# Patient Record
Sex: FEMALE | Race: WHITE | NOT HISPANIC OR LATINO | Employment: UNEMPLOYED | URBAN - METROPOLITAN AREA
[De-identification: names, ages, dates, MRNs, and addresses within clinical notes are randomized per-mention and may not be internally consistent; named-entity substitution may affect disease eponyms.]

---

## 2020-02-10 LAB — ALCOHOL URINE: <10 MG/DL

## 2020-02-14 LAB
BUPRENORPHINE GLUCURONIDE URINE: <5 NG/ML
BUPRENORPHINE URINE: <2 NG/ML
N-DESMETHYLTAPENTADOL, URINE: <25 NG/ML
NALOXONE URINE: <100 NG/ML
NORBUPRENORPHINE GLUCURONIDE URINE: <5 NG/ML
NORBUPRENORPHINE, URINE: <2 NG/ML
TAPENTADOL, URINE: <25 NG/ML

## 2020-02-19 LAB
6-ACETYLMORPHINE, UR: <25 NG/ML
7-AMINOCLONAZEPAM, URINE: <25 NG/ML
ALPHA-HYDROXYALPRAZOLAM, URINE: <25 NG/ML
ALPHA-HYDROXYMIDAZOLAM, URINE: <25 NG/ML
ALPRAZOLAM URINE QUANT: <25 NG/ML
AMPHETAMINE SCREEN, URINE: ABNORMAL
BARBITURATE SCREEN, URINE: ABNORMAL
CANNABINOID SCREEN URINE: ABNORMAL
CHLORDIAZEPOXIDE, URINE: <25 NG/ML
CLONAZEPAM, URINE CONFIRM: <25 NG/ML
COCAINE METABOLITE SCREEN URINE: ABNORMAL
CODEINE URINE: <50 NG/ML
CREAT SERPL-MCNC: 254.3 MG/DL
DIAZEPAM URINE QUANT: <25 NG/ML
EDDP, URINE: <25 NG/ML
FENTANYL, UR CONF: <2.5 NG/ML
HYDROCODONE, URINE: 1070 NG/ML
HYDROMORPHONE, URINE: 280 NG/ML
LORAZEPAM URINE QUANT: <25 NG/ML
Lab: ABNORMAL
METHADONE, URINE: <25 NG/ML
MIDAZOLAM URINE QUANT: <25 NG/ML
MORPHINE URINE: <50 NG/ML
NORDIAZEPAM URINE QUANT: <25 NG/ML
NORFENTANYL, UR CONF: <2.5 NG/ML
NORHYDROCODONE, URINE: 876 NG/ML
NOROXYCODONE, URINE: <25 NG/ML
O-DESMETHYLTRAMADOL,UR: <50 NG/ML
OXAZEPAM URINE QUANT: <25 NG/ML
OXYCODONE URINE: <25 NG/ML
OXYMORPHONE, URINE: <25 NG/ML
PHENCYCLIDINE SCREEN URINE: ABNORMAL
TEMAZEPAM URINE QUANT: <25 NG/ML
TRAMADOL, UR GC/MS CONF: <50 NG/ML
ZOLPIDEM METABOLITE (ZCA), URINE: <25 NG/ML
ZOLPIDEM, URINE: <25 NG/ML

## 2023-02-21 LAB
ESTIMATED AVERAGE GLUCOSE FOR HBA1C: 85 MG/DL
HEMOGLOBIN A1C/HEMOGLOBIN TOTAL IN BLOOD: 4.6 %

## 2023-02-22 LAB
CALCIDIOL (25 OH VITAMIN D3) (NG/ML) IN SER/PLAS: 24 NG/ML
CORTISOL (UG/DL) IN SERUM - AM: 5.8 UG/DL (ref 5–20)
CORTISOL UR FREE PER VOLUME: NORMAL
CORTISOL, URINE FREE - PER 24H: NORMAL
CORTISOL, URINE INTERPRETATION: NORMAL
CORTISOL/CREATININE RATIO: NORMAL
CREATININE, URINE - PER VOLUME: NORMAL
HOURS COLLECTED (ARUP): NORMAL
THYROTROPIN (MIU/L) IN SER/PLAS BY DETECTION LIMIT <= 0.05 MIU/L: 0.82 MIU/L (ref 0.44–3.98)
THYROXINE (T4) FREE (NG/DL) IN SER/PLAS: 0.97 NG/DL (ref 0.78–1.48)
TOTAL VOLUME (ARUP): NORMAL

## 2023-03-07 LAB — CHROMOGRANIN A, LC/MS/MS: 192 NG/ML

## 2023-03-10 LAB
ALANINE AMINOTRANSFERASE (SGPT) (U/L) IN SER/PLAS: 19 U/L (ref 7–45)
ALBUMIN (G/DL) IN SER/PLAS: 4.3 G/DL (ref 3.4–5)
ALKALINE PHOSPHATASE (U/L) IN SER/PLAS: 44 U/L (ref 33–110)
ANION GAP IN SER/PLAS: 13 MMOL/L (ref 10–20)
ASPARTATE AMINOTRANSFERASE (SGOT) (U/L) IN SER/PLAS: 18 U/L (ref 9–39)
BILIRUBIN TOTAL (MG/DL) IN SER/PLAS: 0.5 MG/DL (ref 0–1.2)
C REACTIVE PROTEIN (MG/L) IN SER/PLAS: 0.17 MG/DL
CALCIUM (MG/DL) IN SER/PLAS: 9 MG/DL (ref 8.6–10.3)
CARBON DIOXIDE, TOTAL (MMOL/L) IN SER/PLAS: 25 MMOL/L (ref 21–32)
CHLORIDE (MMOL/L) IN SER/PLAS: 101 MMOL/L (ref 98–107)
CHOLESTEROL (MG/DL) IN SER/PLAS: 214 MG/DL (ref 0–199)
CHOLESTEROL IN HDL (MG/DL) IN SER/PLAS: 41.3 MG/DL
CHOLESTEROL/HDL RATIO: 5.2
CREATININE (MG/DL) IN SER/PLAS: 0.6 MG/DL (ref 0.5–1.05)
ERYTHROCYTE DISTRIBUTION WIDTH (RATIO) BY AUTOMATED COUNT: 14.4 % (ref 11.5–14.5)
ERYTHROCYTE MEAN CORPUSCULAR HEMOGLOBIN CONCENTRATION (G/DL) BY AUTOMATED: 33.4 G/DL (ref 32–36)
ERYTHROCYTE MEAN CORPUSCULAR VOLUME (FL) BY AUTOMATED COUNT: 94 FL (ref 80–100)
ERYTHROCYTES (10*6/UL) IN BLOOD BY AUTOMATED COUNT: 3.9 X10E12/L (ref 4–5.2)
GFR FEMALE: >90 ML/MIN/1.73M2
GLUCOSE (MG/DL) IN SER/PLAS: 80 MG/DL (ref 74–99)
HEMATOCRIT (%) IN BLOOD BY AUTOMATED COUNT: 36.8 % (ref 36–46)
HEMOGLOBIN (G/DL) IN BLOOD: 12.3 G/DL (ref 12–16)
LDL: 121 MG/DL (ref 0–99)
LEUKOCYTES (10*3/UL) IN BLOOD BY AUTOMATED COUNT: 5.4 X10E9/L (ref 4.4–11.3)
NON HDL CHOLESTEROL: 173 MG/DL
PLATELETS (10*3/UL) IN BLOOD AUTOMATED COUNT: 287 X10E9/L (ref 150–450)
POTASSIUM (MMOL/L) IN SER/PLAS: 4.1 MMOL/L (ref 3.5–5.3)
PROTEIN TOTAL: 6.9 G/DL (ref 6.4–8.2)
SEDIMENTATION RATE, ERYTHROCYTE: 6 MM/H (ref 0–20)
SODIUM (MMOL/L) IN SER/PLAS: 135 MMOL/L (ref 136–145)
TRIGLYCERIDE (MG/DL) IN SER/PLAS: 261 MG/DL (ref 0–149)
URATE (MG/DL) IN SER/PLAS: 3.3 MG/DL (ref 2.3–6.7)
UREA NITROGEN (MG/DL) IN SER/PLAS: 12 MG/DL (ref 6–23)
VLDL: 52 MG/DL (ref 0–40)

## 2023-04-12 ENCOUNTER — TELEMEDICINE (OUTPATIENT)
Dept: PRIMARY CARE | Facility: CLINIC | Age: 43
End: 2023-04-12
Payer: COMMERCIAL

## 2023-04-12 DIAGNOSIS — F41.9 ANXIETY: ICD-10-CM

## 2023-04-12 DIAGNOSIS — F33.9 EPISODE OF RECURRENT MAJOR DEPRESSIVE DISORDER, UNSPECIFIED DEPRESSION EPISODE SEVERITY (CMS-HCC): Primary | ICD-10-CM

## 2023-04-12 PROBLEM — J30.9 ALLERGIC RHINITIS: Status: ACTIVE | Noted: 2023-04-12

## 2023-04-12 PROBLEM — R10.32 CONTINUOUS LLQ ABDOMINAL PAIN: Status: ACTIVE | Noted: 2023-04-12

## 2023-04-12 PROBLEM — R09.89 LABILE HYPERTENSION: Status: ACTIVE | Noted: 2023-04-12

## 2023-04-12 PROBLEM — K52.9 BOWEL DISEASE, INFLAMMATORY: Status: ACTIVE | Noted: 2023-04-12

## 2023-04-12 PROBLEM — G90.A POTS (POSTURAL ORTHOSTATIC TACHYCARDIA SYNDROME): Status: ACTIVE | Noted: 2023-04-12

## 2023-04-12 PROBLEM — G24.1: Status: ACTIVE | Noted: 2023-04-12

## 2023-04-12 PROBLEM — R73.03 PREDIABETES: Status: ACTIVE | Noted: 2023-04-12

## 2023-04-12 PROBLEM — D84.9: Status: ACTIVE | Noted: 2019-08-13

## 2023-04-12 PROBLEM — E03.9 HYPOTHYROIDISM (ACQUIRED): Status: ACTIVE | Noted: 2023-04-12

## 2023-04-12 PROBLEM — M10.9 GOUTY ARTHROPATHY: Status: ACTIVE | Noted: 2023-04-12

## 2023-04-12 PROBLEM — A69.20 LYME DISEASE: Status: ACTIVE | Noted: 2023-04-12

## 2023-04-12 PROBLEM — L40.50 PSORIATIC ARTHROPATHY (MULTI): Status: ACTIVE | Noted: 2023-04-12

## 2023-04-12 PROBLEM — G47.10 HYPERSOMNIA: Status: ACTIVE | Noted: 2023-04-12

## 2023-04-12 PROBLEM — J45.909: Status: ACTIVE | Noted: 2023-04-12

## 2023-04-12 PROBLEM — R23.2 HOT FLASHES: Status: ACTIVE | Noted: 2023-04-12

## 2023-04-12 PROBLEM — E53.8 VITAMIN B12 DEFICIENCY: Status: ACTIVE | Noted: 2023-04-12

## 2023-04-12 PROBLEM — R79.89 LOW VITAMIN D LEVEL: Status: ACTIVE | Noted: 2023-04-12

## 2023-04-12 PROBLEM — D89.89 MANNOSE-BINDING LECTIN DEFICIENCY (MULTI): Status: ACTIVE | Noted: 2023-04-12

## 2023-04-12 PROBLEM — F32.A DEPRESSION: Status: ACTIVE | Noted: 2023-04-12

## 2023-04-12 PROBLEM — B02.29 POST HERPETIC NEURALGIA: Status: ACTIVE | Noted: 2023-04-12

## 2023-04-12 PROBLEM — N39.46 MIXED INCONTINENCE URGE AND STRESS: Status: ACTIVE | Noted: 2023-04-12

## 2023-04-12 PROBLEM — F19.939: Status: ACTIVE | Noted: 2019-08-13

## 2023-04-12 PROBLEM — K21.9 GERD (GASTROESOPHAGEAL REFLUX DISEASE): Status: ACTIVE | Noted: 2023-04-12

## 2023-04-12 PROBLEM — M81.0 OSTEOPOROSIS: Status: ACTIVE | Noted: 2023-04-12

## 2023-04-12 PROBLEM — M25.50 ARTHRALGIA OF MULTIPLE SITES: Status: ACTIVE | Noted: 2023-04-12

## 2023-04-12 PROBLEM — G43.909 MIGRAINE WITHOUT STATUS MIGRAINOSUS, NOT INTRACTABLE: Status: ACTIVE | Noted: 2023-04-12

## 2023-04-12 PROBLEM — Z98.84 BARIATRIC SURGERY STATUS: Status: ACTIVE | Noted: 2023-04-12

## 2023-04-12 PROBLEM — R49.0 HOARSENESS: Status: ACTIVE | Noted: 2023-04-12

## 2023-04-12 PROBLEM — K52.9 CHRONIC DIARRHEA: Status: ACTIVE | Noted: 2023-04-12

## 2023-04-12 PROBLEM — G47.33 OBSTRUCTIVE SLEEP APNEA, ADULT: Status: ACTIVE | Noted: 2023-04-12

## 2023-04-12 PROBLEM — M19.90 INFLAMMATORY ARTHROPATHY: Status: ACTIVE | Noted: 2023-04-12

## 2023-04-12 PROBLEM — E78.1 HYPERTRIGLYCERIDEMIA: Status: ACTIVE | Noted: 2023-04-12

## 2023-04-12 PROBLEM — F11.90 METHADONE USE: Status: ACTIVE | Noted: 2023-04-12

## 2023-04-12 PROBLEM — E78.49 ESSENTIAL FAMILIAL HYPERLIPIDEMIA: Status: ACTIVE | Noted: 2023-04-12

## 2023-04-12 PROBLEM — E66.01 MORBID OBESITY (MULTI): Status: ACTIVE | Noted: 2023-04-12

## 2023-04-12 PROBLEM — R63.2 POLYPHAGIA: Status: ACTIVE | Noted: 2023-04-12

## 2023-04-12 PROCEDURE — 99442 PR PHYS/QHP TELEPHONE EVALUATION 11-20 MIN: CPT | Performed by: NURSE PRACTITIONER

## 2023-04-12 RX ORDER — ALBUTEROL SULFATE 90 UG/1
AEROSOL, METERED RESPIRATORY (INHALATION)
COMMUNITY
Start: 2015-04-13

## 2023-04-12 RX ORDER — ICOSAPENT ETHYL 1 G/1
2 CAPSULE ORAL
COMMUNITY
Start: 2018-01-26 | End: 2024-01-04 | Stop reason: ALTCHOICE

## 2023-04-12 RX ORDER — ORPHENADRINE CITRATE 100 MG/1
1 TABLET, EXTENDED RELEASE ORAL 2 TIMES DAILY PRN
COMMUNITY
Start: 2019-07-22 | End: 2023-10-05 | Stop reason: SDUPTHER

## 2023-04-12 RX ORDER — PREGABALIN 75 MG/1
75 CAPSULE ORAL 3 TIMES DAILY
COMMUNITY
Start: 2020-05-26 | End: 2023-10-05 | Stop reason: SDUPTHER

## 2023-04-12 RX ORDER — ALLOPURINOL 300 MG/1
300 TABLET ORAL DAILY
COMMUNITY
Start: 2020-04-02 | End: 2024-01-26 | Stop reason: SDUPTHER

## 2023-04-12 RX ORDER — ERGOCALCIFEROL 1.25 MG/1
50000 CAPSULE ORAL
COMMUNITY
Start: 2019-06-19

## 2023-04-12 RX ORDER — EPLERENONE 25 MG/1
1 TABLET, FILM COATED ORAL DAILY
COMMUNITY
Start: 2021-04-12

## 2023-04-12 RX ORDER — EVOLOCUMAB 140 MG/ML
140 INJECTION, SOLUTION SUBCUTANEOUS
COMMUNITY
Start: 2020-01-28 | End: 2024-03-06 | Stop reason: SDUPTHER

## 2023-04-12 RX ORDER — COLCHICINE 0.6 MG/1
1 TABLET ORAL EVERY OTHER DAY
COMMUNITY
Start: 2020-04-02 | End: 2023-11-14 | Stop reason: SDUPTHER

## 2023-04-12 RX ORDER — BUSPIRONE HYDROCHLORIDE 15 MG/1
1 TABLET ORAL 2 TIMES DAILY
COMMUNITY
Start: 2022-11-22 | End: 2023-09-19 | Stop reason: SDUPTHER

## 2023-04-12 RX ORDER — AMLODIPINE BESYLATE 2.5 MG/1
2.5 TABLET ORAL DAILY
COMMUNITY
Start: 2021-06-16

## 2023-04-12 RX ORDER — VENLAFAXINE HYDROCHLORIDE 75 MG/1
75 CAPSULE, EXTENDED RELEASE ORAL DAILY
COMMUNITY
Start: 2019-06-21 | End: 2023-10-05 | Stop reason: ALTCHOICE

## 2023-04-12 RX ORDER — ONDANSETRON 4 MG/1
4 TABLET, ORALLY DISINTEGRATING ORAL EVERY 8 HOURS PRN
COMMUNITY
Start: 2018-02-19 | End: 2023-07-27 | Stop reason: SDUPTHER

## 2023-04-12 RX ORDER — ALLOPURINOL 100 MG/1
100 TABLET ORAL DAILY
COMMUNITY
Start: 2020-11-03 | End: 2024-01-26 | Stop reason: SDUPTHER

## 2023-04-12 RX ORDER — CLONIDINE HYDROCHLORIDE 0.1 MG/1
0.1 TABLET ORAL DAILY
COMMUNITY
Start: 2020-07-02

## 2023-04-12 RX ORDER — LEFLUNOMIDE 10 MG/1
10 TABLET ORAL DAILY
COMMUNITY
Start: 2021-07-06 | End: 2023-11-14 | Stop reason: SDUPTHER

## 2023-04-12 RX ORDER — MONTELUKAST SODIUM 10 MG/1
10 TABLET ORAL NIGHTLY
COMMUNITY
Start: 2023-01-17

## 2023-04-12 RX ORDER — TAPENTADOL HYDROCHLORIDE 50 MG/1
1 TABLET, FILM COATED ORAL EVERY 6 HOURS
COMMUNITY
Start: 2021-10-04 | End: 2023-10-05 | Stop reason: SDUPTHER

## 2023-04-12 RX ORDER — CARVEDILOL 25 MG/1
1 TABLET ORAL
COMMUNITY
Start: 2020-04-02

## 2023-04-12 RX ORDER — MELOXICAM 15 MG/1
1 TABLET ORAL DAILY
COMMUNITY
End: 2023-10-05 | Stop reason: SDUPTHER

## 2023-04-12 RX ORDER — VENLAFAXINE HYDROCHLORIDE 150 MG/1
150 TABLET, EXTENDED RELEASE ORAL DAILY
COMMUNITY
End: 2023-10-05 | Stop reason: ALTCHOICE

## 2023-04-12 RX ORDER — BACLOFEN 10 MG/1
TABLET ORAL
COMMUNITY
Start: 2014-05-06 | End: 2023-10-05 | Stop reason: ALTCHOICE

## 2023-04-12 RX ORDER — OLMESARTAN MEDOXOMIL 40 MG/1
40 TABLET ORAL DAILY
COMMUNITY
Start: 2019-05-14

## 2023-04-12 RX ORDER — CARBIDOPA, LEVODOPA AND ENTACAPONE 50; 200; 200 MG/1; MG/1; MG/1
1 TABLET, FILM COATED ORAL EVERY 4 HOURS
COMMUNITY
Start: 2014-02-25 | End: 2023-06-13 | Stop reason: SDUPTHER

## 2023-04-12 RX ORDER — MULTIVIT-MIN/IRON/FOLIC ACID/K 18-600-40
1 CAPSULE ORAL DAILY
COMMUNITY
Start: 2022-02-10

## 2023-04-12 RX ORDER — BENZTROPINE MESYLATE 2 MG/1
2 TABLET ORAL DAILY
COMMUNITY
Start: 2015-06-19

## 2023-04-12 RX ORDER — ALENDRONATE SODIUM 70 MG/1
1 TABLET ORAL WEEKLY
COMMUNITY
Start: 2019-11-04 | End: 2024-04-25 | Stop reason: WASHOUT

## 2023-04-12 RX ORDER — METOPROLOL SUCCINATE 25 MG/1
25 TABLET, EXTENDED RELEASE ORAL DAILY
COMMUNITY
Start: 2022-12-27 | End: 2023-10-05 | Stop reason: ALTCHOICE

## 2023-04-12 RX ORDER — BENZOYL PEROXIDE 100 MG/ML
LIQUID TOPICAL
COMMUNITY
Start: 2020-06-16

## 2023-04-12 RX ORDER — TIOTROPIUM BROMIDE INHALATION SPRAY 1.56 UG/1
SPRAY, METERED RESPIRATORY (INHALATION)
COMMUNITY
Start: 2019-07-23

## 2023-04-12 RX ORDER — SECUKINUMAB 150 MG/ML
INJECTION SUBCUTANEOUS
COMMUNITY
Start: 2022-07-29 | End: 2023-10-19 | Stop reason: SDUPTHER

## 2023-04-12 RX ORDER — FLUTICASONE FUROATE AND VILANTEROL 100; 25 UG/1; UG/1
1 POWDER RESPIRATORY (INHALATION) DAILY
COMMUNITY
Start: 2018-11-27 | End: 2024-04-25 | Stop reason: WASHOUT

## 2023-04-12 RX ORDER — SUMATRIPTAN SUCCINATE 25 MG/1
TABLET ORAL
COMMUNITY
Start: 2020-06-08 | End: 2023-09-05 | Stop reason: SDUPTHER

## 2023-04-12 RX ORDER — PIOGLITAZONEHYDROCHLORIDE 15 MG/1
15 TABLET ORAL DAILY
COMMUNITY
Start: 2021-04-13

## 2023-04-12 RX ORDER — BUPROPION HYDROCHLORIDE 450 MG/1
450 TABLET, FILM COATED, EXTENDED RELEASE ORAL DAILY
COMMUNITY
Start: 2021-12-19 | End: 2023-06-13 | Stop reason: SDUPTHER

## 2023-04-12 RX ORDER — AMOXICILLIN 500 MG/1
500 CAPSULE ORAL EVERY 12 HOURS SCHEDULED
COMMUNITY
Start: 2023-02-17

## 2023-04-12 NOTE — PROGRESS NOTES
Subjective   Patient ID: Nichelle Izaguirre is a 42 y.o. female who presents for medication fuv (0 concerns).    HPI     She states that her mood is up and down. She has been feeling very emotional, crying easily, and then can feel happy the next moment. Relates a lot of her stress/anxiety to dealing with the stressors of moving her mother-in-law who has dementia into a nursing home. States she feels that the weight of the whole world is on her shoulders and feels very overwhelmed. She has been having trouble sleeping, even with taking 2 benadryl at night. She denies SI or HI. She is compliant with her medications but mentions she is taking buspirone just once per day (did not realize it was supposed to be BID). She tried to get scheduled with the access clinic but they were unable to locate the referral.       Review of Systems  ROS negative except as noted above in HPI.       Objective   There were no vitals taken for this visit.    Physical Exam  A full physical exam was unable to be completed due to the limitations of the telehealth visit, but those observed are noted below.     Constitutional: Alert and in no acute distress  Pulmonary: No respiratory distress  Neurologic: Normal cortical function  Psychiatric: Normal judgment and insight. Normal mood and affect      Assessment/Plan   Anxiety and depression  - Uncontrolled  - Start taking buspirone 15 mg BID as prescribed  - Continue bupropion 450 mg every day and venlafaxine 225 mg every day  - New referral placed for access clinic  - Encouraged counseling, patient states she does not have time    RTC after access clinic appt    Reviewed and approved by STEPHON CAMPOS on 4/12/23 at 3:25 PM.

## 2023-06-13 DIAGNOSIS — F32.A DEPRESSION, UNSPECIFIED DEPRESSION TYPE: Primary | ICD-10-CM

## 2023-06-13 DIAGNOSIS — G20.C PARKINSONISM, UNSPECIFIED PARKINSONISM TYPE (MULTI): ICD-10-CM

## 2023-06-13 RX ORDER — CARBIDOPA, LEVODOPA AND ENTACAPONE 50; 200; 200 MG/1; MG/1; MG/1
1 TABLET, FILM COATED ORAL EVERY 4 HOURS
Qty: 540 TABLET | Refills: 1 | Status: SHIPPED | OUTPATIENT
Start: 2023-06-13 | End: 2024-04-26 | Stop reason: SDUPTHER

## 2023-06-13 RX ORDER — BUPROPION HYDROCHLORIDE 450 MG/1
450 TABLET, FILM COATED, EXTENDED RELEASE ORAL DAILY
Qty: 90 TABLET | Refills: 1 | Status: SHIPPED | OUTPATIENT
Start: 2023-06-13 | End: 2023-10-11

## 2023-06-16 ENCOUNTER — LAB (OUTPATIENT)
Dept: LAB | Facility: LAB | Age: 43
End: 2023-06-16
Payer: COMMERCIAL

## 2023-06-16 ENCOUNTER — OFFICE VISIT (OUTPATIENT)
Dept: PRIMARY CARE | Facility: CLINIC | Age: 43
End: 2023-06-16
Payer: COMMERCIAL

## 2023-06-16 VITALS
SYSTOLIC BLOOD PRESSURE: 119 MMHG | HEART RATE: 75 BPM | HEIGHT: 67 IN | DIASTOLIC BLOOD PRESSURE: 86 MMHG | BODY MASS INDEX: 31.11 KG/M2 | OXYGEN SATURATION: 99 % | WEIGHT: 198.2 LBS

## 2023-06-16 DIAGNOSIS — R10.9 ACUTE RIGHT FLANK PAIN: Primary | ICD-10-CM

## 2023-06-16 DIAGNOSIS — R10.31 RLQ ABDOMINAL PAIN: ICD-10-CM

## 2023-06-16 DIAGNOSIS — R82.90 ABNORMAL URINALYSIS: ICD-10-CM

## 2023-06-16 DIAGNOSIS — R10.9 ACUTE RIGHT FLANK PAIN: ICD-10-CM

## 2023-06-16 LAB
POC APPEARANCE, URINE: CLEAR
POC BILIRUBIN, URINE: ABNORMAL
POC BLOOD, URINE: ABNORMAL
POC COLOR, URINE: ABNORMAL
POC GLUCOSE, URINE: NEGATIVE MG/DL
POC KETONES, URINE: NEGATIVE MG/DL
POC LEUKOCYTES, URINE: ABNORMAL
POC NITRITE,URINE: NEGATIVE
POC PH, URINE: 6 PH
POC PROTEIN, URINE: ABNORMAL MG/DL
POC SPECIFIC GRAVITY, URINE: >=1.03
POC UROBILINOGEN, URINE: 0.2 EU/DL

## 2023-06-16 PROCEDURE — 36415 COLL VENOUS BLD VENIPUNCTURE: CPT

## 2023-06-16 PROCEDURE — 3074F SYST BP LT 130 MM HG: CPT | Performed by: NURSE PRACTITIONER

## 2023-06-16 PROCEDURE — 99214 OFFICE O/P EST MOD 30 MIN: CPT | Performed by: NURSE PRACTITIONER

## 2023-06-16 PROCEDURE — 81003 URINALYSIS AUTO W/O SCOPE: CPT | Performed by: NURSE PRACTITIONER

## 2023-06-16 PROCEDURE — 1036F TOBACCO NON-USER: CPT | Performed by: NURSE PRACTITIONER

## 2023-06-16 PROCEDURE — 3079F DIAST BP 80-89 MM HG: CPT | Performed by: NURSE PRACTITIONER

## 2023-06-16 PROCEDURE — 80069 RENAL FUNCTION PANEL: CPT

## 2023-06-16 PROCEDURE — 87086 URINE CULTURE/COLONY COUNT: CPT

## 2023-06-16 RX ORDER — NEBULIZER AND COMPRESSOR
EACH MISCELLANEOUS
COMMUNITY
Start: 2017-05-05

## 2023-06-16 RX ORDER — ALPRAZOLAM 0.5 MG/1
1 TABLET ORAL 3 TIMES DAILY PRN
COMMUNITY
Start: 2023-05-02 | End: 2024-03-25 | Stop reason: ALTCHOICE

## 2023-06-16 NOTE — PROGRESS NOTES
"Subjective   Patient ID: Nichelle Izaguirre is a 43 y.o. female who presents for Abdominal Pain (Around 6/1/23 pt has had pain on R side of stomach. Pt has tried OTC meds and heating pad with no relief. ).    HPI     Reports 2 week history of right sided lower quadrant pain. The pain is constant but comes and goes in intensity. Describes the pain as sharp. States when it gets intense, it has dropped her to her knees.     Denies any  fevers, chills, body aches, nausea, vomiting, diarrhea, constipation, melena, abnormal vaginal bleeding or discharge, or dysuria. Reports urinary urgency and frequency but states this is normal for her. Has been taking ibuprofen and tylenol without relief.     States she had this last month but it lasted only 4-5 days.     Review of Systems  ROS negative except as noted above in HPI.       Objective   /86   Pulse 75   Ht 1.702 m (5' 7\")   Wt 89.9 kg (198 lb 3.2 oz)   SpO2 99%   BMI 31.04 kg/m²     Physical Exam  General: Alert and oriented, in no acute distress. Appears stated age, well-nourished, and well hydrated  HEENT:  - Head: Normocephalic and atraumatic   - Eyes: EOMI, PERRLA  - ENT: Hearing grossly intact  Heart: RRR. No murmurs, clicks, or rubs  Lungs: Unlabored breathing. CTAB with no crackles, wheezes, or rhonchi  Abdomen: Normal BS in all 4 quadrants. TTP of RLQ. No rebound tenderness. Soft, non-distended, with no masses. R CVA tenderness  Extremities: Warm and well perfused. No edema. Normal peripheral pulses  Musculoskeletal: Normal gait and station  Neurological: Alert and oriented. No gross neurological deficits  Psychological: Appropriate mood and affect  Skin: No rash, abnormal lesions, cyanosis, or erythema      Assessment/Plan   RLQ pain/R flank pain  - IO UA mod blood, trace leuks  - Send urine for culture  - Obtain CT abd/pelvis w/wo contrast to rule out appendicitis, kidney stones  - Tylenol prn pain  - Increase PO fluid intake    Sandra Lilly, " NAYELI-CNP  [facility]

## 2023-06-17 LAB
ALBUMIN (G/DL) IN SER/PLAS: 4.3 G/DL (ref 3.4–5)
ANION GAP IN SER/PLAS: 13 MMOL/L (ref 10–20)
CALCIUM (MG/DL) IN SER/PLAS: 9.6 MG/DL (ref 8.6–10.6)
CARBON DIOXIDE, TOTAL (MMOL/L) IN SER/PLAS: 27 MMOL/L (ref 21–32)
CHLORIDE (MMOL/L) IN SER/PLAS: 103 MMOL/L (ref 98–107)
CREATININE (MG/DL) IN SER/PLAS: 0.77 MG/DL (ref 0.5–1.05)
GFR FEMALE: >90 ML/MIN/1.73M2
GLUCOSE (MG/DL) IN SER/PLAS: 99 MG/DL (ref 74–99)
PHOSPHATE (MG/DL) IN SER/PLAS: 3.6 MG/DL (ref 2.5–4.9)
POTASSIUM (MMOL/L) IN SER/PLAS: 3.9 MMOL/L (ref 3.5–5.3)
SODIUM (MMOL/L) IN SER/PLAS: 139 MMOL/L (ref 136–145)
UREA NITROGEN (MG/DL) IN SER/PLAS: 15 MG/DL (ref 6–23)

## 2023-06-18 LAB — URINE CULTURE: NORMAL

## 2023-06-22 DIAGNOSIS — R31.9 HEMATURIA, UNSPECIFIED TYPE: Primary | ICD-10-CM

## 2023-06-22 NOTE — PROGRESS NOTES
Called patient re: CT abd/pelvis. Nothing to really explain her pain. With the hematuria found, recommend that she see a nephrology. Patient agreeable.

## 2023-07-05 DIAGNOSIS — K21.9 GASTROESOPHAGEAL REFLUX DISEASE WITHOUT ESOPHAGITIS: Primary | ICD-10-CM

## 2023-07-05 RX ORDER — OMEPRAZOLE 40 MG/1
40 CAPSULE, DELAYED RELEASE ORAL
Qty: 90 CAPSULE | Refills: 3 | Status: SHIPPED | OUTPATIENT
Start: 2023-07-05 | End: 2024-06-29

## 2023-07-05 RX ORDER — OMEPRAZOLE 40 MG/1
40 CAPSULE, DELAYED RELEASE ORAL
COMMUNITY
Start: 2023-01-23 | End: 2023-07-05 | Stop reason: SDUPTHER

## 2023-07-14 DIAGNOSIS — Z12.31 ENCOUNTER FOR SCREENING MAMMOGRAM FOR MALIGNANT NEOPLASM OF BREAST: ICD-10-CM

## 2023-07-27 DIAGNOSIS — R11.2 NAUSEA AND VOMITING, UNSPECIFIED VOMITING TYPE: Primary | ICD-10-CM

## 2023-07-27 RX ORDER — ONDANSETRON 4 MG/1
4 TABLET, ORALLY DISINTEGRATING ORAL EVERY 8 HOURS PRN
Qty: 30 TABLET | Refills: 0 | Status: SHIPPED | OUTPATIENT
Start: 2023-07-27 | End: 2023-09-05 | Stop reason: SDUPTHER

## 2023-08-14 LAB
ALANINE AMINOTRANSFERASE (SGPT) (U/L) IN SER/PLAS: 10 U/L (ref 7–45)
ALBUMIN (G/DL) IN SER/PLAS: 4.1 G/DL (ref 3.4–5)
ALKALINE PHOSPHATASE (U/L) IN SER/PLAS: 54 U/L (ref 33–110)
ASPARTATE AMINOTRANSFERASE (SGOT) (U/L) IN SER/PLAS: 13 U/L (ref 9–39)
BILIRUBIN DIRECT (MG/DL) IN SER/PLAS: 0 MG/DL (ref 0–0.3)
BILIRUBIN TOTAL (MG/DL) IN SER/PLAS: 0.3 MG/DL (ref 0–1.2)
C REACTIVE PROTEIN (MG/L) IN SER/PLAS: 0.12 MG/DL
ERYTHROCYTE DISTRIBUTION WIDTH (RATIO) BY AUTOMATED COUNT: 13.2 % (ref 11.5–14.5)
ERYTHROCYTE MEAN CORPUSCULAR HEMOGLOBIN CONCENTRATION (G/DL) BY AUTOMATED: 32.7 G/DL (ref 32–36)
ERYTHROCYTE MEAN CORPUSCULAR VOLUME (FL) BY AUTOMATED COUNT: 96 FL (ref 80–100)
ERYTHROCYTES (10*6/UL) IN BLOOD BY AUTOMATED COUNT: 3.82 X10E12/L (ref 4–5.2)
HEMATOCRIT (%) IN BLOOD BY AUTOMATED COUNT: 36.7 % (ref 36–46)
HEMOGLOBIN (G/DL) IN BLOOD: 12 G/DL (ref 12–16)
LEUKOCYTES (10*3/UL) IN BLOOD BY AUTOMATED COUNT: 4.5 X10E9/L (ref 4.4–11.3)
NRBC (PER 100 WBCS) BY AUTOMATED COUNT: 0 /100 WBC (ref 0–0)
PLATELETS (10*3/UL) IN BLOOD AUTOMATED COUNT: 301 X10E9/L (ref 150–450)
PROTEIN TOTAL: 6.9 G/DL (ref 6.4–8.2)
SEDIMENTATION RATE, ERYTHROCYTE: 6 MM/H (ref 0–20)
URATE (MG/DL) IN SER/PLAS: 5.6 MG/DL (ref 2.3–6.7)

## 2023-08-24 LAB
BASOPHILS (10*3/UL) IN BLOOD BY AUTOMATED COUNT: 0.02 X10E9/L (ref 0–0.1)
BASOPHILS/100 LEUKOCYTES IN BLOOD BY AUTOMATED COUNT: 0.3 % (ref 0–2)
EOSINOPHILS (10*3/UL) IN BLOOD BY AUTOMATED COUNT: 0 X10E9/L (ref 0–0.7)
EOSINOPHILS/100 LEUKOCYTES IN BLOOD BY AUTOMATED COUNT: 0 % (ref 0–6)
ERYTHROCYTE DISTRIBUTION WIDTH (RATIO) BY AUTOMATED COUNT: 13.2 % (ref 11.5–14.5)
ERYTHROCYTE MEAN CORPUSCULAR HEMOGLOBIN CONCENTRATION (G/DL) BY AUTOMATED: 31.5 G/DL (ref 32–36)
ERYTHROCYTE MEAN CORPUSCULAR VOLUME (FL) BY AUTOMATED COUNT: 98 FL (ref 80–100)
ERYTHROCYTES (10*6/UL) IN BLOOD BY AUTOMATED COUNT: 3.83 X10E12/L (ref 4–5.2)
HEMATOCRIT (%) IN BLOOD BY AUTOMATED COUNT: 37.5 % (ref 36–46)
HEMOGLOBIN (G/DL) IN BLOOD: 11.8 G/DL (ref 12–16)
IGA (MG/DL) IN SER/PLAS: 154 MG/DL (ref 70–400)
IGG (MG/DL) IN SER/PLAS: 1190 MG/DL (ref 700–1600)
IGM (MG/DL) IN SER/PLAS: 47 MG/DL (ref 40–230)
IMMATURE GRANULOCYTES/100 LEUKOCYTES IN BLOOD BY AUTOMATED COUNT: 0 % (ref 0–0.9)
LEUKOCYTES (10*3/UL) IN BLOOD BY AUTOMATED COUNT: 5.9 X10E9/L (ref 4.4–11.3)
LYMPHOCYTES (10*3/UL) IN BLOOD BY AUTOMATED COUNT: 2.28 X10E9/L (ref 1.2–4.8)
LYMPHOCYTES/100 LEUKOCYTES IN BLOOD BY AUTOMATED COUNT: 38.7 % (ref 13–44)
MONOCYTES (10*3/UL) IN BLOOD BY AUTOMATED COUNT: 0.53 X10E9/L (ref 0.1–1)
MONOCYTES/100 LEUKOCYTES IN BLOOD BY AUTOMATED COUNT: 9 % (ref 2–10)
NEUTROPHILS (10*3/UL) IN BLOOD BY AUTOMATED COUNT: 3.06 X10E9/L (ref 1.2–7.7)
NEUTROPHILS/100 LEUKOCYTES IN BLOOD BY AUTOMATED COUNT: 52 % (ref 40–80)
NRBC (PER 100 WBCS) BY AUTOMATED COUNT: 0 /100 WBC (ref 0–0)
PLATELETS (10*3/UL) IN BLOOD AUTOMATED COUNT: 325 X10E9/L (ref 150–450)

## 2023-08-28 PROBLEM — G62.9 PERIPHERAL NEUROPATHY: Status: ACTIVE | Noted: 2023-08-28

## 2023-08-28 PROBLEM — N28.9 RENAL LESION: Status: ACTIVE | Noted: 2023-08-28

## 2023-08-28 PROBLEM — R42 POSTURAL DIZZINESS: Status: ACTIVE | Noted: 2023-08-28

## 2023-08-28 PROBLEM — M53.3 SACROILIAC JOINT DYSFUNCTION OF RIGHT SIDE: Status: ACTIVE | Noted: 2023-08-28

## 2023-08-28 PROBLEM — N81.89 PELVIC FLOOR WEAKNESS: Status: ACTIVE | Noted: 2023-08-28

## 2023-08-28 PROBLEM — R55 SYNCOPE: Status: ACTIVE | Noted: 2023-08-28

## 2023-08-28 PROBLEM — N83.299 OTHER OVARIAN CYST, UNSPECIFIED SIDE: Status: ACTIVE | Noted: 2023-08-28

## 2023-08-28 PROBLEM — G47.00 INSOMNIA, ORGANIC: Status: ACTIVE | Noted: 2023-08-28

## 2023-08-28 PROBLEM — R35.0 URINARY FREQUENCY: Status: ACTIVE | Noted: 2023-08-28

## 2023-08-28 PROBLEM — J32.0 MAXILLARY SINUSITIS: Status: ACTIVE | Noted: 2023-08-28

## 2023-08-28 PROBLEM — Z04.9 CONDITION NOT FOUND: Status: ACTIVE | Noted: 2023-08-28

## 2023-08-28 PROBLEM — M54.50 LOWER BACK PAIN: Status: ACTIVE | Noted: 2023-08-28

## 2023-08-28 PROBLEM — M62.830 SPASM OF THORACIC BACK MUSCLE: Status: ACTIVE | Noted: 2023-08-28

## 2023-08-28 PROBLEM — M79.18 MYOFASCIAL PAIN: Status: ACTIVE | Noted: 2023-08-28

## 2023-08-28 PROBLEM — R06.09 DYSPNEA ON EFFORT: Status: ACTIVE | Noted: 2023-08-28

## 2023-08-28 PROBLEM — R73.9 HYPERGLYCEMIA: Status: ACTIVE | Noted: 2023-08-28

## 2023-08-28 PROBLEM — N39.0 RECURRENT URINARY TRACT INFECTION: Status: ACTIVE | Noted: 2023-08-28

## 2023-08-28 PROBLEM — L71.0 PERIORAL DERMATITIS: Status: ACTIVE | Noted: 2020-07-28

## 2023-08-28 PROBLEM — R06.83 SNORING: Status: ACTIVE | Noted: 2023-08-28

## 2023-08-28 PROBLEM — R29.6 FREQUENT FALLS: Status: ACTIVE | Noted: 2023-08-28

## 2023-08-28 PROBLEM — R00.2 INTERMITTENT PALPITATIONS: Status: ACTIVE | Noted: 2023-08-28

## 2023-08-28 PROBLEM — T16.1XXA FOREIGN BODY IN EAR, RIGHT, INITIAL ENCOUNTER: Status: ACTIVE | Noted: 2023-08-28

## 2023-08-28 PROBLEM — E79.0 HYPERURICEMIA: Status: ACTIVE | Noted: 2023-08-28

## 2023-08-28 PROBLEM — L71.9 ROSACEA, UNSPECIFIED: Status: ACTIVE | Noted: 2020-07-28

## 2023-08-28 PROBLEM — L40.0 PSORIASIS VULGARIS: Status: ACTIVE | Noted: 2020-07-28

## 2023-08-28 PROBLEM — I95.1 ORTHOSTATIC INTOLERANCE: Status: ACTIVE | Noted: 2023-08-28

## 2023-08-28 PROBLEM — R60.0 BILATERAL LEG EDEMA: Status: ACTIVE | Noted: 2023-08-28

## 2023-08-28 PROBLEM — M54.16 RIGHT LUMBAR RADICULOPATHY: Status: ACTIVE | Noted: 2023-08-28

## 2023-08-28 PROBLEM — R33.9 INCOMPLETE BLADDER EMPTYING: Status: ACTIVE | Noted: 2023-08-28

## 2023-08-28 PROBLEM — N81.4 CYSTOCELE WITH UTERINE DESCENSUS: Status: ACTIVE | Noted: 2023-08-28

## 2023-08-28 PROBLEM — M77.8 ELBOW TENDINITIS: Status: ACTIVE | Noted: 2023-08-28

## 2023-08-28 RX ORDER — DULOXETIN HYDROCHLORIDE 60 MG/1
1 CAPSULE, DELAYED RELEASE ORAL DAILY
COMMUNITY
Start: 2023-05-02 | End: 2024-04-08

## 2023-08-28 RX ORDER — LANOLIN ALCOHOL/MO/W.PET/CERES
CREAM (GRAM) TOPICAL
COMMUNITY
End: 2024-05-10 | Stop reason: ALTCHOICE

## 2023-08-28 RX ORDER — APREMILAST 10-20-30MG
1 KIT ORAL
COMMUNITY
Start: 2020-07-01 | End: 2023-10-05 | Stop reason: ALTCHOICE

## 2023-08-28 RX ORDER — IPRATROPIUM BROMIDE AND ALBUTEROL SULFATE 2.5; .5 MG/3ML; MG/3ML
3 SOLUTION RESPIRATORY (INHALATION) EVERY 4 HOURS PRN
COMMUNITY

## 2023-08-28 RX ORDER — IVERMECTIN 3 MG/1
TABLET ORAL
COMMUNITY
Start: 2020-03-23 | End: 2023-10-05 | Stop reason: ALTCHOICE

## 2023-08-28 RX ORDER — METRONIDAZOLE 7.5 MG/G
CREAM TOPICAL
COMMUNITY
Start: 2019-09-24

## 2023-08-28 RX ORDER — VORTIOXETINE 5 MG/1
TABLET, FILM COATED ORAL
COMMUNITY
Start: 2022-10-27 | End: 2023-10-05 | Stop reason: ALTCHOICE

## 2023-08-28 RX ORDER — FUROSEMIDE 20 MG/1
TABLET ORAL
COMMUNITY
End: 2024-01-04 | Stop reason: ALTCHOICE

## 2023-08-28 RX ORDER — QUETIAPINE FUMARATE 25 MG/1
TABLET, FILM COATED ORAL
COMMUNITY
Start: 2023-08-25 | End: 2023-11-14 | Stop reason: WASHOUT

## 2023-08-28 RX ORDER — APREMILAST 30 MG/1
1 TABLET, FILM COATED ORAL
COMMUNITY
Start: 2020-07-01 | End: 2023-10-05 | Stop reason: ALTCHOICE

## 2023-08-28 RX ORDER — SPIRONOLACTONE 25 MG/1
25 TABLET ORAL
COMMUNITY
Start: 2019-06-26

## 2023-08-28 RX ORDER — LEVALBUTEROL INHALATION SOLUTION 1.25 MG/3ML
1 SOLUTION RESPIRATORY (INHALATION)
COMMUNITY

## 2023-08-28 RX ORDER — TIRZEPATIDE 15 MG/.5ML
15 INJECTION, SOLUTION SUBCUTANEOUS
COMMUNITY
Start: 2023-05-04 | End: 2023-10-05 | Stop reason: ALTCHOICE

## 2023-08-28 RX ORDER — PEN NEEDLE, DIABETIC 31 GX5/16"
NEEDLE, DISPOSABLE MISCELLANEOUS EVERY 4 HOURS PRN
COMMUNITY
Start: 2017-05-05

## 2023-08-28 RX ORDER — CLINDAMYCIN PHOSPHATE 10 UG/ML
LOTION TOPICAL
COMMUNITY
Start: 2020-02-17

## 2023-08-28 RX ORDER — DOXYCYCLINE 100 MG/1
1 CAPSULE ORAL
COMMUNITY
Start: 2019-09-24

## 2023-08-28 RX ORDER — CLOBETASOL PROPIONATE 0.46 MG/ML
SOLUTION TOPICAL 2 TIMES DAILY
COMMUNITY

## 2023-08-28 RX ORDER — ISOTRETINOIN 40 MG/1
1 CAPSULE ORAL
COMMUNITY
Start: 2019-12-19 | End: 2023-10-05 | Stop reason: ALTCHOICE

## 2023-08-28 RX ORDER — TEZEPELUMAB-EKKO 210 MG/1.9ML
210 INJECTION, SOLUTION SUBCUTANEOUS
COMMUNITY

## 2023-08-28 RX ORDER — METOPROLOL SUCCINATE 50 MG/1
1 TABLET, EXTENDED RELEASE ORAL DAILY
COMMUNITY
Start: 2023-05-10

## 2023-08-28 RX ORDER — KETOCONAZOLE 20 MG/G
CREAM TOPICAL
COMMUNITY
Start: 2020-03-23

## 2023-08-28 RX ORDER — DICLOFENAC SODIUM 10 MG/G
4 GEL TOPICAL 2 TIMES DAILY PRN
COMMUNITY

## 2023-08-29 LAB
PNEUMO SEROTYPE INTERPRETATION: NORMAL
SEROTYPE 1, VIRC: 0.89 UG/ML
SEROTYPE 12F, VIRC: 1.13 UG/ML
SEROTYPE 14, VIRC: 14.76 UG/ML
SEROTYPE 18C(56), VIRC: 4.98 UG/ML
SEROTYPE 19F, VIRC: 23.57 UG/ML
SEROTYPE 23F, VIRC: 1.05 UG/ML
SEROTYPE 3, VIRC: 2.04 UG/ML
SEROTYPE 4, VIRC: 2.98 UG/ML
SEROTYPE 5, VIRC: 4.3 UG/ML
SEROTYPE 6B(26), VIRC: 1.25 UG/ML
SEROTYPE 7F(51), VIRC: 0.56 UG/ML
SEROTYPE 8, VIRC: >19.77 UG/ML
SEROTYPE 9N, VIRC: 0.77 UG/ML
SEROTYPE 9V(68), VIRC: 2.13 UG/ML

## 2023-09-05 DIAGNOSIS — G43.809 OTHER MIGRAINE WITHOUT STATUS MIGRAINOSUS, NOT INTRACTABLE: Primary | ICD-10-CM

## 2023-09-05 DIAGNOSIS — R11.2 NAUSEA AND VOMITING, UNSPECIFIED VOMITING TYPE: ICD-10-CM

## 2023-09-05 DIAGNOSIS — J30.9 ALLERGIC RHINITIS, UNSPECIFIED SEASONALITY, UNSPECIFIED TRIGGER: ICD-10-CM

## 2023-09-05 RX ORDER — ONDANSETRON 4 MG/1
4 TABLET, ORALLY DISINTEGRATING ORAL EVERY 8 HOURS PRN
Qty: 30 TABLET | Refills: 0 | Status: SHIPPED | OUTPATIENT
Start: 2023-09-05 | End: 2023-09-19 | Stop reason: SDUPTHER

## 2023-09-05 RX ORDER — CETIRIZINE HYDROCHLORIDE, PSEUDOEPHEDRINE HYDROCHLORIDE 5; 120 MG/1; MG/1
1 TABLET, FILM COATED, EXTENDED RELEASE ORAL 2 TIMES DAILY
Qty: 180 TABLET | Refills: 0 | Status: SHIPPED | OUTPATIENT
Start: 2023-09-05 | End: 2023-12-04

## 2023-09-05 RX ORDER — SUMATRIPTAN SUCCINATE 25 MG/1
25 TABLET ORAL ONCE AS NEEDED
Qty: 9 TABLET | Refills: 2 | Status: SHIPPED | OUTPATIENT
Start: 2023-09-05 | End: 2023-12-28 | Stop reason: SDUPTHER

## 2023-09-18 LAB
ALBUMIN (G/DL) IN SER/PLAS: 3.9 G/DL (ref 3.4–5)
ALBUMIN (MG/L) IN URINE: <7 MG/L
ALBUMIN/CREATININE (UG/MG) IN URINE: NORMAL UG/MG CRT (ref 0–30)
ANION GAP IN SER/PLAS: 11 MMOL/L (ref 10–20)
APPEARANCE, URINE: CLEAR
BILIRUBIN, URINE: NEGATIVE
BLOOD, URINE: NEGATIVE
CALCIUM (MG/DL) IN SER/PLAS: 9.4 MG/DL (ref 8.6–10.3)
CARBON DIOXIDE, TOTAL (MMOL/L) IN SER/PLAS: 28 MMOL/L (ref 21–32)
CHLORIDE (MMOL/L) IN SER/PLAS: 102 MMOL/L (ref 98–107)
COLOR, URINE: YELLOW
CREATININE (MG/DL) IN SER/PLAS: 0.59 MG/DL (ref 0.5–1.05)
CREATININE (MG/DL) IN URINE: 34.9 MG/DL (ref 20–320)
CREATININE (MG/DL) IN URINE: 34.9 MG/DL (ref 20–320)
ERYTHROCYTE DISTRIBUTION WIDTH (RATIO) BY AUTOMATED COUNT: 13.6 % (ref 11.5–14.5)
ERYTHROCYTE MEAN CORPUSCULAR HEMOGLOBIN CONCENTRATION (G/DL) BY AUTOMATED: 32.9 G/DL (ref 32–36)
ERYTHROCYTE MEAN CORPUSCULAR VOLUME (FL) BY AUTOMATED COUNT: 94 FL (ref 80–100)
ERYTHROCYTES (10*6/UL) IN BLOOD BY AUTOMATED COUNT: 3.84 X10E12/L (ref 4–5.2)
GFR FEMALE: >90 ML/MIN/1.73M2
GLUCOSE (MG/DL) IN SER/PLAS: 106 MG/DL (ref 74–99)
GLUCOSE, URINE: NEGATIVE MG/DL
HEMATOCRIT (%) IN BLOOD BY AUTOMATED COUNT: 36.2 % (ref 36–46)
HEMOGLOBIN (G/DL) IN BLOOD: 11.9 G/DL (ref 12–16)
HYALINE CASTS, URINE: ABNORMAL /LPF
KETONES, URINE: NEGATIVE MG/DL
LEUKOCYTE ESTERASE, URINE: ABNORMAL
LEUKOCYTES (10*3/UL) IN BLOOD BY AUTOMATED COUNT: 4.6 X10E9/L (ref 4.4–11.3)
NITRITE, URINE: NEGATIVE
PH, URINE: 6 (ref 5–8)
PHOSPHATE (MG/DL) IN SER/PLAS: 3.7 MG/DL (ref 2.5–4.9)
PLATELETS (10*3/UL) IN BLOOD AUTOMATED COUNT: 334 X10E9/L (ref 150–450)
POTASSIUM (MMOL/L) IN SER/PLAS: 4.2 MMOL/L (ref 3.5–5.3)
PROTEIN (MG/DL) IN URINE: <4 MG/DL (ref 5–24)
PROTEIN, URINE: NEGATIVE MG/DL
PROTEIN/CREATININE (MG/MG) IN URINE: ABNORMAL MG/MG CREAT (ref 0–0.17)
RBC, URINE: 1 /HPF (ref 0–5)
SODIUM (MMOL/L) IN SER/PLAS: 137 MMOL/L (ref 136–145)
SPECIFIC GRAVITY, URINE: 1.01 (ref 1–1.03)
SQUAMOUS EPITHELIAL CELLS, URINE: 1 /HPF
UREA NITROGEN (MG/DL) IN SER/PLAS: 11 MG/DL (ref 6–23)
UROBILINOGEN, URINE: <2 MG/DL (ref 0–1.9)
WBC, URINE: 3 /HPF (ref 0–5)

## 2023-09-19 DIAGNOSIS — R11.2 NAUSEA AND VOMITING, UNSPECIFIED VOMITING TYPE: ICD-10-CM

## 2023-09-19 DIAGNOSIS — F41.9 ANXIETY DISORDER, UNSPECIFIED TYPE: Primary | ICD-10-CM

## 2023-09-19 LAB
IMMUNOGLOBULIN LIGHT CHAINS KAPPA/LAMBDA (MASS RATIO) IN SERUM: 1.39 (ref 0.26–1.65)
IMMUNOGLOBULIN LIGHT CHAINS.KAPPA (MG/DL) IN SERUM: 2.1 MG/DL (ref 0.33–1.94)
IMMUNOGLOBULIN LIGHT CHAINS.LAMBDA (MG/DL) IN SERUM: 1.51 MG/DL (ref 0.57–2.63)

## 2023-09-19 RX ORDER — BUSPIRONE HYDROCHLORIDE 15 MG/1
15 TABLET ORAL 2 TIMES DAILY
Qty: 90 TABLET | Refills: 1 | Status: SHIPPED | OUTPATIENT
Start: 2023-09-19 | End: 2024-01-04 | Stop reason: ALTCHOICE

## 2023-09-19 RX ORDER — ONDANSETRON 4 MG/1
4 TABLET, ORALLY DISINTEGRATING ORAL EVERY 8 HOURS PRN
Qty: 90 TABLET | Refills: 0 | Status: SHIPPED | OUTPATIENT
Start: 2023-09-19 | End: 2023-10-19

## 2023-09-21 LAB
ALBUMIN ELP: 4.3 G/DL (ref 3.4–5)
ALPHA 1: 0.3 G/DL (ref 0.2–0.6)
ALPHA 2: 0.6 G/DL (ref 0.4–1.1)
BETA: 0.8 G/DL (ref 0.5–1.2)
GAMMA GLOBULIN: 1 G/DL (ref 0.5–1.4)
PATH REVIEW - SERUM IMMUNOFIXATION: NORMAL
PATH REVIEW-SERUM PROTEIN ELECTROPHORESIS: NORMAL
PROTEIN ELECTROPHORESIS INTERPRETATION: NORMAL
PROTEIN TOTAL: 7 G/DL (ref 6.4–8.2)
SERUM IMMUNOFIXATION INTERPRETATION: NORMAL

## 2023-10-05 ENCOUNTER — OFFICE VISIT (OUTPATIENT)
Dept: PAIN MEDICINE | Facility: HOSPITAL | Age: 43
End: 2023-10-05
Payer: MEDICAID

## 2023-10-05 VITALS
HEART RATE: 103 BPM | SYSTOLIC BLOOD PRESSURE: 166 MMHG | DIASTOLIC BLOOD PRESSURE: 95 MMHG | WEIGHT: 221.6 LBS | RESPIRATION RATE: 16 BRPM | HEIGHT: 66 IN | BODY MASS INDEX: 35.62 KG/M2

## 2023-10-05 DIAGNOSIS — M77.8 ELBOW TENDINITIS: ICD-10-CM

## 2023-10-05 DIAGNOSIS — M54.16 RIGHT LUMBAR RADICULOPATHY: ICD-10-CM

## 2023-10-05 DIAGNOSIS — M25.50 ARTHRALGIA OF MULTIPLE SITES: ICD-10-CM

## 2023-10-05 DIAGNOSIS — F11.90 CHRONIC, CONTINUOUS USE OF OPIOIDS: ICD-10-CM

## 2023-10-05 DIAGNOSIS — M62.830 SPASM OF THORACIC BACK MUSCLE: ICD-10-CM

## 2023-10-05 DIAGNOSIS — M51.16 LUMBAR DISC HERNIATION WITH RADICULOPATHY: Primary | ICD-10-CM

## 2023-10-05 PROCEDURE — 3077F SYST BP >= 140 MM HG: CPT | Performed by: PHYSICAL MEDICINE & REHABILITATION

## 2023-10-05 PROCEDURE — 99204 OFFICE O/P NEW MOD 45 MIN: CPT | Performed by: PHYSICAL MEDICINE & REHABILITATION

## 2023-10-05 PROCEDURE — 3080F DIAST BP >= 90 MM HG: CPT | Performed by: PHYSICAL MEDICINE & REHABILITATION

## 2023-10-05 PROCEDURE — 1036F TOBACCO NON-USER: CPT | Performed by: PHYSICAL MEDICINE & REHABILITATION

## 2023-10-05 PROCEDURE — 99214 OFFICE O/P EST MOD 30 MIN: CPT | Performed by: PHYSICAL MEDICINE & REHABILITATION

## 2023-10-05 RX ORDER — MELOXICAM 15 MG/1
15 TABLET ORAL DAILY
Qty: 30 TABLET | Refills: 3 | Status: SHIPPED | OUTPATIENT
Start: 2023-10-05

## 2023-10-05 RX ORDER — ORPHENADRINE CITRATE 100 MG/1
100 TABLET, EXTENDED RELEASE ORAL 2 TIMES DAILY PRN
Qty: 60 TABLET | Refills: 3 | Status: SHIPPED | OUTPATIENT
Start: 2023-10-05 | End: 2024-01-12 | Stop reason: SDUPTHER

## 2023-10-05 RX ORDER — PREGABALIN 75 MG/1
75 CAPSULE ORAL 3 TIMES DAILY
Qty: 90 CAPSULE | Refills: 0 | Status: SHIPPED | OUTPATIENT
Start: 2023-10-05 | End: 2024-05-14 | Stop reason: SDUPTHER

## 2023-10-05 RX ORDER — TAPENTADOL HYDROCHLORIDE 50 MG/1
50 TABLET, FILM COATED ORAL 3 TIMES DAILY
Qty: 90 TABLET | Refills: 0 | Status: SHIPPED | OUTPATIENT
Start: 2023-10-10 | End: 2023-11-29 | Stop reason: SDUPTHER

## 2023-10-05 ASSESSMENT — PATIENT HEALTH QUESTIONNAIRE - PHQ9
4. FEELING TIRED OR HAVING LITTLE ENERGY: NOT AT ALL
6. FEELING BAD ABOUT YOURSELF - OR THAT YOU ARE A FAILURE OR HAVE LET YOURSELF OR YOUR FAMILY DOWN: NOT AT ALL
1. LITTLE INTEREST OR PLEASURE IN DOING THINGS: NOT AT ALL
SUM OF ALL RESPONSES TO PHQ QUESTIONS 1-9: 0
9. THOUGHTS THAT YOU WOULD BE BETTER OFF DEAD, OR OF HURTING YOURSELF: NOT AT ALL
SUM OF ALL RESPONSES TO PHQ9 QUESTIONS 1 AND 2: 0
7. TROUBLE CONCENTRATING ON THINGS, SUCH AS READING THE NEWSPAPER OR WATCHING TELEVISION: NOT AT ALL
2. FEELING DOWN, DEPRESSED OR HOPELESS: NOT AT ALL
10. IF YOU CHECKED OFF ANY PROBLEMS, HOW DIFFICULT HAVE THESE PROBLEMS MADE IT FOR YOU TO DO YOUR WORK, TAKE CARE OF THINGS AT HOME, OR GET ALONG WITH OTHER PEOPLE: NOT DIFFICULT AT ALL
8. MOVING OR SPEAKING SO SLOWLY THAT OTHER PEOPLE COULD HAVE NOTICED. OR THE OPPOSITE, BEING SO FIGETY OR RESTLESS THAT YOU HAVE BEEN MOVING AROUND A LOT MORE THAN USUAL: NOT AT ALL
5. POOR APPETITE OR OVEREATING: NOT AT ALL
3. TROUBLE FALLING OR STAYING ASLEEP OR SLEEPING TOO MUCH: NOT AT ALL

## 2023-10-05 ASSESSMENT — ENCOUNTER SYMPTOMS
OCCASIONAL FEELINGS OF UNSTEADINESS: 0
DEPRESSION: 0
LOSS OF SENSATION IN FEET: 0

## 2023-10-05 ASSESSMENT — COLUMBIA-SUICIDE SEVERITY RATING SCALE - C-SSRS
2. HAVE YOU ACTUALLY HAD ANY THOUGHTS OF KILLING YOURSELF?: NO
6. HAVE YOU EVER DONE ANYTHING, STARTED TO DO ANYTHING, OR PREPARED TO DO ANYTHING TO END YOUR LIFE?: NO
1. IN THE PAST MONTH, HAVE YOU WISHED YOU WERE DEAD OR WISHED YOU COULD GO TO SLEEP AND NOT WAKE UP?: NO

## 2023-10-05 NOTE — PROGRESS NOTES
Subjective   Patient ID: Nichelle Izaguirre is a 43 y.o. female who presents for Back Pain and Elbow Pain.  HPI  Follow Up Visit    Location of Pain: pt is here for interval follow up and medication count. Pt states bilateral elbow pain due to psoriatic arthritis. Pt also having mid back pain along spine that radiates down to above the buttock.          Pain Score: 5/10    Treatment: Nucynta 50mg QID  Last dose: 10/5/23 1 dose and QID  Medication Count: 22 pills left in rx bottle  Fill date:10/3/23    Efficacy: 75% relief and then depends from there    Side Effects: denies    Narcan:pt forgot today    Other pain medication/neuromodulator: Mobic/Norflex/Lyrica-needs refills    Therapeutic Goals: to be able to do more throughout     Therapeutic Assessment: It does help more in the morning    Injections and/or Procedures:denies    Other:denies      43-year-old female with a past medical history of anxiety, psoriatic arthritis, gout, hypertension, GUICHO, irritable bowel disease, asthma, Parkinson's, kidney stones, osteoporosis who is being seen today as a follow-up of her bilateral elbow pain and back pain that radiates to her buttock    MRI of the lumbar spine done in 2021 showed loss of lumbar lordosis, normal disc height and hydration, small central disc bulge at L5/S1 and L4/5. no significant foraminal stenosis.  Moderate lateral recess stenosis at L4/5.    She takes Xanax, Wellbutrin, BuSpar, Cymbalta, Lyrica, baclofen, tapentadol    OARRS:  No data recorded  I have personally reviewed the OARRS report for Nichelle Izaguirre. I have considered the risks of abuse, dependence, addiction and diversion    Is the patient prescribed a combination of a benzodiazepine and opioid?  Yes, I feel it is clincially indicated to continue the medication and have discussed with the patient risks/benefits/alternatives.  She only fairly infrequently uses benzodiazepine    Last Urine Drug Screen / ordered today: Yes  Recent Results (from  the past 8760 hour(s))   Amphetamine Confirm, Urine    Collection Time: 06/26/23 12:23 PM   Result Value Ref Range    Amphetamines,Urine <50 ng/mL    MDA, Urine <200 ng/mL    MDEA, Urine <200 ng/mL    MDMA, Urine <200 ng/mL    Methamphetamine Quant, Ur <200 ng/mL    Phentermine,Urine <200 ng/mL   Tapentadol Confirmation, Urine    Collection Time: 06/26/23 12:23 PM   Result Value Ref Range    Tapentadol >1000 (A) Cutoff <25 ng/mL    N-Desmethyltapentadol >1000 (A) Cutoff <25 ng/mL   OPIATE/OPIOID/BENZO PRESCRIPTION COMPLIANCE    Collection Time: 06/26/23 12:23 PM   Result Value Ref Range    DRUG SCREEN COMMENT URINE SEE BELOW     Creatine, Urine 106.5 mg/dL    Amphetamine Screen, Urine PRESUMPTIVE POSITIVE (A) NEGATIVE    Barbiturate Screen, Urine PRESUMPTIVE NEGATIVE NEGATIVE    Cannabinoid Screen, Urine PRESUMPTIVE NEGATIVE NEGATIVE    Cocaine Screen, Urine PRESUMPTIVE NEGATIVE NEGATIVE    PCP Screen, Urine PRESUMPTIVE NEGATIVE NEGATIVE    7-Aminoclonazepam <25 Cutoff <25 ng/mL    Alpha-Hydroxyalprazolam <25 Cutoff <25 ng/mL    Alpha-Hydroxymidazolam <25 Cutoff <25 ng/mL    Alprazolam <25 Cutoff <25 ng/mL    Chlordiazepoxide <25 Cutoff <25 ng/mL    Clonazepam <25 Cutoff <25 ng/mL    Diazepam <25 Cutoff <25 ng/mL    Lorazepam <25 Cutoff <25 ng/mL    Midazolam <25 Cutoff <25 ng/mL    Nordiazepam <25 Cutoff <25 ng/mL    Oxazepam <25 Cutoff <25 ng/mL    Temazepam <25 Cutoff <25 ng/mL    Zolpidem <25 Cutoff <25 ng/mL    Zolpidem Metabolite (ZCA) <25 Cutoff <25 ng/mL    6-Acetylmorphine <25 Cutoff <25 ng/mL    Codeine <50 Cutoff <50 ng/mL    Hydrocodone <25 Cutoff <25 ng/mL    Hydromorphone <25 Cutoff <25 ng/mL    Morphine Urine <50 Cutoff <50 ng/mL    Norhydrocodone <25 Cutoff <25 ng/mL    Noroxycodone <25 Cutoff <25 ng/mL    Oxycodone <25 Cutoff <25 ng/mL    Oxymorphone <25 Cutoff <25 ng/mL    Tramadol <50 Cutoff <50 ng/mL    O-Desmethyltramadol <50 Cutoff <50 ng/mL    Fentanyl <2.5 Cutoff<2.5 ng/mL    Norfentanyl <2.5  Cutoff<2.5 ng/mL    METHADONE CONFIRMATION,URINE <25 Cutoff <25 ng/mL    EDDP <25 Cutoff <25 ng/mL   Buprenorphine Confirm,Urine    Collection Time: 06/26/23 12:23 PM   Result Value Ref Range    BUPRENORPHINE GLUC, URINE <5 ng/mL    BUPRENORPHINE ,URINE <2 ng/mL    NALOXONE, URINE <100 ng/mL    NORBUPRENORPHINE GLUC,URINE <5 ng/mL    NORBUPRENORPHINE, URINE <2 ng/mL     Results are as expected.     Controlled Substance Agreement: 6/26/23  Reviewed Controlled Substance Agreement including but not limited to the benefits, risks, and alternatives to treatment with a Controlled Substance medication(s).    Monitoring and compliance: 6/26/23    ORT: 6/26/23    PDUQ: 6/26/23    Office Agreement: 6/26/23      Review of Systems       Current Outpatient Medications:     alendronate (Fosamax) 70 mg tablet, Take 1 tablet (70 mg) by mouth once a week., Disp: , Rfl:     allopurinol (Zyloprim) 100 mg tablet, Take 1 tablet (100 mg) by mouth once daily., Disp: , Rfl:     allopurinol (Zyloprim) 300 mg tablet, Take 1 tablet (300 mg) by mouth once daily., Disp: , Rfl:     ALPRAZolam (Xanax) 0.5 mg tablet, Take 1 tablet (0.5 mg) by mouth 3 times a day as needed for anxiety., Disp: , Rfl:     amLODIPine (Norvasc) 2.5 mg tablet, Take 1 tablet (2.5 mg) by mouth once daily., Disp: , Rfl:     amoxicillin (Amoxil) 500 mg capsule, Take 1 capsule (500 mg) by mouth every 12 hours., Disp: , Rfl:     ascorbic acid, vitamin C, 500 mg capsule, Take 1 capsule by mouth once daily., Disp: , Rfl:     benzoyl peroxide (Benzac AC) 10 % external wash, Apply topically once daily., Disp: , Rfl:     benztropine (Cogentin) 2 mg tablet, Take 1 tablet (2 mg) by mouth once daily., Disp: , Rfl:     buPROPion XL (Forfivo XL) 450 mg 24 hr tablet, Take 1 tablet (450 mg) by mouth once daily., Disp: 90 tablet, Rfl: 1    busPIRone (Buspar) 15 mg tablet, Take 1 tablet (15 mg) by mouth 2 times a day., Disp: 90 tablet, Rfl: 1    carbidopa-levodopa-entacapone (Stalevo)  -200 mg tablet, Take 1 tablet by mouth every 4 hours., Disp: 540 tablet, Rfl: 1    carvedilol (Coreg) 25 mg tablet, Take 1 tablet (25 mg) by mouth 2 times a day with meals., Disp: , Rfl:     cetirizine-pseudoephedrine (ZyrTEC-D) 5-120 mg 12 hr tablet, Take 1 tablet by mouth 2 times a day., Disp: 180 tablet, Rfl: 0    clindamycin (Cleocin T) 1 % lotion, 1 Application, Disp: , Rfl:     clobetasol (Temovate) 0.05 % external solution, Apply topically 2 times a day., Disp: , Rfl:     cloNIDine (Catapres) 0.1 mg tablet, Take 1 tablet (0.1 mg) by mouth once daily., Disp: , Rfl:     colchicine 0.6 mg tablet, Take 1 tablet (0.6 mg) by mouth every other day., Disp: , Rfl:     Cosentyx Pen 150 mg/mL self-injector pen, Inject under the skin., Disp: , Rfl:     cyanocobalamin (Vitamin B-12) 1,000 mcg tablet, Take by mouth every 30 (thirty) days., Disp: , Rfl:     diclofenac sodium (Voltaren) 1 % gel gel, Apply 1 Application topically 2 times a day as needed., Disp: , Rfl:     doxycycline (Vibramycin) 100 mg capsule, 1 capsule (100 mg)., Disp: , Rfl:     DULoxetine (Cymbalta) 60 mg DR capsule, Take 1 capsule (60 mg) by mouth once daily., Disp: , Rfl:     eplerenone (Inspra) 25 mg tablet, Take 1 tablet (25 mg) by mouth once daily., Disp: , Rfl:     ergocalciferol (Vitamin D-2) 1.25 MG (94025 UT) capsule, Take 1 capsule (50,000 Units) by mouth 1 (one) time per week., Disp: , Rfl:     FENOFIBRATE MICRONIZED ORAL, 48 mg once daily., Disp: , Rfl:     fluticasone furoate-vilanteroL (Breo Ellipta) 100-25 mcg/dose inhaler, Inhale 1 puff once daily., Disp: , Rfl:     furosemide (Lasix) 20 mg tablet, Take by mouth., Disp: , Rfl:     icosapent ethyL (Vascepa) 1 gram capsule, Take 2 capsules (2 g) by mouth 2 times a day with meals., Disp: , Rfl:     ipratropium-albuteroL (Duo-Neb) 0.5-2.5 mg/3 mL nebulizer solution, Take 3 mL by nebulization every 4 hours if needed for wheezing., Disp: , Rfl:     ketoconazole (NIZOral) 2 % cream, 1  Application, Disp: , Rfl:     leflunomide (Arava) 10 mg tablet, Take 1 tablet (10 mg) by mouth once daily., Disp: , Rfl:     levalbuterol (Xopenex) 1.25 mg/3 mL nebulizer solution, Take 1 ampule by nebulization 3 times a day., Disp: , Rfl:     meloxicam (Mobic) 15 mg tablet, Take 1 tablet (15 mg) by mouth once daily., Disp: , Rfl:     metoprolol succinate XL (Toprol-XL) 50 mg 24 hr tablet, Take 1 tablet (50 mg) by mouth once daily., Disp: , Rfl:     metroNIDAZOLE (Metrocream) 0.75 % cream, 1 Application, Disp: , Rfl:     montelukast (Singulair) 10 mg tablet, Take 1 tablet (10 mg) by mouth once daily at bedtime., Disp: , Rfl:     nebulizer and compressor device, use q4-6 hours with albuterol PRN cough/wheeze, Disp: , Rfl:     nebulizers (Appscio Disposable Nebulizer) misc, every 4 hours if needed., Disp: , Rfl:     Nucynta 50 mg tablet, Take 1 tablet (50 mg) by mouth every 6 hours., Disp: , Rfl:     olmesartan (BENIcar) 40 mg tablet, Take 1 tablet (40 mg) by mouth once daily., Disp: , Rfl:     omeprazole (PriLOSEC) 40 mg DR capsule, Take 1 capsule (40 mg) by mouth once daily in the morning. Take before meals., Disp: 90 capsule, Rfl: 3    ondansetron ODT (Zofran-ODT) 4 mg disintegrating tablet, Take 1 tablet (4 mg) by mouth every 8 hours if needed for nausea or vomiting., Disp: 90 tablet, Rfl: 0    orphenadrine (Norflex) 100 mg 12 hr tablet, Take 1 tablet (100 mg) by mouth 2 times a day as needed., Disp: , Rfl:     pioglitazone (Actos) 15 mg tablet, Take 1 tablet (15 mg) by mouth once daily., Disp: , Rfl:     pregabalin (Lyrica) 75 mg capsule, Take 1 capsule (75 mg) by mouth 3 times a day., Disp: , Rfl:     ProAir HFA 90 mcg/actuation inhaler, , Disp: , Rfl:     QUEtiapine (SEROquel) 25 mg tablet, Take by mouth. TAKE 1-3 TABLETS AT BEDTIME, Disp: , Rfl:     Repatha SureClick 140 mg/mL injection, Inject 1 mL (140 mg) under the skin every 14 (fourteen) days., Disp: , Rfl:     Spiriva Respimat 1.25 mcg/actuation  inhaler, Inhale once daily., Disp: , Rfl:     spironolactone (Aldactone) 25 mg tablet, 1 tablet (25 mg)., Disp: , Rfl:     SUMAtriptan (Imitrex) 25 mg tablet, Take 1 tablet (25 mg) by mouth 1 time if needed for migraine. May repeat in 2 hours if no improvement. Max 200 mg/24 hr, Disp: 9 tablet, Rfl: 2    tezepelumab-ekko (Tezspire) SubQ Pen Injector, Inject 210 mg under the skin., Disp: , Rfl:      Past Medical History:   Diagnosis Date    Acute upper respiratory infection, unspecified 07/26/2016    Viral URI with cough    Acute upper respiratory infection, unspecified 11/15/2017    Viral URI with cough    Allergy status to unspecified drugs, medicaments and biological substances     History of allergy    Chronic maxillary sinusitis 08/27/2018    Maxillary sinusitis, unspecified chronicity    Dystonia, unspecified 04/08/2014    Dystonia    Encounter for other screening for malignant neoplasm of breast 01/22/2018    Breast screening    Encounter for screening for respiratory tuberculosis 11/11/2013    Screening examination for pulmonary tuberculosis    Essential (primary) hypertension 12/27/2017    Benign essential hypertension    Hypersomnia, unspecified 04/13/2015    Sleeps too much    Idiopathic sleep related nonobstructive alveolar hypoventilation     Nocturnal hypoxemia    Idiopathic sleep related nonobstructive alveolar hypoventilation 02/09/2018    Nocturnal hypoxia    Impaired fasting glucose 01/22/2018    Impaired fasting glucose    Insect bite (nonvenomous) of lower back and pelvis, initial encounter 03/29/2018    Tick bite of back    Left lower quadrant pain 01/07/2019    Left lower quadrant pain    Local infection of the skin and subcutaneous tissue, unspecified 07/28/2017    Skin infection    Low back pain, unspecified 05/23/2019    Acute low back pain    Other conditions influencing health status 02/27/2017    Sprain of right thumb, unspecified site of finger, initial encounter    Other malaise  04/16/2018    Malaise and fatigue    Other microscopic hematuria 03/30/2018    Microscopic hematuria    Other specified cough 09/07/2018    Productive cough    Other specified health status 01/07/2019    Acute medical illness    Other symptoms and signs involving the musculoskeletal system 07/12/2018    Weakness of right lower extremity    Other symptoms and signs involving the musculoskeletal system 03/21/2018    Polyarticular joint involvement    Other symptoms and signs involving the musculoskeletal system 07/12/2018    RUE weakness    Pain in right lower leg 01/27/2016    Right calf pain    Pain in unspecified hip 01/20/2016    Hip pain    Pelvic and perineal pain 04/17/2018    Pelvic pain    Personal history of diseases of the skin and subcutaneous tissue 08/07/2018    History of dermatitis    Personal history of other (healed) physical injury and trauma 08/07/2018    History of insect bite    Personal history of other diseases of the circulatory system     History of hypertension    Personal history of other diseases of the female genital tract 11/24/2015    History of menorrhagia    Personal history of other diseases of the musculoskeletal system and connective tissue 03/14/2018    History of tendinitis    Personal history of other diseases of the nervous system and sense organs 04/08/2014    History of Parkinson's disease    Personal history of other diseases of the respiratory system 01/07/2019    History of acute bronchitis    Personal history of other diseases of the respiratory system 10/05/2018    History of paranasal sinus pain    Personal history of other specified conditions 04/13/2015    History of snoring    Personal history of other specified conditions 09/11/2017    History of headache    Personal history of other specified conditions 09/07/2018    History of persistent cough    Personal history of other specified conditions 04/08/2014    History of memory loss    Personal history of other  specified conditions 03/26/2018    History of left flank pain    Personal history of other specified conditions     History of heartburn    Personal history of urinary calculi 03/26/2018    Personal history of urinary calculi    Personal history of urinary calculi 03/26/2018    History of renal calculi    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/2017    Plantar fasciitis, left    Primary insomnia 04/13/2015    Primary insomnia    Rash and other nonspecific skin eruption 04/16/2018    Rash    Unspecified abdominal pain 01/07/2019    Left sided abdominal pain    Urinary tract infection, site not specified 10/19/2018    UTI (urinary tract infection), bacterial    Urinary tract infection, site not specified 10/18/2018    Recurrent UTI    Vitamin D deficiency, unspecified 04/22/2016    Vitamin D deficiency    Zoster with other complications 11/25/2015    Herpes zoster dermatitis        Past Surgical History:   Procedure Laterality Date    CT ANGIO CORONARY ART WITH HEARTFLOW IF SCORE >30%  3/18/2020    CT HEART CORONARY ANGIOGRAM 3/18/2020 [facility]    HAND TENDON SURGERY  11/11/2013    Hand Incision Tendon Sheath Of A Finger    HYSTERECTOMY  03/04/2016    Hysterectomy    LITHOTRIPSY  11/11/2013    Renal Lithotripsy    TONSILLECTOMY  12/19/2013    Tonsillectomy With Adenoidectomy        Family History   Problem Relation Name Age of Onset    Asthma Mother      Hypertension Mother      Irregular heart beat Mother      Allergies Father      Heart disease Father      Hypertension Father      Sinusitis Father      Allergies Sister      Asthma Daughter      Allergies Son      Heart disease Maternal Grandmother      Breast cancer Paternal Grandmother      Heart disease Paternal Grandfather      Lung cancer Paternal Grandfather      Dementia Paternal Great-Grandfather          Allergies   Allergen Reactions    Clindamycin Anaphylaxis    Dupilumab Anaphylaxis    Hydrochlorothiazide Anaphylaxis, Itching and Swelling     Sulfamethoxazole-Trimethoprim Anaphylaxis    Cefazolin Hives, Other and Swelling     STATES TONGUE SPLITS    Atorvastatin Hives    Cephalexin Other    Clarithromycin Swelling     hives, tongue swelling    Nitrofurantoin Monohyd/M-Cryst Hives    Sulfa (Sulfonamide Antibiotics) Hives    Sulfacetamide Hives        Objective     Vitals:    10/05/23 1029   BP: (!) 166/95   Pulse: 103   Resp: 16        Physical Exam    GENERAL EXAM  Vital Signs: Vital signs to include heart rate, respiration rate, blood pressure, and temperature were reviewed.  General Appearance:  Awake, alert, healthy appearing, well developed, No acute distress.  Head: Normocephalic without evidence of head injury.  Neck: The appearance of the neck was normal without swelling with a midline trachea.  Eyes: The eyelids and eyebrows exhibited no abnormalities.  Pupils were not pin-point.  Sclera was without icterus.  Lungs: Respiration rhythm and depth was normal.  Respiratory movements were normal without labored breathing.  Cardiovascular: No peripheral edema was present.    Neurological: Patient was oriented to time, place, and person.  Speech was normal.  Balance, gait, and stance were unremarkable.    Psychiatric: Appearance was normal with appropriate dress.  Mood was euthymic and affect was normal.  Skin: Affected regions were without ecchymosis or skin lesions.    Physical exam as above except:    Tenderness over thoracic and lumbar paraspinous muscles.  Straight leg raise positive on the right at 45 degrees, negative on left.  ABHAY test negative bilaterally.  No pain with hip internal/external rotation bilaterally.      Assessment/Plan     43-year-old female with multiple medical problems with primary complaint today of lumbar neuritis pain.  Previous MRI of her lumbar spine which I personally reviewed showed disc bulging at L5-S1.    We will refer her to physical therapy and then schedule her for an interlaminar lumbar epidural steroid  injection L5/S1.  She is on a significant OME with her tapentadol at 4 times per day especially given the fact that she is on benzos for anxiety; we will reduce this to 3 times daily.  Potentially, in the future, we will transition her to buprenorphine patches given the lower risk of respiratory depression as well as given her young age less risk of tolerance and hyperalgesia with buprenorphine compared to tapentadol.    We will get her rheumatologist's approval for epidural steroid injections given her history of immunodeficiency.  According to the patient, she has been getting steroid injections in her elbows without issue, we will double check with rheumatology just to make sure.    We plan to continue her meloxicam, Cymbalta, Lyrica.     Physical therapy referral and x-rays of her lumbar spine.    She will follow-up with our nurse practitioner in 2 months    Diagnoses and all orders for this visit:  Lumbar disc herniation with radiculopathy  Chronic, continuous use of opioids  Spasm of thoracic back muscle

## 2023-10-11 ENCOUNTER — ANCILLARY PROCEDURE (OUTPATIENT)
Dept: RADIOLOGY | Facility: CLINIC | Age: 43
End: 2023-10-11
Payer: MEDICAID

## 2023-10-11 ENCOUNTER — OFFICE VISIT (OUTPATIENT)
Dept: BEHAVIORAL HEALTH | Facility: CLINIC | Age: 43
End: 2023-10-11
Payer: MEDICAID

## 2023-10-11 VITALS
HEIGHT: 66 IN | BODY MASS INDEX: 36.16 KG/M2 | HEART RATE: 89 BPM | RESPIRATION RATE: 20 BRPM | TEMPERATURE: 97.4 F | DIASTOLIC BLOOD PRESSURE: 81 MMHG | WEIGHT: 225 LBS | SYSTOLIC BLOOD PRESSURE: 131 MMHG

## 2023-10-11 DIAGNOSIS — N95.1 HOT FLASHES DUE TO MENOPAUSE: ICD-10-CM

## 2023-10-11 DIAGNOSIS — F33.41 RECURRENT MAJOR DEPRESSIVE DISORDER, IN PARTIAL REMISSION (CMS-HCC): ICD-10-CM

## 2023-10-11 DIAGNOSIS — M51.16 LUMBAR DISC HERNIATION WITH RADICULOPATHY: ICD-10-CM

## 2023-10-11 PROCEDURE — 3079F DIAST BP 80-89 MM HG: CPT | Performed by: PSYCHIATRY & NEUROLOGY

## 2023-10-11 PROCEDURE — 72120 X-RAY BEND ONLY L-S SPINE: CPT

## 2023-10-11 PROCEDURE — 3075F SYST BP GE 130 - 139MM HG: CPT | Performed by: PSYCHIATRY & NEUROLOGY

## 2023-10-11 PROCEDURE — 72114 X-RAY EXAM L-S SPINE BENDING: CPT | Performed by: RADIOLOGY

## 2023-10-11 PROCEDURE — 99213 OFFICE O/P EST LOW 20 MIN: CPT | Performed by: PSYCHIATRY & NEUROLOGY

## 2023-10-11 PROCEDURE — 1036F TOBACCO NON-USER: CPT | Performed by: PSYCHIATRY & NEUROLOGY

## 2023-10-11 RX ORDER — CLONIDINE HYDROCHLORIDE 0.2 MG/1
0.2 TABLET ORAL NIGHTLY
Qty: 90 TABLET | Refills: 3 | Status: SHIPPED | OUTPATIENT
Start: 2023-10-11 | End: 2024-10-10

## 2023-10-11 RX ORDER — BUPROPION HYDROCHLORIDE 300 MG/1
300 TABLET ORAL EVERY MORNING
Qty: 30 TABLET | Refills: 11 | Status: SHIPPED | OUTPATIENT
Start: 2023-10-11 | End: 2024-04-25 | Stop reason: SDUPTHER

## 2023-10-11 ASSESSMENT — PAIN SCALES - GENERAL: PAINLEVEL: 4

## 2023-10-12 ASSESSMENT — ENCOUNTER SYMPTOMS
SLEEP DISTURBANCE: 1
NERVOUS/ANXIOUS: 1

## 2023-10-12 NOTE — PROGRESS NOTES
Subjective   Patient ID: Nichelle Izaguirre is a 43 y.o. female who presents for medication management follow-up visit.  HPI patient seen interval psych history reviewed.  The patient reports that Seroquel makes her too drowsy the next morning and makes her more irritable.  The insomnia is very bad and has been for many years she does have hot flashes at night.  She also has a sense of inner trembling which might be Parkinson's related but could also be related to anxiety with son not talking to them, as well as her Parkinson's.    Review of Systems   Psychiatric/Behavioral:  Positive for sleep disturbance. The patient is nervous/anxious.        Objective   Physical Exam  Psychiatric:         Attention and Perception: Attention and perception normal.         Mood and Affect: Mood is anxious. Affect is blunt.         Speech: Speech normal.         Behavior: Behavior normal. Behavior is cooperative.         Thought Content: Thought content normal.         Cognition and Memory: Cognition and memory normal.         Judgment: Judgment normal.         Assessment/Plan   Problem List Items Addressed This Visit             ICD-10-CM    Depression F32.A     For severe chronic insomnia we will add clonidine 0.2 mg at bedtime which might also help with hot flashes.  For the inner trembling that she seems to have we will cut Wellbutrin XL dose down to 300 mg.  May continue to take clonidine 0.1 mg for high blood pressure readings during the day.  Follow-up in 4 weeks         Relevant Medications    buPROPion XL (Wellbutrin XL) 300 mg 24 hr tablet     Other Visit Diagnoses         Codes    Hot flashes due to menopause     N95.1    Relevant Medications    cloNIDine (Catapres) 0.2 mg tablet

## 2023-10-12 NOTE — ASSESSMENT & PLAN NOTE
For severe chronic insomnia we will add clonidine 0.2 mg at bedtime which might also help with hot flashes.  For the inner trembling that she seems to have we will cut Wellbutrin XL dose down to 300 mg.  May continue to take clonidine 0.1 mg for high blood pressure readings during the day.  Follow-up in 4 weeks

## 2023-10-19 DIAGNOSIS — L40.50 PSORIASIS WITH ARTHROPATHY (MULTI): Primary | ICD-10-CM

## 2023-10-19 RX ORDER — SECUKINUMAB 150 MG/ML
300 INJECTION SUBCUTANEOUS ONCE
Qty: 2 ML | Refills: 0 | Status: SHIPPED | OUTPATIENT
Start: 2023-10-19 | End: 2023-11-27 | Stop reason: ALTCHOICE

## 2023-10-23 ENCOUNTER — SPECIALTY PHARMACY (OUTPATIENT)
Dept: PHARMACY | Facility: CLINIC | Age: 43
End: 2023-10-23

## 2023-10-24 ENCOUNTER — PHARMACY VISIT (OUTPATIENT)
Dept: PHARMACY | Facility: CLINIC | Age: 43
End: 2023-10-24
Payer: MEDICAID

## 2023-10-24 PROCEDURE — RXMED WILLOW AMBULATORY MEDICATION CHARGE

## 2023-10-30 ENCOUNTER — APPOINTMENT (OUTPATIENT)
Dept: NEUROLOGY | Facility: CLINIC | Age: 43
End: 2023-10-30
Payer: MEDICAID

## 2023-11-01 ENCOUNTER — OFFICE VISIT (OUTPATIENT)
Dept: DERMATOLOGY | Facility: CLINIC | Age: 43
End: 2023-11-01
Payer: MEDICAID

## 2023-11-01 DIAGNOSIS — L40.9 PSORIASIS: Primary | ICD-10-CM

## 2023-11-01 PROCEDURE — 1036F TOBACCO NON-USER: CPT | Performed by: STUDENT IN AN ORGANIZED HEALTH CARE EDUCATION/TRAINING PROGRAM

## 2023-11-01 PROCEDURE — 3075F SYST BP GE 130 - 139MM HG: CPT | Performed by: STUDENT IN AN ORGANIZED HEALTH CARE EDUCATION/TRAINING PROGRAM

## 2023-11-01 PROCEDURE — 3079F DIAST BP 80-89 MM HG: CPT | Performed by: STUDENT IN AN ORGANIZED HEALTH CARE EDUCATION/TRAINING PROGRAM

## 2023-11-01 PROCEDURE — 99213 OFFICE O/P EST LOW 20 MIN: CPT | Performed by: STUDENT IN AN ORGANIZED HEALTH CARE EDUCATION/TRAINING PROGRAM

## 2023-11-01 RX ORDER — CLOBETASOL PROPIONATE 0.5 MG/ML
LOTION TOPICAL
Qty: 118 ML | Refills: 3 | Status: SHIPPED | OUTPATIENT
Start: 2023-11-01

## 2023-11-01 NOTE — PROGRESS NOTES
Subjective     Nichelle Izaguirre is a 43 y.o. female who presents for the following: Psoriasis (Hands and scalp).     Started coxentyx one year ago for psori arthritis with great response but unfortunatelyinsurance has issues with it and so she sometimes doesn't get it covered  Its sad because its working so well for her  Now she has been off of it for 3 month and skin is starting to break out    Review of Systems:  No other skin or systemic complaints other than what is documented elsewhere in the note.    The following portions of the chart were reviewed this encounter and updated as appropriate:          Skin Cancer History  No skin cancer on file.      Specialty Problems          Dermatology Problems    Perioral dermatitis    Psoriasis vulgaris    Rosacea, unspecified        Objective   Well appearing patient in no apparent distress; mood and affect are within normal limits.    A focused skin examination was performed. All findings within normal limits unless otherwise noted below.    Assessment/Plan   1. Psoriasis  Left Hand - Anterior, Right Hand - Anterior, Scalp  reddishpatches      I completely agree withCosentyx  I hope insurance will cover it  Otherwise, can try skyrizi or Taltz or other based on rheum input    For my end, I am also adding clobetsaol lotion to use every few days for flaring on scalp or hands  Not for face or body folds, just for scalp and hands

## 2023-11-06 ENCOUNTER — TELEPHONE (OUTPATIENT)
Dept: BEHAVIORAL HEALTH | Facility: CLINIC | Age: 43
End: 2023-11-06
Payer: MEDICAID

## 2023-11-06 DIAGNOSIS — F33.41 RECURRENT MAJOR DEPRESSIVE DISORDER, IN PARTIAL REMISSION (CMS-HCC): ICD-10-CM

## 2023-11-06 NOTE — PROGRESS NOTES
Patient request to increase the Welbutrin back to 450 mg. States she has no motivation and her depression has increased. Please send to [facility] - [place], [place] - [phone] [phone]-   No SI.

## 2023-11-07 RX ORDER — BUPROPION HYDROCHLORIDE 150 MG/1
150 TABLET ORAL DAILY
Qty: 30 TABLET | Refills: 11 | Status: SHIPPED | OUTPATIENT
Start: 2023-11-07 | End: 2024-04-25 | Stop reason: SDUPTHER

## 2023-11-14 ENCOUNTER — LAB (OUTPATIENT)
Dept: LAB | Facility: LAB | Age: 43
End: 2023-11-14
Payer: MEDICAID

## 2023-11-14 ENCOUNTER — OFFICE VISIT (OUTPATIENT)
Dept: RHEUMATOLOGY | Facility: CLINIC | Age: 43
End: 2023-11-14
Payer: MEDICAID

## 2023-11-14 VITALS
HEART RATE: 85 BPM | WEIGHT: 225 LBS | SYSTOLIC BLOOD PRESSURE: 118 MMHG | DIASTOLIC BLOOD PRESSURE: 77 MMHG | BODY MASS INDEX: 36.16 KG/M2 | HEIGHT: 66 IN

## 2023-11-14 DIAGNOSIS — L40.50 PSORIATIC ARTHROPATHY (MULTI): Primary | ICD-10-CM

## 2023-11-14 DIAGNOSIS — L40.50 PSORIATIC ARTHROPATHY (MULTI): ICD-10-CM

## 2023-11-14 DIAGNOSIS — M10.9 GOUTY ARTHROPATHY: ICD-10-CM

## 2023-11-14 LAB
BASOPHILS # BLD AUTO: 0.02 X10*3/UL (ref 0–0.1)
BASOPHILS NFR BLD AUTO: 0.4 %
EOSINOPHIL # BLD AUTO: 0 X10*3/UL (ref 0–0.7)
EOSINOPHIL NFR BLD AUTO: 0 %
ERYTHROCYTE [DISTWIDTH] IN BLOOD BY AUTOMATED COUNT: 13.6 % (ref 11.5–14.5)
ERYTHROCYTE [SEDIMENTATION RATE] IN BLOOD BY WESTERGREN METHOD: 5 MM/H (ref 0–20)
HCT VFR BLD AUTO: 34.9 % (ref 36–46)
HGB BLD-MCNC: 11.2 G/DL (ref 12–16)
IMM GRANULOCYTES # BLD AUTO: 0.01 X10*3/UL (ref 0–0.7)
IMM GRANULOCYTES NFR BLD AUTO: 0.2 % (ref 0–0.9)
LYMPHOCYTES # BLD AUTO: 2.48 X10*3/UL (ref 1.2–4.8)
LYMPHOCYTES NFR BLD AUTO: 46 %
MCH RBC QN AUTO: 30.5 PG (ref 26–34)
MCHC RBC AUTO-ENTMCNC: 32.1 G/DL (ref 32–36)
MCV RBC AUTO: 95 FL (ref 80–100)
MONOCYTES # BLD AUTO: 0.49 X10*3/UL (ref 0.1–1)
MONOCYTES NFR BLD AUTO: 9.1 %
NEUTROPHILS # BLD AUTO: 2.39 X10*3/UL (ref 1.2–7.7)
NEUTROPHILS NFR BLD AUTO: 44.3 %
NRBC BLD-RTO: 0 /100 WBCS (ref 0–0)
PLATELET # BLD AUTO: 296 X10*3/UL (ref 150–450)
RBC # BLD AUTO: 3.67 X10*6/UL (ref 4–5.2)
WBC # BLD AUTO: 5.4 X10*3/UL (ref 4.4–11.3)

## 2023-11-14 PROCEDURE — 99214 OFFICE O/P EST MOD 30 MIN: CPT | Performed by: INTERNAL MEDICINE

## 2023-11-14 PROCEDURE — 85652 RBC SED RATE AUTOMATED: CPT

## 2023-11-14 PROCEDURE — 36415 COLL VENOUS BLD VENIPUNCTURE: CPT

## 2023-11-14 PROCEDURE — 80053 COMPREHEN METABOLIC PANEL: CPT

## 2023-11-14 PROCEDURE — 86140 C-REACTIVE PROTEIN: CPT

## 2023-11-14 PROCEDURE — 3078F DIAST BP <80 MM HG: CPT | Performed by: INTERNAL MEDICINE

## 2023-11-14 PROCEDURE — 3074F SYST BP LT 130 MM HG: CPT | Performed by: INTERNAL MEDICINE

## 2023-11-14 PROCEDURE — 85025 COMPLETE CBC W/AUTO DIFF WBC: CPT

## 2023-11-14 PROCEDURE — 1036F TOBACCO NON-USER: CPT | Performed by: INTERNAL MEDICINE

## 2023-11-14 RX ORDER — COLCHICINE 0.6 MG/1
0.6 TABLET ORAL EVERY OTHER DAY
Qty: 45 TABLET | Refills: 0 | Status: SHIPPED | OUTPATIENT
Start: 2023-11-14 | End: 2023-12-20 | Stop reason: SDUPTHER

## 2023-11-14 RX ORDER — SECUKINUMAB 150 MG/ML
300 INJECTION SUBCUTANEOUS
COMMUNITY
Start: 2023-10-24 | End: 2023-11-15 | Stop reason: SDUPTHER

## 2023-11-14 RX ORDER — LEFLUNOMIDE 10 MG/1
10 TABLET ORAL DAILY
Qty: 90 TABLET | Refills: 0 | Status: SHIPPED | OUTPATIENT
Start: 2023-11-14 | End: 2024-02-12

## 2023-11-14 NOTE — PROGRESS NOTES
Follow-up Rheumatology Patient Visit    Chief Complaint:  Nichelle Izaguirre is a 43 y.o. female presenting today for Follow-up.    History of Presenting Problem:   42 y/o female with Hx of psoriatic arthritis, gout, asthma, Parkinsonism/Dystonia, Immunodeficiency disorder (MBL) on prophylaxis antibiotics with amoxicillin presents for follow-up  She is tolerating leflunomide currently taking 10 mg daily now  She is taking allopurinol 400 mg daily  Today She report she has finally got Cosentyx 1 week ago   she has been off Cosentyx x 6 months, just got medication filled recent, She had only taken for 2 months prior to the 6 month gap due to insurance issues.  Previous meds ;  MTX  Otezla      Problem List:   Patient Active Problem List   Diagnosis    Allergic rhinitis    Anxiety disorder    Arthralgia of multiple sites    Bariatric surgery status    Benign essential hypertension    Bowel disease, inflammatory    Immunodeficiency syndrome (CMS/HCC)    Chronic diarrhea    Continuous LLQ abdominal pain    Depression    Essential familial hyperlipidemia    GERD (gastroesophageal reflux disease)    Gouty arthropathy    Hoarseness    Hot flashes    Hyperlipidemia    Hypertriglyceridemia    Hypothyroidism (acquired)    Inflammatory arthropathy    Rheumatoid arthritis (CMS/HCC)    Hypersomnia    Labile hypertension    Low vitamin D level    Lyme disease    Mannose-binding lectin deficiency    Methadone use    Migraine without status migrainosus, not intractable    Morbid obesity (CMS/HCC)    Mixed incontinence urge and stress    Osteoporosis    Obstructive sleep apnea, adult    DRD (DOPA-responsive dystonia)    Post herpetic neuralgia    Polyphagia    POTS (postural orthostatic tachycardia syndrome)    Prediabetes    Psoriatic arthropathy (CMS/HCC)    Steroid withdrawal syndrome with complication (CMS/HCC)    Steroid-dependent asthma    Vitamin B12 deficiency    Vitamin D deficiency    Gait disturbance    Bilateral leg edema     Cystocele with uterine descensus    Dyspnea on effort    Elbow tendinitis    Foreign body in ear, right, initial encounter    Frequent falls    Hyperglycemia    Hyperuricemia    Incomplete bladder emptying    Insomnia, organic    Intermittent palpitations    Lower back pain    Maxillary sinusitis    Myofascial pain    Orthostatic intolerance    Other ovarian cyst, unspecified side    Pelvic floor weakness    Perioral dermatitis    Peripheral neuropathy    Postural dizziness    Psoriasis vulgaris    Recurrent urinary tract infection    Renal lesion    Right lumbar radiculopathy    Rosacea, unspecified    Sacroiliac joint dysfunction of right side    Snoring    Spasm of thoracic back muscle    Syncope    Urinary frequency    Condition not found       Past Medical History:   Past Medical History:   Diagnosis Date    Acute upper respiratory infection, unspecified 07/26/2016    Viral URI with cough    Acute upper respiratory infection, unspecified 11/15/2017    Viral URI with cough    Allergy status to unspecified drugs, medicaments and biological substances     History of allergy    Chronic maxillary sinusitis 08/27/2018    Maxillary sinusitis, unspecified chronicity    Dystonia, unspecified 04/08/2014    Dystonia    Encounter for other screening for malignant neoplasm of breast 01/22/2018    Breast screening    Encounter for screening for respiratory tuberculosis 11/11/2013    Screening examination for pulmonary tuberculosis    Essential (primary) hypertension 12/27/2017    Benign essential hypertension    Hypersomnia, unspecified 04/13/2015    Sleeps too much    Idiopathic sleep related nonobstructive alveolar hypoventilation     Nocturnal hypoxemia    Idiopathic sleep related nonobstructive alveolar hypoventilation 02/09/2018    Nocturnal hypoxia    Impaired fasting glucose 01/22/2018    Impaired fasting glucose    Insect bite (nonvenomous) of lower back and pelvis, initial encounter 03/29/2018    Tick bite of  back    Left lower quadrant pain 01/07/2019    Left lower quadrant pain    Local infection of the skin and subcutaneous tissue, unspecified 07/28/2017    Skin infection    Low back pain, unspecified 05/23/2019    Acute low back pain    Other conditions influencing health status 02/27/2017    Sprain of right thumb, unspecified site of finger, initial encounter    Other malaise 04/16/2018    Malaise and fatigue    Other microscopic hematuria 03/30/2018    Microscopic hematuria    Other specified cough 09/07/2018    Productive cough    Other specified health status 01/07/2019    Acute medical illness    Other symptoms and signs involving the musculoskeletal system 07/12/2018    Weakness of right lower extremity    Other symptoms and signs involving the musculoskeletal system 03/21/2018    Polyarticular joint involvement    Other symptoms and signs involving the musculoskeletal system 07/12/2018    RUE weakness    Pain in right lower leg 01/27/2016    Right calf pain    Pain in unspecified hip 01/20/2016    Hip pain    Pelvic and perineal pain 04/17/2018    Pelvic pain    Personal history of diseases of the skin and subcutaneous tissue 08/07/2018    History of dermatitis    Personal history of other (healed) physical injury and trauma 08/07/2018    History of insect bite    Personal history of other diseases of the circulatory system     History of hypertension    Personal history of other diseases of the female genital tract 11/24/2015    History of menorrhagia    Personal history of other diseases of the musculoskeletal system and connective tissue 03/14/2018    History of tendinitis    Personal history of other diseases of the nervous system and sense organs 04/08/2014    History of Parkinson's disease    Personal history of other diseases of the respiratory system 01/07/2019    History of acute bronchitis    Personal history of other diseases of the respiratory system 10/05/2018    History of paranasal sinus pain     Personal history of other specified conditions 04/13/2015    History of snoring    Personal history of other specified conditions 09/11/2017    History of headache    Personal history of other specified conditions 09/07/2018    History of persistent cough    Personal history of other specified conditions 04/08/2014    History of memory loss    Personal history of other specified conditions 03/26/2018    History of left flank pain    Personal history of other specified conditions     History of heartburn    Personal history of urinary calculi 03/26/2018    Personal history of urinary calculi    Personal history of urinary calculi 03/26/2018    History of renal calculi    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/2017    Plantar fasciitis, left    Primary insomnia 04/13/2015    Primary insomnia    Rash and other nonspecific skin eruption 04/16/2018    Rash    Unspecified abdominal pain 01/07/2019    Left sided abdominal pain    Urinary tract infection, site not specified 10/19/2018    UTI (urinary tract infection), bacterial    Urinary tract infection, site not specified 10/18/2018    Recurrent UTI    Vitamin D deficiency, unspecified 04/22/2016    Vitamin D deficiency    Zoster with other complications 11/25/2015    Herpes zoster dermatitis       Surgical History:   Past Surgical History:   Procedure Laterality Date    CT ANGIO CORONARY ART WITH HEARTFLOW IF SCORE >30%  3/18/2020    CT HEART CORONARY ANGIOGRAM 3/18/2020 [facility]    HAND TENDON SURGERY  11/11/2013    Hand Incision Tendon Sheath Of A Finger    HYSTERECTOMY  03/04/2016    Hysterectomy    LITHOTRIPSY  11/11/2013    Renal Lithotripsy    TONSILLECTOMY  12/19/2013    Tonsillectomy With Adenoidectomy        Allergies:   Allergies   Allergen Reactions    Clindamycin Anaphylaxis    Dupilumab Anaphylaxis    Hydrochlorothiazide Anaphylaxis, Itching and Swelling    Sulfamethoxazole-Trimethoprim Anaphylaxis    Cefazolin Hives, Other and Swelling     STATES TONGUE  SPLITS    Atorvastatin Hives    Cephalexin Other    Clarithromycin Swelling     hives, tongue swelling    Nitrofurantoin Monohyd/M-Cryst Hives    Sulfa (Sulfonamide Antibiotics) Hives    Sulfacetamide Hives       Medications:   Current Outpatient Medications:     alendronate (Fosamax) 70 mg tablet, Take 1 tablet (70 mg) by mouth once a week., Disp: , Rfl:     allopurinol (Zyloprim) 100 mg tablet, Take 1 tablet (100 mg) by mouth once daily., Disp: , Rfl:     allopurinol (Zyloprim) 300 mg tablet, Take 1 tablet (300 mg) by mouth once daily., Disp: , Rfl:     ALPRAZolam (Xanax) 0.5 mg tablet, Take 1 tablet (0.5 mg) by mouth 3 times a day as needed for anxiety., Disp: , Rfl:     amLODIPine (Norvasc) 2.5 mg tablet, Take 1 tablet (2.5 mg) by mouth once daily., Disp: , Rfl:     amoxicillin (Amoxil) 500 mg capsule, Take 1 capsule (500 mg) by mouth every 12 hours., Disp: , Rfl:     ascorbic acid, vitamin C, 500 mg capsule, Take 1 capsule by mouth once daily., Disp: , Rfl:     benzoyl peroxide (Benzac AC) 10 % external wash, Apply topically once daily., Disp: , Rfl:     benztropine (Cogentin) 2 mg tablet, Take 1 tablet (2 mg) by mouth once daily., Disp: , Rfl:     buPROPion XL (Wellbutrin XL) 150 mg 24 hr tablet, Take 1 tablet (150 mg) by mouth once daily. Do not crush, chew, or split., Disp: 30 tablet, Rfl: 11    buPROPion XL (Wellbutrin XL) 300 mg 24 hr tablet, Take 1 tablet (300 mg) by mouth once daily in the morning. Do not crush, chew, or split., Disp: 30 tablet, Rfl: 11    busPIRone (Buspar) 15 mg tablet, Take 1 tablet (15 mg) by mouth 2 times a day., Disp: 90 tablet, Rfl: 1    carbidopa-levodopa-entacapone (Stalevo) -200 mg tablet, Take 1 tablet by mouth every 4 hours., Disp: 540 tablet, Rfl: 1    carvedilol (Coreg) 25 mg tablet, Take 1 tablet (25 mg) by mouth 2 times a day with meals., Disp: , Rfl:     cetirizine-pseudoephedrine (ZyrTEC-D) 5-120 mg 12 hr tablet, Take 1 tablet by mouth 2 times a day., Disp: 180  tablet, Rfl: 0    clindamycin (Cleocin T) 1 % lotion, 1 Application, Disp: , Rfl:     clobetasol (Temovate) 0.05 % external solution, Apply topically 2 times a day., Disp: , Rfl:     clobetasoL 0.05 % lotion, One application as needed for flaring only.not for daily use. For scalp and hands only, Disp: 118 mL, Rfl: 3    cloNIDine (Catapres) 0.1 mg tablet, Take 1 tablet (0.1 mg) by mouth once daily., Disp: , Rfl:     cloNIDine (Catapres) 0.2 mg tablet, Take 1 tablet (0.2 mg) by mouth once daily at bedtime., Disp: 90 tablet, Rfl: 3    colchicine 0.6 mg tablet, Take 1 tablet (0.6 mg) by mouth every other day., Disp: , Rfl:     cyanocobalamin (Vitamin B-12) 1,000 mcg tablet, Take by mouth every 30 (thirty) days., Disp: , Rfl:     diclofenac sodium (Voltaren) 1 % gel gel, Apply 1 Application topically 2 times a day as needed., Disp: , Rfl:     doxycycline (Vibramycin) 100 mg capsule, 1 capsule (100 mg)., Disp: , Rfl:     DULoxetine (Cymbalta) 60 mg DR capsule, Take 1 capsule (60 mg) by mouth once daily., Disp: , Rfl:     eplerenone (Inspra) 25 mg tablet, Take 1 tablet (25 mg) by mouth once daily., Disp: , Rfl:     ergocalciferol (Vitamin D-2) 1.25 MG (99880 UT) capsule, Take 1 capsule (50,000 Units) by mouth 1 (one) time per week., Disp: , Rfl:     FENOFIBRATE MICRONIZED ORAL, 48 mg once daily., Disp: , Rfl:     fluticasone furoate-vilanteroL (Breo Ellipta) 100-25 mcg/dose inhaler, Inhale 1 puff once daily., Disp: , Rfl:     furosemide (Lasix) 20 mg tablet, Take by mouth., Disp: , Rfl:     icosapent ethyL (Vascepa) 1 gram capsule, Take 2 capsules (2 g) by mouth 2 times a day with meals., Disp: , Rfl:     ipratropium-albuteroL (Duo-Neb) 0.5-2.5 mg/3 mL nebulizer solution, Take 3 mL by nebulization every 4 hours if needed for wheezing., Disp: , Rfl:     ketoconazole (NIZOral) 2 % cream, 1 Application, Disp: , Rfl:     leflunomide (Arava) 10 mg tablet, Take 1 tablet (10 mg) by mouth once daily., Disp: , Rfl:     levalbuterol  (Xopenex) 1.25 mg/3 mL nebulizer solution, Take 1 ampule by nebulization 3 times a day., Disp: , Rfl:     meloxicam (Mobic) 15 mg tablet, Take 1 tablet (15 mg) by mouth once daily., Disp: 30 tablet, Rfl: 3    metoprolol succinate XL (Toprol-XL) 50 mg 24 hr tablet, Take 1 tablet (50 mg) by mouth once daily., Disp: , Rfl:     metroNIDAZOLE (Metrocream) 0.75 % cream, 1 Application, Disp: , Rfl:     montelukast (Singulair) 10 mg tablet, Take 1 tablet (10 mg) by mouth once daily at bedtime., Disp: , Rfl:     nebulizer and compressor device, use q4-6 hours with albuterol PRN cough/wheeze, Disp: , Rfl:     nebulizers (DevilEduorass Disposable Nebulizer) misc, every 4 hours if needed., Disp: , Rfl:     Nucynta 50 mg tablet, Take 1 tablet (50 mg) by mouth 3 times a day. Do not start before October 10, 2023., Disp: 90 tablet, Rfl: 0    olmesartan (BENIcar) 40 mg tablet, Take 1 tablet (40 mg) by mouth once daily., Disp: , Rfl:     omeprazole (PriLOSEC) 40 mg DR capsule, Take 1 capsule (40 mg) by mouth once daily in the morning. Take before meals., Disp: 90 capsule, Rfl: 3    orphenadrine (Norflex) 100 mg 12 hr tablet, Take 1 tablet (100 mg) by mouth 2 times a day as needed for muscle spasms., Disp: 60 tablet, Rfl: 3    pioglitazone (Actos) 15 mg tablet, Take 1 tablet (15 mg) by mouth once daily., Disp: , Rfl:     pregabalin (Lyrica) 75 mg capsule, Take 1 capsule (75 mg) by mouth 3 times a day., Disp: 90 capsule, Rfl: 0    ProAir HFA 90 mcg/actuation inhaler, , Disp: , Rfl:     QUEtiapine (SEROquel) 25 mg tablet, Take by mouth. TAKE 1-3 TABLETS AT BEDTIME, Disp: , Rfl:     Repatha SureClick 140 mg/mL injection, Inject 1 mL (140 mg) under the skin every 14 (fourteen) days., Disp: , Rfl:     Spiriva Respimat 1.25 mcg/actuation inhaler, Inhale once daily., Disp: , Rfl:     spironolactone (Aldactone) 25 mg tablet, 1 tablet (25 mg)., Disp: , Rfl:     SUMAtriptan (Imitrex) 25 mg tablet, Take 1 tablet (25 mg) by mouth 1 time if needed for  "migraine. May repeat in 2 hours if no improvement. Max 200 mg/24 hr, Disp: 9 tablet, Rfl: 2    tezepelumab-ekko (Tezspire) SubQ Pen Injector, Inject 210 mg under the skin., Disp: , Rfl:       Objective   Physical Examination:    Visit Vitals  /77   Pulse 85   Ht 1.676 m (5' 6\")   Wt 102 kg (225 lb)   BMI 36.32 kg/m²   Smoking Status Never   BSA 2.18 m²        Gen: NAD, A&O x 3  HEENT: clear sclera and conjunctiva,     Musculoskeletal:   Neck; WNL, full ROM  Shoulder: WNL, full ROM  Elbow:WNL, full ROM, no effusion noted  Wrist and fingers;no active synovitis noted, Full ROM in the Wrist , Good fist and   Knees:  No effusions or crepitation, full ROM.  Hips; WNL, full ROM, Negative Cabrera test  Ankle, Feet; WNL, full ROM    Skin: No rashes or lesions seen, no nail changes  Neuro: A&O x3, Normal Gait    Procedures :None    Orders:  No orders of the defined types were placed in this encounter.       Provider Impression:   Assessment/Plan   No diagnosis found.     42 y/o female with Hx of asthma, Parkinsonism/Dystonia, Immunodeficiency disorder (MBL) on prophylaxis antibiotics with amoxicillin present for pain in the right elbow and bilateral knee. Workup shows elevated uric acid levels and positive Family History on the maternal side. underlying gout and possible underlying PSA. MRI show severe lateral epicondylitis. Concerned that this may be due to underlying PSA given the inflammatory findings  -Continue Cosentyx 300 mg monthly  -Continue leflunomide 10 mg,  -Continue Allopurinol 400 mg daily  -Check uric acid and hepatic panel  -F/u in 3 months  "

## 2023-11-15 ENCOUNTER — PHARMACY VISIT (OUTPATIENT)
Dept: PHARMACY | Facility: CLINIC | Age: 43
End: 2023-11-15
Payer: MEDICAID

## 2023-11-15 DIAGNOSIS — L40.50 PSORIATIC ARTHROPATHY (MULTI): Primary | ICD-10-CM

## 2023-11-15 LAB
ALBUMIN SERPL BCP-MCNC: 4.4 G/DL (ref 3.4–5)
ALP SERPL-CCNC: 53 U/L (ref 33–110)
ALT SERPL W P-5'-P-CCNC: 18 U/L (ref 7–45)
ANION GAP SERPL CALC-SCNC: 16 MMOL/L (ref 10–20)
AST SERPL W P-5'-P-CCNC: 17 U/L (ref 9–39)
BILIRUB SERPL-MCNC: 0.4 MG/DL (ref 0–1.2)
BUN SERPL-MCNC: 19 MG/DL (ref 6–23)
CALCIUM SERPL-MCNC: 9.4 MG/DL (ref 8.6–10.6)
CHLORIDE SERPL-SCNC: 101 MMOL/L (ref 98–107)
CO2 SERPL-SCNC: 27 MMOL/L (ref 21–32)
CREAT SERPL-MCNC: 0.68 MG/DL (ref 0.5–1.05)
CRP SERPL-MCNC: <0.1 MG/DL
GFR SERPL CREATININE-BSD FRML MDRD: >90 ML/MIN/1.73M*2
GLUCOSE SERPL-MCNC: 87 MG/DL (ref 74–99)
POTASSIUM SERPL-SCNC: 4.2 MMOL/L (ref 3.5–5.3)
PROT SERPL-MCNC: 7.1 G/DL (ref 6.4–8.2)
SODIUM SERPL-SCNC: 140 MMOL/L (ref 136–145)

## 2023-11-15 RX ORDER — SECUKINUMAB 150 MG/ML
300 INJECTION SUBCUTANEOUS
Qty: 2 ML | Refills: 2 | Status: SHIPPED | OUTPATIENT
Start: 2023-11-15 | End: 2023-11-27 | Stop reason: SDUPTHER

## 2023-11-15 RX ORDER — SECUKINUMAB 150 MG/ML
300 INJECTION SUBCUTANEOUS
Qty: 2 ML | Refills: 2 | Status: SHIPPED | OUTPATIENT
Start: 2023-11-15 | End: 2024-03-15 | Stop reason: SDUPTHER

## 2023-11-21 DIAGNOSIS — L40.50 PSORIATIC ARTHROPATHY (MULTI): Primary | ICD-10-CM

## 2023-11-21 RX ORDER — SECUKINUMAB 150 MG/ML
INJECTION SUBCUTANEOUS
Qty: 2 ML | Refills: 1 | Status: SHIPPED | OUTPATIENT
Start: 2023-11-21 | End: 2023-11-27 | Stop reason: SDUPTHER

## 2023-11-29 ENCOUNTER — OFFICE VISIT (OUTPATIENT)
Dept: PAIN MEDICINE | Facility: HOSPITAL | Age: 43
End: 2023-11-29
Payer: MEDICAID

## 2023-11-29 VITALS
DIASTOLIC BLOOD PRESSURE: 95 MMHG | RESPIRATION RATE: 16 BRPM | BODY MASS INDEX: 36.76 KG/M2 | WEIGHT: 228.7 LBS | HEART RATE: 82 BPM | HEIGHT: 66 IN | SYSTOLIC BLOOD PRESSURE: 148 MMHG

## 2023-11-29 DIAGNOSIS — M54.16 RIGHT LUMBAR RADICULOPATHY: ICD-10-CM

## 2023-11-29 DIAGNOSIS — M19.90 INFLAMMATORY ARTHROPATHY: ICD-10-CM

## 2023-11-29 DIAGNOSIS — M62.830 SPASM OF THORACIC BACK MUSCLE: ICD-10-CM

## 2023-11-29 DIAGNOSIS — M79.18 MYOFASCIAL PAIN: ICD-10-CM

## 2023-11-29 DIAGNOSIS — M77.8 ELBOW TENDINITIS: ICD-10-CM

## 2023-11-29 DIAGNOSIS — M51.16 LUMBAR DISC HERNIATION WITH RADICULOPATHY: ICD-10-CM

## 2023-11-29 DIAGNOSIS — F11.90 CHRONIC, CONTINUOUS USE OF OPIOIDS: ICD-10-CM

## 2023-11-29 DIAGNOSIS — Z79.891 LONG TERM PRESCRIPTION OPIATE USE: Primary | ICD-10-CM

## 2023-11-29 DIAGNOSIS — G89.29 CHRONIC RIGHT-SIDED LOW BACK PAIN WITH RIGHT-SIDED SCIATICA: ICD-10-CM

## 2023-11-29 DIAGNOSIS — M25.50 ARTHRALGIA OF MULTIPLE SITES: ICD-10-CM

## 2023-11-29 DIAGNOSIS — M54.41 CHRONIC RIGHT-SIDED LOW BACK PAIN WITH RIGHT-SIDED SCIATICA: ICD-10-CM

## 2023-11-29 PROCEDURE — 3080F DIAST BP >= 90 MM HG: CPT | Performed by: CLINICAL NURSE SPECIALIST

## 2023-11-29 PROCEDURE — 3077F SYST BP >= 140 MM HG: CPT | Performed by: CLINICAL NURSE SPECIALIST

## 2023-11-29 PROCEDURE — 80348 DRUG SCREENING BUPRENORPHINE: CPT | Performed by: CLINICAL NURSE SPECIALIST

## 2023-11-29 PROCEDURE — 1036F TOBACCO NON-USER: CPT | Performed by: CLINICAL NURSE SPECIALIST

## 2023-11-29 PROCEDURE — 99214 OFFICE O/P EST MOD 30 MIN: CPT | Performed by: CLINICAL NURSE SPECIALIST

## 2023-11-29 PROCEDURE — 80361 OPIATES 1 OR MORE: CPT | Performed by: CLINICAL NURSE SPECIALIST

## 2023-11-29 PROCEDURE — 80307 DRUG TEST PRSMV CHEM ANLYZR: CPT | Performed by: CLINICAL NURSE SPECIALIST

## 2023-11-29 PROCEDURE — 80372 DRUG SCREENING TAPENTADOL: CPT | Performed by: CLINICAL NURSE SPECIALIST

## 2023-11-29 PROCEDURE — 80324 DRUG SCREEN AMPHETAMINES 1/2: CPT | Performed by: CLINICAL NURSE SPECIALIST

## 2023-11-29 ASSESSMENT — COLUMBIA-SUICIDE SEVERITY RATING SCALE - C-SSRS
1. IN THE PAST MONTH, HAVE YOU WISHED YOU WERE DEAD OR WISHED YOU COULD GO TO SLEEP AND NOT WAKE UP?: NO
6. HAVE YOU EVER DONE ANYTHING, STARTED TO DO ANYTHING, OR PREPARED TO DO ANYTHING TO END YOUR LIFE?: NO
2. HAVE YOU ACTUALLY HAD ANY THOUGHTS OF KILLING YOURSELF?: NO

## 2023-11-29 ASSESSMENT — PATIENT HEALTH QUESTIONNAIRE - PHQ9
2. FEELING DOWN, DEPRESSED OR HOPELESS: NOT AT ALL
1. LITTLE INTEREST OR PLEASURE IN DOING THINGS: NOT AT ALL
SUM OF ALL RESPONSES TO PHQ9 QUESTIONS 1 AND 2: 0

## 2023-11-29 ASSESSMENT — ENCOUNTER SYMPTOMS
LOSS OF SENSATION IN FEET: 0
DEPRESSION: 0
OCCASIONAL FEELINGS OF UNSTEADINESS: 1

## 2023-11-29 NOTE — PROGRESS NOTES
Subjective   Patient ID: Nichelle Izaguirre is a 43 y.o. female who presents for back pain and polyarticular pain most bothersome to elbows.        HPI    43-year-old female with past medical history of anxiety, psoriatic arthritis, gout, hypertension, GUICHO, irritable bowel disease, asthma, Parkinson's, kidney stones, osteoporosis who presents for follow-up of back pain from her cervical region to lumbar region as well as polyarticular pain most bothersome to bilateral elbows.  Most recent MRI in 2021 consistent with lumbar lordosis, normal disc height and hydration, small central disc bulge at L5-S1 and L4-5 with no significant foraminal stenosis and moderate lateral recess stenosis at L4-5.  Patient was sent for lumbar x-ray which was negative with no acute findings.  Presents at today's office visit with chronic pain from her mid back to her lumbar spine.  Pain occasionally radiates to right lower extremity to the level of her calf.  She denies numbness/tingling, weakness or changes in bowel/bladder function.  She endorses chronic polyarticular pain most bothersome to bilateral elbows.  Pain described as aching, dull and throbbing.  Patient states that increasing motion of her elbows and movement results in pain and swelling by the end of the day.  Patient also states that standing for long periods of time lifting and bending forward increases her back pain.  Patient was ordered physical therapy at her last office visit which she has been unable to start.  She is unsure about doing physical therapy until she has been able to receive some pain relief from her Cosentyx.  She received approval for Cosentyx and had started her treatment.  Her second dose of Cosentyx was delayed secondary to being sent the wrong type of medication.  She is awaiting a new shipment of her Cosentyx and will restart as soon as that arrives.  She is currently not interested in doing epidural steroid injections at this time.  She prefers to  resume her Cosentyx and try physical therapy when she feels she will be able to tolerate the therapy without increasing joint pain.  She continues leflunomide, allopurinol as prescribed by rheumatology/dermatology.  She currently is managing her pain with Nucynta which was reduced to 50 mg 3 times per day.  She states that she takes most often 2-3 times per day.  Continued benefit with Lyrica, duloxetine, meloxicam and orphenadrine.  Patient states that this medication regimen provides greater than 75% pain relief and allows her to remain active.  She has been able to take care of her grandchildren and her home.  Patient continues to use a very low-dose of alprazolam sparingly for types of increased anxiety and panic.  Patient last filled alprazolam No. 20 in July 2023.      Location of Pain: pt is here for interval follow up and medication count. Pt states bilateral elbow pain due to psoriatic arthritis. Pt also having mid back pain along spine that radiates down to above the buttock.           Pain Score: 4/10     Treatment: Nucynta 50mg TID  Last dose: 11/29/23 PM  Medication Count: 7  Fill date: 10/19/23     Efficacy: 75%     Side Effects: denies     Narcan: Feb 2024     Other pain medication/neuromodulator: Mobic/Norflex/Lyrica/Cymbalta     Therapeutic Goals: to be able to do more throughout the day     Therapeutic Assessment: It helps the most in the morning     Injections and/or Procedures: denies, does not want to have injections.     Other:denies        OARRS:  Santa Dunn, APRN-CNP, APRN-CNS on 11/29/2023  4:42 PM  I have personally reviewed the OARRS report for Nichelle Izaguirre. I have considered the risks of abuse, dependence, addiction and diversion    Is the patient prescribed a combination of a benzodiazepine and opioid?  Yes, I feel it is clincially indicated to continue the medication and have discussed with the patient risks/benefits/alternatives.    Last Urine Drug Screen / ordered today: Yes  11/29/23  Recent Results (from the past 8760 hour(s))   Amphetamine Confirm, Urine    Collection Time: 06/26/23 12:23 PM   Result Value Ref Range    Amphetamines,Urine <50 ng/mL    MDA, Urine <200 ng/mL    MDEA, Urine <200 ng/mL    MDMA, Urine <200 ng/mL    Methamphetamine Quant, Ur <200 ng/mL    Phentermine,Urine <200 ng/mL   Tapentadol Confirmation, Urine    Collection Time: 06/26/23 12:23 PM   Result Value Ref Range    Tapentadol >1000 (A) Cutoff <25 ng/mL    N-Desmethyltapentadol >1000 (A) Cutoff <25 ng/mL   OPIATE/OPIOID/BENZO PRESCRIPTION COMPLIANCE    Collection Time: 06/26/23 12:23 PM   Result Value Ref Range    DRUG SCREEN COMMENT URINE SEE BELOW     Creatine, Urine 106.5 mg/dL    Amphetamine Screen, Urine PRESUMPTIVE POSITIVE (A) NEGATIVE    Barbiturate Screen, Urine PRESUMPTIVE NEGATIVE NEGATIVE    Cannabinoid Screen, Urine PRESUMPTIVE NEGATIVE NEGATIVE    Cocaine Screen, Urine PRESUMPTIVE NEGATIVE NEGATIVE    PCP Screen, Urine PRESUMPTIVE NEGATIVE NEGATIVE    7-Aminoclonazepam <25 Cutoff <25 ng/mL    Alpha-Hydroxyalprazolam <25 Cutoff <25 ng/mL    Alpha-Hydroxymidazolam <25 Cutoff <25 ng/mL    Alprazolam <25 Cutoff <25 ng/mL    Chlordiazepoxide <25 Cutoff <25 ng/mL    Clonazepam <25 Cutoff <25 ng/mL    Diazepam <25 Cutoff <25 ng/mL    Lorazepam <25 Cutoff <25 ng/mL    Midazolam <25 Cutoff <25 ng/mL    Nordiazepam <25 Cutoff <25 ng/mL    Oxazepam <25 Cutoff <25 ng/mL    Temazepam <25 Cutoff <25 ng/mL    Zolpidem <25 Cutoff <25 ng/mL    Zolpidem Metabolite (ZCA) <25 Cutoff <25 ng/mL    6-Acetylmorphine <25 Cutoff <25 ng/mL    Codeine <50 Cutoff <50 ng/mL    Hydrocodone <25 Cutoff <25 ng/mL    Hydromorphone <25 Cutoff <25 ng/mL    Morphine Urine <50 Cutoff <50 ng/mL    Norhydrocodone <25 Cutoff <25 ng/mL    Noroxycodone <25 Cutoff <25 ng/mL    Oxycodone <25 Cutoff <25 ng/mL    Oxymorphone <25 Cutoff <25 ng/mL    Tramadol <50 Cutoff <50 ng/mL    O-Desmethyltramadol <50 Cutoff <50 ng/mL    Fentanyl <2.5  Cutoff<2.5 ng/mL    Norfentanyl <2.5 Cutoff<2.5 ng/mL    METHADONE CONFIRMATION,URINE <25 Cutoff <25 ng/mL    EDDP <25 Cutoff <25 ng/mL   Buprenorphine Confirm,Urine    Collection Time: 06/26/23 12:23 PM   Result Value Ref Range    BUPRENORPHINE GLUC, URINE <5 ng/mL    BUPRENORPHINE ,URINE <2 ng/mL    NALOXONE, URINE <100 ng/mL    NORBUPRENORPHINE GLUC,URINE <5 ng/mL    NORBUPRENORPHINE, URINE <2 ng/mL     Results are as expected.     Controlled Substance Agreement: 6/29/23  Date of the Last Agreement: 6/29/23  Reviewed Controlled Substance Agreement including but not limited to the benefits, risks, and alternatives to treatment with a Controlled Substance medication(s).    Monitoring and compliance:    ORT: 6/26/23    PDUQ: 11/29/23 score 7    Office Agreement: 6/26/23      Review of Systems    ROS:   General: No fevers, chills, weight loss  Skin: Negative for lesions  Eyes: No acute vision changes  Ears: No vertigo  Nose, mouth, throat: No difficulty swallowing or speaking  Respiratory: No cough, shortness of breath, cyanosis  Cardiovascular: Negative for chest pain syncope or palpitation  Gastrointestinal: No constipation, nausea, vomiting  Neurological: Positive for occasional headaches  Psychological: Negative for severe or debilitating anxiety, depression. Negative memory loss  Musculoskeletal: Positive for arthralgia, myalgia, pain and joint swelling  Endocrine: Negative for weight gain, appetite changes, excessive sweating  Allergy/immune: Negative    All 13 systems were reviewed and are within normal levels except as noted or in the history of present illness.  Positive or pertinent negative responses are noted or were in the history of present illness. As noted, the patient denies significant or impairing weakness in the bilateral upper and lower extremities, medication induced constipation, and bowel or bladder incontinence.     Current Outpatient Medications:     alendronate (Fosamax) 70 mg tablet, Take  1 tablet (70 mg) by mouth once a week., Disp: , Rfl:     allopurinol (Zyloprim) 100 mg tablet, Take 1 tablet (100 mg) by mouth once daily., Disp: , Rfl:     ALPRAZolam (Xanax) 0.5 mg tablet, Take 1 tablet (0.5 mg) by mouth 3 times a day as needed for anxiety., Disp: , Rfl:     amLODIPine (Norvasc) 2.5 mg tablet, Take 1 tablet (2.5 mg) by mouth once daily., Disp: , Rfl:     amoxicillin (Amoxil) 500 mg capsule, Take 1 capsule (500 mg) by mouth every 12 hours., Disp: , Rfl:     ascorbic acid, vitamin C, 500 mg capsule, Take 1 capsule by mouth once daily., Disp: , Rfl:     benzoyl peroxide (Benzac AC) 10 % external wash, Apply topically once daily., Disp: , Rfl:     benztropine (Cogentin) 2 mg tablet, Take 1 tablet (2 mg) by mouth once daily., Disp: , Rfl:     buPROPion XL (Wellbutrin XL) 150 mg 24 hr tablet, Take 1 tablet (150 mg) by mouth once daily. Do not crush, chew, or split., Disp: 30 tablet, Rfl: 11    buPROPion XL (Wellbutrin XL) 300 mg 24 hr tablet, Take 1 tablet (300 mg) by mouth once daily in the morning. Do not crush, chew, or split., Disp: 30 tablet, Rfl: 11    carbidopa-levodopa-entacapone (Stalevo) -200 mg tablet, Take 1 tablet by mouth every 4 hours., Disp: 540 tablet, Rfl: 1    carvedilol (Coreg) 25 mg tablet, Take 1 tablet (25 mg) by mouth 2 times a day with meals., Disp: , Rfl:     cetirizine-pseudoephedrine (ZyrTEC-D) 5-120 mg 12 hr tablet, Take 1 tablet by mouth 2 times a day., Disp: 180 tablet, Rfl: 0    clindamycin (Cleocin T) 1 % lotion, 1 Application, Disp: , Rfl:     clobetasol (Temovate) 0.05 % external solution, Apply topically 2 times a day., Disp: , Rfl:     clobetasoL 0.05 % lotion, One application as needed for flaring only.not for daily use. For scalp and hands only, Disp: 118 mL, Rfl: 3    cloNIDine (Catapres) 0.1 mg tablet, Take 1 tablet (0.1 mg) by mouth once daily., Disp: , Rfl:     cloNIDine (Catapres) 0.2 mg tablet, Take 1 tablet (0.2 mg) by mouth once daily at bedtime.,  Disp: 90 tablet, Rfl: 3    colchicine 0.6 mg tablet, Take 1 tablet (0.6 mg) by mouth every other day., Disp: 45 tablet, Rfl: 0    cyanocobalamin (Vitamin B-12) 1,000 mcg tablet, Take by mouth every 30 (thirty) days., Disp: , Rfl:     diclofenac sodium (Voltaren) 1 % gel gel, Apply 1 Application topically 2 times a day as needed., Disp: , Rfl:     DULoxetine (Cymbalta) 60 mg DR capsule, Take 1 capsule (60 mg) by mouth once daily., Disp: , Rfl:     ergocalciferol (Vitamin D-2) 1.25 MG (89316 UT) capsule, Take 1 capsule (50,000 Units) by mouth 1 (one) time per week., Disp: , Rfl:     fluticasone furoate-vilanteroL (Breo Ellipta) 100-25 mcg/dose inhaler, Inhale 1 puff once daily., Disp: , Rfl:     icosapent ethyL (Vascepa) 1 gram capsule, Take 2 capsules (2 g) by mouth 2 times a day with meals., Disp: , Rfl:     ipratropium-albuteroL (Duo-Neb) 0.5-2.5 mg/3 mL nebulizer solution, Take 3 mL by nebulization every 4 hours if needed for wheezing., Disp: , Rfl:     ketoconazole (NIZOral) 2 % cream, 1 Application, Disp: , Rfl:     leflunomide (Arava) 10 mg tablet, Take 1 tablet (10 mg) by mouth once daily., Disp: 90 tablet, Rfl: 0    levalbuterol (Xopenex) 1.25 mg/3 mL nebulizer solution, Take 1 ampule by nebulization 3 times a day., Disp: , Rfl:     meloxicam (Mobic) 15 mg tablet, Take 1 tablet (15 mg) by mouth once daily., Disp: 30 tablet, Rfl: 3    metoprolol succinate XL (Toprol-XL) 50 mg 24 hr tablet, Take 1 tablet (50 mg) by mouth once daily., Disp: , Rfl:     metroNIDAZOLE (Metrocream) 0.75 % cream, 1 Application, Disp: , Rfl:     montelukast (Singulair) 10 mg tablet, Take 1 tablet (10 mg) by mouth once daily at bedtime., Disp: , Rfl:     nebulizer and compressor device, use q4-6 hours with albuterol PRN cough/wheeze, Disp: , Rfl:     nebulizers ([employer] Disposable Nebulizer) misc, every 4 hours if needed., Disp: , Rfl:     olmesartan (BENIcar) 40 mg tablet, Take 1 tablet (40 mg) by mouth once daily., Disp: , Rfl:      omeprazole (PriLOSEC) 40 mg DR capsule, Take 1 capsule (40 mg) by mouth once daily in the morning. Take before meals., Disp: 90 capsule, Rfl: 3    orphenadrine (Norflex) 100 mg 12 hr tablet, Take 1 tablet (100 mg) by mouth 2 times a day as needed for muscle spasms., Disp: 60 tablet, Rfl: 3    pioglitazone (Actos) 15 mg tablet, Take 1 tablet (15 mg) by mouth once daily., Disp: , Rfl:     pregabalin (Lyrica) 75 mg capsule, Take 1 capsule (75 mg) by mouth 3 times a day., Disp: 90 capsule, Rfl: 0    Repatha SureClick 140 mg/mL injection, Inject 1 mL (140 mg) under the skin every 14 (fourteen) days., Disp: , Rfl:     Spiriva Respimat 1.25 mcg/actuation inhaler, Inhale once daily., Disp: , Rfl:     SUMAtriptan (Imitrex) 25 mg tablet, Take 1 tablet (25 mg) by mouth 1 time if needed for migraine. May repeat in 2 hours if no improvement. Max 200 mg/24 hr, Disp: 9 tablet, Rfl: 2    tezepelumab-ekko (Tezspire) SubQ Pen Injector, Inject 210 mg under the skin., Disp: , Rfl:     allopurinol (Zyloprim) 300 mg tablet, Take 1 tablet (300 mg) by mouth once daily., Disp: , Rfl:     busPIRone (Buspar) 15 mg tablet, Take 1 tablet (15 mg) by mouth 2 times a day. (Patient not taking: Reported on 11/29/2023), Disp: 90 tablet, Rfl: 1    Cosentyx Pen, 2 Pens, 150 mg/mL self-injector pen, Inject 2 mL (300 mg) under the skin every 30 (thirty) days. (Patient not taking: Reported on 11/29/2023), Disp: 2 mL, Rfl: 2    doxycycline (Vibramycin) 100 mg capsule, 1 capsule (100 mg)., Disp: , Rfl:     eplerenone (Inspra) 25 mg tablet, Take 1 tablet (25 mg) by mouth once daily., Disp: , Rfl:     FENOFIBRATE MICRONIZED ORAL, 48 mg once daily., Disp: , Rfl:     furosemide (Lasix) 20 mg tablet, Take by mouth., Disp: , Rfl:     ProAir HFA 90 mcg/actuation inhaler, , Disp: , Rfl:     spironolactone (Aldactone) 25 mg tablet, 1 tablet (25 mg)., Disp: , Rfl:     tapentadol (Nucynta) 50 mg tablet, Take 1 tablet (50 mg) by mouth 3 times a day., Disp: 90 tablet,  Rfl: 0     Past Medical History:   Diagnosis Date    Acute upper respiratory infection, unspecified 07/26/2016    Viral URI with cough    Acute upper respiratory infection, unspecified 11/15/2017    Viral URI with cough    Allergy status to unspecified drugs, medicaments and biological substances     History of allergy    Chronic maxillary sinusitis 08/27/2018    Maxillary sinusitis, unspecified chronicity    Dystonia, unspecified 04/08/2014    Dystonia    Encounter for other screening for malignant neoplasm of breast 01/22/2018    Breast screening    Encounter for screening for respiratory tuberculosis 11/11/2013    Screening examination for pulmonary tuberculosis    Essential (primary) hypertension 12/27/2017    Benign essential hypertension    Hypersomnia, unspecified 04/13/2015    Sleeps too much    Idiopathic sleep related nonobstructive alveolar hypoventilation     Nocturnal hypoxemia    Idiopathic sleep related nonobstructive alveolar hypoventilation 02/09/2018    Nocturnal hypoxia    Impaired fasting glucose 01/22/2018    Impaired fasting glucose    Insect bite (nonvenomous) of lower back and pelvis, initial encounter 03/29/2018    Tick bite of back    Left lower quadrant pain 01/07/2019    Left lower quadrant pain    Local infection of the skin and subcutaneous tissue, unspecified 07/28/2017    Skin infection    Low back pain, unspecified 05/23/2019    Acute low back pain    Other conditions influencing health status 02/27/2017    Sprain of right thumb, unspecified site of finger, initial encounter    Other malaise 04/16/2018    Malaise and fatigue    Other microscopic hematuria 03/30/2018    Microscopic hematuria    Other specified cough 09/07/2018    Productive cough    Other specified health status 01/07/2019    Acute medical illness    Other symptoms and signs involving the musculoskeletal system 07/12/2018    Weakness of right lower extremity    Other symptoms and signs involving the musculoskeletal  system 03/21/2018    Polyarticular joint involvement    Other symptoms and signs involving the musculoskeletal system 07/12/2018    RUE weakness    Pain in right lower leg 01/27/2016    Right calf pain    Pain in unspecified hip 01/20/2016    Hip pain    Pelvic and perineal pain 04/17/2018    Pelvic pain    Personal history of diseases of the skin and subcutaneous tissue 08/07/2018    History of dermatitis    Personal history of other (healed) physical injury and trauma 08/07/2018    History of insect bite    Personal history of other diseases of the circulatory system     History of hypertension    Personal history of other diseases of the female genital tract 11/24/2015    History of menorrhagia    Personal history of other diseases of the musculoskeletal system and connective tissue 03/14/2018    History of tendinitis    Personal history of other diseases of the nervous system and sense organs 04/08/2014    History of Parkinson's disease    Personal history of other diseases of the respiratory system 01/07/2019    History of acute bronchitis    Personal history of other diseases of the respiratory system 10/05/2018    History of paranasal sinus pain    Personal history of other specified conditions 04/13/2015    History of snoring    Personal history of other specified conditions 09/11/2017    History of headache    Personal history of other specified conditions 09/07/2018    History of persistent cough    Personal history of other specified conditions 04/08/2014    History of memory loss    Personal history of other specified conditions 03/26/2018    History of left flank pain    Personal history of other specified conditions     History of heartburn    Personal history of urinary calculi 03/26/2018    Personal history of urinary calculi    Personal history of urinary calculi 03/26/2018    History of renal calculi    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/2017    Plantar fasciitis, left    Primary insomnia  04/13/2015    Primary insomnia    Rash and other nonspecific skin eruption 04/16/2018    Rash    Unspecified abdominal pain 01/07/2019    Left sided abdominal pain    Urinary tract infection, site not specified 10/19/2018    UTI (urinary tract infection), bacterial    Urinary tract infection, site not specified 10/18/2018    Recurrent UTI    Vitamin D deficiency, unspecified 04/22/2016    Vitamin D deficiency    Zoster with other complications 11/25/2015    Herpes zoster dermatitis        Past Surgical History:   Procedure Laterality Date    CT ANGIO CORONARY ART WITH HEARTFLOW IF SCORE >30%  3/18/2020    CT HEART CORONARY ANGIOGRAM 3/18/2020 [facility]    HAND TENDON SURGERY  11/11/2013    Hand Incision Tendon Sheath Of A Finger    HYSTERECTOMY  03/04/2016    Hysterectomy    LITHOTRIPSY  11/11/2013    Renal Lithotripsy    TONSILLECTOMY  12/19/2013    Tonsillectomy With Adenoidectomy        Family History   Problem Relation Name Age of Onset    Asthma Mother      Hypertension Mother      Irregular heart beat Mother      Allergies Father      Heart disease Father      Hypertension Father      Sinusitis Father      Allergies Sister      Asthma Daughter      Allergies Son      Heart disease Maternal Grandmother      Breast cancer Paternal Grandmother      Heart disease Paternal Grandfather      Lung cancer Paternal Grandfather      Dementia Paternal Great-Grandfather          Allergies   Allergen Reactions    Clindamycin Anaphylaxis    Dupilumab Anaphylaxis    Hydrochlorothiazide Anaphylaxis, Itching and Swelling    Sulfamethoxazole-Trimethoprim Anaphylaxis    Cefazolin Hives, Other and Swelling     STATES TONGUE SPLITS    Atorvastatin Hives    Cephalexin Other    Clarithromycin Swelling     hives, tongue swelling    Nitrofurantoin Monohyd/M-Cryst Hives    Sulfa (Sulfonamide Antibiotics) Hives    Sulfacetamide Hives        Objective     Visit Vitals  BP (!) 148/95   Pulse 82   Resp 16 Comment: spo2 98%   Ht 1.676 m  "(5' 6\")   Wt 104 kg (228 lb 11.2 oz)   BMI 36.91 kg/m²   Smoking Status Never   BSA 2.2 m²        Physical Exam    PE:  General: Well-developed, well-nourished, no acute distress. The patient demonstrates no pain behavior, symptom magnification or overt drug-seeking behavior.  Eye: Pupils appropriate for room lighting  Neck/thyroid: No obvious goiter or enlargement of neck noted  Respiratory exam: Normal respiratory effort, unlabored respiration. No accessory muscle use noted  Cardiac exam: Bilateral radial pulses intact  Abdominal: Nondistended  Spine, lumbar: The patient is able to rise from a seated to standing position without hesitancy, push off, or delay. Gait is grossly nonantalgic. Tenderness to paraspinous musculature is noted from mid thoracic through lumbar region.  Multiple myofascial tender points throughout thoracic and lumbar region.  Flexion and extension intact.  Extension increasing symptoms.  Seated straight leg raise positive right lower extremity.  Neurologic exam: Muscle strength is antigravity in all 4 extremities.  Equal muscle strength bilateral lower extremity 5/5.  Psychiatric exam: Judgment and insight normal, affect normal, speech is fluent, affect appropriate, demonstrating no signs of hypersomnolence, sedation, or confusion          Assessment/Plan   Problem List Items Addressed This Visit             ICD-10-CM    Arthralgia of multiple sites M25.50     43-year-old female with a complicated medical history and multiple comorbid conditions presenting for follow-up of back pain radiating from mid back through lumbar spine as well as widespread polyarticular pain most bothersome to bilateral elbows.  Symptoms consistent with lumbar neuritis to right lower extremity as well as polyarticular arthropathy.  Patient recently started Cosentyx which was interrupted secondary to receiving the wrong type of medication in the mail.  She is awaiting a new shipment so that she may restart this " medication.  She felt that she was beginning to feel some relief in her joint pain with the addition of Cosentyx.  She would like to hold off on physical therapy until she is able to restart her Cosentyx so that she is able to fully participate in physical therapy with less joint pain.  She does not want to proceed with epidural steroid injections at this time.  She may revisit in the future if symptoms should worsen.  Plan discussed with patient at today's office visit.    -We will continue the patient on Nucynta 50 mg up to 3 times per day as needed for pain for the next 2 to 3 months.  This will allow the patient time to begin receiving benefit from her Cosentyx treatment.  Patient will also begin a formal course of physical therapy after she restarts her Cosentyx and has given this medication time to provide some benefit and she is able to fully participate in PT.  When we see the patient back in the office we will again discuss weaning Nucynta dose to 50 mg twice daily.  We will continue to wean Nucynta and attempt to transition this patient to buprenorphine.  -Patient will proceed with a formal course of physical therapy.  -Patient does not want to proceed with lumbar epidural steroid injection at this time.  She will reconsider if physical therapy does not provide relief.  -Patient will continue Lyrica, duloxetine and orphenadrine as ordered.  She continues to tolerate these medications without adverse effects.    -Continue meloxicam.  Recent CMP consistent with adequate renal function.  The patient was cautioned about possible side effects and risks of this medication including stomach upset, stomach ulcers, kidney risks and cardiovascular risks to include hypertension and slightly increased risks of stroke and myocardial infarction. Also discussed were ways to minimize these risks by taking this medication with food, staying well-hydrated, watching for any hypertension, and taking the medication with a  stomach protectant such as pepcid, zantac, or a proton pump inhibitor.   -Patient will follow-up in 3 months or sooner if needed.    MEDICAL DECISION MAKING:    My impressions and treatment recommendations were discussed in detail with the patient, who verbalized understanding and had no further questions prior to discharge. Given the patient's report of reduced pain and improved functional ability without adverse effects,  it is reasonable to continue Nucynta 50 mg up to 3 times per day as needed for pain.  The terms of the opioid agreement as well as the potential risks and adverse effects of the patient's medication regimen were discussed in detail. This includes if applicable due to dosage of medication permission to discuss and coordinate care with other treatment providers relevant to the patients condition. The patient verbalized understanding.    Treatment goals were discussed in detail with the patient.  These goals include reduction of pain levels, improved levels of functioning, avoidance of medication side effect and lowest medication dose possible to achieve  these goals.  The patient was in full agreement with these goals.  Also discussed is the understanding that pain may not be eliminated by medication but that the goal of a better sustaining life through use of medication is appropriate.  Lifestyle modifications including weight management, stretching, diet, exercise and smoking are addressed at each office visit.  The patient provided a urine drug screen for routine monitoring and compliance.  This test may be given as frequently as every month based on the patient's individual opiate risk stratification and prescriber concerns for any aberrancies.  This test is indicated given the use of controlled substances for the patient's medical condition.  Unless otherwise noted the prior (s) urine drug testing results were consistent with prescribed medications.  There is no evidence of illicit drug use or  additional medications ingested.     Risks and side effects of chronic opioid therapy including but not limited to tolerance, dependence, constipation, hyperalgesia, cognitive side effects, addiction and possible death due to overuse and or misuse were discussed. I also discussed that such medications when co-administered with other sedative agents including but not limited to alcohol, benzodiazepines, sedative hypnotics and illegal drugs could pose life threatening consequences including death.  I also explained the impact that the administration of such medication has on a patient with obstructive sleep apnea and continued recommendations for use of apnea devices if ordered are prescribed by other physicians.  In order to effectively and safely treat the pain, I also emphasized the importance of compliance with the treatment plan as well as compliance with the terms of the opioid agreement which was reviewed in detail. I explained the importance of being responsible with the medications and to take these only as prescribed, never in excess and never for reasons other than pain reduction. The patient was counseled on keeping the medications safe and locked away from children and other adults as well as disposal methods and options. The patient understood the risks and instructions.      I also discussed with the patient in detail that based on the clinical response to the opioid medications and improvements of activities of daily living, sleep, and work performance in light of compliance with the treatment plan we can continue this form of therapy for the above chronic  pain.  The goal and rationale used for current treatment with chronic opioid medication is to control the pain and alleviate disability induced by the chronic pain condition noted above after failures of other non-opioid and nonpharmacological modalities to treat the chronic pain and the symptoms associated with have failed.  The patient understood  the goals in terms of the above treatment plan and had no further questions prior to leaving the office today.    Given the patient's total MED, general use of daily opiates, or other coadministered medications in various classes the patient was offered a prescription for Narcan.  I instructed the patient that Narcan is for emergency use only and is worse suspected or known opiate overdose.  Call 911 prior to administration of this medication to activate the emergency response team.  It is imperative to fill this medication in order to demonstrate understanding of the gravity of possible side effects including respiratory depression and risk of overdose of this opiate load or medication combination.  As such patients will be required to bring Narcan prescriptions to follow-up appointments as part of the compliance with continued opiate care.    Disclaimer: This note was transcribed using an audio transcription device.  As such, minor errors may be present with regard to spelling, punctuation, and inadvertent word insertion.  Please disregard such errors.             Relevant Medications    tapentadol (Nucynta) 50 mg tablet    Inflammatory arthropathy M19.90    Elbow tendinitis M77.8    Relevant Medications    tapentadol (Nucynta) 50 mg tablet    Lower back pain M54.50    Myofascial pain M79.18    Right lumbar radiculopathy M54.16    Relevant Medications    tapentadol (Nucynta) 50 mg tablet    Spasm of thoracic back muscle M62.830    Relevant Medications    tapentadol (Nucynta) 50 mg tablet     Other Visit Diagnoses         Codes    Long term prescription opiate use    -  Primary Z79.891    Relevant Orders    Buprenorphine Confirm,Urine    Tapentadol Confirmation, Urine    Alcohol, Urine    Opiate/Opioid/Benzo Prescription Compliance    OOB Internal Tracking    Lumbar disc herniation with radiculopathy     M51.16    Relevant Medications    tapentadol (Nucynta) 50 mg tablet    Chronic, continuous use of opioids      F11.90

## 2023-11-29 NOTE — ASSESSMENT & PLAN NOTE
43-year-old female with a complicated medical history and multiple comorbid conditions presenting for follow-up of back pain radiating from mid back through lumbar spine as well as widespread polyarticular pain most bothersome to bilateral elbows.  Symptoms consistent with lumbar neuritis to right lower extremity as well as polyarticular arthropathy.  Patient recently started Cosentyx which was interrupted secondary to receiving the wrong type of medication in the mail.  She is awaiting a new shipment so that she may restart this medication.  She felt that she was beginning to feel some relief in her joint pain with the addition of Cosentyx.  She would like to hold off on physical therapy until she is able to restart her Cosentyx so that she is able to fully participate in physical therapy with less joint pain.  She does not want to proceed with epidural steroid injections at this time.  She may revisit in the future if symptoms should worsen.  Plan discussed with patient at today's office visit.    -We will continue the patient on Nucynta 50 mg up to 3 times per day as needed for pain for the next 2 to 3 months.  This will allow the patient time to begin receiving benefit from her Cosentyx treatment.  Patient will also begin a formal course of physical therapy after she restarts her Cosentyx and has given this medication time to provide some benefit and she is able to fully participate in PT.  When we see the patient back in the office we will again discuss weaning Nucynta dose to 50 mg twice daily.  We will continue to wean Nucynta and attempt to transition this patient to buprenorphine.  -Patient will proceed with a formal course of physical therapy.  -Patient does not want to proceed with lumbar epidural steroid injection at this time.  She will reconsider if physical therapy does not provide relief.  -Patient will continue Lyrica, duloxetine and orphenadrine as ordered.  She continues to tolerate these  medications without adverse effects.    -Continue meloxicam.  Recent CMP consistent with adequate renal function.  The patient was cautioned about possible side effects and risks of this medication including stomach upset, stomach ulcers, kidney risks and cardiovascular risks to include hypertension and slightly increased risks of stroke and myocardial infarction. Also discussed were ways to minimize these risks by taking this medication with food, staying well-hydrated, watching for any hypertension, and taking the medication with a stomach protectant such as pepcid, zantac, or a proton pump inhibitor.   -Patient will follow-up in 3 months or sooner if needed.    MEDICAL DECISION MAKING:    My impressions and treatment recommendations were discussed in detail with the patient, who verbalized understanding and had no further questions prior to discharge. Given the patient's report of reduced pain and improved functional ability without adverse effects,  it is reasonable to continue Nucynta 50 mg up to 3 times per day as needed for pain.  The terms of the opioid agreement as well as the potential risks and adverse effects of the patient's medication regimen were discussed in detail. This includes if applicable due to dosage of medication permission to discuss and coordinate care with other treatment providers relevant to the patients condition. The patient verbalized understanding.    Treatment goals were discussed in detail with the patient.  These goals include reduction of pain levels, improved levels of functioning, avoidance of medication side effect and lowest medication dose possible to achieve  these goals.  The patient was in full agreement with these goals.  Also discussed is the understanding that pain may not be eliminated by medication but that the goal of a better sustaining life through use of medication is appropriate.  Lifestyle modifications including weight management, stretching, diet, exercise  and smoking are addressed at each office visit.  The patient provided a urine drug screen for routine monitoring and compliance.  This test may be given as frequently as every month based on the patient's individual opiate risk stratification and prescriber concerns for any aberrancies.  This test is indicated given the use of controlled substances for the patient's medical condition.  Unless otherwise noted the prior (s) urine drug testing results were consistent with prescribed medications.  There is no evidence of illicit drug use or additional medications ingested.     Risks and side effects of chronic opioid therapy including but not limited to tolerance, dependence, constipation, hyperalgesia, cognitive side effects, addiction and possible death due to overuse and or misuse were discussed. I also discussed that such medications when co-administered with other sedative agents including but not limited to alcohol, benzodiazepines, sedative hypnotics and illegal drugs could pose life threatening consequences including death.  I also explained the impact that the administration of such medication has on a patient with obstructive sleep apnea and continued recommendations for use of apnea devices if ordered are prescribed by other physicians.  In order to effectively and safely treat the pain, I also emphasized the importance of compliance with the treatment plan as well as compliance with the terms of the opioid agreement which was reviewed in detail. I explained the importance of being responsible with the medications and to take these only as prescribed, never in excess and never for reasons other than pain reduction. The patient was counseled on keeping the medications safe and locked away from children and other adults as well as disposal methods and options. The patient understood the risks and instructions.      I also discussed with the patient in detail that based on the clinical response to the opioid  medications and improvements of activities of daily living, sleep, and work performance in light of compliance with the treatment plan we can continue this form of therapy for the above chronic  pain.  The goal and rationale used for current treatment with chronic opioid medication is to control the pain and alleviate disability induced by the chronic pain condition noted above after failures of other non-opioid and nonpharmacological modalities to treat the chronic pain and the symptoms associated with have failed.  The patient understood the goals in terms of the above treatment plan and had no further questions prior to leaving the office today.    Given the patient's total MED, general use of daily opiates, or other coadministered medications in various classes the patient was offered a prescription for Narcan.  I instructed the patient that Narcan is for emergency use only and is worse suspected or known opiate overdose.  Call 911 prior to administration of this medication to activate the emergency response team.  It is imperative to fill this medication in order to demonstrate understanding of the gravity of possible side effects including respiratory depression and risk of overdose of this opiate load or medication combination.  As such patients will be required to bring Narcan prescriptions to follow-up appointments as part of the compliance with continued opiate care.    Disclaimer: This note was transcribed using an audio transcription device.  As such, minor errors may be present with regard to spelling, punctuation, and inadvertent word insertion.  Please disregard such errors.       Bi-Rhombic Flap Text: The defect edges were debeveled with a #15 scalpel blade.  Given the location of the defect and the proximity to free margins a bi-rhombic flap was deemed most appropriate.  Using a sterile surgical marker, an appropriate rhombic flap was drawn incorporating the defect. The area thus outlined was incised deep to adipose tissue with a #15 scalpel blade.  The skin margins were undermined to an appropriate distance in all directions utilizing iris scissors.

## 2023-11-30 LAB
AMPHETAMINES UR QL SCN: ABNORMAL
BARBITURATES UR QL SCN: ABNORMAL
BZE UR QL SCN: ABNORMAL
CANNABINOIDS UR QL SCN: ABNORMAL
CREAT UR-MCNC: 153.4 MG/DL (ref 20–320)
ETHANOL ?TM UR-MCNC: <10 MG/DL
PCP UR QL SCN: ABNORMAL

## 2023-12-03 LAB
AMPHET UR-MCNC: <50 NG/ML
MDA UR-MCNC: <200 NG/ML
MDEA UR-MCNC: <200 NG/ML
MDMA UR-MCNC: <200 NG/ML
METHAMPHET UR-MCNC: <200 NG/ML
PHENTERMINE UR CFM-MCNC: <200 NG/ML

## 2023-12-04 LAB
1OH-MIDAZOLAM UR CFM-MCNC: <25 NG/ML
6MAM UR CFM-MCNC: <25 NG/ML
7AMINOCLONAZEPAM UR CFM-MCNC: <25 NG/ML
A-OH ALPRAZ UR CFM-MCNC: <25 NG/ML
ALPRAZ UR CFM-MCNC: <25 NG/ML
BUPRENORPHINE UR-MCNC: <2 NG/ML
BUPRENORPHINE UR-MCNC: <5 NG/ML
CHLORDIAZEP UR CFM-MCNC: <25 NG/ML
CLONAZEPAM UR CFM-MCNC: <25 NG/ML
CODEINE UR CFM-MCNC: <50 NG/ML
DIAZEPAM UR CFM-MCNC: <25 NG/ML
EDDP UR CFM-MCNC: <25 NG/ML
FENTANYL UR CFM-MCNC: <2.5 NG/ML
HYDROCODONE CTO UR CFM-MCNC: <25 NG/ML
HYDROMORPHONE UR CFM-MCNC: <25 NG/ML
LORAZEPAM UR CFM-MCNC: <25 NG/ML
METHADONE UR CFM-MCNC: <25 NG/ML
MIDAZOLAM UR CFM-MCNC: <25 NG/ML
MORPHINE UR CFM-MCNC: <50 NG/ML
NALOXONE UR CFM-MCNC: <100 NG/ML
NORBUPRENORPHINE UR CFM-MCNC: <5 NG/ML
NORBUPRENORPHINE UR-MCNC: <2 NG/ML
NORDIAZEPAM UR CFM-MCNC: <25 NG/ML
NORFENTANYL UR CFM-MCNC: <2.5 NG/ML
NORHYDROCODONE UR CFM-MCNC: <25 NG/ML
NOROXYCODONE UR CFM-MCNC: <25 NG/ML
NORTAPENTADOL UR CFM-MCNC: >1000 NG/ML
NORTRAMADOL UR-MCNC: <50 NG/ML
OXAZEPAM UR CFM-MCNC: <25 NG/ML
OXYCODONE UR CFM-MCNC: <25 NG/ML
OXYMORPHONE UR CFM-MCNC: <25 NG/ML
TAPENTADOL UR CFM-MCNC: >1000 NG/ML
TEMAZEPAM UR CFM-MCNC: <25 NG/ML
TRAMADOL UR CFM-MCNC: <50 NG/ML
ZOLPIDEM UR CFM-MCNC: <25 NG/ML
ZOLPIDEM UR-MCNC: <25 NG/ML

## 2023-12-11 ENCOUNTER — SPECIALTY PHARMACY (OUTPATIENT)
Dept: PHARMACY | Facility: CLINIC | Age: 43
End: 2023-12-11

## 2023-12-13 PROCEDURE — RXMED WILLOW AMBULATORY MEDICATION CHARGE

## 2023-12-18 ENCOUNTER — PHARMACY VISIT (OUTPATIENT)
Dept: PHARMACY | Facility: CLINIC | Age: 43
End: 2023-12-18
Payer: MEDICAID

## 2023-12-20 DIAGNOSIS — M10.9 GOUTY ARTHROPATHY: ICD-10-CM

## 2023-12-20 RX ORDER — COLCHICINE 0.6 MG/1
0.6 TABLET ORAL EVERY OTHER DAY
Qty: 45 TABLET | Refills: 0 | Status: SHIPPED | OUTPATIENT
Start: 2023-12-20 | End: 2024-03-19

## 2023-12-28 DIAGNOSIS — G43.809 OTHER MIGRAINE WITHOUT STATUS MIGRAINOSUS, NOT INTRACTABLE: ICD-10-CM

## 2023-12-28 RX ORDER — ONDANSETRON 4 MG/1
4 TABLET, ORALLY DISINTEGRATING ORAL EVERY 8 HOURS PRN
Qty: 90 TABLET | Refills: 0 | Status: SHIPPED | OUTPATIENT
Start: 2023-12-28 | End: 2024-04-25 | Stop reason: SDUPTHER

## 2023-12-28 RX ORDER — SUMATRIPTAN SUCCINATE 25 MG/1
25 TABLET ORAL ONCE AS NEEDED
Qty: 9 TABLET | Refills: 2 | Status: SHIPPED | OUTPATIENT
Start: 2023-12-28 | End: 2024-04-25 | Stop reason: SDUPTHER

## 2024-01-04 ENCOUNTER — HOSPITAL ENCOUNTER (OUTPATIENT)
Dept: CARDIOLOGY | Facility: HOSPITAL | Age: 44
Discharge: HOME | End: 2024-01-04
Payer: MEDICAID

## 2024-01-04 ENCOUNTER — OFFICE VISIT (OUTPATIENT)
Dept: CARDIOLOGY | Facility: HOSPITAL | Age: 44
End: 2024-01-04
Payer: MEDICAID

## 2024-01-04 VITALS
WEIGHT: 228 LBS | DIASTOLIC BLOOD PRESSURE: 92 MMHG | SYSTOLIC BLOOD PRESSURE: 146 MMHG | OXYGEN SATURATION: 98 % | HEART RATE: 90 BPM | HEIGHT: 67 IN | BODY MASS INDEX: 35.79 KG/M2

## 2024-01-04 DIAGNOSIS — E78.01 FAMILIAL HYPERCHOLESTEROLEMIA: ICD-10-CM

## 2024-01-04 DIAGNOSIS — R55 SYNCOPE AND COLLAPSE: ICD-10-CM

## 2024-01-04 DIAGNOSIS — G47.33 OBSTRUCTIVE SLEEP APNEA, ADULT: ICD-10-CM

## 2024-01-04 DIAGNOSIS — E78.49 ESSENTIAL FAMILIAL HYPERLIPIDEMIA: ICD-10-CM

## 2024-01-04 DIAGNOSIS — G90.A POTS (POSTURAL ORTHOSTATIC TACHYCARDIA SYNDROME): ICD-10-CM

## 2024-01-04 DIAGNOSIS — R55 SYNCOPE AND COLLAPSE: Primary | ICD-10-CM

## 2024-01-04 DIAGNOSIS — I10 BENIGN ESSENTIAL HYPERTENSION: ICD-10-CM

## 2024-01-04 DIAGNOSIS — E78.1 HYPERTRIGLYCERIDEMIA: ICD-10-CM

## 2024-01-04 DIAGNOSIS — E66.9 OBESITY (BMI 35.0-39.9 WITHOUT COMORBIDITY): ICD-10-CM

## 2024-01-04 PROCEDURE — 99214 OFFICE O/P EST MOD 30 MIN: CPT | Performed by: INTERNAL MEDICINE

## 2024-01-04 PROCEDURE — 93010 ELECTROCARDIOGRAM REPORT: CPT | Performed by: INTERNAL MEDICINE

## 2024-01-04 PROCEDURE — 93005 ELECTROCARDIOGRAM TRACING: CPT | Mod: 59 | Performed by: INTERNAL MEDICINE

## 2024-01-04 PROCEDURE — 93272 ECG/REVIEW INTERPRET ONLY: CPT | Performed by: INTERNAL MEDICINE

## 2024-01-04 PROCEDURE — 3077F SYST BP >= 140 MM HG: CPT | Performed by: INTERNAL MEDICINE

## 2024-01-04 PROCEDURE — 93270 REMOTE 30 DAY ECG REV/REPORT: CPT

## 2024-01-04 PROCEDURE — 3080F DIAST BP >= 90 MM HG: CPT | Performed by: INTERNAL MEDICINE

## 2024-01-04 PROCEDURE — 1036F TOBACCO NON-USER: CPT | Performed by: INTERNAL MEDICINE

## 2024-01-04 RX ORDER — ICOSAPENT ETHYL 1 G/1
2 CAPSULE ORAL
Qty: 360 CAPSULE | Refills: 3 | Status: SHIPPED | OUTPATIENT
Start: 2024-01-04 | End: 2024-03-06 | Stop reason: WASHOUT

## 2024-01-04 ASSESSMENT — ENCOUNTER SYMPTOMS
DEPRESSION: 0
LOSS OF SENSATION IN FEET: 0
OCCASIONAL FEELINGS OF UNSTEADINESS: 0

## 2024-01-04 NOTE — PROGRESS NOTES
Primary Care Physician: NAYELI Yadav-CNP      Date of Visit: 01/04/2024 11:40 AM EST  Location of visit: [facility]   Type of Visit: Established Patient     HPI / Summary:     Patient is a 43 year old woman with HTN, hyperlipidemia ( at FH levels) with strong family history of CAD , with morbid obesity, h/o asthma, GUICHO ( using CPAP) and MBL with statin intolerance who had a syncopal episode( in November 2020, still with significant issues with joint pain and uncontrolled HTN also s/p COvid infection in late Dec 2021 who returns for followup.     Patient with significant issues with pain from MBL who has had issues controlling her BP when pain is worst. BP meds have been adjusted over time. The higher BP in the afternoon does correlate when her pain is worse. Continues to work with pain management     Cardiac work up :  ST scan for CAC: total 0  Cardiac Echo Jan 23 2020: normal LV size and systolic function with LVEF 65-70% technically difficult study. impaired relaxation, no significant valvular pathology.  Event monitor from 11/19/2020-12/19/2020: min HR 70 bpm, max 132, no SVEs VEs noted. skipped beats, lightheadedness and heart racing corresponded to sinus rhythm and sinus tachycardia.   CTA with heart flow 3/18/2020: normal chamber sizes, normal coronary anatomy without evidence for atherosclerotic changes or stenotic disease.   Renal artery ultrasound 1/23/2020- no evidence for renal artery stenosis. bilateral renal veins widely patent.  VMA and metanephrines were normal, TSH was normal.     She has had syncopal episodes over the years.  She wore a 30 day( Nov 2020) monitor and log notes occasional skipped beats, some SOB and CP and heart racing. Monitor during those episodes showed sinus rhythm and sinus tachycardia, and did not show any worrisome arrhythmias. Had not had syncopal episodes from Nov 2020 til at some point in  2022. She recently was diagnosed with POTs and is working with a  neurologist. Most recently had a syncopal episodes without any warning Oct 2023. ( Holding grandchild, fell and broke a table in child's room). More recently she does note an increase in her palpitations. Heart races for seconds then stops.   She routinely uses CPAP for GUICHO. Has hyperlipidemia at FH levels and is statin intolerant and has been on  repatha . Also has elevated triglycerides and was on fish oil, but cannot tolerate due to reflux issues and vomiting. She had tolerated vascepa in the past though could not afford so d/cd.     She saw Dr Gentile for medical weight. On meds ( monjaro) she had initially lost 55 lbs, however insurance stopped paying for meds and could not afford . Off meds she has regained 30 lbs.  She is routinely using her CPAP., however still not sleeping well. She does no  exercise due to pain. Her BP varies a lot through the day depending on her pain levels. Often around 120s/75mmHg, but if gets too much pain her BP rises to 190mmHg systolic and will use clonidine to bring down ( though gets tired).  She does take clonidine at bedtime a well. She intermittently has LE swelling. She uses support stockings. She denies CP or SOB at rest or with ADLs          ROS: Full 10 pt review of symptoms of negative unless discussed above.     Problems:   Patient Active Problem List   Diagnosis    Allergic rhinitis    Anxiety disorder    Arthralgia of multiple sites    Bariatric surgery status    Benign essential hypertension    Bowel disease, inflammatory    Immunodeficiency syndrome (CMS/HCC)    Chronic diarrhea    Continuous LLQ abdominal pain    Depression    Essential familial hyperlipidemia    GERD (gastroesophageal reflux disease)    Gouty arthropathy    Hoarseness    Hot flashes    Hyperlipidemia    Hypertriglyceridemia    Hypothyroidism (acquired)    Inflammatory arthropathy    Rheumatoid arthritis (CMS/HCC)    Hypersomnia    Labile hypertension    Low vitamin D level    Lyme disease     Mannose-binding lectin deficiency    Methadone use    Migraine without status migrainosus, not intractable    Morbid obesity (CMS/HCC)    Mixed incontinence urge and stress    Osteoporosis    Obstructive sleep apnea, adult    DRD (DOPA-responsive dystonia)    Post herpetic neuralgia    Polyphagia    POTS (postural orthostatic tachycardia syndrome)    Prediabetes    Psoriatic arthropathy (CMS/HCC)    Steroid withdrawal syndrome with complication (CMS/HCC)    Steroid-dependent asthma    Vitamin B12 deficiency    Vitamin D deficiency    Gait disturbance    Bilateral leg edema    Cystocele with uterine descensus    Dyspnea on effort    Elbow tendinitis    Foreign body in ear, right, initial encounter    Frequent falls    Hyperglycemia    Hyperuricemia    Incomplete bladder emptying    Insomnia, organic    Intermittent palpitations    Lower back pain    Maxillary sinusitis    Myofascial pain    Orthostatic intolerance    Other ovarian cyst, unspecified side    Pelvic floor weakness    Perioral dermatitis    Peripheral neuropathy    Postural dizziness    Psoriasis vulgaris    Recurrent urinary tract infection    Renal lesion    Right lumbar radiculopathy    Rosacea, unspecified    Sacroiliac joint dysfunction of right side    Snoring    Spasm of thoracic back muscle    Syncope    Urinary frequency    Condition not found     Medical History:   Past Medical History:   Diagnosis Date    Acute upper respiratory infection, unspecified 07/26/2016    Viral URI with cough    Acute upper respiratory infection, unspecified 11/15/2017    Viral URI with cough    Allergy status to unspecified drugs, medicaments and biological substances     History of allergy    Chronic maxillary sinusitis 08/27/2018    Maxillary sinusitis, unspecified chronicity    Dystonia, unspecified 04/08/2014    Dystonia    Encounter for other screening for malignant neoplasm of breast 01/22/2018    Breast screening    Encounter for screening for respiratory  tuberculosis 11/11/2013    Screening examination for pulmonary tuberculosis    Essential (primary) hypertension 12/27/2017    Benign essential hypertension    Hypersomnia, unspecified 04/13/2015    Sleeps too much    Idiopathic sleep related nonobstructive alveolar hypoventilation     Nocturnal hypoxemia    Idiopathic sleep related nonobstructive alveolar hypoventilation 02/09/2018    Nocturnal hypoxia    Impaired fasting glucose 01/22/2018    Impaired fasting glucose    Insect bite (nonvenomous) of lower back and pelvis, initial encounter 03/29/2018    Tick bite of back    Left lower quadrant pain 01/07/2019    Left lower quadrant pain    Local infection of the skin and subcutaneous tissue, unspecified 07/28/2017    Skin infection    Low back pain, unspecified 05/23/2019    Acute low back pain    Other conditions influencing health status 02/27/2017    Sprain of right thumb, unspecified site of finger, initial encounter    Other malaise 04/16/2018    Malaise and fatigue    Other microscopic hematuria 03/30/2018    Microscopic hematuria    Other specified cough 09/07/2018    Productive cough    Other specified health status 01/07/2019    Acute medical illness    Other symptoms and signs involving the musculoskeletal system 07/12/2018    Weakness of right lower extremity    Other symptoms and signs involving the musculoskeletal system 03/21/2018    Polyarticular joint involvement    Other symptoms and signs involving the musculoskeletal system 07/12/2018    RUE weakness    Pain in right lower leg 01/27/2016    Right calf pain    Pain in unspecified hip 01/20/2016    Hip pain    Pelvic and perineal pain 04/17/2018    Pelvic pain    Personal history of diseases of the skin and subcutaneous tissue 08/07/2018    History of dermatitis    Personal history of other (healed) physical injury and trauma 08/07/2018    History of insect bite    Personal history of other diseases of the circulatory system     History of  hypertension    Personal history of other diseases of the female genital tract 11/24/2015    History of menorrhagia    Personal history of other diseases of the musculoskeletal system and connective tissue 03/14/2018    History of tendinitis    Personal history of other diseases of the nervous system and sense organs 04/08/2014    History of Parkinson's disease    Personal history of other diseases of the respiratory system 01/07/2019    History of acute bronchitis    Personal history of other diseases of the respiratory system 10/05/2018    History of paranasal sinus pain    Personal history of other specified conditions 04/13/2015    History of snoring    Personal history of other specified conditions 09/11/2017    History of headache    Personal history of other specified conditions 09/07/2018    History of persistent cough    Personal history of other specified conditions 04/08/2014    History of memory loss    Personal history of other specified conditions 03/26/2018    History of left flank pain    Personal history of other specified conditions     History of heartburn    Personal history of urinary calculi 03/26/2018    Personal history of urinary calculi    Personal history of urinary calculi 03/26/2018    History of renal calculi    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/2017    Plantar fasciitis, left    Primary insomnia 04/13/2015    Primary insomnia    Rash and other nonspecific skin eruption 04/16/2018    Rash    Unspecified abdominal pain 01/07/2019    Left sided abdominal pain    Urinary tract infection, site not specified 10/19/2018    UTI (urinary tract infection), bacterial    Urinary tract infection, site not specified 10/18/2018    Recurrent UTI    Vitamin D deficiency, unspecified 04/22/2016    Vitamin D deficiency    Zoster with other complications 11/25/2015    Herpes zoster dermatitis     Surgical Hx:   Past Surgical History:   Procedure Laterality Date    CT ANGIO CORONARY ART WITH HEARTFLOW IF  "SCORE >30%  3/18/2020    CT HEART CORONARY ANGIOGRAM 3/18/2020 [facility]    HAND TENDON SURGERY  11/11/2013    Hand Incision Tendon Sheath Of A Finger    HYSTERECTOMY  03/04/2016    Hysterectomy    LITHOTRIPSY  11/11/2013    Renal Lithotripsy    TONSILLECTOMY  12/19/2013    Tonsillectomy With Adenoidectomy      Social Hx:   Tobacco Use: Low Risk  (1/4/2024)    Patient History     Smoking Tobacco Use: Never     Smokeless Tobacco Use: Never     Passive Exposure: Not on file     Alcohol Use: Not on file     Family Hx:   Family History   Problem Relation Name Age of Onset    Asthma Mother      Hypertension Mother      Irregular heart beat Mother      Allergies Father      Heart disease Father      Hypertension Father      Sinusitis Father      Allergies Sister      Asthma Daughter      Allergies Son      Heart disease Maternal Grandmother      Breast cancer Paternal Grandmother      Heart disease Paternal Grandfather      Lung cancer Paternal Grandfather      Dementia Paternal Great-Grandfather        Exam:   Vitals:   Vitals:    01/04/24 1132   BP: (!) 146/92   BP Location: Right arm   Patient Position: Sitting   Pulse: 90   SpO2: 98%   Weight: 103 kg (228 lb)   Height: 1.702 m (5' 7\")     Wt Readings from Last 5 Encounters:   01/04/24 103 kg (228 lb)   11/29/23 104 kg (228 lb 11.2 oz)   11/14/23 102 kg (225 lb)   10/11/23 102 kg (225 lb)   10/05/23 101 kg (221 lb 9.6 oz)      Constitutional:       Appearance: Healthy appearance. Not in distress.   Pulmonary:      Effort: Pulmonary effort is normal.      Breath sounds: Normal breath sounds.   Cardiovascular:      Normal rate. Regular rhythm. S1 with normal intensity. S2 with normal intensity.       Murmurs: There is no murmur.   Edema:     Peripheral edema absent.   Abdominal:      General: Bowel sounds are normal.      Palpations: Abdomen is soft.   Neurological:      Mental Status: Alert and oriented to person, place and time.       Labs:   Recent Labs     " 11/14/23  1615 09/18/23  1317 08/24/23  1551 08/14/23  1157 03/10/23  1329 12/08/22  1530 06/30/22  1515 03/03/22  1204   WBC 5.4 4.6 5.9 4.5 5.4 6.0 5.9 5.6   HGB 11.2* 11.9* 11.8* 12.0 12.3 12.7 12.6 12.6   HCT 34.9* 36.2 37.5 36.7 36.8 38.9 38.3 38.6    334 325 301 287 311 337 374   MCV 95 94 98 96 94 95 96 96     Recent Labs     11/14/23  1615 09/18/23  1317 06/16/23  1705 03/10/23  1329 12/08/22  1530 06/30/22  1515 03/03/22  1204 11/12/21  0939    137 139 135* 139 140 140 134*   K 4.2 4.2 3.9 4.1 3.9 3.9 4.5 4.3    102 103 101 101 102 102 98   BUN 19 11 15 12 13 18 16 18   CREATININE 0.68 0.59 0.77 0.60 0.60 0.66 0.53 0.60      Recent Labs     02/21/23  1251 04/09/21  1204 11/19/20  1300 05/27/20  1242 06/20/19  1416   HGBA1C 4.6 5.0 5.0 5.2 5.1   FERRITIN  --   --  42  --   --    TIBC  --   --  486*  --   --    IRONSAT  --   --  25  --   --        Lab Results   Component Value Date    CHOL 214 (H) 03/10/2023    HDL 41.3 03/10/2023    LDLF 121 (H) 03/10/2023    TRIG 261 (H) 03/10/2023     Notable Studies: imaging personally reviewed and summarized by me below  EKG:  Encounter Date: 01/04/24   ECG 12 lead (Clinic Performed)   Result Value    Ventricular Rate 90    Atrial Rate 90    FL Interval 164    QRS Duration 92    QT Interval 364    QTC Calculation(Bazett) 445    P Axis 53    R Axis 12    T Axis 53    QRS Count 15    Q Onset 222    P Onset 140    P Offset 197    T Offset 404    QTC Fredericia 416    Narrative    Normal sinus rhythm  Normal ECG  When compared with ECG of 10-FEB-2022 10:42,  No significant change was found       Echo:  - Echo (1/23/2020)  PHYSICIAN INTERPRETATION:  Left Ventricle: The left ventricular systolic function is normal, with an estimated ejection fraction of 65-70%. There are no regional wall motion abnormalities. The left ventricular cavity size is normal. Spectral Doppler shows an impaired relaxation pattern of left ventricular diastolic filling.  Left Atrium:  The left atrium is normal in size.  Right Ventricle: The right ventricle is normal in size. There is normal right ventricular global systolic function.  Right Atrium: The right atrium is normal in size.  Aortic Valve: The aortic valve is trileaflet. There is no evidence of aortic valve regurgitation. The peak instantaneous gradient of the aortic valve is 6.2 mmHg. The mean gradient of the aortic valve is 4.0 mmHg.  Mitral Valve: The mitral valve is normal in structure. There is trace mitral valve regurgitation.  Tricuspid Valve: The tricuspid valve is structurally normal. There is trace tricuspid regurgitation. The right ventricular systolic pressure is unable to be estimated.  Pulmonic Valve: The pulmonic valve is not well visualized. There is physiologic pulmonic valve regurgitation.  Pericardium: A pericardial effusion was not well visualized.  Aorta: The aortic root was not well visualized.  Systemic Veins: The inferior vena cava appears to be of normal size. There is less than 50% IVC collapse with inspiration.  In comparison to the previous echocardiogram(s): Compared with study from 4/5/2017,. Compared with the prior exam from 4/5/2017 there are no significant changes.     CONCLUSIONS:   1. The left ventricular systolic function is normal with a 65-70% estimated ejection fraction.   2. Poorly visualized anatomical structures due to suboptimal image quality.   3. Spectral Doppler shows an impaired relaxation pattern of left ventricular diastolic filling.   4. No significant valvular pathology.   5. Compared with the prior exam from 4/5/2017 there are no significant changes    Cardiac MRI  1. Normal LV size (EDVi 56 ml/m2) with mildly reduced systolic   function (LVEF 47 %) .Global hypokinesis  without focal wall motion abnormality  2. Questionable myocardial inflammation/edema on limited T2-weighted   imaging (T2 mapping). Would correlate  with biomarkers and clinical status for myocarditis.   3. No evidence  of myocardial fibrosis, infiltration or infarction   based on late gadolinium imaging      LEFT VENTRICLE: 1. Normal LV size (EDVi 56 ml/m2) mildly reduced   systolic function (LVEF 47 %).   2. Global hypokinesis without focal wall motion abnormality  3. Normal LV wall thickness and indexed LV mass.   4. Normal ECV 25%.   5. Limited T2 mapping due to artifacts. Patchy elevated native T2   values (>55msec ms) , which can be seen  with myocarditis. Would correlate with biomarkers and clinical status   for myocarditis.   6. Following administration of gadolinium, in the early phase, there   is no evidence of LV thrombus or MVO.   7. In the late phase, there is no significant myocardial enhancement.  Quantitative LVEF 47 %.     VIABILITY: Hyperenhancement is normal.     RIGHT VENTRICLE: Quantitative RVEF 48 %.     LV/RV SEPTUM: The LV/RV septum is normal.     LA/RA SEPTUM: The LA/RA septum is normal.     LEFT ATRIUM: Severe left atrial     RIGHT ATRIUM: Mild right atrial enlargement     PERICARDIUM: Trivial pericardial effusion     PLEURAL EFFUSION: No pleural effusion     AORTIC VALVE: Peak aortic valve velocity 75 cm/sec. Aortic   regurgitant volume 3 ml. Aortic regurgitant fraction 8 %.     MITRAL VALVE: There is mild mitral regurgitation with regurgitant   volume 15ml, regurgitant fraction 30%.      AORTIC ROOT: The aortic root is normal.          Current Outpatient Medications   Medication Instructions    alendronate (Fosamax) 70 mg tablet 1 tablet, oral, Weekly    allopurinol (ZYLOPRIM) 100 mg, oral, Daily    allopurinol (ZYLOPRIM) 300 mg, oral, Daily    ALPRAZolam (Xanax) 0.5 mg tablet 1 tablet, oral, 3 times daily PRN    amLODIPine (NORVASC) 2.5 mg, oral, Daily    amoxicillin (AMOXIL) 500 mg, oral, Every 12 hours scheduled    ascorbic acid, vitamin C, 500 mg capsule 1 capsule, oral, Daily    benzoyl peroxide (Benzac AC) 10 % external wash Topical, Daily RT    benztropine (COGENTIN) 2 mg, oral, Daily    buPROPion  XL (WELLBUTRIN XL) 300 mg, oral, Every morning, Do not crush, chew, or split.    buPROPion XL (WELLBUTRIN XL) 150 mg, oral, Daily, Do not crush, chew, or split.    carbidopa-levodopa-entacapone (Stalevo) -200 mg tablet 1 tablet, oral, Every 4 hours    carvedilol (Coreg) 25 mg tablet 1 tablet, oral, 2 times daily with meals    cetirizine-pseudoephedrine (ZyrTEC-D) 5-120 mg 12 hr tablet 1 tablet, oral, 2 times daily    clindamycin (Cleocin T) 1 % lotion 1 Application    clobetasol (Temovate) 0.05 % external solution Topical, 2 times daily    clobetasoL 0.05 % lotion One application as needed for flaring only.not for daily use. For scalp and hands only    cloNIDine (CATAPRES) 0.1 mg, oral, Daily    cloNIDine (CATAPRES) 0.2 mg, oral, Nightly    colchicine 0.6 mg, oral, Every other day    Cosentyx Pen (2 Pens) 300 mg, subcutaneous, Every 30 days    cyanocobalamin (Vitamin B-12) 1,000 mcg tablet oral, Every 30 days    diclofenac sodium (VOLTAREN) 4 g, Topical, 2 times daily PRN    doxycycline (Vibramycin) 100 mg capsule 1 capsule    DULoxetine (Cymbalta) 60 mg DR capsule 1 capsule, oral, Daily    eplerenone (Inspra) 25 mg tablet 1 tablet, oral, Daily    ergocalciferol (VITAMIN D-2) 50,000 Units, oral, Weekly    fluticasone furoate-vilanteroL (Breo Ellipta) 100-25 mcg/dose inhaler 1 puff, inhalation, Daily    icosapent ethyL (VASCEPA) 2 g, oral, 2 times daily with meals    ipratropium-albuteroL (Duo-Neb) 0.5-2.5 mg/3 mL nebulizer solution 3 mL, nebulization, Every 4 hours PRN    ketoconazole (NIZOral) 2 % cream 1 Application    leflunomide (ARAVA) 10 mg, oral, Daily    levalbuterol (Xopenex) 1.25 mg/3 mL nebulizer solution 1 ampule, nebulization, 3 times daily RT    meloxicam (MOBIC) 15 mg, oral, Daily    metoprolol succinate XL (Toprol-XL) 50 mg 24 hr tablet 1 tablet, oral, Daily    metroNIDAZOLE (Metrocream) 0.75 % cream 1 Application    montelukast (SINGULAIR) 10 mg, oral, Nightly    nebulizer and compressor  device use q4-6 hours with albuterol PRN cough/wheeze    nebulizers (Prelert Disposable Nebulizer) misc Every 4 hours PRN    olmesartan (BENICAR) 40 mg, oral, Daily    omeprazole (PRILOSEC) 40 mg, oral, Daily before breakfast    ondansetron ODT (ZOFRAN-ODT) 4 mg, oral, Every 8 hours PRN    orphenadrine (NORFLEX) 100 mg, oral, 2 times daily PRN    pioglitazone (ACTOS) 15 mg, oral, Daily    pregabalin (LYRICA) 75 mg, oral, 3 times daily    ProAir HFA 90 mcg/actuation inhaler     Repatha SureClick 140 mg, subcutaneous, Every 14 days    Spiriva Respimat 1.25 mcg/actuation inhaler inhalation, Daily RT    spironolactone (ALDACTONE) 25 mg    SUMAtriptan (IMITREX) 25 mg, oral, Once as needed, May repeat in 2 hours if no improvement. Max 200 mg/24 hr    tapentadol (NUCYNTA) 50 mg, oral, 3 times daily PRN    Tezspire 210 mg, subcutaneous     Impressions and Plan:    Patient is 43 year old woman with complex medical history with FH, HTN which is poorly controlled, Vitamin D deficiency despite high dose Rx with prior statin intolerance with MBL, asthma with strong family history of CAD who had a CTA with heart flow( 3/2020)showing no significant CAD. Her BP is  controlled except when in significant pain. Uses clonidine to control BP spikes.   She continues her repatha and is tolerating it well. Had been on  zetia and vascepa in the past though stopped due to cost. Will resume her zetia and also to try to get vascepa resumed due to elevated triglycerides. ( Will work with pharmacy to look into patient assistance programs). Did not tolerate over the counter fish oil. . Of note following covid infection at end of December 2021 she had persistent significant SOB on minimal exertion as well as  more tachycardia,. Had cardiac MRI 3/2022 with mildly reduced LV function and questionable myocardial inflammation. Inflammatory markers in blood were normal at that time. She no longer is having SOB. She does note more palpitations (  episodic) and had 1 episode of syncope without warning October 2023. No CP or SOB or diaphoresis before or after syncope. She had lost 55 lbs on wegovy, but once insurance stopped coverage is no longer able to afford and has regained 30 lb. Also recently diagnosed with POTs- using volume expanders yony gaotoade and wearing support stockings. She was reportedly tod by neurologist to be on metoprolol and carvedilol. Will review her medications with her neurologist.   Plan:  1. Hyperlipidemia/FH and hypertriglyceridemia- To continue repatha, resume zetia and will work with pharmacy to see if can get pt assistance for vascepa.   2. Hypertension- Continue current regimen Can use clonidine 0.1 mg in the late afternoon if BP is very high ( > 150mmHg systolic).To continue to check and log BP and HR and review.Will review medications ( specifically beta blockers) with her POTs neurologist.  3. Palpitations - recently increased in frequency as well as a single episode of syncope without warning in October. Will place 30 day live monitor. Will also check cardiac echo to reassess LV function. (Previously mildly reduced by MRI in 2022)   4. GUICHO- CPAP. To follow up with sleep medicine since still not sleeping well with the CPAP.   6. Obesity- continue working with Dr Gentile's group. Will refer to pharmacy clinic to see if we can help with medication costs and coverage .     Return to clinic in 2 months   Patient Instructions:  If you have any questions or need cardiac medication refills, please call my office at [phone],      To reach my office please call [phone]  To schedule an appointment call [phone].          ____________________________________________________________  Akosua Richardson MD  Division of Cardiovascular Medicine  [facility]  [facility]

## 2024-01-04 NOTE — PATIENT INSTRUCTIONS
1. Obstructive sleep apnea -continue using CPAP- though will need to go back to sleep medicine to explore further treatment options  since having a hard time tolerating CPAP  2. hyperlipidemia- Continue repatha and   will try to  resume vascepa. LDL on last check was still elevated at 121 ( ideally should have ) Was higher than that 3/2023. Would recheck fasting lipids and consider starting zetia. Triglycerides off vascepa quite high in 3 2023 at 261  3. Morbid Obesity-Working with Dr Gentile's group trying to get pharmacy coverage for meds for medical weight loss.   4. Hypertension- Continue amlodipine 2.5 mg daily( swelling resolved on lower dose ) , (eplerenone on hold) , and olmesartan and carvedilol 25 mg bid. And takes clonidine 0.2 mg at bed time. Will have you take addition of clonidine 0.1 mg around 3 or 4 in the afternoon if systolic BP > 150mmHg. BP overall appears better controlled at home ( though still elevated). Note that did a renal artery ultrasound 1/2020 and did not have any renal artery stenosis. Constant pain issues as well as use of NSAIDs certainly contributing to BP issues in addition to the obesity. ( Unclear if actually on 2 beta blockers- carvedilol and metoprolol ) Will clarify with her neurologist.   5.  Pt with worsening of palpitations . Also newer dx of POTs. To use support stockings/socks( [employer]) as well as volume expanders. Her neurologist has her using Gatorade or Power aide. Given weight issues would use G-0 ( gaotrade zero)rather than usual Gatorade ( has less sugar) could consider liquid IV for episodes of near syncope . Will also review meds with her neurologist ( pt states she was told to be on carvedilol and metoprolol  for her POTs. Also had one episode of syncope in the fall without any warning. Will place a 30 day live monitor as well as a cardiac echo.  6. Issues with affording medications- pt gets her medications through the specialty pharmacy ( including  arthritis meds and weight loss meds etc)- will have her discuss possible patient assistance programs to help with cost of medications.   Return to clinic in 2 months.

## 2024-01-10 ENCOUNTER — SPECIALTY PHARMACY (OUTPATIENT)
Dept: PHARMACY | Facility: CLINIC | Age: 44
End: 2024-01-10

## 2024-01-10 DIAGNOSIS — M54.16 RIGHT LUMBAR RADICULOPATHY: ICD-10-CM

## 2024-01-10 DIAGNOSIS — M77.8 ELBOW TENDINITIS: ICD-10-CM

## 2024-01-10 DIAGNOSIS — M62.830 SPASM OF THORACIC BACK MUSCLE: ICD-10-CM

## 2024-01-10 DIAGNOSIS — M25.50 ARTHRALGIA OF MULTIPLE SITES: ICD-10-CM

## 2024-01-10 DIAGNOSIS — M51.16 LUMBAR DISC HERNIATION WITH RADICULOPATHY: ICD-10-CM

## 2024-01-10 PROCEDURE — RXMED WILLOW AMBULATORY MEDICATION CHARGE

## 2024-01-10 NOTE — TELEPHONE ENCOUNTER
Pt called requesting RF of Nucynta and Norflex, however, pt does not have an appointment scheduled.  Would you please call pt and have her schedule an appointment & let me know when it is before I can pend any medication RF to Santa Dunn CNP.  Thanks!

## 2024-01-11 ENCOUNTER — APPOINTMENT (OUTPATIENT)
Dept: CARDIOLOGY | Facility: HOSPITAL | Age: 44
End: 2024-01-11
Payer: MEDICAID

## 2024-01-12 RX ORDER — ORPHENADRINE CITRATE 100 MG/1
100 TABLET, EXTENDED RELEASE ORAL 2 TIMES DAILY PRN
Qty: 60 TABLET | Refills: 3 | Status: SHIPPED | OUTPATIENT
Start: 2024-01-12 | End: 2024-02-19 | Stop reason: SDUPTHER

## 2024-01-12 NOTE — TELEPHONE ENCOUNTER
Okay to refill orphenadrine 100 mg twice daily and Nucynta 50 mg up to 3 times per day as needed for pain.

## 2024-01-12 NOTE — TELEPHONE ENCOUNTER
Pt is requesting refill of   Nucynta 50 mg 1 tablet TID & Norflex 100 mg 1 tablet BID                                                        LV:   11/29/23                 NV:    2/19/24              [place] reviewed with [facility]:   12/5/23  #90/30 days                        Pended RX to JEMIMA Rosenbaum for transmission to pharmacy.

## 2024-01-15 ENCOUNTER — PHARMACY VISIT (OUTPATIENT)
Dept: PHARMACY | Facility: CLINIC | Age: 44
End: 2024-01-15
Payer: MEDICAID

## 2024-01-18 ENCOUNTER — TELEPHONE (OUTPATIENT)
Dept: PAIN MEDICINE | Facility: HOSPITAL | Age: 44
End: 2024-01-18
Payer: MEDICAID

## 2024-01-18 NOTE — TELEPHONE ENCOUNTER
Patient called and wants one of the nurses to give her a call back because she can't figure out why her insurance wont cover her Norflex. She said she was on other muscle relaxer before and that there is no reason for them to deny it.

## 2024-01-22 NOTE — TELEPHONE ENCOUNTER
Regarding denied PA for Norflex - I researched pt's notes from old system and Epic and this is what I could find... Pt tried and failed the following muscle relaxers:  metaxalone/skelaxin 800 mg 12/31/2020 - 2/2022; methocarbamol 500 mg TID 10/2020 - 12/2020; tizanidine 4 mg TID 6/25/18 - 6/21/19; Baclofen 20 mg 1 tablet 3-4 times a day 5/21/2017- 6/24/2018.  [facility] - Barbie Bower who approved appeal.  PA approval #[id number].  Notified pt..  Madiha Cabrera RN

## 2024-01-26 DIAGNOSIS — M10.9 GOUTY ARTHROPATHY: Primary | ICD-10-CM

## 2024-01-29 RX ORDER — ALLOPURINOL 300 MG/1
300 TABLET ORAL DAILY
Qty: 90 TABLET | Refills: 0 | Status: SHIPPED | OUTPATIENT
Start: 2024-01-29 | End: 2024-05-20

## 2024-01-29 RX ORDER — ALLOPURINOL 100 MG/1
100 TABLET ORAL DAILY
Qty: 90 TABLET | Refills: 0 | Status: SHIPPED | OUTPATIENT
Start: 2024-01-29 | End: 2024-05-20

## 2024-02-05 ENCOUNTER — HOSPITAL ENCOUNTER (OUTPATIENT)
Dept: CARDIOLOGY | Facility: HOSPITAL | Age: 44
Discharge: HOME | End: 2024-02-05
Payer: MEDICAID

## 2024-02-05 ENCOUNTER — LAB (OUTPATIENT)
Dept: LAB | Facility: LAB | Age: 44
End: 2024-02-05
Payer: MEDICAID

## 2024-02-05 VITALS
DIASTOLIC BLOOD PRESSURE: 92 MMHG | SYSTOLIC BLOOD PRESSURE: 146 MMHG | BODY MASS INDEX: 35.79 KG/M2 | WEIGHT: 228 LBS | HEIGHT: 67 IN

## 2024-02-05 DIAGNOSIS — E78.49 ESSENTIAL FAMILIAL HYPERLIPIDEMIA: ICD-10-CM

## 2024-02-05 DIAGNOSIS — R55 SYNCOPE AND COLLAPSE: ICD-10-CM

## 2024-02-05 LAB
AORTIC VALVE MEAN GRADIENT: 2 MMHG
AORTIC VALVE PEAK VELOCITY: 0.92 M/S
AV PEAK GRADIENT: 3.4 MMHG
AVA (PEAK VEL): 2.99 CM2
AVA (VTI): 2.83 CM2
EJECTION FRACTION APICAL 4 CHAMBER: 68.3
EJECTION FRACTION: 67 %
LEFT ATRIUM VOLUME AREA LENGTH INDEX BSA: 27.2 ML/M2
LEFT VENTRICLE INTERNAL DIMENSION DIASTOLE: 4.6 CM (ref 3.5–6)
LEFT VENTRICULAR OUTFLOW TRACT DIAMETER: 2 CM
MITRAL VALVE E/A RATIO: 0.79
MITRAL VALVE E/E' RATIO: 6.4
RIGHT VENTRICLE FREE WALL PEAK S': 11.7 CM/S
TRICUSPID ANNULAR PLANE SYSTOLIC EXCURSION: 1.9 CM

## 2024-02-05 PROCEDURE — 93306 TTE W/DOPPLER COMPLETE: CPT

## 2024-02-05 PROCEDURE — 80061 LIPID PANEL: CPT

## 2024-02-05 PROCEDURE — 93306 TTE W/DOPPLER COMPLETE: CPT | Performed by: INTERNAL MEDICINE

## 2024-02-05 PROCEDURE — 36415 COLL VENOUS BLD VENIPUNCTURE: CPT

## 2024-02-06 LAB
CHOLEST SERPL-MCNC: 264 MG/DL (ref 0–199)
CHOLESTEROL/HDL RATIO: 5.9
HDLC SERPL-MCNC: 45 MG/DL
LDLC SERPL CALC-MCNC: 152 MG/DL
NON HDL CHOLESTEROL: 219 MG/DL (ref 0–149)
TRIGL SERPL-MCNC: 336 MG/DL (ref 0–149)
VLDL: 67 MG/DL (ref 0–40)

## 2024-02-07 ENCOUNTER — SPECIALTY PHARMACY (OUTPATIENT)
Dept: PHARMACY | Facility: CLINIC | Age: 44
End: 2024-02-07

## 2024-02-07 PROCEDURE — RXMED WILLOW AMBULATORY MEDICATION CHARGE

## 2024-02-08 ENCOUNTER — TELEPHONE (OUTPATIENT)
Dept: SCHEDULING | Age: 44
End: 2024-02-08
Payer: MEDICAID

## 2024-02-08 DIAGNOSIS — L40.50 PSORIATIC ARTHROPATHY (MULTI): Primary | ICD-10-CM

## 2024-02-08 RX ORDER — PREDNISONE 5 MG/1
TABLET ORAL
Qty: 18 TABLET | Refills: 0 | Status: SHIPPED | OUTPATIENT
Start: 2024-02-08 | End: 2024-02-17

## 2024-02-14 DIAGNOSIS — R00.2 PALPITATIONS: Primary | ICD-10-CM

## 2024-02-15 ENCOUNTER — SPECIALTY PHARMACY (OUTPATIENT)
Dept: PHARMACY | Facility: CLINIC | Age: 44
End: 2024-02-15

## 2024-02-15 NOTE — TELEPHONE ENCOUNTER
New order has been placed and new monitor to be placed since initial monitor only worked for 2 days

## 2024-02-16 ENCOUNTER — PHARMACY VISIT (OUTPATIENT)
Dept: PHARMACY | Facility: CLINIC | Age: 44
End: 2024-02-16
Payer: MEDICAID

## 2024-02-16 LAB — BODY SURFACE AREA: 2.21 M2

## 2024-02-18 LAB
ATRIAL RATE: 90 BPM
P AXIS: 53 DEGREES
P OFFSET: 197 MS
P ONSET: 140 MS
PR INTERVAL: 164 MS
Q ONSET: 222 MS
QRS COUNT: 15 BEATS
QRS DURATION: 92 MS
QT INTERVAL: 364 MS
QTC CALCULATION(BAZETT): 445 MS
QTC FREDERICIA: 416 MS
R AXIS: 12 DEGREES
T AXIS: 53 DEGREES
T OFFSET: 404 MS
VENTRICULAR RATE: 90 BPM

## 2024-02-19 ENCOUNTER — OFFICE VISIT (OUTPATIENT)
Dept: PAIN MEDICINE | Facility: HOSPITAL | Age: 44
End: 2024-02-19
Payer: MEDICAID

## 2024-02-19 VITALS
BODY MASS INDEX: 35.71 KG/M2 | SYSTOLIC BLOOD PRESSURE: 173 MMHG | WEIGHT: 227.5 LBS | HEART RATE: 92 BPM | DIASTOLIC BLOOD PRESSURE: 119 MMHG | HEIGHT: 67 IN | RESPIRATION RATE: 16 BRPM

## 2024-02-19 DIAGNOSIS — M54.16 RIGHT LUMBAR RADICULOPATHY: ICD-10-CM

## 2024-02-19 DIAGNOSIS — M77.8 ELBOW TENDINITIS: ICD-10-CM

## 2024-02-19 DIAGNOSIS — M79.18 MYOFASCIAL PAIN: ICD-10-CM

## 2024-02-19 DIAGNOSIS — M51.16 LUMBAR DISC HERNIATION WITH RADICULOPATHY: ICD-10-CM

## 2024-02-19 DIAGNOSIS — M19.90 INFLAMMATORY ARTHROPATHY: ICD-10-CM

## 2024-02-19 DIAGNOSIS — L40.50 PSORIATIC ARTHROPATHY (MULTI): ICD-10-CM

## 2024-02-19 DIAGNOSIS — M25.50 ARTHRALGIA OF MULTIPLE SITES: Primary | ICD-10-CM

## 2024-02-19 DIAGNOSIS — M62.830 SPASM OF THORACIC BACK MUSCLE: ICD-10-CM

## 2024-02-19 PROCEDURE — 3080F DIAST BP >= 90 MM HG: CPT | Performed by: CLINICAL NURSE SPECIALIST

## 2024-02-19 PROCEDURE — 99214 OFFICE O/P EST MOD 30 MIN: CPT | Performed by: CLINICAL NURSE SPECIALIST

## 2024-02-19 PROCEDURE — 3077F SYST BP >= 140 MM HG: CPT | Performed by: CLINICAL NURSE SPECIALIST

## 2024-02-19 PROCEDURE — 1036F TOBACCO NON-USER: CPT | Performed by: CLINICAL NURSE SPECIALIST

## 2024-02-19 RX ORDER — ORPHENADRINE CITRATE 100 MG/1
100 TABLET, EXTENDED RELEASE ORAL 2 TIMES DAILY PRN
Qty: 60 TABLET | Refills: 3 | Status: SHIPPED | OUTPATIENT
Start: 2024-02-19 | End: 2024-05-14 | Stop reason: SDUPTHER

## 2024-02-19 ASSESSMENT — PAIN SCALES - GENERAL: PAINLEVEL: 3

## 2024-02-19 NOTE — ASSESSMENT & PLAN NOTE
43-year-old female with a complicated medical history and multiple comorbid conditions presenting for follow-up of back pain radiating from mid back through lumbar spine as well as widespread polyarticular pain most bothersome to bilateral elbows.  Symptoms consistent with lumbar neuritis to right lower extremity as well as polyarticular arthropathy.  Patient just recently was able to restart Cosentyx and has been on this medication for 1 month.  She does feel that she is beginning to feel some relief from polyarticular pain as well as some mid and low back pain relief.  She would like to continue to hold off on physical therapy for 1 additional month until she is able to get more relief with Cosentyx.  At that time she feels that she will be able to fully participate in physical therapy due to improvement in pain relief with Cosentyx.  She does not want to proceed with epidural steroid injections at this time.  She may revisit in the future if symptoms should worsen.  She will continue to manage her pain with Nucynta 50 mg 2-3 times per day.  Advised patient to take most often 2 times per day and use the third dose only when needed.  Our plan will be to continue to decrease Nucynta in the future as she receives better relief with Cosentyx.  The goal is to decrease the use of Nucynta and possibly transition to buprenorphine.  She will continue Lyrica, duloxetine and orphenadrine as prescribed.  Advised the patient to limit use of meloxicam secondary to hypertension.  We also reviewed that she should utilize her alprazolam sparingly and only use when absolutely necessary for increased anxiety or panic attacks.  Advised patient not to take with opiates.  She continues to use sparingly.  Plan discussed with patient at today's office visit.    -We will continue the patient on Nucynta 50 mg up to 3 times per day as needed for pain for the next 2 to 3 months.  Patient will continue to try using her Nucynta 2 times per day  most often and use the third dose when pain is more intense.  Patient will also begin a formal course of physical therapy after 1 additional month of Cosentyx.  At that time the patient feels she will be able to fully participate in physical therapy.  When we see the patient back in the office we will again discuss weaning Nucynta dose to 50 mg twice daily.  We will continue to wean Nucynta and attempt to transition this patient to buprenorphine.   -Continue Lyrica 75 mg 3 times daily.  -Continue orphenadrine 100 mg twice daily for muscle tightness and spasm.  -Continue duloxetine 60 mg daily.  -Advised patient to limit use of oral NSAIDs including meloxicam secondary to hypertension.  The patient was cautioned about possible side effects and risks of this medication including stomach upset, stomach ulcers, kidney risks and cardiovascular risks to include hypertension and slightly increased risks of stroke and myocardial infarction. Also discussed were ways to minimize these risks by taking this medication with food, staying well-hydrated, watching for any hypertension, and taking the medication with a stomach protectant such as pepcid, zantac, or a proton pump inhibitor.   -Advised patient to proceed with a formal course of physical therapy after 1 additional month of Cosentyx treatment.  -Advised patient to continue to monitor her blood pressure and if it remains elevated or she notices symptoms associated with hypertension she should be evaluated in the emergency department.  -Patient will follow-up in 3 months or sooner if needed.    MEDICAL DECISION MAKING:    My impressions and treatment recommendations were discussed in detail with the patient, who verbalized understanding and had no further questions prior to discharge. Given the patient's report of reduced pain and improved functional ability without adverse effects,  it is reasonable to continue Nucynta 50 mg 2-3 times per day as needed for the next 2 to 3  months.  The terms of the opioid agreement as well as the potential risks and adverse effects of the patient's medication regimen were discussed in detail. This includes if applicable due to dosage of medication permission to discuss and coordinate care with other treatment providers relevant to the patients condition. The patient verbalized understanding.    Treatment goals were discussed in detail with the patient.  These goals include reduction of pain levels, improved levels of functioning, avoidance of medication side effect and lowest medication dose possible to achieve  these goals.  The patient was in full agreement with these goals.  Also discussed is the understanding that pain may not be eliminated by medication but that the goal of a better sustaining life through use of medication is appropriate.  Lifestyle modifications including weight management, stretching, diet, exercise and smoking are addressed at each office visit.  The patient provided a urine drug screen for routine monitoring and compliance.  This test may be given as frequently as every month based on the patient's individual opiate risk stratification and prescriber concerns for any aberrancies.  This test is indicated given the use of controlled substances for the patient's medical condition.  Unless otherwise noted the prior (s) urine drug testing results were consistent with prescribed medications.  There is no evidence of illicit drug use or additional medications ingested.     Risks and side effects of chronic opioid therapy including but not limited to tolerance, dependence, constipation, hyperalgesia, cognitive side effects, addiction and possible death due to overuse and or misuse were discussed. I also discussed that such medications when co-administered with other sedative agents including but not limited to alcohol, benzodiazepines, sedative hypnotics and illegal drugs could pose life threatening consequences including death.  I  also explained the impact that the administration of such medication has on a patient with obstructive sleep apnea and continued recommendations for use of apnea devices if ordered are prescribed by other physicians.  In order to effectively and safely treat the pain, I also emphasized the importance of compliance with the treatment plan as well as compliance with the terms of the opioid agreement which was reviewed in detail. I explained the importance of being responsible with the medications and to take these only as prescribed, never in excess and never for reasons other than pain reduction. The patient was counseled on keeping the medications safe and locked away from children and other adults as well as disposal methods and options. The patient understood the risks and instructions.      I also discussed with the patient in detail that based on the clinical response to the opioid medications and improvements of activities of daily living, sleep, and work performance in light of compliance with the treatment plan we can continue this form of therapy for the above chronic  pain.  The goal and rationale used for current treatment with chronic opioid medication is to control the pain and alleviate disability induced by the chronic pain condition noted above after failures of other non-opioid and nonpharmacological modalities to treat the chronic pain and the symptoms associated with have failed.  The patient understood the goals in terms of the above treatment plan and had no further questions prior to leaving the office today.    Given the patient's total MED, general use of daily opiates, or other coadministered medications in various classes the patient was offered a prescription for Narcan.  I instructed the patient that Narcan is for emergency use only and is worse suspected or known opiate overdose.  Call 911 prior to administration of this medication to activate the emergency response team.  It is  imperative to fill this medication in order to demonstrate understanding of the gravity of possible side effects including respiratory depression and risk of overdose of this opiate load or medication combination.  As such patients will be required to bring Narcan prescriptions to follow-up appointments as part of the compliance with continued opiate care.    Disclaimer: This note was transcribed using an audio transcription device.  As such, minor errors may be present with regard to spelling, punctuation, and inadvertent word insertion.  Please disregard such errors.

## 2024-02-19 NOTE — PROGRESS NOTES
Subjective   Patient ID: Nichelle Izaguirre is a 43 y.o. female who presents for Back Pain and polyarticular pain  HPI    43 year old female with complicated medical history of anxiety, psoriatic arthritis, gout, hypertension, GUICHO, irritable bowel disease, asthma, Parkinson's, kidney stones, osteoporosis who presents for follow-up of back pain from her cervical region to lumbar region as well as polyarticular pain most bothersome to bilateral elbows. Most recent MRI in 2021 consistent with lumbar lordosis, normal disc height and hydration, small central disc bulge at L5-S1 and L4-5 with no significant foraminal stenosis and moderate lateral recess stenosis at L4-5. Patient was sent for lumbar x-ray which was negative with no acute findings.  Not interested in epidural steroid injections at this time.  Followed closely by rheumatology and recently was able to start Cosentyx after insurance approval.  Sent for formal course of physical therapy once she began to receive relief of polyarticular pain from Cosentyx.  Patient presents at today's office visit with chronic pain from mid/thoracic back to her lumbar spine.  Also continues to experience polyarticular pain most bothersome to her elbows.  Patient will occasionally have radiating pain into her buttocks and lower extremities.  She states that this is not constant and she has noticed a decrease in radicular symptoms in the past few months.  She denies numbness/tingling, weakness or changes in bowel/bladder function.  Continues to have polyarticular pain, however, she has noticed an improvement since she restarted her Cosentyx.  She has only been able to be on Cosentyx for 1 month and is anticipating further improvement in symptoms as she continues this medication.  Her pain can be aching and throbbing.  Pain increases with standing for long periods of time, lifting and bending.  Continues to manage her pain with Nucynta which was decreased to 50 mg 3 times per day as  "needed.  Taking her Nucynta takes most often 2 times per day and using the third dose when pain is more severe.  Also managing pain with Lyrica, duloxetine, meloxicam and orphenadrine.  Continues utilizing a very low-dose of alprazolam sparingly for severe increased anxiety or panic attacks.  Most recent prescription for alprazolam filled on 7/7/2023 for #20.  Also continued on leflunomide and allopurinol as prescribed by rheumatology and dermatology.      Patient's blood pressure elevated at today's office visit.  The patient is experiencing a headache with a mild increase in shortness of breath.  She states that her headache is due to a recent fall where she fell out of bed and hit her head on her nightstand.  She does have bruising to the right side of her face.  She denies any blurred vision or chest pain.  Recommended that the patient go to the emergency department for evaluation of her current blood pressure with above noted symptoms.  Advised patient that we cannot be sure that the symptoms are not related to her elevated blood pressure.  The patient declined going to the emergency department.  She prefers to go home and take her clonidine which was prescribed for episodes of hypertension.  Advised that if her blood pressure does not improve with clonidine she should go to the emergency department for evaluation.  Also advised that if her symptoms worsen she should go to the emergency department.  Patient will continue to monitor blood pressure and if it remains elevated or the patient becomes symptomatic, he/she will notify PCP or go to the emergency department.     Location of Pain: pt is here for interval follow up and medication count. Pt states bilateral elbow pain due to psoriatic arthritis. Pt also having mid back pain along spine that radiates down to above the buttock intermittently.            Pain Score:  \"3/10\"     Treatment: Nucynta 50mg TID  Last dose: 02/19/2024  PM  Medication Count: 1  Fill " "date:  01/12/2024     Efficacy:   \"70% relief for 5 hours\"     Side Effects: denies     Narcan: Pt brought unopened Narcan with her to today's visit.  EXP:  Feb 2024     Other pain medication/neuromodulator: Mobic/Norflex/Lyrica/Cymbalta - Pt needs refill on Norflex     Therapeutic Goals: to be able to do more throughout the day     Therapeutic Assessment: It helps the most in the morning     Injections and/or Procedures: denies, does not want to have injections.     Other:denies       OARRS:  Santa Dunn, APRN-CNP, APRN-CNS on 2/19/2024  4:38 PM  I have personally reviewed the OARRS report for Nichelle MERRILL Dmitriy. I have considered the risks of abuse, dependence, addiction and diversion    Is the patient prescribed a combination of a benzodiazepine and opioid?  Yes, I feel it is clincially indicated to continue the medication and have discussed with the patient risks/benefits/alternatives. Using alprazolam sparingly.      Last Urine Drug Screen / ordered today: No  Recent Results (from the past 8760 hour(s))   Amphetamine Confirm, Urine    Collection Time: 11/29/23  4:54 PM   Result Value Ref Range    Methamphetamine Quant, Ur <200 ng/mL    MDA, Urine <200 ng/mL    MDEA, Urine <200 ng/mL    Phentermine,Urine <200 ng/mL    Amphetamines,Urine <50 ng/mL    MDMA, Urine <200 ng/mL   Confirmation Opiate/Opioid/Benzo Prescription Compliance    Collection Time: 11/29/23  4:54 PM   Result Value Ref Range    Clonazepam <25 <25 ng/mL    7-Aminoclonazepam <25 <25 ng/mL    Alprazolam <25 <25 ng/mL    Alpha-Hydroxyalprazolam <25 <25 ng/mL    Midazolam <25 <25 ng/mL    Alpha-Hydroxymidazolam <25 <25 ng/mL    Chlordiazepoxide <25 <25 ng/mL    Diazepam <25 <25 ng/mL    Nordiazepam <25 <25 ng/mL    Temazepam <25 <25 ng/mL    Oxazepam <25 <25 ng/mL    Lorazepam <25 <25 ng/mL    Methadone <25 <25 ng/mL    EDDP <25 <25 ng/mL    6-Acetylmorphine <25 <25 ng/mL    Codeine <50 <50 ng/mL    Hydrocodone <25 <25 ng/mL    Hydromorphone <25 " <25 ng/mL    Morphine  <50 <50 ng/mL    Norhydrocodone <25 <25 ng/mL    Noroxycodone <25 <25 ng/mL    Oxycodone <25 <25 ng/mL    Oxymorphone <25 <25 ng/mL    Fentanyl <2.5 <2.5 ng/mL    Norfentanyl <2.5 <2.5 ng/mL    Tramadol <50 <50 ng/mL    O-Desmethyltramadol <50 <50 ng/mL    Zolpidem <25 <25 ng/mL    Zolpidem Metabolite (ZCA) <25 <25 ng/mL   Screen Opiate/Opioid/Benzo Prescription Compliance    Collection Time: 11/29/23  4:54 PM   Result Value Ref Range    Creatinine, Urine Random 153.4 20.0 - 320.0 mg/dL    Amphetamine Screen, Urine Presumptive Positive (A) Presumptive Negative    Barbiturate Screen, Urine Presumptive Negative Presumptive Negative    Cannabinoid Screen, Urine Presumptive Negative Presumptive Negative    Cocaine Metabolite Screen, Urine Presumptive Negative Presumptive Negative    PCP Screen, Urine Presumptive Negative Presumptive Negative   Tapentadol Confirmation, Urine    Collection Time: 11/29/23  4:54 PM   Result Value Ref Range    Tapentadol >1,000 (H) <25 ng/mL    N-Desmethyltapentadol >1,000 (H) <25 ng/mL   Buprenorphine Confirm,Urine    Collection Time: 11/29/23  4:54 PM   Result Value Ref Range    BUPRENORPHINE GLUC, URINE <5 ng/mL    BUPRENORPHINE ,URINE <2 ng/mL    NALOXONE, URINE <100 ng/mL    NORBUPRENORPHINE GLUC,URINE <5 ng/mL    NORBUPRENORPHINE, URINE <2 ng/mL     Results are as expected.     Controlled Substance Agreement:  Date of the Last Agreement: 6/26/23  Reviewed Controlled Substance Agreement including but not limited to the benefits, risks, and alternatives to treatment with a Controlled Substance medication(s).    Monitoring and compliance:    ORT:  6/26/23      PDUQ: 11/29/23     Office Agreement:      Review of Systems    ROS:   General: No fevers, chills, weight loss  Skin: Negative for lesions  Eyes: No acute vision changes  Ears: No vertigo  Nose, mouth, throat: No difficulty swallowing or speaking  Respiratory: No cough, positive for intermittent shortness of  breath   Cardiovascular: Negative for chest pain syncope or palpitation  Gastrointestinal: No constipation, nausea, vomiting  Neurological: Negative for headache, positive for: Intermittent headache  Psychological: Negative for severe or debilitating anxiety, depression. Negative memory loss  Musculoskeletal: Positive for arthralgia, myalgia, pain and joint swelling  Endocrine: Negative for weight gain, appetite changes, excessive sweating  Allergy/immune: Negative    All 13 systems were reviewed and are within normal levels except as noted or in the history of present illness.  Positive or pertinent negative responses are noted or were in the history of present illness. As noted, the patient denies significant or impairing weakness in the bilateral upper and lower extremities, medication induced constipation, and bowel or bladder incontinence.     Current Outpatient Medications:     alendronate (Fosamax) 70 mg tablet, Take 1 tablet (70 mg) by mouth once a week., Disp: , Rfl:     allopurinol (Zyloprim) 100 mg tablet, Take 1 tablet (100 mg) by mouth once daily., Disp: 90 tablet, Rfl: 0    allopurinol (Zyloprim) 300 mg tablet, Take 1 tablet (300 mg) by mouth once daily., Disp: 90 tablet, Rfl: 0    ALPRAZolam (Xanax) 0.5 mg tablet, Take 1 tablet (0.5 mg) by mouth 3 times a day as needed for anxiety., Disp: , Rfl:     amLODIPine (Norvasc) 2.5 mg tablet, Take 1 tablet (2.5 mg) by mouth once daily., Disp: , Rfl:     amoxicillin (Amoxil) 500 mg capsule, Take 1 capsule (500 mg) by mouth every 12 hours., Disp: , Rfl:     ascorbic acid, vitamin C, 500 mg capsule, Take 1 capsule by mouth once daily., Disp: , Rfl:     benzoyl peroxide (Benzac AC) 10 % external wash, Apply topically once daily., Disp: , Rfl:     benztropine (Cogentin) 2 mg tablet, Take 1 tablet (2 mg) by mouth once daily., Disp: , Rfl:     buPROPion XL (Wellbutrin XL) 150 mg 24 hr tablet, Take 1 tablet (150 mg) by mouth once daily. Do not crush, chew, or split.,  Disp: 30 tablet, Rfl: 11    buPROPion XL (Wellbutrin XL) 300 mg 24 hr tablet, Take 1 tablet (300 mg) by mouth once daily in the morning. Do not crush, chew, or split., Disp: 30 tablet, Rfl: 11    carvedilol (Coreg) 25 mg tablet, Take 1 tablet (25 mg) by mouth 2 times a day with meals., Disp: , Rfl:     clindamycin (Cleocin T) 1 % lotion, 1 Application, Disp: , Rfl:     clobetasol (Temovate) 0.05 % external solution, Apply topically 2 times a day., Disp: , Rfl:     clobetasoL 0.05 % lotion, One application as needed for flaring only.not for daily use. For scalp and hands only, Disp: 118 mL, Rfl: 3    cloNIDine (Catapres) 0.1 mg tablet, Take 1 tablet (0.1 mg) by mouth once daily., Disp: , Rfl:     cloNIDine (Catapres) 0.2 mg tablet, Take 1 tablet (0.2 mg) by mouth once daily at bedtime., Disp: 90 tablet, Rfl: 3    colchicine 0.6 mg tablet, Take 1 tablet (0.6 mg) by mouth every other day., Disp: 45 tablet, Rfl: 0    Cosentyx Pen, 2 Pens, 150 mg/mL self-injector pen, Inject 2 mL (300 mg) under the skin every 30 (thirty) days., Disp: 2 mL, Rfl: 2    cyanocobalamin (Vitamin B-12) 1,000 mcg tablet, Take by mouth every 30 (thirty) days., Disp: , Rfl:     diclofenac sodium (Voltaren) 1 % gel gel, Apply 4.5 inches (4 g) topically 2 times a day as needed., Disp: , Rfl:     doxycycline (Vibramycin) 100 mg capsule, 1 capsule (100 mg)., Disp: , Rfl:     DULoxetine (Cymbalta) 60 mg DR capsule, Take 1 capsule (60 mg) by mouth once daily., Disp: , Rfl:     eplerenone (Inspra) 25 mg tablet, Take 1 tablet (25 mg) by mouth once daily., Disp: , Rfl:     ergocalciferol (Vitamin D-2) 1.25 MG (49667 UT) capsule, Take 1 capsule (50,000 Units) by mouth 1 (one) time per week., Disp: , Rfl:     fluticasone furoate-vilanteroL (Breo Ellipta) 100-25 mcg/dose inhaler, Inhale 1 puff once daily., Disp: , Rfl:     icosapent ethyL (Vascepa) 1 gram capsule, Take 2 capsules (2 g) by mouth 2 times a day with meals., Disp: 360 capsule, Rfl: 3     ipratropium-albuteroL (Duo-Neb) 0.5-2.5 mg/3 mL nebulizer solution, Take 3 mL by nebulization every 4 hours if needed for wheezing., Disp: , Rfl:     ketoconazole (NIZOral) 2 % cream, 1 Application, Disp: , Rfl:     levalbuterol (Xopenex) 1.25 mg/3 mL nebulizer solution, Take 1 ampule by nebulization 3 times a day., Disp: , Rfl:     meloxicam (Mobic) 15 mg tablet, Take 1 tablet (15 mg) by mouth once daily., Disp: 30 tablet, Rfl: 3    metoprolol succinate XL (Toprol-XL) 50 mg 24 hr tablet, Take 1 tablet (50 mg) by mouth once daily., Disp: , Rfl:     metroNIDAZOLE (Metrocream) 0.75 % cream, 1 Application, Disp: , Rfl:     montelukast (Singulair) 10 mg tablet, Take 1 tablet (10 mg) by mouth once daily at bedtime., Disp: , Rfl:     nebulizer and compressor device, use q4-6 hours with albuterol PRN cough/wheeze, Disp: , Rfl:     olmesartan (BENIcar) 40 mg tablet, Take 1 tablet (40 mg) by mouth once daily., Disp: , Rfl:     omeprazole (PriLOSEC) 40 mg DR capsule, Take 1 capsule (40 mg) by mouth once daily in the morning. Take before meals., Disp: 90 capsule, Rfl: 3    ondansetron ODT (Zofran-ODT) 4 mg disintegrating tablet, Take 1 tablet (4 mg) by mouth every 8 hours if needed for nausea or vomiting., Disp: 90 tablet, Rfl: 0    pioglitazone (Actos) 15 mg tablet, Take 1 tablet (15 mg) by mouth once daily., Disp: , Rfl:     pregabalin (Lyrica) 75 mg capsule, Take 1 capsule (75 mg) by mouth 3 times a day., Disp: 90 capsule, Rfl: 0    ProAir HFA 90 mcg/actuation inhaler, , Disp: , Rfl:     Repatha SureClick 140 mg/mL injection, Inject 1 mL (140 mg) under the skin every 14 (fourteen) days., Disp: , Rfl:     Spiriva Respimat 1.25 mcg/actuation inhaler, Inhale once daily., Disp: , Rfl:     spironolactone (Aldactone) 25 mg tablet, 1 tablet (25 mg)., Disp: , Rfl:     SUMAtriptan (Imitrex) 25 mg tablet, Take 1 tablet (25 mg) by mouth 1 time if needed for migraine. May repeat in 2 hours if no improvement. Max 200 mg/24 hr, Disp: 9  tablet, Rfl: 2    carbidopa-levodopa-entacapone (Stalevo) -200 mg tablet, Take 1 tablet by mouth every 4 hours., Disp: 540 tablet, Rfl: 1    cetirizine-pseudoephedrine (ZyrTEC-D) 5-120 mg 12 hr tablet, Take 1 tablet by mouth 2 times a day., Disp: 180 tablet, Rfl: 0    nebulizers (Devilbiss Disposable Nebulizer) misc, every 4 hours if needed., Disp: , Rfl:     orphenadrine (Norflex) 100 mg 12 hr tablet, Take 1 tablet (100 mg) by mouth 2 times a day as needed for muscle spasms., Disp: 60 tablet, Rfl: 3    tapentadol (Nucynta) 50 mg tablet, Take 1 tablet (50 mg) by mouth 3 times a day as needed for severe pain (7 - 10)., Disp: 90 tablet, Rfl: 0    tezepelumab-ekko (Tezspire) SubQ Pen Injector, Inject 210 mg under the skin., Disp: , Rfl:      Past Medical History:   Diagnosis Date    Acute upper respiratory infection, unspecified 07/26/2016    Viral URI with cough    Acute upper respiratory infection, unspecified 11/15/2017    Viral URI with cough    Allergy status to unspecified drugs, medicaments and biological substances     History of allergy    Chronic maxillary sinusitis 08/27/2018    Maxillary sinusitis, unspecified chronicity    Dystonia, unspecified 04/08/2014    Dystonia    Encounter for other screening for malignant neoplasm of breast 01/22/2018    Breast screening    Encounter for screening for respiratory tuberculosis 11/11/2013    Screening examination for pulmonary tuberculosis    Essential (primary) hypertension 12/27/2017    Benign essential hypertension    Hypersomnia, unspecified 04/13/2015    Sleeps too much    Idiopathic sleep related nonobstructive alveolar hypoventilation     Nocturnal hypoxemia    Idiopathic sleep related nonobstructive alveolar hypoventilation 02/09/2018    Nocturnal hypoxia    Impaired fasting glucose 01/22/2018    Impaired fasting glucose    Insect bite (nonvenomous) of lower back and pelvis, initial encounter 03/29/2018    Tick bite of back    Left lower quadrant pain  01/07/2019    Left lower quadrant pain    Local infection of the skin and subcutaneous tissue, unspecified 07/28/2017    Skin infection    Low back pain, unspecified 05/23/2019    Acute low back pain    Other conditions influencing health status 02/27/2017    Sprain of right thumb, unspecified site of finger, initial encounter    Other malaise 04/16/2018    Malaise and fatigue    Other microscopic hematuria 03/30/2018    Microscopic hematuria    Other specified cough 09/07/2018    Productive cough    Other specified health status 01/07/2019    Acute medical illness    Other symptoms and signs involving the musculoskeletal system 07/12/2018    Weakness of right lower extremity    Other symptoms and signs involving the musculoskeletal system 03/21/2018    Polyarticular joint involvement    Other symptoms and signs involving the musculoskeletal system 07/12/2018    RUE weakness    Pain in right lower leg 01/27/2016    Right calf pain    Pain in unspecified hip 01/20/2016    Hip pain    Pelvic and perineal pain 04/17/2018    Pelvic pain    Personal history of diseases of the skin and subcutaneous tissue 08/07/2018    History of dermatitis    Personal history of other (healed) physical injury and trauma 08/07/2018    History of insect bite    Personal history of other diseases of the circulatory system     History of hypertension    Personal history of other diseases of the female genital tract 11/24/2015    History of menorrhagia    Personal history of other diseases of the musculoskeletal system and connective tissue 03/14/2018    History of tendinitis    Personal history of other diseases of the nervous system and sense organs 04/08/2014    History of Parkinson's disease    Personal history of other diseases of the respiratory system 01/07/2019    History of acute bronchitis    Personal history of other diseases of the respiratory system 10/05/2018    History of paranasal sinus pain    Personal history of other  specified conditions 04/13/2015    History of snoring    Personal history of other specified conditions 09/11/2017    History of headache    Personal history of other specified conditions 09/07/2018    History of persistent cough    Personal history of other specified conditions 04/08/2014    History of memory loss    Personal history of other specified conditions 03/26/2018    History of left flank pain    Personal history of other specified conditions     History of heartburn    Personal history of urinary calculi 03/26/2018    Personal history of urinary calculi    Personal history of urinary calculi 03/26/2018    History of renal calculi    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/2017    Plantar fasciitis, left    Primary insomnia 04/13/2015    Primary insomnia    Rash and other nonspecific skin eruption 04/16/2018    Rash    Unspecified abdominal pain 01/07/2019    Left sided abdominal pain    Urinary tract infection, site not specified 10/19/2018    UTI (urinary tract infection), bacterial    Urinary tract infection, site not specified 10/18/2018    Recurrent UTI    Vitamin D deficiency, unspecified 04/22/2016    Vitamin D deficiency    Zoster with other complications 11/25/2015    Herpes zoster dermatitis        Past Surgical History:   Procedure Laterality Date    CT ANGIO CORONARY ART WITH HEARTFLOW IF SCORE >30%  3/18/2020    CT HEART CORONARY ANGIOGRAM 3/18/2020 [facility]    HAND TENDON SURGERY  11/11/2013    Hand Incision Tendon Sheath Of A Finger    HYSTERECTOMY  03/04/2016    Hysterectomy    LITHOTRIPSY  11/11/2013    Renal Lithotripsy    TONSILLECTOMY  12/19/2013    Tonsillectomy With Adenoidectomy        Family History   Problem Relation Name Age of Onset    Asthma Mother      Hypertension Mother      Irregular heart beat Mother      Allergies Father      Heart disease Father      Hypertension Father      Sinusitis Father      Allergies Sister      Asthma Daughter      Allergies Son      Heart  "disease Maternal Grandmother      Breast cancer Paternal Grandmother      Heart disease Paternal Grandfather      Lung cancer Paternal Grandfather      Dementia Paternal Great-Grandfather          Allergies   Allergen Reactions    Clindamycin Anaphylaxis    Dupilumab Anaphylaxis    Hydrochlorothiazide Anaphylaxis, Itching and Swelling    Sulfamethoxazole-Trimethoprim Anaphylaxis    Cefazolin Hives, Other and Swelling     STATES TONGUE SPLITS    Atorvastatin Hives    Cephalexin Other    Clarithromycin Swelling     hives, tongue swelling    Nitrofurantoin Monohyd/M-Cryst Hives    Sulfa (Sulfonamide Antibiotics) Hives    Sulfacetamide Hives        Objective     Visit Vitals  BP (!) 173/119 (BP Location: Right arm, BP Cuff Size: Large adult)   Pulse 92   Resp 16   Ht 1.702 m (5' 7\")   Wt 103 kg (227 lb 8 oz)   BMI 35.63 kg/m²   Smoking Status Never   BSA 2.21 m²        Physical Exam    PE:  General: Well-developed, well-nourished, no acute distress. The patient demonstrates no pain behavior, symptom magnification or overt drug-seeking behavior.  Skin: Healing bruises to right jaw and right cheek  Eye: Pupils appropriate for room lighting  Neck/thyroid: No obvious goiter or enlargement of neck noted  Respiratory exam: Normal respiratory effort, unlabored respiration. No accessory muscle use noted  Cardiac exam: Bilateral radial pulses intact  Abdominal: Nondistended  Spine, lumbar: The patient is able to rise from a seated to standing position without hesitancy, push off, or delay. Gait is grossly nonantalgic. Tenderness to paraspinous musculature is noted mid thoracic through lumbar region.  Multiple myofascial tender points/muscle tightness throughout thoracic and lumbar region.  Flexion and extension intact.  Seated straight leg raise positive right lower extremity.  Neurologic exam: Muscle strength is antigravity in all 4 extremities.  Equal muscle strength bilateral lower extremities 5/5.  Ortho: Widespread joint " pain most bothersome to her elbow.  Psychiatric exam: Judgment and insight normal, affect normal, speech is fluent, affect appropriate, demonstrating no signs of hypersomnolence, sedation, or confusion        Assessment/Plan   Problem List Items Addressed This Visit             ICD-10-CM    Arthralgia of multiple sites - Primary M25.50    Inflammatory arthropathy M19.90     43-year-old female with a complicated medical history and multiple comorbid conditions presenting for follow-up of back pain radiating from mid back through lumbar spine as well as widespread polyarticular pain most bothersome to bilateral elbows.  Symptoms consistent with lumbar neuritis to right lower extremity as well as polyarticular arthropathy.  Patient just recently was able to restart Cosentyx and has been on this medication for 1 month.  She does feel that she is beginning to feel some relief from polyarticular pain as well as some mid and low back pain relief.  She would like to continue to hold off on physical therapy for 1 additional month until she is able to get more relief with Cosentyx.  At that time she feels that she will be able to fully participate in physical therapy due to improvement in pain relief with Cosentyx.  She does not want to proceed with epidural steroid injections at this time.  She may revisit in the future if symptoms should worsen.  She will continue to manage her pain with Nucynta 50 mg 2-3 times per day.  Advised patient to take most often 2 times per day and use the third dose only when needed.  Our plan will be to continue to decrease Nucynta in the future as she receives better relief with Cosentyx.  The goal is to decrease the use of Nucynta and possibly transition to buprenorphine.  She will continue Lyrica, duloxetine and orphenadrine as prescribed.  Advised the patient to limit use of meloxicam secondary to hypertension.  We also reviewed that she should utilize her alprazolam sparingly and only use  when absolutely necessary for increased anxiety or panic attacks.  Advised patient not to take with opiates.  She continues to use sparingly.  Plan discussed with patient at today's office visit.    -We will continue the patient on Nucynta 50 mg up to 3 times per day as needed for pain for the next 2 to 3 months.  Patient will continue to try using her Nucynta 2 times per day most often and use the third dose when pain is more intense.  Patient will also begin a formal course of physical therapy after 1 additional month of Cosentyx.  At that time the patient feels she will be able to fully participate in physical therapy.  When we see the patient back in the office we will again discuss weaning Nucynta dose to 50 mg twice daily.  We will continue to wean Nucynta and attempt to transition this patient to buprenorphine.   -Continue Lyrica 75 mg 3 times daily.  -Continue orphenadrine 100 mg twice daily for muscle tightness and spasm.  -Continue duloxetine 60 mg daily.  -Advised patient to limit use of oral NSAIDs including meloxicam secondary to hypertension.  The patient was cautioned about possible side effects and risks of this medication including stomach upset, stomach ulcers, kidney risks and cardiovascular risks to include hypertension and slightly increased risks of stroke and myocardial infarction. Also discussed were ways to minimize these risks by taking this medication with food, staying well-hydrated, watching for any hypertension, and taking the medication with a stomach protectant such as pepcid, zantac, or a proton pump inhibitor.   -Advised patient to proceed with a formal course of physical therapy after 1 additional month of Cosentyx treatment.  -Advised patient to continue to monitor her blood pressure and if it remains elevated or she notices symptoms associated with hypertension she should be evaluated in the emergency department.  -Patient will follow-up in 3 months or sooner if  needed.    MEDICAL DECISION MAKING:    My impressions and treatment recommendations were discussed in detail with the patient, who verbalized understanding and had no further questions prior to discharge. Given the patient's report of reduced pain and improved functional ability without adverse effects,  it is reasonable to continue Nucynta 50 mg 2-3 times per day as needed for the next 2 to 3 months.  The terms of the opioid agreement as well as the potential risks and adverse effects of the patient's medication regimen were discussed in detail. This includes if applicable due to dosage of medication permission to discuss and coordinate care with other treatment providers relevant to the patients condition. The patient verbalized understanding.    Treatment goals were discussed in detail with the patient.  These goals include reduction of pain levels, improved levels of functioning, avoidance of medication side effect and lowest medication dose possible to achieve  these goals.  The patient was in full agreement with these goals.  Also discussed is the understanding that pain may not be eliminated by medication but that the goal of a better sustaining life through use of medication is appropriate.  Lifestyle modifications including weight management, stretching, diet, exercise and smoking are addressed at each office visit.  The patient provided a urine drug screen for routine monitoring and compliance.  This test may be given as frequently as every month based on the patient's individual opiate risk stratification and prescriber concerns for any aberrancies.  This test is indicated given the use of controlled substances for the patient's medical condition.  Unless otherwise noted the prior (s) urine drug testing results were consistent with prescribed medications.  There is no evidence of illicit drug use or additional medications ingested.     Risks and side effects of chronic opioid therapy including but not  limited to tolerance, dependence, constipation, hyperalgesia, cognitive side effects, addiction and possible death due to overuse and or misuse were discussed. I also discussed that such medications when co-administered with other sedative agents including but not limited to alcohol, benzodiazepines, sedative hypnotics and illegal drugs could pose life threatening consequences including death.  I also explained the impact that the administration of such medication has on a patient with obstructive sleep apnea and continued recommendations for use of apnea devices if ordered are prescribed by other physicians.  In order to effectively and safely treat the pain, I also emphasized the importance of compliance with the treatment plan as well as compliance with the terms of the opioid agreement which was reviewed in detail. I explained the importance of being responsible with the medications and to take these only as prescribed, never in excess and never for reasons other than pain reduction. The patient was counseled on keeping the medications safe and locked away from children and other adults as well as disposal methods and options. The patient understood the risks and instructions.      I also discussed with the patient in detail that based on the clinical response to the opioid medications and improvements of activities of daily living, sleep, and work performance in light of compliance with the treatment plan we can continue this form of therapy for the above chronic  pain.  The goal and rationale used for current treatment with chronic opioid medication is to control the pain and alleviate disability induced by the chronic pain condition noted above after failures of other non-opioid and nonpharmacological modalities to treat the chronic pain and the symptoms associated with have failed.  The patient understood the goals in terms of the above treatment plan and had no further questions prior to leaving the office  today.    Given the patient's total MED, general use of daily opiates, or other coadministered medications in various classes the patient was offered a prescription for Narcan.  I instructed the patient that Narcan is for emergency use only and is worse suspected or known opiate overdose.  Call 911 prior to administration of this medication to activate the emergency response team.  It is imperative to fill this medication in order to demonstrate understanding of the gravity of possible side effects including respiratory depression and risk of overdose of this opiate load or medication combination.  As such patients will be required to bring Narcan prescriptions to follow-up appointments as part of the compliance with continued opiate care.    Disclaimer: This note was transcribed using an audio transcription device.  As such, minor errors may be present with regard to spelling, punctuation, and inadvertent word insertion.  Please disregard such errors.                Relevant Medications    tapentadol (Nucynta) 50 mg tablet    Psoriatic arthropathy (CMS/HCC) L40.50    Relevant Medications    tapentadol (Nucynta) 50 mg tablet    Elbow tendinitis M77.8    Myofascial pain M79.18    Right lumbar radiculopathy M54.16    Relevant Medications    tapentadol (Nucynta) 50 mg tablet    Spasm of thoracic back muscle M62.830    Relevant Medications    orphenadrine (Norflex) 100 mg 12 hr tablet     Other Visit Diagnoses         Codes    Lumbar disc herniation with radiculopathy     M51.16

## 2024-02-21 ENCOUNTER — LAB (OUTPATIENT)
Dept: LAB | Facility: LAB | Age: 44
End: 2024-02-21
Payer: MEDICAID

## 2024-02-21 ENCOUNTER — OFFICE VISIT (OUTPATIENT)
Dept: RHEUMATOLOGY | Facility: CLINIC | Age: 44
End: 2024-02-21
Payer: MEDICAID

## 2024-02-21 ENCOUNTER — APPOINTMENT (OUTPATIENT)
Dept: PAIN MEDICINE | Facility: HOSPITAL | Age: 44
End: 2024-02-21
Payer: MEDICAID

## 2024-02-21 VITALS
HEIGHT: 67 IN | SYSTOLIC BLOOD PRESSURE: 165 MMHG | BODY MASS INDEX: 35.19 KG/M2 | WEIGHT: 224.2 LBS | HEART RATE: 96 BPM | DIASTOLIC BLOOD PRESSURE: 111 MMHG

## 2024-02-21 DIAGNOSIS — L40.50 PSORIATIC ARTHROPATHY (MULTI): Primary | ICD-10-CM

## 2024-02-21 DIAGNOSIS — M10.9 GOUTY ARTHROPATHY: ICD-10-CM

## 2024-02-21 DIAGNOSIS — G89.29 CHRONIC ELBOW PAIN, UNSPECIFIED LATERALITY: ICD-10-CM

## 2024-02-21 DIAGNOSIS — L40.50 PSORIATIC ARTHROPATHY (MULTI): ICD-10-CM

## 2024-02-21 DIAGNOSIS — M25.529 CHRONIC ELBOW PAIN, UNSPECIFIED LATERALITY: ICD-10-CM

## 2024-02-21 LAB
ALBUMIN SERPL BCP-MCNC: 4.4 G/DL (ref 3.4–5)
ALP SERPL-CCNC: 53 U/L (ref 33–110)
ALT SERPL W P-5'-P-CCNC: 16 U/L (ref 7–45)
ANION GAP SERPL CALC-SCNC: 16 MMOL/L (ref 10–20)
AST SERPL W P-5'-P-CCNC: 14 U/L (ref 9–39)
BASOPHILS # BLD AUTO: 0.04 X10*3/UL (ref 0–0.1)
BASOPHILS NFR BLD AUTO: 0.7 %
BILIRUB SERPL-MCNC: 0.3 MG/DL (ref 0–1.2)
BUN SERPL-MCNC: 11 MG/DL (ref 6–23)
CALCIUM SERPL-MCNC: 9.7 MG/DL (ref 8.6–10.6)
CHLORIDE SERPL-SCNC: 100 MMOL/L (ref 98–107)
CO2 SERPL-SCNC: 27 MMOL/L (ref 21–32)
CREAT SERPL-MCNC: 0.58 MG/DL (ref 0.5–1.05)
CRP SERPL-MCNC: 0.19 MG/DL
EGFRCR SERPLBLD CKD-EPI 2021: >90 ML/MIN/1.73M*2
EOSINOPHIL # BLD AUTO: 0.01 X10*3/UL (ref 0–0.7)
EOSINOPHIL NFR BLD AUTO: 0.2 %
ERYTHROCYTE [DISTWIDTH] IN BLOOD BY AUTOMATED COUNT: 14.5 % (ref 11.5–14.5)
ERYTHROCYTE [SEDIMENTATION RATE] IN BLOOD BY WESTERGREN METHOD: 20 MM/H (ref 0–20)
GLUCOSE SERPL-MCNC: 80 MG/DL (ref 74–99)
HCT VFR BLD AUTO: 38.9 % (ref 36–46)
HGB BLD-MCNC: 12.6 G/DL (ref 12–16)
IMM GRANULOCYTES # BLD AUTO: 0.01 X10*3/UL (ref 0–0.7)
IMM GRANULOCYTES NFR BLD AUTO: 0.2 % (ref 0–0.9)
LYMPHOCYTES # BLD AUTO: 3 X10*3/UL (ref 1.2–4.8)
LYMPHOCYTES NFR BLD AUTO: 50.3 %
MCH RBC QN AUTO: 30 PG (ref 26–34)
MCHC RBC AUTO-ENTMCNC: 32.4 G/DL (ref 32–36)
MCV RBC AUTO: 93 FL (ref 80–100)
MONOCYTES # BLD AUTO: 0.58 X10*3/UL (ref 0.1–1)
MONOCYTES NFR BLD AUTO: 9.7 %
NEUTROPHILS # BLD AUTO: 2.32 X10*3/UL (ref 1.2–7.7)
NEUTROPHILS NFR BLD AUTO: 38.9 %
NRBC BLD-RTO: 0 /100 WBCS (ref 0–0)
PLATELET # BLD AUTO: 334 X10*3/UL (ref 150–450)
POTASSIUM SERPL-SCNC: 4 MMOL/L (ref 3.5–5.3)
PROT SERPL-MCNC: 7.3 G/DL (ref 6.4–8.2)
RBC # BLD AUTO: 4.2 X10*6/UL (ref 4–5.2)
SODIUM SERPL-SCNC: 139 MMOL/L (ref 136–145)
URATE SERPL-MCNC: 3.5 MG/DL (ref 2.3–6.7)
WBC # BLD AUTO: 6 X10*3/UL (ref 4.4–11.3)

## 2024-02-21 PROCEDURE — 85652 RBC SED RATE AUTOMATED: CPT

## 2024-02-21 PROCEDURE — 80053 COMPREHEN METABOLIC PANEL: CPT

## 2024-02-21 PROCEDURE — 36415 COLL VENOUS BLD VENIPUNCTURE: CPT

## 2024-02-21 PROCEDURE — 85025 COMPLETE CBC W/AUTO DIFF WBC: CPT

## 2024-02-21 PROCEDURE — 86140 C-REACTIVE PROTEIN: CPT

## 2024-02-21 PROCEDURE — 99214 OFFICE O/P EST MOD 30 MIN: CPT | Performed by: INTERNAL MEDICINE

## 2024-02-21 PROCEDURE — 3077F SYST BP >= 140 MM HG: CPT | Performed by: INTERNAL MEDICINE

## 2024-02-21 PROCEDURE — 84550 ASSAY OF BLOOD/URIC ACID: CPT

## 2024-02-21 PROCEDURE — 1036F TOBACCO NON-USER: CPT | Performed by: INTERNAL MEDICINE

## 2024-02-21 PROCEDURE — 3080F DIAST BP >= 90 MM HG: CPT | Performed by: INTERNAL MEDICINE

## 2024-02-21 RX ORDER — LEFLUNOMIDE 10 MG/1
10 TABLET ORAL DAILY
Qty: 90 TABLET | Refills: 0 | Status: SHIPPED | OUTPATIENT
Start: 2024-02-21 | End: 2024-05-21

## 2024-02-21 NOTE — PROGRESS NOTES
Follow-up Rheumatology Patient Visit    Chief Complaint:  Nichelle Izaguirre is a 43 y.o. female presenting today for Follow-up.    History of Presenting Problem:   44 y/o female with Hx of psoriatic arthritis, gout, asthma, Parkinsonism/Dystonia, Immunodeficiency disorder (MBL) on prophylaxis antibiotics with amoxicillin presents for follow-up  She is tolerating leflunomide currently taking 10 mg daily now  She is taking allopurinol 400 mg daily  She  is finally getting  Cosentyx consistently x 2 month, the chronic elbow pain seem improve but still there.    She did get an URI last month, but she is on chronic Amoxil for URI prophalasix  Previous meds ;  MTX  Otezla      Problem List:   Patient Active Problem List   Diagnosis    Allergic rhinitis    Anxiety disorder    Arthralgia of multiple sites    Bariatric surgery status    Benign essential hypertension    Bowel disease, inflammatory    Immunodeficiency syndrome (CMS/HCC)    Chronic diarrhea    Continuous LLQ abdominal pain    Depression    Essential familial hyperlipidemia    GERD (gastroesophageal reflux disease)    Gouty arthropathy    Hoarseness    Hot flashes    Hyperlipidemia    Hypertriglyceridemia    Hypothyroidism (acquired)    Inflammatory arthropathy    Rheumatoid arthritis (CMS/HCC)    Hypersomnia    Labile hypertension    Low vitamin D level    Lyme disease    Mannose-binding lectin deficiency    Methadone use    Migraine without status migrainosus, not intractable    Morbid obesity (CMS/HCC)    Mixed incontinence urge and stress    Osteoporosis    Obstructive sleep apnea, adult    DRD (DOPA-responsive dystonia)    Post herpetic neuralgia    Polyphagia    POTS (postural orthostatic tachycardia syndrome)    Prediabetes    Psoriatic arthropathy (CMS/HCC)    Steroid withdrawal syndrome with complication (CMS/HCC)    Steroid-dependent asthma    Vitamin B12 deficiency    Vitamin D deficiency    Gait disturbance    Bilateral leg edema    Cystocele with  uterine descensus    Dyspnea on effort    Elbow tendinitis    Foreign body in ear, right, initial encounter    Frequent falls    Hyperglycemia    Hyperuricemia    Incomplete bladder emptying    Insomnia, organic    Intermittent palpitations    Lower back pain    Maxillary sinusitis    Myofascial pain    Orthostatic intolerance    Other ovarian cyst, unspecified side    Pelvic floor weakness    Perioral dermatitis    Peripheral neuropathy    Postural dizziness    Psoriasis vulgaris    Recurrent urinary tract infection    Renal lesion    Right lumbar radiculopathy    Rosacea, unspecified    Sacroiliac joint dysfunction of right side    Snoring    Spasm of thoracic back muscle    Syncope    Urinary frequency    Condition not found       Past Medical History:   Past Medical History:   Diagnosis Date    Acute upper respiratory infection, unspecified 07/26/2016    Viral URI with cough    Acute upper respiratory infection, unspecified 11/15/2017    Viral URI with cough    Allergy status to unspecified drugs, medicaments and biological substances     History of allergy    Chronic maxillary sinusitis 08/27/2018    Maxillary sinusitis, unspecified chronicity    Dystonia, unspecified 04/08/2014    Dystonia    Encounter for other screening for malignant neoplasm of breast 01/22/2018    Breast screening    Encounter for screening for respiratory tuberculosis 11/11/2013    Screening examination for pulmonary tuberculosis    Essential (primary) hypertension 12/27/2017    Benign essential hypertension    Hypersomnia, unspecified 04/13/2015    Sleeps too much    Idiopathic sleep related nonobstructive alveolar hypoventilation     Nocturnal hypoxemia    Idiopathic sleep related nonobstructive alveolar hypoventilation 02/09/2018    Nocturnal hypoxia    Impaired fasting glucose 01/22/2018    Impaired fasting glucose    Insect bite (nonvenomous) of lower back and pelvis, initial encounter 03/29/2018    Tick bite of back    Left lower  quadrant pain 01/07/2019    Left lower quadrant pain    Local infection of the skin and subcutaneous tissue, unspecified 07/28/2017    Skin infection    Low back pain, unspecified 05/23/2019    Acute low back pain    Other conditions influencing health status 02/27/2017    Sprain of right thumb, unspecified site of finger, initial encounter    Other malaise 04/16/2018    Malaise and fatigue    Other microscopic hematuria 03/30/2018    Microscopic hematuria    Other specified cough 09/07/2018    Productive cough    Other specified health status 01/07/2019    Acute medical illness    Other symptoms and signs involving the musculoskeletal system 07/12/2018    Weakness of right lower extremity    Other symptoms and signs involving the musculoskeletal system 03/21/2018    Polyarticular joint involvement    Other symptoms and signs involving the musculoskeletal system 07/12/2018    RUE weakness    Pain in right lower leg 01/27/2016    Right calf pain    Pain in unspecified hip 01/20/2016    Hip pain    Pelvic and perineal pain 04/17/2018    Pelvic pain    Personal history of diseases of the skin and subcutaneous tissue 08/07/2018    History of dermatitis    Personal history of other (healed) physical injury and trauma 08/07/2018    History of insect bite    Personal history of other diseases of the circulatory system     History of hypertension    Personal history of other diseases of the female genital tract 11/24/2015    History of menorrhagia    Personal history of other diseases of the musculoskeletal system and connective tissue 03/14/2018    History of tendinitis    Personal history of other diseases of the nervous system and sense organs 04/08/2014    History of Parkinson's disease    Personal history of other diseases of the respiratory system 01/07/2019    History of acute bronchitis    Personal history of other diseases of the respiratory system 10/05/2018    History of paranasal sinus pain    Personal history  of other specified conditions 04/13/2015    History of snoring    Personal history of other specified conditions 09/11/2017    History of headache    Personal history of other specified conditions 09/07/2018    History of persistent cough    Personal history of other specified conditions 04/08/2014    History of memory loss    Personal history of other specified conditions 03/26/2018    History of left flank pain    Personal history of other specified conditions     History of heartburn    Personal history of urinary calculi 03/26/2018    Personal history of urinary calculi    Personal history of urinary calculi 03/26/2018    History of renal calculi    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/2017    Plantar fasciitis, left    Primary insomnia 04/13/2015    Primary insomnia    Rash and other nonspecific skin eruption 04/16/2018    Rash    Unspecified abdominal pain 01/07/2019    Left sided abdominal pain    Urinary tract infection, site not specified 10/19/2018    UTI (urinary tract infection), bacterial    Urinary tract infection, site not specified 10/18/2018    Recurrent UTI    Vitamin D deficiency, unspecified 04/22/2016    Vitamin D deficiency    Zoster with other complications 11/25/2015    Herpes zoster dermatitis       Surgical History:   Past Surgical History:   Procedure Laterality Date    CT ANGIO CORONARY ART WITH HEARTFLOW IF SCORE >30%  3/18/2020    CT HEART CORONARY ANGIOGRAM 3/18/2020 [facility]    HAND TENDON SURGERY  11/11/2013    Hand Incision Tendon Sheath Of A Finger    HYSTERECTOMY  03/04/2016    Hysterectomy    LITHOTRIPSY  11/11/2013    Renal Lithotripsy    TONSILLECTOMY  12/19/2013    Tonsillectomy With Adenoidectomy        Allergies:   Allergies   Allergen Reactions    Clindamycin Anaphylaxis    Dupilumab Anaphylaxis    Hydrochlorothiazide Anaphylaxis, Itching and Swelling    Sulfamethoxazole-Trimethoprim Anaphylaxis    Cefazolin Hives, Other and Swelling     STATES TONGUE SPLITS     Atorvastatin Hives    Cephalexin Other    Clarithromycin Swelling     hives, tongue swelling    Nitrofurantoin Monohyd/M-Cryst Hives    Sulfa (Sulfonamide Antibiotics) Hives    Sulfacetamide Hives       Medications:   Current Outpatient Medications:     alendronate (Fosamax) 70 mg tablet, Take 1 tablet (70 mg) by mouth once a week., Disp: , Rfl:     allopurinol (Zyloprim) 100 mg tablet, Take 1 tablet (100 mg) by mouth once daily., Disp: 90 tablet, Rfl: 0    allopurinol (Zyloprim) 300 mg tablet, Take 1 tablet (300 mg) by mouth once daily., Disp: 90 tablet, Rfl: 0    ALPRAZolam (Xanax) 0.5 mg tablet, Take 1 tablet (0.5 mg) by mouth 3 times a day as needed for anxiety., Disp: , Rfl:     amLODIPine (Norvasc) 2.5 mg tablet, Take 1 tablet (2.5 mg) by mouth once daily., Disp: , Rfl:     amoxicillin (Amoxil) 500 mg capsule, Take 1 capsule (500 mg) by mouth every 12 hours., Disp: , Rfl:     ascorbic acid, vitamin C, 500 mg capsule, Take 1 capsule by mouth once daily., Disp: , Rfl:     benzoyl peroxide (Benzac AC) 10 % external wash, Apply topically once daily., Disp: , Rfl:     benztropine (Cogentin) 2 mg tablet, Take 1 tablet (2 mg) by mouth once daily., Disp: , Rfl:     buPROPion XL (Wellbutrin XL) 150 mg 24 hr tablet, Take 1 tablet (150 mg) by mouth once daily. Do not crush, chew, or split., Disp: 30 tablet, Rfl: 11    buPROPion XL (Wellbutrin XL) 300 mg 24 hr tablet, Take 1 tablet (300 mg) by mouth once daily in the morning. Do not crush, chew, or split., Disp: 30 tablet, Rfl: 11    carvedilol (Coreg) 25 mg tablet, Take 1 tablet (25 mg) by mouth 2 times a day with meals., Disp: , Rfl:     clindamycin (Cleocin T) 1 % lotion, 1 Application, Disp: , Rfl:     clobetasol (Temovate) 0.05 % external solution, Apply topically 2 times a day., Disp: , Rfl:     clobetasoL 0.05 % lotion, One application as needed for flaring only.not for daily use. For scalp and hands only, Disp: 118 mL, Rfl: 3    cloNIDine (Catapres) 0.1 mg tablet,  Take 1 tablet (0.1 mg) by mouth once daily., Disp: , Rfl:     cloNIDine (Catapres) 0.2 mg tablet, Take 1 tablet (0.2 mg) by mouth once daily at bedtime., Disp: 90 tablet, Rfl: 3    colchicine 0.6 mg tablet, Take 1 tablet (0.6 mg) by mouth every other day., Disp: 45 tablet, Rfl: 0    Cosentyx Pen, 2 Pens, 150 mg/mL self-injector pen, Inject 2 mL (300 mg) under the skin every 30 (thirty) days., Disp: 2 mL, Rfl: 2    cyanocobalamin (Vitamin B-12) 1,000 mcg tablet, Take by mouth every 30 (thirty) days., Disp: , Rfl:     diclofenac sodium (Voltaren) 1 % gel gel, Apply 4.5 inches (4 g) topically 2 times a day as needed., Disp: , Rfl:     doxycycline (Vibramycin) 100 mg capsule, 1 capsule (100 mg)., Disp: , Rfl:     DULoxetine (Cymbalta) 60 mg DR capsule, Take 1 capsule (60 mg) by mouth once daily., Disp: , Rfl:     eplerenone (Inspra) 25 mg tablet, Take 1 tablet (25 mg) by mouth once daily., Disp: , Rfl:     ergocalciferol (Vitamin D-2) 1.25 MG (16226 UT) capsule, Take 1 capsule (50,000 Units) by mouth 1 (one) time per week., Disp: , Rfl:     fluticasone furoate-vilanteroL (Breo Ellipta) 100-25 mcg/dose inhaler, Inhale 1 puff once daily., Disp: , Rfl:     icosapent ethyL (Vascepa) 1 gram capsule, Take 2 capsules (2 g) by mouth 2 times a day with meals., Disp: 360 capsule, Rfl: 3    ipratropium-albuteroL (Duo-Neb) 0.5-2.5 mg/3 mL nebulizer solution, Take 3 mL by nebulization every 4 hours if needed for wheezing., Disp: , Rfl:     ketoconazole (NIZOral) 2 % cream, 1 Application, Disp: , Rfl:     levalbuterol (Xopenex) 1.25 mg/3 mL nebulizer solution, Take 1 ampule by nebulization 3 times a day., Disp: , Rfl:     meloxicam (Mobic) 15 mg tablet, Take 1 tablet (15 mg) by mouth once daily., Disp: 30 tablet, Rfl: 3    metoprolol succinate XL (Toprol-XL) 50 mg 24 hr tablet, Take 1 tablet (50 mg) by mouth once daily., Disp: , Rfl:     metroNIDAZOLE (Metrocream) 0.75 % cream, 1 Application, Disp: , Rfl:     montelukast (Singulair)  10 mg tablet, Take 1 tablet (10 mg) by mouth once daily at bedtime., Disp: , Rfl:     nebulizer and compressor device, use q4-6 hours with albuterol PRN cough/wheeze, Disp: , Rfl:     nebulizers (Devilbiss Disposable Nebulizer) misc, every 4 hours if needed., Disp: , Rfl:     olmesartan (BENIcar) 40 mg tablet, Take 1 tablet (40 mg) by mouth once daily., Disp: , Rfl:     omeprazole (PriLOSEC) 40 mg DR capsule, Take 1 capsule (40 mg) by mouth once daily in the morning. Take before meals., Disp: 90 capsule, Rfl: 3    ondansetron ODT (Zofran-ODT) 4 mg disintegrating tablet, Take 1 tablet (4 mg) by mouth every 8 hours if needed for nausea or vomiting., Disp: 90 tablet, Rfl: 0    orphenadrine (Norflex) 100 mg 12 hr tablet, Take 1 tablet (100 mg) by mouth 2 times a day as needed for muscle spasms., Disp: 60 tablet, Rfl: 3    pioglitazone (Actos) 15 mg tablet, Take 1 tablet (15 mg) by mouth once daily., Disp: , Rfl:     pregabalin (Lyrica) 75 mg capsule, Take 1 capsule (75 mg) by mouth 3 times a day., Disp: 90 capsule, Rfl: 0    ProAir HFA 90 mcg/actuation inhaler, , Disp: , Rfl:     Repatha SureClick 140 mg/mL injection, Inject 1 mL (140 mg) under the skin every 14 (fourteen) days., Disp: , Rfl:     Spiriva Respimat 1.25 mcg/actuation inhaler, Inhale once daily., Disp: , Rfl:     spironolactone (Aldactone) 25 mg tablet, 1 tablet (25 mg)., Disp: , Rfl:     SUMAtriptan (Imitrex) 25 mg tablet, Take 1 tablet (25 mg) by mouth 1 time if needed for migraine. May repeat in 2 hours if no improvement. Max 200 mg/24 hr, Disp: 9 tablet, Rfl: 2    tapentadol (Nucynta) 50 mg tablet, Take 1 tablet (50 mg) by mouth 3 times a day as needed for severe pain (7 - 10)., Disp: 90 tablet, Rfl: 0    tezepelumab-ekko (Tezspire) SubQ Pen Injector, Inject 210 mg under the skin., Disp: , Rfl:     carbidopa-levodopa-entacapone (Stalevo) -200 mg tablet, Take 1 tablet by mouth every 4 hours., Disp: 540 tablet, Rfl: 1    cetirizine-pseudoephedrine  "(ZyrTEC-D) 5-120 mg 12 hr tablet, Take 1 tablet by mouth 2 times a day., Disp: 180 tablet, Rfl: 0      Objective   Physical Examination:    Visit Vitals  BP (!) 165/111   Pulse 96   Ht 1.702 m (5' 7.01\")   Wt 102 kg (224 lb 3.2 oz)   BMI 35.11 kg/m²   Smoking Status Never   BSA 2.2 m²        Gen: NAD, A&O x 3  HEENT: clear sclera and conjunctiva,     Musculoskeletal:   Neck; WNL, full ROM  Shoulder: WNL, full ROM  Elbow:WNL, full ROM, no effusion noted  Wrist and fingers;no active synovitis noted, Full ROM in the Wrist , Good fist and   Knees:  No effusions or crepitation, full ROM.  Hips; WNL, full ROM, Negative Cabrera test  Ankle, Feet; WNL, full ROM    Skin: No rashes or lesions seen, no nail changes  Neuro: A&O x3, Normal Gait    Procedures :None    Orders:  No orders of the defined types were placed in this encounter.       Provider Impression:   Assessment/Plan   Encounter Diagnoses   Name Primary?    Psoriatic arthropathy (CMS/HCC) Yes    Gouty arthropathy     Chronic elbow pain, unspecified laterality         42 y/o female with Hx of asthma, Parkinsonism/Dystonia, Immunodeficiency disorder (MBL) on prophylaxis antibiotics with amoxicillin present for pain in the right elbow and bilateral knee. Workup shows elevated uric acid levels and positive Family History on the maternal side. underlying gout and possible underlying PSA. MRI show severe lateral epicondylitis. Concerned that this may be due to underlying PSA given the inflammatory findings  -Continue Cosentyx 300 mg monthly  -Continue leflunomide 10 mg,  -Continue Allopurinol 400 mg daily  -Check routine labs   -Refer to orthopedics for chronic elbow pain  -F/u in 4 months  "

## 2024-02-21 NOTE — RESULT ENCOUNTER NOTE
Results form this monitor was benign. HR ranged from  bpm, with Avg 91 bpm, however this was supposed to be a 30 day monitor, and monitor only functioned for 2 days.Discussed with the company and a new monitor will be placed tomorrow.

## 2024-02-22 ENCOUNTER — ANCILLARY PROCEDURE (OUTPATIENT)
Dept: CARDIOLOGY | Facility: CLINIC | Age: 44
End: 2024-02-22
Payer: MEDICAID

## 2024-02-22 DIAGNOSIS — R00.2 PALPITATIONS: ICD-10-CM

## 2024-02-22 NOTE — PROGRESS NOTES
Placed 30 day monitor on pt today. Pt was given instructions and verbalized understanding. Not a cardiology patient.

## 2024-02-26 ENCOUNTER — APPOINTMENT (OUTPATIENT)
Dept: PHARMACY | Facility: HOSPITAL | Age: 44
End: 2024-02-26
Payer: MEDICAID

## 2024-03-04 ENCOUNTER — APPOINTMENT (OUTPATIENT)
Dept: NEUROLOGY | Facility: CLINIC | Age: 44
End: 2024-03-04
Payer: MEDICAID

## 2024-03-05 NOTE — PROGRESS NOTES
"[facility]  Nichelle Izaguirre is a 43 y.o. female was referred to Clinical Pharmacy Team for hypercholesterolemia/hyperlipidemia and weight loss management.     Referring Provider: Akosua Richardson    ____________________________________________________________________________________________    PHARMACY ASSESSMENT    Allergies Reviewed? No  Home Pharmacy Reviewed? Yes, describe: [facility]    Affordability/Accessibility: Patient is on Medicaid. Insurance requires PA for Vascepa and Repatha.  Adherence/Organization: Taking Repatha 140 mg [id number]   Adverse Effects: NA    RELEVANT LAB RESULTS  Lab Results   Component Value Date    BILITOT 0.3 02/21/2024    CALCIUM 9.7 02/21/2024    CO2 27 02/21/2024     02/21/2024    CREATININE 0.58 02/21/2024    GLUCOSE 80 02/21/2024    ALKPHOS 53 02/21/2024    K 4.0 02/21/2024    PROT 7.3 02/21/2024     02/21/2024    AST 14 02/21/2024    ALT 16 02/21/2024    BUN 11 02/21/2024    ANIONGAP 16 02/21/2024    MG 1.90 11/19/2020    PHOS 3.7 09/18/2023    ALBUMIN 4.4 02/21/2024    AMYLASE 18 (L) 11/12/2021    LIPASE 14 11/12/2021    GFRF >90 09/18/2023     Lab Results   Component Value Date    TRIG 336 (H) 02/05/2024    CHOL 264 (H) 02/05/2024    LDLCALC 152 (H) 02/05/2024    HDL 45.0 02/05/2024     No results found for: \"BMCBC\", \"CBCDIF\"       DRUG INTERACTIONS  - No    ____________________________________________________________________________________________  HYPERCHOLESTEROLEMIA ASSESSMENT    RECENT LIPID PANEL (DATE): 2/5/2024  Lab Results   Component Value Date    TRIG 336 (H) 02/05/2024    CHOL 264 (H) 02/05/2024    LDLCALC 152 (H) 02/05/2024    HDL 45.0 02/05/2024          ASCVD SCORE: The 10-year ASCVD risk score (Amarilis DK, et al., 2019) is: 4%    Values used to calculate the score:      Age: 43 years      Sex: Female      Is Non- : No      Diabetic: No      Tobacco smoker: No      Systolic Blood Pressure: 165 mmHg      Is " BP treated: Yes      HDL Cholesterol: 45 mg/dL      Total Cholesterol: 264 mg/dL  Coronary Heart Disease (MI, angina, coronary artery stenosis): No - but noted family history of CAD   History of ischemic stroke? No   History of carotid artery stenosis? No   Peripheral artery disease? No   ASCVD RISK FACTORS:    CKD? No   Diabetes? No   HTN? Yes   Persistently elevated LDL? Yes   Elevated triglycerides? Yes   Inflammatory diseases (rheumatoid arthritis, psoriasis, HIV)? Yes - inflammatory arthropathy, rheumatoid arthritis, Lyme disease, MBL    CURRENT PHARMACOTHERAPY  - Statin? No - noted statin intolerance  - Ezetimibe? No  - PCSK9-I? Yes, describe: Repatha   - Other lipid lowering agents? No    HISTORICAL PHARMACOTHERAPY  - Has hyperlipidemia at FH levels and is statin intolerant and has been on repatha . Also has elevated triglycerides and was on fish oil, but cannot tolerate due to reflux issues and vomiting. She had tolerated vascepa in the past though could not afford so d/cd.   - Consider starting Zetia and adding Vascepa back on  - Patient had hives and upset stomach on statin    - Patient has been on Repatha for years without any complications. Has about 4 pens left at home. Looks like insurance is requiring PA.   ___________________________________________________________________________________________  WEIGHT LOSS ASSESSMENT     CURRENT PHARMACOTHERAPY  - None    HISTORICAL PHARMACOTHERAPY   - She saw Dr Gentile for medical weight. On meds (Monjaro) she had initially lost 55 lbs, however insurance stopped paying for meds and could not afford . Off meds she has regained 30 lbs   - Since pt is on Medicaid insurance, the only GLP-1 that the insurance will cover if Trulicity. However, Trulicity does not have an FDA indication for weight loss. Will consult with cardiologist to get thoughts on initiation and discuss with patient at next visit.     ASSESSMENT  BMI: 35.1  Diet: NA  Exercise Routine:  NA  Additional Contributory Factors: immunodeficency syndrome, hypothyroidism, theumatoid arthritis, MBL, Lyme disease  ___________________________________________________________________________________________    PATIENT EDUCATION/DISCUSSION:  - Counseled patient on MOA, expectations, side effects, duration of therapy, contraindications, administration, and monitoring parameters  - Answered all patient questions and concerns  ____________________________________________________________________________________________  RECOMMENDATIONS/PLAN  1. START Zetia 10 mg every day - sent to [facility] pharmacy   2. Prescriptions for Repatha and Vascepa sent to  [facility] to be evaluated for PA  3. I will talk with Dr. Richardson about initiating Trulicity for weight loss    Next Cardiology Appointment: 3/21/2024  Clinical Pharmacist follow up: 3/13/2024  Type of Encounter: Coty Crook PharmD    Verbal consent to manage patient's drug therapy was obtained from the patient . They were informed they may decline to participate or withdraw from participation in pharmacy services at any time.    Continue all meds under the continuation of care with the referring provider and clinical pharmacy team.

## 2024-03-06 ENCOUNTER — TELEMEDICINE (OUTPATIENT)
Dept: PHARMACY | Facility: HOSPITAL | Age: 44
End: 2024-03-06
Payer: MEDICAID

## 2024-03-06 DIAGNOSIS — E66.9 OBESITY (BMI 35.0-39.9 WITHOUT COMORBIDITY): ICD-10-CM

## 2024-03-06 DIAGNOSIS — E78.1 HYPERTRIGLYCERIDEMIA: ICD-10-CM

## 2024-03-06 RX ORDER — EVOLOCUMAB 140 MG/ML
140 INJECTION, SOLUTION SUBCUTANEOUS
Qty: 6 ML | Refills: 3 | Status: SHIPPED | OUTPATIENT
Start: 2024-03-06 | End: 2025-03-06

## 2024-03-06 RX ORDER — ICOSAPENT ETHYL 1 G/1
2 CAPSULE ORAL
Qty: 360 CAPSULE | Refills: 3 | Status: SHIPPED | OUTPATIENT
Start: 2024-03-06 | End: 2024-03-27 | Stop reason: WASHOUT

## 2024-03-06 RX ORDER — EZETIMIBE 10 MG/1
10 TABLET ORAL DAILY
Qty: 90 TABLET | Refills: 3 | Status: SHIPPED | OUTPATIENT
Start: 2024-03-06 | End: 2025-03-06

## 2024-03-06 NOTE — Clinical Note
Renan Richardson! Here was my discussion and plan for Nichelle:  1. START Zetia 10 mg every day - sent to preferred pharmacy  2. Prescriptions for Repatha and Vascepa sent to  [facility] to be evaluated for PA 3. Patient is on medicaid insurance so the only GLP-1 they will cover is Trulicity. However, Trulicity does not have an FDA indication for weight loss. What are your thoughts? I will be messaging you separately on this matter.  Follow up 1 week.

## 2024-03-12 ENCOUNTER — SPECIALTY PHARMACY (OUTPATIENT)
Dept: PHARMACY | Facility: CLINIC | Age: 44
End: 2024-03-12

## 2024-03-13 ENCOUNTER — APPOINTMENT (OUTPATIENT)
Dept: PHARMACY | Facility: HOSPITAL | Age: 44
End: 2024-03-13
Payer: MEDICAID

## 2024-03-15 DIAGNOSIS — L40.50 PSORIATIC ARTHROPATHY (MULTI): ICD-10-CM

## 2024-03-15 DIAGNOSIS — M25.522 PAIN OF BOTH ELBOWS: ICD-10-CM

## 2024-03-15 DIAGNOSIS — M25.521 PAIN OF BOTH ELBOWS: ICD-10-CM

## 2024-03-18 RX ORDER — SECUKINUMAB 150 MG/ML
300 INJECTION SUBCUTANEOUS
Qty: 2 ML | Refills: 2 | Status: SHIPPED | OUTPATIENT
Start: 2024-03-18

## 2024-03-19 PROCEDURE — RXMED WILLOW AMBULATORY MEDICATION CHARGE

## 2024-03-20 ENCOUNTER — OFFICE VISIT (OUTPATIENT)
Dept: ORTHOPEDIC SURGERY | Facility: CLINIC | Age: 44
End: 2024-03-20
Payer: MEDICAID

## 2024-03-20 ENCOUNTER — APPOINTMENT (OUTPATIENT)
Dept: PHARMACY | Facility: HOSPITAL | Age: 44
End: 2024-03-20
Payer: MEDICAID

## 2024-03-20 ENCOUNTER — HOSPITAL ENCOUNTER (OUTPATIENT)
Dept: RADIOLOGY | Facility: CLINIC | Age: 44
Discharge: HOME | End: 2024-03-20
Payer: MEDICAID

## 2024-03-20 VITALS — HEIGHT: 67 IN | BODY MASS INDEX: 35.16 KG/M2 | WEIGHT: 224 LBS

## 2024-03-20 DIAGNOSIS — M77.11 LATERAL EPICONDYLITIS, RIGHT ELBOW: Primary | ICD-10-CM

## 2024-03-20 DIAGNOSIS — M25.529 CHRONIC ELBOW PAIN, UNSPECIFIED LATERALITY: ICD-10-CM

## 2024-03-20 DIAGNOSIS — M77.12 LATERAL EPICONDYLITIS, LEFT ELBOW: ICD-10-CM

## 2024-03-20 DIAGNOSIS — M25.522 PAIN OF BOTH ELBOWS: ICD-10-CM

## 2024-03-20 DIAGNOSIS — G89.29 CHRONIC ELBOW PAIN, UNSPECIFIED LATERALITY: ICD-10-CM

## 2024-03-20 DIAGNOSIS — M25.521 PAIN OF BOTH ELBOWS: ICD-10-CM

## 2024-03-20 PROCEDURE — 1036F TOBACCO NON-USER: CPT | Performed by: ORTHOPAEDIC SURGERY

## 2024-03-20 PROCEDURE — 99204 OFFICE O/P NEW MOD 45 MIN: CPT | Performed by: ORTHOPAEDIC SURGERY

## 2024-03-20 PROCEDURE — 20551 NJX 1 TENDON ORIGIN/INSJ: CPT | Performed by: ORTHOPAEDIC SURGERY

## 2024-03-20 PROCEDURE — 73080 X-RAY EXAM OF ELBOW: CPT | Mod: BILATERAL PROCEDURE | Performed by: RADIOLOGY

## 2024-03-20 PROCEDURE — L3908 WHO COCK-UP NONMOLDE PRE OTS: HCPCS | Performed by: ORTHOPAEDIC SURGERY

## 2024-03-20 PROCEDURE — 73080 X-RAY EXAM OF ELBOW: CPT | Mod: 50

## 2024-03-20 RX ORDER — LIDOCAINE HYDROCHLORIDE 10 MG/ML
1 INJECTION INFILTRATION; PERINEURAL
Status: COMPLETED | OUTPATIENT
Start: 2024-03-20 | End: 2024-03-20

## 2024-03-20 RX ADMIN — LIDOCAINE HYDROCHLORIDE 1 ML: 10 INJECTION INFILTRATION; PERINEURAL at 10:35

## 2024-03-20 NOTE — PROGRESS NOTES
43 y.o. female presents today for evaluation of bilateral lateral sided elbow pain. The patient reports symptoms for years, getting worse over time.  Reports she sees her rheumatologist Dr Hilton, is on multiple medicines and has had anywhere from 4-6 shots in her elbows over the years.  States her last shots were about 6 months ago.  She has been referred here for further evaluation of her bilateral elbow pain, currently being treated for rheumatoid arthritis. Pain is controlled. Patient reports no numbness and tingling.  Reports no previous surgeries or trauma to the area.  Reports pain worse with use, better at rest.   Pain dull ache, sharp at times.  Worse lifting things.  States she cannot  a gallon of milk.  Takes anti-inflammatory Aleve as needed which helps some.    Review of Systems    Constitutional: see HPI, no fever, no chills, not feeling tired, no significant weight gain or weight loss.   Eyes: No vision changes  ENT: no nosebleeds.   Cardiovascular: no chest pain.   Respiratory: no shortness of breath and no cough.   Gastrointestinal: no abdominal pain, no nausea, no vomiting and no diarrhea.   Musculoskeletal: per HPI  Neurological: no headache, no gait disturbances  Psychiatric: no depression and no sleep disturbances.   Endocrine: no muscle weakness and no muscle cramps.   Hematologic/Lymphatic: no swollen glands and no tendency for easy bruising or excessive swelling.     Patient's past medical history, past surgical history, allergies, and medications have been reviewed unless otherwise noted in the chart.     Lateral Epicondylitis  Inspection:  no evidence of infection, no erythema, no edema, Palpation:  compartments are soft, positive pain over the lateral epicondyle, Range of Motion:  full elbow motion, Stability:  no elbow instability noted, Strength:  5/5 elbow flexion/extension, Skin:  intact, Vascular:  capillary refill <2 seconds distally, Sensation:  intact to light touch distally,  Tests:  pain with resisted wrist extension over lateral epicondyle.      Constitutional   General appearance: Alert and in no acute distress. Well developed, well nourished.    Eyes   External Eye, Conjunctiva and lids: Normal external exam - pupils were equal in size, round, reactive to light (PERRL) with normal accommodation and extraocular movements intact (EOMI).   Ears, Nose, Mouth, and Throat   Hearing: Normal.   Neck   Neck: No neck mass was observed. Supple.   Pulmonary   Respiratory effort: No respiratory distress.   Cardiovascular   Examination of extremities: No peripheral edema.   Psychiatric   Judgment and insight: Intact.   Orientation to person, place, and time: Alert and oriented x 3.       Mood and affect: Normal.      X-rays show no fractures, dislocations    Bilateral lateral epicondylitis  Based on the history, physical exam and imaging studies above, the patient's presentation is consistent with the above diagnosis.  I had a long discussion with the patient regarding their presentation and the treatment options.  We discussed initial nonoperative versus operative management options as well as potential further diagnostic imaging.  We again discussed her treatment options going forward along with their associated risks and benefits. After thorough discussion, the patient has elected to proceed with conservative management. All questions were answered to the patients satisfaction who seems satisfied with the plan.  They will call the office with any questions/concerns.    Discussion I discussed the diagnosis and treatment options with the patient today along with their associated risks and benefits. After thorough discussion, the patient has elected to proceed with a corticosteroid injection with Kenalog and Xylocaine, which was performed in the office today under aseptic technique and the patient tolerated the procedure well.          Ortho Injections:           Injection site right lateral  epicondyle. Medication 1 cc 1% Xylocaine, 1 cc 10 mg Kenalog, Injection given under sterile conditions. No immediate complications noted. Post injection instructions given.  Hand master plus  Cock up wrist splints  Over-the-counter anti-inflammatories as needed  Follow-up 8 weeks no x-ray    Patient ID: Nichelle Izaguirre is a 43 y.o. female.    Hand / UE Inj/Asp: R elbow for lateral epicondylitis on 3/20/2024 10:35 AM  Indications: pain  Details: 25 G needle, lateral approach  Medications: 1 mL lidocaine 10 mg/mL (1 %); 10 mg triamcinolone acetonide 10 mg/mL  Outcome: tolerated well, no immediate complications  Procedure, treatment alternatives, risks and benefits explained, specific risks discussed. Consent was given by the patient. Immediately prior to procedure a time out was called to verify the correct patient, procedure, equipment, support staff and site/side marked as required. Patient was prepped and draped in the usual sterile fashion.

## 2024-03-20 NOTE — PROGRESS NOTES
Bilateral elbow pain pain for a couple of year  Gout left elbow  Rheumatoid arthritis  Psoriatic arthritis  Bilateral elbow injection 6 months ago by Rheumatologist.  Gives relief for a month  Her rheumatologist wants to make sure there isn't anything else going on that they are missing.   MITCHEL

## 2024-03-21 ENCOUNTER — OFFICE VISIT (OUTPATIENT)
Dept: CARDIOLOGY | Facility: HOSPITAL | Age: 44
End: 2024-03-21
Payer: MEDICAID

## 2024-03-21 VITALS
HEART RATE: 77 BPM | BODY MASS INDEX: 36.53 KG/M2 | SYSTOLIC BLOOD PRESSURE: 129 MMHG | HEIGHT: 66 IN | OXYGEN SATURATION: 96 % | WEIGHT: 227.3 LBS | DIASTOLIC BLOOD PRESSURE: 89 MMHG

## 2024-03-21 DIAGNOSIS — E78.1 HYPERTRIGLYCERIDEMIA: ICD-10-CM

## 2024-03-21 DIAGNOSIS — R73.03 PREDIABETES: ICD-10-CM

## 2024-03-21 DIAGNOSIS — G47.33 OBSTRUCTIVE SLEEP APNEA, ADULT: ICD-10-CM

## 2024-03-21 DIAGNOSIS — E66.01 MORBID OBESITY (MULTI): ICD-10-CM

## 2024-03-21 DIAGNOSIS — G90.A POTS (POSTURAL ORTHOSTATIC TACHYCARDIA SYNDROME): ICD-10-CM

## 2024-03-21 DIAGNOSIS — R55 SYNCOPE, UNSPECIFIED SYNCOPE TYPE: ICD-10-CM

## 2024-03-21 DIAGNOSIS — I10 BENIGN ESSENTIAL HYPERTENSION: ICD-10-CM

## 2024-03-21 DIAGNOSIS — E78.49 ESSENTIAL FAMILIAL HYPERLIPIDEMIA: Primary | ICD-10-CM

## 2024-03-21 PROCEDURE — 3079F DIAST BP 80-89 MM HG: CPT | Performed by: INTERNAL MEDICINE

## 2024-03-21 PROCEDURE — 99215 OFFICE O/P EST HI 40 MIN: CPT | Performed by: INTERNAL MEDICINE

## 2024-03-21 PROCEDURE — 1036F TOBACCO NON-USER: CPT | Performed by: INTERNAL MEDICINE

## 2024-03-21 PROCEDURE — 3074F SYST BP LT 130 MM HG: CPT | Performed by: INTERNAL MEDICINE

## 2024-03-21 NOTE — PROGRESS NOTES
Primary Care Physician: NAYELI Yadav-CNP      Date of Visit: 03/21/2024 10:20 AM EDT  Location of visit: [facility]   Type of Visit: Established Patient     HPI / Summary:   Patient is a 43 year old woman with HTN, hyperlipidemia ( at FH levels) with strong family history of CAD , with morbid obesity, h/o asthma, GUICHO ( using CPAP) and MBL with statin intolerance who had a syncopal episode( in November 2020, still with significant issues with joint pain and uncontrolled HTN also s/p COvid infection in late Dec 2021 who returns for followup.     Patient with significant issues with pain from MBL who has had issues controlling her BP when pain is worst. BP meds have been adjusted over time. The higher BP in the afternoon does correlate when her pain is worse. Continues to work with pain management     Cardiac work up :  ST scan for CAC: total 0  Cardiac Echo Jan 23 2020: normal LV size and systolic function with LVEF 65-70% technically difficult study. impaired relaxation, no significant valvular pathology.  Event monitor from 11/19/2020-12/19/2020: min HR 70 bpm, max 132, no SVEs VEs noted. skipped beats, lightheadedness and heart racing corresponded to sinus rhythm and sinus tachycardia.   CTA with heart flow 3/18/2020: normal chamber sizes, normal coronary anatomy without evidence for atherosclerotic changes or stenotic disease.   Renal artery ultrasound 1/23/2020- no evidence for renal artery stenosis. bilateral renal veins widely patent.  VMA and metanephrines were normal, TSH was normal.      She has had syncopal episodes over the years.  She wore a 30 day( Nov 2020) monitor and log notes occasional skipped beats, some SOB and CP and heart racing. Monitor during those episodes showed sinus rhythm and sinus tachycardia, and did not show any worrisome arrhythmias. Had not had syncopal episodes from Nov 2020 til at some point in  2022. She recently was diagnosed with POTs and is working with a  neurologist. Most recently had a syncopal episodes without any warning Oct 2023. ( Holding grandchild, fell and broke a table in child's room). More recently she does note an increase in her palpitations. Heart races for seconds then stops.   She routinely uses CPAP for GUICHO. Has hyperlipidemia at FH levels and is statin intolerant and has been on  repatha . Also has elevated triglycerides and was on fish oil, but cannot tolerate due to reflux issues and vomiting. She had tolerated vascepa in the past though could not afford so d/cd.      She saw Dr Gentile for medical weight. On meds ( monjaro) she had initially lost 55 lbs, however insurance stopped paying for meds and could not afford . Off meds she has regained 35 lbs.  She is routinely using her CPAP., however still not sleeping well. She does no  exercise due to pain. Her BP varies a lot through the day depending on her pain levels. Often around 120s/75mmHg, but if gets too much pain her BP rises to 190mmHg systolic and will use clonidine to bring down ( though gets tired).  She does take clonidine at bedtime a well. She intermittently has LE swelling. She uses support stockings. She denies CP or SOB at rest or with ADLs. She typically needs to use the additional dose f 0.1 mg clonidine 1-2 x per week. Additionally she can occasionally be dizzy and have low BP which she then treats with volume expanders( Gatorade etc) and uses support socks about 1-2 x per week. Overall she feels her BP is better controlled than previously. IF BP very high ( ie > 150mmHg systolic mid day she takes the clonidine and then down to about 120smmHg systolic. Her heart rate is still quit variable and she still is having palpitations. In the fall had an episode of syncope and has been trying to wear a 30 day monitor. The first attempt the monitor only worked for 2 days. More recently replacement monitor was worn on and off for 2 weeks, and had significant skin reaction to patches (  nearly burns) but has been sent the hypoallergenic patches to try.      ROS: Full 10 pt review of symptoms of negative unless discussed above.     Problems:   Patient Active Problem List   Diagnosis    Allergic rhinitis    Anxiety disorder    Arthralgia of multiple sites    Bariatric surgery status    Benign essential hypertension    Bowel disease, inflammatory    Immunodeficiency syndrome (CMS/HCC)    Chronic diarrhea    Continuous LLQ abdominal pain    Depression    Essential familial hyperlipidemia    GERD (gastroesophageal reflux disease)    Gouty arthropathy    Hoarseness    Hot flashes    Hyperlipidemia    Hypertriglyceridemia    Hypothyroidism (acquired)    Inflammatory arthropathy    Rheumatoid arthritis (CMS/HCC)    Hypersomnia    Labile hypertension    Low vitamin D level    Lyme disease    Mannose-binding lectin deficiency    Methadone use    Migraine without status migrainosus, not intractable    Morbid obesity (CMS/HCC)    Mixed incontinence urge and stress    Osteoporosis    Obstructive sleep apnea, adult    DRD (DOPA-responsive dystonia)    Post herpetic neuralgia    Polyphagia    POTS (postural orthostatic tachycardia syndrome)    Prediabetes    Psoriatic arthropathy (CMS/HCC)    Steroid withdrawal syndrome with complication (CMS/HCC)    Steroid-dependent asthma    Vitamin B12 deficiency    Vitamin D deficiency    Gait disturbance    Bilateral leg edema    Cystocele with uterine descensus    Dyspnea on effort    Elbow tendinitis    Foreign body in ear, right, initial encounter    Frequent falls    Hyperglycemia    Hyperuricemia    Incomplete bladder emptying    Insomnia, organic    Intermittent palpitations    Lower back pain    Maxillary sinusitis    Myofascial pain    Orthostatic intolerance    Other ovarian cyst, unspecified side    Pelvic floor weakness    Perioral dermatitis    Peripheral neuropathy    Postural dizziness    Psoriasis vulgaris    Recurrent urinary tract infection    Renal lesion     Right lumbar radiculopathy    Rosacea, unspecified    Sacroiliac joint dysfunction of right side    Snoring    Spasm of thoracic back muscle    Syncope    Urinary frequency    Condition not found     Medical History:   Past Medical History:   Diagnosis Date    Acute upper respiratory infection, unspecified 07/26/2016    Viral URI with cough    Acute upper respiratory infection, unspecified 11/15/2017    Viral URI with cough    Allergy status to unspecified drugs, medicaments and biological substances     History of allergy    Chronic maxillary sinusitis 08/27/2018    Maxillary sinusitis, unspecified chronicity    Dystonia, unspecified 04/08/2014    Dystonia    Encounter for other screening for malignant neoplasm of breast 01/22/2018    Breast screening    Encounter for screening for respiratory tuberculosis 11/11/2013    Screening examination for pulmonary tuberculosis    Essential (primary) hypertension 12/27/2017    Benign essential hypertension    Hypersomnia, unspecified 04/13/2015    Sleeps too much    Idiopathic sleep related nonobstructive alveolar hypoventilation     Nocturnal hypoxemia    Idiopathic sleep related nonobstructive alveolar hypoventilation 02/09/2018    Nocturnal hypoxia    Impaired fasting glucose 01/22/2018    Impaired fasting glucose    Insect bite (nonvenomous) of lower back and pelvis, initial encounter 03/29/2018    Tick bite of back    Left lower quadrant pain 01/07/2019    Left lower quadrant pain    Local infection of the skin and subcutaneous tissue, unspecified 07/28/2017    Skin infection    Low back pain, unspecified 05/23/2019    Acute low back pain    Other conditions influencing health status 02/27/2017    Sprain of right thumb, unspecified site of finger, initial encounter    Other malaise 04/16/2018    Malaise and fatigue    Other microscopic hematuria 03/30/2018    Microscopic hematuria    Other specified cough 09/07/2018    Productive cough    Other specified health  status 01/07/2019    Acute medical illness    Other symptoms and signs involving the musculoskeletal system 07/12/2018    Weakness of right lower extremity    Other symptoms and signs involving the musculoskeletal system 03/21/2018    Polyarticular joint involvement    Other symptoms and signs involving the musculoskeletal system 07/12/2018    RUE weakness    Pain in right lower leg 01/27/2016    Right calf pain    Pain in unspecified hip 01/20/2016    Hip pain    Pelvic and perineal pain 04/17/2018    Pelvic pain    Personal history of diseases of the skin and subcutaneous tissue 08/07/2018    History of dermatitis    Personal history of other (healed) physical injury and trauma 08/07/2018    History of insect bite    Personal history of other diseases of the circulatory system     History of hypertension    Personal history of other diseases of the female genital tract 11/24/2015    History of menorrhagia    Personal history of other diseases of the musculoskeletal system and connective tissue 03/14/2018    History of tendinitis    Personal history of other diseases of the nervous system and sense organs 04/08/2014    History of Parkinson's disease    Personal history of other diseases of the respiratory system 01/07/2019    History of acute bronchitis    Personal history of other diseases of the respiratory system 10/05/2018    History of paranasal sinus pain    Personal history of other specified conditions 04/13/2015    History of snoring    Personal history of other specified conditions 09/11/2017    History of headache    Personal history of other specified conditions 09/07/2018    History of persistent cough    Personal history of other specified conditions 04/08/2014    History of memory loss    Personal history of other specified conditions 03/26/2018    History of left flank pain    Personal history of other specified conditions     History of heartburn    Personal history of urinary calculi 03/26/2018     Personal history of urinary calculi    Personal history of urinary calculi 03/26/2018    History of renal calculi    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/2017    Plantar fasciitis, left    Primary insomnia 04/13/2015    Primary insomnia    Rash and other nonspecific skin eruption 04/16/2018    Rash    Unspecified abdominal pain 01/07/2019    Left sided abdominal pain    Urinary tract infection, site not specified 10/19/2018    UTI (urinary tract infection), bacterial    Urinary tract infection, site not specified 10/18/2018    Recurrent UTI    Vitamin D deficiency, unspecified 04/22/2016    Vitamin D deficiency    Zoster with other complications 11/25/2015    Herpes zoster dermatitis     Surgical Hx:   Past Surgical History:   Procedure Laterality Date    CT ANGIO CORONARY ART WITH HEARTFLOW IF SCORE >30%  3/18/2020    CT HEART CORONARY ANGIOGRAM 3/18/2020 [facility]    HAND TENDON SURGERY  11/11/2013    Hand Incision Tendon Sheath Of A Finger    HYSTERECTOMY  03/04/2016    Hysterectomy    LITHOTRIPSY  11/11/2013    Renal Lithotripsy    TONSILLECTOMY  12/19/2013    Tonsillectomy With Adenoidectomy      Social Hx:   Tobacco Use: Low Risk  (3/21/2024)    Patient History     Smoking Tobacco Use: Never     Smokeless Tobacco Use: Never     Passive Exposure: Not on file     Alcohol Use: Not on file     Family Hx:   Family History   Problem Relation Name Age of Onset    Asthma Mother      Hypertension Mother      Irregular heart beat Mother      Allergies Father      Heart disease Father      Hypertension Father      Sinusitis Father      Allergies Sister      Asthma Daughter      Allergies Son      Heart disease Maternal Grandmother      Breast cancer Paternal Grandmother      Heart disease Paternal Grandfather      Lung cancer Paternal Grandfather      Dementia Paternal Great-Grandfather        Exam:   Vitals:   Vitals:    03/21/24 0952   BP: 129/89   BP Location: Left arm   Patient Position: Sitting   BP Cuff Size:  "Adult   Pulse: 77   SpO2: 96%   Weight: 103 kg (227 lb 4.8 oz)   Height: 1.676 m (5' 6\")     Wt Readings from Last 5 Encounters:   03/21/24 103 kg (227 lb 4.8 oz)   03/20/24 102 kg (224 lb)   02/21/24 102 kg (224 lb 3.2 oz)   02/19/24 103 kg (227 lb 8 oz)   02/05/24 103 kg (228 lb)      Constitutional:       Appearance: Healthy appearance. Not in distress.   Pulmonary:      Effort: Pulmonary effort is normal.      Breath sounds: Normal breath sounds.   Cardiovascular:      Normal rate. Regular rhythm. S1 with normal intensity. S2 with normal intensity.       Murmurs: There is no murmur.   Edema:     Peripheral edema absent.   Abdominal:      General: Bowel sounds are normal.      Palpations: Abdomen is soft.   Neurological:      Mental Status: Alert and oriented to person, place and time.       Labs:   Recent Labs     02/21/24  1559 11/14/23  1615 09/18/23  1317 08/24/23  1551 08/14/23  1157 03/10/23  1329 12/08/22  1530 06/30/22  1515   WBC 6.0 5.4 4.6 5.9 4.5 5.4 6.0 5.9   HGB 12.6 11.2* 11.9* 11.8* 12.0 12.3 12.7 12.6   HCT 38.9 34.9* 36.2 37.5 36.7 36.8 38.9 38.3    296 334 325 301 287 311 337   MCV 93 95 94 98 96 94 95 96     Recent Labs     02/21/24  1559 11/14/23  1615 09/18/23  1317 06/16/23  1705 03/10/23  1329 12/08/22  1530 06/30/22  1515 03/03/22  1204    140 137 139 135* 139 140 140   K 4.0 4.2 4.2 3.9 4.1 3.9 3.9 4.5    101 102 103 101 101 102 102   BUN 11 19 11 15 12 13 18 16   CREATININE 0.58 0.68 0.59 0.77 0.60 0.60 0.66 0.53      Recent Labs     02/21/23  1251 04/09/21  1204 11/19/20  1300 05/27/20  1242 06/20/19  1416   HGBA1C 4.6 5.0 5.0 5.2 5.1   FERRITIN  --   --  42  --   --    TIBC  --   --  486*  --   --    IRONSAT  --   --  25  --   --      Lab Results   Component Value Date    CHOL 264 (H) 02/05/2024    HDL 45.0 02/05/2024    LDLF 121 (H) 03/10/2023    TRIG 336 (H) 02/05/2024   LDL  2/5/2024: 152  Notable Studies: imaging personally reviewed and summarized by me " below  EKG:  Encounter Date: 01/04/24   ECG 12 lead (Clinic Performed)   Result Value    Ventricular Rate 90    Atrial Rate 90    WV Interval 164    QRS Duration 92    QT Interval 364    QTC Calculation(Bazett) 445    P Axis 53    R Axis 12    T Axis 53    QRS Count 15    Q Onset 222    P Onset 140    P Offset 197    T Offset 404    QTC Fredericia 416    Narrative    Normal sinus rhythm  Normal ECG  When compared with ECG of 10-FEB-2022 10:42,  No significant change was found  Confirmed by Tan Fishman ([id number]) on 2/18/2024 9:44:51 PM        Echo:  - Echo (1/23/2020)  PHYSICIAN INTERPRETATION:  Left Ventricle: The left ventricular systolic function is normal, with an estimated ejection fraction of 65-70%. There are no regional wall motion abnormalities. The left ventricular cavity size is normal. Spectral Doppler shows an impaired relaxation pattern of left ventricular diastolic filling.  Left Atrium: The left atrium is normal in size.  Right Ventricle: The right ventricle is normal in size. There is normal right ventricular global systolic function.  Right Atrium: The right atrium is normal in size.  Aortic Valve: The aortic valve is trileaflet. There is no evidence of aortic valve regurgitation. The peak instantaneous gradient of the aortic valve is 6.2 mmHg. The mean gradient of the aortic valve is 4.0 mmHg.  Mitral Valve: The mitral valve is normal in structure. There is trace mitral valve regurgitation.  Tricuspid Valve: The tricuspid valve is structurally normal. There is trace tricuspid regurgitation. The right ventricular systolic pressure is unable to be estimated.  Pulmonic Valve: The pulmonic valve is not well visualized. There is physiologic pulmonic valve regurgitation.  Pericardium: A pericardial effusion was not well visualized.  Aorta: The aortic root was not well visualized.  Systemic Veins: The inferior vena cava appears to be of normal size. There is less than 50% IVC collapse with  inspiration.  In comparison to the previous echocardiogram(s): Compared with study from 4/5/2017,. Compared with the prior exam from 4/5/2017 there are no significant changes.     CONCLUSIONS:   1. The left ventricular systolic function is normal with a 65-70% estimated ejection fraction.   2. Poorly visualized anatomical structures due to suboptimal image quality.   3. Spectral Doppler shows an impaired relaxation pattern of left ventricular diastolic filling.   4. No significant valvular pathology.   5. Compared with the prior exam from 4/5/2017 there are no significant changes       Echo 2/5/2024:   PHYSICIAN INTERPRETATION:  Left Ventricle: The left ventricular systolic function is normal, with an estimated ejection fraction of 60-65%. There are no regional wall motion abnormalities. The left ventricular cavity size is normal. Spectral Doppler shows a normal pattern of left ventricular diastolic filling.  Left Atrium: The left atrium is normal in size.  Right Ventricle: The right ventricle is normal in size. There is normal right ventricular global systolic function.  Right Atrium: The right atrium is normal in size.  Aortic Valve: The aortic valve is trileaflet. There is no evidence of aortic valve regurgitation. The peak instantaneous gradient of the aortic valve is 3.4 mmHg. The mean gradient of the aortic valve is 2.0 mmHg.  Mitral Valve: The mitral valve is normal in structure. There is trace mitral valve regurgitation.  Tricuspid Valve: The tricuspid valve is structurally normal. There is trace tricuspid regurgitation. The right ventricular systolic pressure is unable to be estimated.  Pulmonic Valve: The pulmonic valve is structurally normal. There is physiologic pulmonic valve regurgitation.  Pericardium: There is a trivial pericardial effusion.  Aorta: The aortic root is normal.  Systemic Veins: The inferior vena cava appears to be of normal size.  In comparison to the previous echocardiogram(s):  Compared with study from 1/23/2020,. Nompared wtihthe prior examfro m1/23/2020, there are no significant changes though endocardial definition was suboptimal on today's exam and would have benefitted from use of echocontrast.        CONCLUSIONS:   1. Left ventricular systolic function is normal with a 60-65% estimated ejection fraction.   2. Compared with the prior exam from1/23/2020, there are no significant changes though endocardial definition was suboptimal on today's exam and would have benefitted from use of echocontrast.    Cardiac MRI 3/15/2022  1. Normal LV size (EDVi 56 ml/m2) with mildly reduced systolic   function (LVEF 47 %) .Global hypokinesis  without focal wall motion abnormality  2. Questionable myocardial inflammation/edema on limited T2-weighted   imaging (T2 mapping). Would correlate  with biomarkers and clinical status for myocarditis.   3. No evidence of myocardial fibrosis, infiltration or infarction   based on late gadolinium imaging      LEFT VENTRICLE: 1. Normal LV size (EDVi 56 ml/m2) mildly reduced   systolic function (LVEF 47 %).   2. Global hypokinesis without focal wall motion abnormality  3. Normal LV wall thickness and indexed LV mass.   4. Normal ECV 25%.   5. Limited T2 mapping due to artifacts. Patchy elevated native T2   values (>55msec ms) , which can be seen  with myocarditis. Would correlate with biomarkers and clinical status   for myocarditis.   6. Following administration of gadolinium, in the early phase, there   is no evidence of LV thrombus or MVO.   7. In the late phase, there is no significant myocardial enhancement.  Quantitative LVEF 47 %.     VIABILITY: Hyperenhancement is normal.     RIGHT VENTRICLE: Quantitative RVEF 48 %.     LV/RV SEPTUM: The LV/RV septum is normal.     LA/RA SEPTUM: The LA/RA septum is normal.     LEFT ATRIUM: Severe left atrial     RIGHT ATRIUM: Mild right atrial enlargement     PERICARDIUM: Trivial pericardial effusion     PLEURAL EFFUSION:  No pleural effusion     AORTIC VALVE: Peak aortic valve velocity 75 cm/sec. Aortic   regurgitant volume 3 ml. Aortic regurgitant fraction 8 %.     MITRAL VALVE: There is mild mitral regurgitation with regurgitant   volume 15ml, regurgitant fraction 30%.      AORTIC ROOT: The aortic root is normal.            Current Outpatient Medications   Medication Instructions    alendronate (Fosamax) 70 mg tablet 1 tablet, oral, Weekly    allopurinol (ZYLOPRIM) 100 mg, oral, Daily    allopurinol (ZYLOPRIM) 300 mg, oral, Daily    ALPRAZolam (Xanax) 0.5 mg tablet 1 tablet, oral, 3 times daily PRN    amLODIPine (NORVASC) 2.5 mg, oral, Daily    amoxicillin (AMOXIL) 500 mg, oral, Every 12 hours scheduled    ascorbic acid, vitamin C, 500 mg capsule 1 capsule, oral, Daily    benzoyl peroxide (Benzac AC) 10 % external wash Topical, Daily RT    benztropine (COGENTIN) 2 mg, oral, Daily    buPROPion XL (WELLBUTRIN XL) 300 mg, oral, Every morning, Do not crush, chew, or split.    buPROPion XL (WELLBUTRIN XL) 150 mg, oral, Daily, Do not crush, chew, or split.    carbidopa-levodopa-entacapone (Stalevo) -200 mg tablet 1 tablet, oral, Every 4 hours    carvedilol (Coreg) 25 mg tablet 1 tablet, oral, 2 times daily with meals    cetirizine-pseudoephedrine (ZyrTEC-D) 5-120 mg 12 hr tablet 1 tablet, oral, 2 times daily    clindamycin (Cleocin T) 1 % lotion 1 Application    clobetasol (Temovate) 0.05 % external solution Topical, 2 times daily    clobetasoL 0.05 % lotion One application as needed for flaring only.not for daily use. For scalp and hands only    cloNIDine (CATAPRES) 0.1 mg, oral, Daily    cloNIDine (CATAPRES) 0.2 mg, oral, Nightly    Cosentyx Pen (2 Pens) 300 mg, subcutaneous, Every 30 days    cyanocobalamin (Vitamin B-12) 1,000 mcg tablet oral, Every 30 days    diclofenac sodium (VOLTAREN) 4 g, Topical, 2 times daily PRN    doxycycline (Vibramycin) 100 mg capsule 1 capsule    DULoxetine (Cymbalta) 60 mg DR capsule 1 capsule,  oral, Daily    eplerenone (Inspra) 25 mg tablet 1 tablet, oral, Daily    ergocalciferol (VITAMIN D-2) 50,000 Units, oral, Weekly    ezetimibe (ZETIA) 10 mg, oral, Daily    fluticasone furoate-vilanteroL (Breo Ellipta) 100-25 mcg/dose inhaler 1 puff, inhalation, Daily    icosapent ethyL (VASCEPA) 2 g, oral, 2 times daily with meals    ipratropium-albuteroL (Duo-Neb) 0.5-2.5 mg/3 mL nebulizer solution 3 mL, nebulization, Every 4 hours PRN    ketoconazole (NIZOral) 2 % cream 1 Application    leflunomide (ARAVA) 10 mg, oral, Daily    levalbuterol (Xopenex) 1.25 mg/3 mL nebulizer solution 1 ampule, nebulization, 3 times daily RT    meloxicam (MOBIC) 15 mg, oral, Daily    metoprolol succinate XL (Toprol-XL) 50 mg 24 hr tablet 1 tablet, oral, Daily    metroNIDAZOLE (Metrocream) 0.75 % cream 1 Application    montelukast (SINGULAIR) 10 mg, oral, Nightly    nebulizer and compressor device use q4-6 hours with albuterol PRN cough/wheeze    nebulizers ([employer] Disposable Nebulizer) misc Every 4 hours PRN    olmesartan (BENICAR) 40 mg, oral, Daily    omeprazole (PRILOSEC) 40 mg, oral, Daily before breakfast    ondansetron ODT (ZOFRAN-ODT) 4 mg, oral, Every 8 hours PRN    orphenadrine (NORFLEX) 100 mg, oral, 2 times daily PRN    pioglitazone (ACTOS) 15 mg, oral, Daily    pregabalin (LYRICA) 75 mg, oral, 3 times daily    ProAir HFA 90 mcg/actuation inhaler     Repatha SureClick 140 mg, subcutaneous, Every 14 days    Spiriva Respimat 1.25 mcg/actuation inhaler inhalation, Daily RT    spironolactone (ALDACTONE) 25 mg    SUMAtriptan (IMITREX) 25 mg, oral, Once as needed, May repeat in 2 hours if no improvement. Max 200 mg/24 hr    Tezspire 210 mg, subcutaneous     Impressions and Plan:    Patient is 43 year old woman with complex medical history with FH, HTN which is poorly controlled, Vitamin D deficiency despite high dose Rx with prior statin intolerance with MBL, asthma with strong family history of CAD who had a CTA with heart  flow( 3/2020)showing no significant CAD. Her BP is  controlled except when in significant pain. Uses clonidine to control BP spikes. More recently diagnosed with POTs and occasionally has low BP which she treats with volume expanders and wears support socks.   She continues her repatha and is tolerating it well. Had been on  zetia and vascepa in the past though stopped due to cost. She is back her zetia, but not taking vascepa.   Will work with pharmacy to look into patient assistance programs). Did not tolerate over the counter fish oil. . Of note following covid infection at end of December 2021 she had persistent significant SOB on minimal exertion as well as  more tachycardia,. Had cardiac MRI 3/2022 with mildly reduced LV function and questionable myocardial inflammation. Inflammatory markers in blood were normal at that time. She no longer is having SOB. She does note more palpitations ( episodic) and had 1 episode of syncope without warning October 2023. No CP or SOB or diaphoresis before or after syncope. She had lost 55 lbs on wegovy, but once insurance stopped coverage is no longer able to afford and has regained 35 lb. This weight gain may explain why her LDL is higher than before despite being on  repatha. With diagnosis of  POTs- using volume expanders yony gaotoade and wearing support stockings and was reportedly tod by neurologist to be on metoprolol and carvedilol. Will review her medications with her neurologist.   Plan:  1. Hyperlipidemia/FH and hypertriglyceridemia- To continue repatha, and recently resumed  zetia and will work with pharmacy to see if can get pt assistance for vascepa.   2. Hypertension- Continue current regimen ( amlodipine 2.5 mg daily, olmesartan 40 mg daily and carvedilol 25 mg bid, and clonidine 0.2 mg at bed time). Can use clonidine 0.1 mg in the late afternoon if BP is very high ( > 150mmHg systolic). To continue to check and log BP and HR and review. Will review medications  ( specifically beta blockers) with her POTs neurologist. Overall her BP swings are less and she has ways to manage either high or low BP. ( More functional at this time)   3. Palpitations - recently increased in frequency as well as a single episode of syncope without warning in October. Will work on wearing 30 day live monitor. Cardiac echo with preserved LV systolic function 2/2024.   4. GUICHO- CPAP. To follow up with sleep medicine since still not sleeping well with the CPAP.   6. Obesity- continue working with Dr Gentile's group. Working with pharmacy clinic to see if we can help with medication costs and coverage . Only GLP-1 agonist her insurance will cover is  trulicity, though does not have FDA indication for weight loss. Pharmacist will be checking with Dr Gentile to see if it would be appropriate to start trulicity.   Return to clinic in 3 months.   Patient Instructions:  If you have any questions or need cardiac medication refills, please call my office at [phone],      To reach my office please call [phone]  To schedule an appointment call [phone].          ____________________________________________________________  Akosua Richardson MD  Division of Cardiovascular Medicine  [facility]  [facility]    Detail Level: Zone Plan: Discussed efudex treatment for bilateral arms, hands, forehead, temples and preauricular cheeks. Patient declines at this time, prefers cryotherapy. Render In Strict Bullet Format?: No

## 2024-03-21 NOTE — PATIENT INSTRUCTIONS
1. Obstructive sleep apnea -continue using CPAP- though will need to go back to sleep medicine to explore further treatment options  since having a hard time tolerating CPAP  2. hyperlipidemia- Continue repatha and   will try to  resume vascepa. LDL on last check was still elevated at 121 ( ideally should have ) Was higher than that 3/2023. Would recheck fasting lipids and consider starting zetia. Triglycerides off vascepa quite high in 3 2023 at 261  3. Morbid Obesity-Working with Dr Gentile's group trying to get pharmacy coverage for meds for medical weight loss.   4. Hypertension- Continue amlodipine 2.5 mg daily( swelling resolved on lower dose ) , (eplerenone on hold) , and olmesartan and carvedilol 25 mg bid. And takes clonidine 0.2 mg at bed time. Will have you take addition of clonidine 0.1 mg around 3 or 4 in the afternoon if systolic BP > 150mmHg ( now taking 1-2 x per week) . BP overall appears better controlled at home ( though still elevated). Note that did a renal artery ultrasound 1/2020 and did not have any renal artery stenosis. Constant pain issues as well as use of NSAIDs certainly contributing to BP issues in addition to the obesity. ( Unclear if actually on 2 beta blockers- carvedilol and metoprolol ) Will clarify with her neurologist/POTs specialist.   5.  Pt with worsening of palpitations . Also newer dx of POTs. To continue use support stockings/socks( [employer]) as well as volume expanders. Her neurologist has her using Gatorade or Power aide. Given weight issues would use G-0 ( gaotrade zero)rather than usual Gatorade ( has less sugar) could consider liquid IV for episodes of near syncope . Will also review meds with her neurologist ( pt states she was told to be on carvedilol and metoprolol  for her POTs. Also had one episode of syncope in the fall without any warning still working on wearing 30 day live monitor . Echo technically difficult though normal LV systolic function.  Return  to clinic 3 months- recheck lipid panel 1 week prior to visit.

## 2024-03-25 ENCOUNTER — TELEMEDICINE (OUTPATIENT)
Dept: BEHAVIORAL HEALTH | Facility: CLINIC | Age: 44
End: 2024-03-25
Payer: MEDICAID

## 2024-03-25 DIAGNOSIS — F41.0 PANIC ATTACKS: ICD-10-CM

## 2024-03-25 PROCEDURE — 1036F TOBACCO NON-USER: CPT | Performed by: PSYCHIATRY & NEUROLOGY

## 2024-03-25 PROCEDURE — 99213 OFFICE O/P EST LOW 20 MIN: CPT | Performed by: PSYCHIATRY & NEUROLOGY

## 2024-03-25 RX ORDER — ALPRAZOLAM 1 MG/1
1 TABLET ORAL NIGHTLY PRN
Qty: 20 TABLET | Refills: 0 | Status: SHIPPED | OUTPATIENT
Start: 2024-03-25 | End: 2024-04-23 | Stop reason: SDUPTHER

## 2024-03-25 ASSESSMENT — ENCOUNTER SYMPTOMS
DYSPHORIC MOOD: 1
AGITATION: 1
NERVOUS/ANXIOUS: 1
SLEEP DISTURBANCE: 1
DECREASED CONCENTRATION: 1
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
## 2024-03-25 NOTE — ASSESSMENT & PLAN NOTE
Panic attacks which are situational.  Will allow a 20 tablet supply of Xanax 1 mg as needed for panic attacks especially those interfering with sleep.  No refills checked OARRS no suspicious behavior.  Follow-up 4 weeks

## 2024-03-27 ENCOUNTER — PHARMACY VISIT (OUTPATIENT)
Dept: PHARMACY | Facility: CLINIC | Age: 44
End: 2024-03-27
Payer: MEDICAID

## 2024-03-27 ENCOUNTER — SPECIALTY PHARMACY (OUTPATIENT)
Dept: PHARMACY | Facility: CLINIC | Age: 44
End: 2024-03-27

## 2024-03-27 ENCOUNTER — TELEPHONE (OUTPATIENT)
Dept: PHARMACY | Facility: HOSPITAL | Age: 44
End: 2024-03-27
Payer: MEDICAID

## 2024-03-27 RX ORDER — OMEGA-3-ACID ETHYL ESTERS 1 G/1
2 CAPSULE, LIQUID FILLED ORAL 2 TIMES DAILY
Qty: 360 CAPSULE | Refills: 3 | Status: SHIPPED | OUTPATIENT
Start: 2024-03-27 | End: 2025-03-27

## 2024-03-27 NOTE — TELEPHONE ENCOUNTER
I was notified by team at  Specialty that patient's prior authorizations for Repatha and Vascepa have been denied.     Therefore, I have submitted additional document to appeal decision for Repatha. This was sent to [facility] pharmacy on 3/13/2024. Still waiting response.     Patient has Medicaid insurance which prefers Lovaza over Vascepa. I have sent prescription for Lovaza for  Specialty to ensure this is covered.     Continuing to follow.     Cynthia Crook, PharmD

## 2024-04-03 DIAGNOSIS — M19.90 INFLAMMATORY ARTHROPATHY: ICD-10-CM

## 2024-04-03 DIAGNOSIS — M25.50 ARTHRALGIA OF MULTIPLE SITES: Primary | ICD-10-CM

## 2024-04-03 DIAGNOSIS — M10.9 GOUTY ARTHROPATHY: ICD-10-CM

## 2024-04-03 DIAGNOSIS — L40.50 PSORIATIC ARTHROPATHY (MULTI): ICD-10-CM

## 2024-04-03 NOTE — TELEPHONE ENCOUNTER
Pt is requesting refill of   Nucynta 50 mg 1 tablet po TID                                                        LV:     2/19/24                  NV:     5/14/24             OARRS reviewed with L[facility]:   2/20/24  #90/30 days                         Pended RX to JEMIMA Rosenbaum for transmission to pharmacy.

## 2024-04-08 DIAGNOSIS — F33.41 RECURRENT MAJOR DEPRESSIVE DISORDER, IN PARTIAL REMISSION (CMS-HCC): ICD-10-CM

## 2024-04-08 RX ORDER — DULOXETIN HYDROCHLORIDE 60 MG/1
60 CAPSULE, DELAYED RELEASE ORAL DAILY
Qty: 90 CAPSULE | Refills: 1 | Status: SHIPPED | OUTPATIENT
Start: 2024-04-08

## 2024-04-17 DIAGNOSIS — B37.9 YEAST INFECTION: Primary | ICD-10-CM

## 2024-04-17 RX ORDER — L. ACIDOPHILUS/L.BULGARICUS 1MM CELL
1 TABLET ORAL DAILY
Qty: 90 TABLET | Refills: 0 | Status: SHIPPED | OUTPATIENT
Start: 2024-04-17

## 2024-04-23 ENCOUNTER — TELEMEDICINE (OUTPATIENT)
Dept: BEHAVIORAL HEALTH | Facility: CLINIC | Age: 44
End: 2024-04-23
Payer: MEDICAID

## 2024-04-23 DIAGNOSIS — F41.0 PANIC ATTACKS: ICD-10-CM

## 2024-04-23 PROCEDURE — 99213 OFFICE O/P EST LOW 20 MIN: CPT | Performed by: PSYCHIATRY & NEUROLOGY

## 2024-04-23 PROCEDURE — RXMED WILLOW AMBULATORY MEDICATION CHARGE

## 2024-04-23 RX ORDER — ALPRAZOLAM 1 MG/1
1 TABLET ORAL NIGHTLY PRN
Qty: 20 TABLET | Refills: 0 | Status: SHIPPED | OUTPATIENT
Start: 2024-04-23 | End: 2025-04-23

## 2024-04-23 ASSESSMENT — ENCOUNTER SYMPTOMS
SLEEP DISTURBANCE: 1
NERVOUS/ANXIOUS: 1

## 2024-04-23 NOTE — ASSESSMENT & PLAN NOTE
Okay to continue Xanax for about another month or 2 as long as she does not take it every day.  Continue duloxetine.  Follow-up 2 months   
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
## 2024-04-25 ENCOUNTER — OFFICE VISIT (OUTPATIENT)
Dept: PRIMARY CARE | Facility: CLINIC | Age: 44
End: 2024-04-25
Payer: MEDICAID

## 2024-04-25 VITALS
HEIGHT: 66 IN | HEART RATE: 80 BPM | BODY MASS INDEX: 36.96 KG/M2 | SYSTOLIC BLOOD PRESSURE: 91 MMHG | WEIGHT: 230 LBS | DIASTOLIC BLOOD PRESSURE: 66 MMHG

## 2024-04-25 DIAGNOSIS — F33.41 RECURRENT MAJOR DEPRESSIVE DISORDER, IN PARTIAL REMISSION (CMS-HCC): ICD-10-CM

## 2024-04-25 DIAGNOSIS — M54.6 CHRONIC MIDLINE THORACIC BACK PAIN: ICD-10-CM

## 2024-04-25 DIAGNOSIS — R41.3 MEMORY DIFFICULTIES: ICD-10-CM

## 2024-04-25 DIAGNOSIS — G89.29 CHRONIC MIDLINE THORACIC BACK PAIN: ICD-10-CM

## 2024-04-25 DIAGNOSIS — E53.8 VITAMIN B12 DEFICIENCY: ICD-10-CM

## 2024-04-25 DIAGNOSIS — G43.809 OTHER MIGRAINE WITHOUT STATUS MIGRAINOSUS, NOT INTRACTABLE: ICD-10-CM

## 2024-04-25 DIAGNOSIS — M54.50 LUMBAR BACK PAIN: Primary | ICD-10-CM

## 2024-04-25 PROBLEM — Z04.9 CONDITION NOT FOUND: Status: RESOLVED | Noted: 2023-08-28 | Resolved: 2024-04-25

## 2024-04-25 PROCEDURE — 1036F TOBACCO NON-USER: CPT | Performed by: NURSE PRACTITIONER

## 2024-04-25 PROCEDURE — 3074F SYST BP LT 130 MM HG: CPT | Performed by: NURSE PRACTITIONER

## 2024-04-25 PROCEDURE — 99214 OFFICE O/P EST MOD 30 MIN: CPT | Performed by: NURSE PRACTITIONER

## 2024-04-25 PROCEDURE — 3078F DIAST BP <80 MM HG: CPT | Performed by: NURSE PRACTITIONER

## 2024-04-25 RX ORDER — SUMATRIPTAN SUCCINATE 25 MG/1
25 TABLET ORAL ONCE AS NEEDED
Qty: 9 TABLET | Refills: 2 | Status: SHIPPED | OUTPATIENT
Start: 2024-04-25

## 2024-04-25 RX ORDER — FLUTICASONE FUROATE AND VILANTEROL 200; 25 UG/1; UG/1
POWDER RESPIRATORY (INHALATION)
COMMUNITY

## 2024-04-25 RX ORDER — ALBUTEROL SULFATE 0.83 MG/ML
SOLUTION RESPIRATORY (INHALATION)
COMMUNITY

## 2024-04-25 RX ORDER — ONDANSETRON 4 MG/1
4 TABLET, ORALLY DISINTEGRATING ORAL EVERY 8 HOURS PRN
Qty: 90 TABLET | Refills: 0 | Status: SHIPPED | OUTPATIENT
Start: 2024-04-25

## 2024-04-25 RX ORDER — BUPROPION HYDROCHLORIDE 150 MG/1
150 TABLET ORAL DAILY
Qty: 90 TABLET | Refills: 3 | Status: SHIPPED | OUTPATIENT
Start: 2024-04-25 | End: 2025-04-25

## 2024-04-25 RX ORDER — BUPROPION HYDROCHLORIDE 300 MG/1
300 TABLET ORAL EVERY MORNING
Qty: 90 TABLET | Refills: 3 | Status: SHIPPED | OUTPATIENT
Start: 2024-04-25 | End: 2025-04-25

## 2024-04-25 RX ORDER — BENZONATATE 100 MG/1
CAPSULE ORAL
COMMUNITY

## 2024-04-25 ASSESSMENT — ENCOUNTER SYMPTOMS
OCCASIONAL FEELINGS OF UNSTEADINESS: 1
DEPRESSION: 0
LOSS OF SENSATION IN FEET: 0

## 2024-04-25 NOTE — PROGRESS NOTES
"Subjective   Patient ID: Nichelle Izaguirre is a 43 y.o. female who presents for back pain.     HPI     Reports 1 year history of spinal pain  The pain is located to her middle and lower spine  Describes the pain as tight, she has difficulty standing up straight at times if she bends over   Gets tight with sitting in the car   Pain does not radiate   Rates her pain anywhere from 3-8/10  Has been taking ibuprofen and tylenol which help a little bit  Denies numbness or tingling  Her prescribed norflex does not help  She had an x-ray in 10/2023 for this and it was normal  Has done PT for her back in the past   States she mentioned the back pain to her rheumatologist and pain medicine providers who both advised her to follow up with PCP    She also notes that she is having a lot of memory issues  She states at one point she was diagnosed with early Alzheimer's but believes this was with the social security disability office   She notes issues with long term and short term memory, worse with short term   Has to write things down or she wont remember   Gives the example of driving to the grocery store the other day for something and when she got there, she couldn't remember what it was      Review of Systems  ROS negative except as noted above in HPI.     Objective   BP 91/66   Pulse 80   Ht 1.676 m (5' 6\")   Wt 104 kg (230 lb)   BMI 37.12 kg/m²     Physical Exam  General: Alert and oriented, in no acute distress. Appears stated age, well-nourished, and well hydrated  HEENT:  - Head: Normocephalic and atraumatic   - Eyes: EOMI, PERRLA  - ENT: Hearing grossly intact  Heart: RRR. No murmurs, clicks, or rubs  Lungs: Unlabored breathing. CTAB with no crackles, wheezes, or rhonchi  Abdomen: Normal BS in all 4 quadrants. Soft, non-tender, non-distended, with no masses  Extremities: Warm and well perfused. No edema. Normal peripheral pulses  Musculoskeletal: TTP of thoracic and lumbar spines. Good range of motion but pain " with flexion and extension of lumbar spine. Normal gait and station  Neurological: Alert and oriented. No gross neurological deficits  Psychological: Appropriate mood and affect  Skin: No rash, abnormal lesions, cyanosis, or erythema    Assessment/Plan   Thoracic and lumbar spine pain  - Thoracic: MR thoracic in 2019 showed spondylosis without canal or foraminal narrowing  - Lumbar: MR lumbar in 2021 showed minimal degenerative changes of the lumbar spine. Xray October 2023 was normal   - Patient requesting MRIs which I did order, she may be required to complete PT though  - Follow up with pain management and rheumatology    2. Memory issues  - Patient states she was diagnosed with early Alzheimer's from social security disability office years ago but there is no record of that in her chart  - Referral placed for neuropsychology for testing    3. Vitamin B12 deficiency  - Asking for refill of monthly injections but her level hasn't been checked in awhile, will check    RTC in June for physical    NAYELI Starr-CNP  [facility]

## 2024-04-26 ENCOUNTER — TELEPHONE (OUTPATIENT)
Dept: PRIMARY CARE | Facility: CLINIC | Age: 44
End: 2024-04-26
Payer: MEDICAID

## 2024-04-26 DIAGNOSIS — G20.C PARKINSONISM, UNSPECIFIED PARKINSONISM TYPE (MULTI): ICD-10-CM

## 2024-04-26 DIAGNOSIS — D84.9: Primary | ICD-10-CM

## 2024-04-26 RX ORDER — CARBIDOPA, LEVODOPA AND ENTACAPONE 50; 200; 200 MG/1; MG/1; MG/1
1 TABLET, FILM COATED ORAL EVERY 4 HOURS
Qty: 540 TABLET | Refills: 1 | Status: SHIPPED | OUTPATIENT
Start: 2024-04-26 | End: 2024-10-23

## 2024-04-29 ENCOUNTER — SPECIALTY PHARMACY (OUTPATIENT)
Dept: PHARMACY | Facility: CLINIC | Age: 44
End: 2024-04-29

## 2024-04-29 NOTE — PROGRESS NOTES
[facility] Clinical Note    Nichelle Izaguirre is a 43 y.o. female, who is on the specialty pharmacy service of: [facility].  Nichelle Izaguirre is taking: Cosentyx.     Nichelle was contacted on 4/29/2024.    Refer to the encounter summary report for documentation details about patient counseling and education.      Impression/Plan  Is patient high risk? (potential patients:  pregnancy, geriatric, pediatric)   no  Is laboratory follow-up needed? no  Is a clinical intervention needed?  no  Next assessment date?  10/29/24  Additional comments:    Medication Adherence  The importance of adherence was discussed with the patient and they were advised to take the medication as prescribed by their provider. Nichelle was encouraged to call her physician's office if they have a question regarding a missed dose.     QOL/Patient Satisfaction  Rate your quality of life on scale of 1-10: -- (declined, was feeling unwell at the time we spoke)  Rate your satisfaction with  Specialty Pharmacy on scale of 1-10: -- (omitted)      Patient advised to contact the pharmacy if there are any changes to her medication list, including prescriptions, OTC medications, herbal products, or supplements. Patient was advised of [facility]’s dispensing process, refill timeline, contact information ([phone]), and patient management follow up. Patient confirmed understanding of education conducted during assessment. All patient questions and concerns were addressed to the best of my ability. Patient was encouraged to contact the specialty pharmacy with any questions or concerns.    Confirmed follow-up outreaches are properly scheduled. Reviewed goals of therapy in the program targets.    Martin Lamar, PharmD

## 2024-04-29 NOTE — TELEPHONE ENCOUNTER
Need was denied needs to try preferred includes but is not limited to amantadine, carbidopa/levodopa and pramixpexole

## 2024-05-03 ENCOUNTER — LAB (OUTPATIENT)
Dept: LAB | Facility: LAB | Age: 44
End: 2024-05-03
Payer: MEDICAID

## 2024-05-03 DIAGNOSIS — D84.9: ICD-10-CM

## 2024-05-03 DIAGNOSIS — E53.8 VITAMIN B12 DEFICIENCY: ICD-10-CM

## 2024-05-03 LAB
BASOPHILS # BLD AUTO: 0.02 X10*3/UL (ref 0–0.1)
BASOPHILS NFR BLD AUTO: 0.4 %
EOSINOPHIL # BLD AUTO: 0.02 X10*3/UL (ref 0–0.7)
EOSINOPHIL NFR BLD AUTO: 0.4 %
ERYTHROCYTE [DISTWIDTH] IN BLOOD BY AUTOMATED COUNT: 14.3 % (ref 11.5–14.5)
HCT VFR BLD AUTO: 33.7 % (ref 36–46)
HGB BLD-MCNC: 10.7 G/DL (ref 12–16)
IMM GRANULOCYTES # BLD AUTO: 0 X10*3/UL (ref 0–0.7)
IMM GRANULOCYTES NFR BLD AUTO: 0 % (ref 0–0.9)
LYMPHOCYTES # BLD AUTO: 2.31 X10*3/UL (ref 1.2–4.8)
LYMPHOCYTES NFR BLD AUTO: 47.4 %
MCH RBC QN AUTO: 29.4 PG (ref 26–34)
MCHC RBC AUTO-ENTMCNC: 31.8 G/DL (ref 32–36)
MCV RBC AUTO: 93 FL (ref 80–100)
MONOCYTES # BLD AUTO: 0.53 X10*3/UL (ref 0.1–1)
MONOCYTES NFR BLD AUTO: 10.9 %
NEUTROPHILS # BLD AUTO: 1.99 X10*3/UL (ref 1.2–7.7)
NEUTROPHILS NFR BLD AUTO: 40.9 %
NRBC BLD-RTO: 0 /100 WBCS (ref 0–0)
PLATELET # BLD AUTO: 292 X10*3/UL (ref 150–450)
RBC # BLD AUTO: 3.64 X10*6/UL (ref 4–5.2)
VIT B12 SERPL-MCNC: 138 PG/ML (ref 211–911)
WBC # BLD AUTO: 4.9 X10*3/UL (ref 4.4–11.3)

## 2024-05-03 PROCEDURE — 82607 VITAMIN B-12: CPT

## 2024-05-03 PROCEDURE — 85025 COMPLETE CBC W/AUTO DIFF WBC: CPT

## 2024-05-03 PROCEDURE — 36415 COLL VENOUS BLD VENIPUNCTURE: CPT

## 2024-05-08 ENCOUNTER — SPECIALTY PHARMACY (OUTPATIENT)
Dept: PHARMACY | Facility: CLINIC | Age: 44
End: 2024-05-08

## 2024-05-10 ENCOUNTER — HOSPITAL ENCOUNTER (OUTPATIENT)
Dept: RADIOLOGY | Facility: CLINIC | Age: 44
Discharge: HOME | End: 2024-05-10
Payer: MEDICAID

## 2024-05-10 ENCOUNTER — PHARMACY VISIT (OUTPATIENT)
Dept: PHARMACY | Facility: CLINIC | Age: 44
End: 2024-05-10
Payer: MEDICAID

## 2024-05-10 ENCOUNTER — APPOINTMENT (OUTPATIENT)
Dept: RADIOLOGY | Facility: CLINIC | Age: 44
End: 2024-05-10
Payer: MEDICAID

## 2024-05-10 DIAGNOSIS — M54.50 LUMBAR BACK PAIN: ICD-10-CM

## 2024-05-10 DIAGNOSIS — E53.8 VITAMIN B12 DEFICIENCY: Primary | ICD-10-CM

## 2024-05-10 DIAGNOSIS — M54.6 CHRONIC MIDLINE THORACIC BACK PAIN: ICD-10-CM

## 2024-05-10 DIAGNOSIS — G89.29 CHRONIC MIDLINE THORACIC BACK PAIN: ICD-10-CM

## 2024-05-10 PROCEDURE — 72146 MRI CHEST SPINE W/O DYE: CPT

## 2024-05-10 PROCEDURE — 72148 MRI LUMBAR SPINE W/O DYE: CPT | Performed by: RADIOLOGY

## 2024-05-10 PROCEDURE — 72146 MRI CHEST SPINE W/O DYE: CPT | Performed by: RADIOLOGY

## 2024-05-10 PROCEDURE — 72148 MRI LUMBAR SPINE W/O DYE: CPT

## 2024-05-14 ENCOUNTER — OFFICE VISIT (OUTPATIENT)
Dept: PAIN MEDICINE | Facility: HOSPITAL | Age: 44
End: 2024-05-14
Payer: MEDICAID

## 2024-05-14 VITALS
OXYGEN SATURATION: 94 % | HEART RATE: 78 BPM | DIASTOLIC BLOOD PRESSURE: 70 MMHG | BODY MASS INDEX: 37.51 KG/M2 | RESPIRATION RATE: 16 BRPM | SYSTOLIC BLOOD PRESSURE: 114 MMHG | HEIGHT: 66 IN | WEIGHT: 233.4 LBS

## 2024-05-14 DIAGNOSIS — M51.16 LUMBAR DISC HERNIATION WITH RADICULOPATHY: ICD-10-CM

## 2024-05-14 DIAGNOSIS — M54.16 RIGHT LUMBAR RADICULOPATHY: ICD-10-CM

## 2024-05-14 DIAGNOSIS — M77.8 ELBOW TENDINITIS: ICD-10-CM

## 2024-05-14 DIAGNOSIS — Z79.891 LONG TERM (CURRENT) USE OF OPIATE ANALGESIC: Primary | ICD-10-CM

## 2024-05-14 DIAGNOSIS — M10.9 GOUTY ARTHROPATHY: ICD-10-CM

## 2024-05-14 DIAGNOSIS — M25.50 ARTHRALGIA OF MULTIPLE SITES: ICD-10-CM

## 2024-05-14 DIAGNOSIS — M62.830 SPASM OF THORACIC BACK MUSCLE: ICD-10-CM

## 2024-05-14 DIAGNOSIS — L40.50 PSORIATIC ARTHROPATHY (MULTI): ICD-10-CM

## 2024-05-14 DIAGNOSIS — M79.18 MYOFASCIAL PAIN: ICD-10-CM

## 2024-05-14 PROCEDURE — 80307 DRUG TEST PRSMV CHEM ANLYZR: CPT | Mod: PORLAB | Performed by: CLINICAL NURSE SPECIALIST

## 2024-05-14 PROCEDURE — 80324 DRUG SCREEN AMPHETAMINES 1/2: CPT | Mod: PORLAB | Performed by: CLINICAL NURSE SPECIALIST

## 2024-05-14 PROCEDURE — 80348 DRUG SCREENING BUPRENORPHINE: CPT | Performed by: CLINICAL NURSE SPECIALIST

## 2024-05-14 PROCEDURE — 80372 DRUG SCREENING TAPENTADOL: CPT | Mod: PORLAB | Performed by: CLINICAL NURSE SPECIALIST

## 2024-05-14 PROCEDURE — 3074F SYST BP LT 130 MM HG: CPT | Performed by: CLINICAL NURSE SPECIALIST

## 2024-05-14 PROCEDURE — RXMED WILLOW AMBULATORY MEDICATION CHARGE

## 2024-05-14 PROCEDURE — 80346 BENZODIAZEPINES1-12: CPT | Mod: PORLAB | Performed by: CLINICAL NURSE SPECIALIST

## 2024-05-14 PROCEDURE — 99214 OFFICE O/P EST MOD 30 MIN: CPT | Performed by: CLINICAL NURSE SPECIALIST

## 2024-05-14 PROCEDURE — 82570 ASSAY OF URINE CREATININE: CPT | Mod: 59,PORLAB | Performed by: CLINICAL NURSE SPECIALIST

## 2024-05-14 PROCEDURE — 1036F TOBACCO NON-USER: CPT | Performed by: CLINICAL NURSE SPECIALIST

## 2024-05-14 PROCEDURE — 3078F DIAST BP <80 MM HG: CPT | Performed by: CLINICAL NURSE SPECIALIST

## 2024-05-14 RX ORDER — ORPHENADRINE CITRATE 100 MG/1
100 TABLET, EXTENDED RELEASE ORAL 2 TIMES DAILY PRN
Qty: 60 TABLET | Refills: 3 | Status: SHIPPED | OUTPATIENT
Start: 2024-05-14 | End: 2024-06-13

## 2024-05-14 RX ORDER — PREGABALIN 75 MG/1
75 CAPSULE ORAL 3 TIMES DAILY
Qty: 90 CAPSULE | Refills: 2 | Status: SHIPPED | OUTPATIENT
Start: 2024-05-14 | End: 2024-06-13

## 2024-05-14 ASSESSMENT — ENCOUNTER SYMPTOMS
OCCASIONAL FEELINGS OF UNSTEADINESS: 1
LOSS OF SENSATION IN FEET: 0
DEPRESSION: 0

## 2024-05-14 ASSESSMENT — PATIENT HEALTH QUESTIONNAIRE - PHQ9
2. FEELING DOWN, DEPRESSED OR HOPELESS: NOT AT ALL
SUM OF ALL RESPONSES TO PHQ9 QUESTIONS 1 AND 2: 0
1. LITTLE INTEREST OR PLEASURE IN DOING THINGS: NOT AT ALL

## 2024-05-14 ASSESSMENT — LIFESTYLE VARIABLES: TOTAL SCORE: 3

## 2024-05-14 NOTE — PROGRESS NOTES
Subjective   Patient ID: Nichelle Izaguirre is a 43 y.o. female who presents for widespread pain including mid to low back pain and polyarticular pain.  HPI  43 year-old female with a complex medical history including psoriatic arthritis, gout, HTN, GUICHO, irritable bowel disease, asthma, parkinsonism/dystonia, immune deficiency syndrome, anxiety and mid to low back pain as well as polyarticular pain presents for follow-up.  Most recent imaging including an MRI in 2021 consistent with lumbar lordosis, normal disc height, small central disc bulge at L5-S1 and L4-5 with no significant foraminal stenosis and moderate lateral recess stenosis at L4-5.  Lumbar x-ray negative with no acute findings.  We discussed injections in the past and she is not interested in pursuing injections for back pain.  Sent for formal course of physical therapy at her last office visit targeting mid and low back pain as well as polyarticular pain.  She is followed closely by rheumatology and was recently started on Cosentyx after obtaining insurance approval.  Also maintained on leflunomide and allopurinol.  Managing her pain with multiple medications including Nucynta 50 mg 3 times daily, Lyrica, duloxetine, orphenadrine.  She will use Xanax sparingly for panic attacks.  Patient presents at today's office visit with chronic mid/thoracic to lumbar pain as well as polyarticular pain.  Polyarticular pain most bothersome to right elbow at today's visit.  States that mid back pain will radiate anteriorly under her breasts to mid chest.  She describes this pain as squeezing accompanied by spasms.  She also has low back pain which will radiate into her buttocks intermittently.  She denies any numbness/tingling, weakness or changes in bowel/bladder function.  Her pain is normally aching and throbbing.  Her pain increases with most movement especially standing for long periods of time, bending and lifting.  She feels that pain has been increased  secondary to extended upper respiratory infection which required antibiotic therapy.  She states that she has been sick for several months with upper respiratory infections.  She also had to interrupt her Cosentyx secondary to insurance issues.  She was able to receive insurance approval and has received her first dose of Cosentyx which she will start on 5/15/2024.  Continues to take leflunomide and allopurinol.  She was evaluated by orthopedics for right elbow pain and received a corticosteroid injection which provided 1 to 2 weeks of relief.  She is scheduled to follow-up with her orthopedic surgeon to discuss further treatment options.  She did not start physical therapy secondary to increasing pain with URI and lack of Cosentyx.  She does state that she is taking her Lyrica, however, the last time I see that it was filled was October 2023.  Patient states she takes daily, although she may only take it twice daily and not 3 times daily as scheduled.  She continues to benefit from her Nucynta which she is trying to take 2 times per day most often.  She does feel that orphenadrine remains helpful for muscle tightness and spasm.  She has stopped all oral NSAIDs.  She will use her Xanax sparingly for panic attacks.  She assures me that she does not take her Xanax with her opiate.  She was evaluated by her PCP and ordered of the lumbar and thoracic MRI for persistent back pain.  Thoracic MRI consistent with thoracic spondylosis with no significant central canal stenosis.  Lumbar MRI consistent with stable lumbar lordosis with small disc protrusion at L5-S1 without central canal stenosis or neuroforaminal narrowing similar to previous exam.      Location of Pain: pt is here for interval follow up and medication count. Pt states bilateral elbow pain due to psoriatic arthritis. Pt also having mid back pain along spine that radiates down to above the buttock intermittently.      Orthopedic did procedure and said there was  "decreased blood flow in right elbow          Pain Score:  \"5/10\"     Treatment: Nucynta 50mg TID  Last dose: 24 2 doses  Medication Count: 0 pills left in rx bottle  Fill date: 24     Efficacy:   \"40-50% relief\"     Side Effects: denies     Narcan: Pt brought unopened Narcan with her to today's visit.  EXP: -forgot today but states it will  in July so gave harm reduction pamphlet today 24     Other pain medication/neuromodulator: Norflex/Lyrica-needs refill/Cymbalta/ Klein's compound cream-needs refill     Therapeutic Goals: to be able to do more throughout the day     Therapeutic Assessment: It helps the most in the morning     Injections and/or Procedures: denies, does not want to have injections.     Other: no current PT  OARRS:  Santa Dunn, APRN-CNP, APRN-CNS on 2024  4:05 PM  I have personally reviewed the OARRS report for Nichelle Izaguirre. I have considered the risks of abuse, dependence, addiction and diversion    Is the patient prescribed a combination of a benzodiazepine and opioid?  Yes    Last Urine Drug Screen / ordered today: Yes  Recent Results (from the past 8760 hour(s))   Amphetamine Confirm, Urine    Collection Time: 23  4:54 PM   Result Value Ref Range    Methamphetamine Quant, Ur <200 ng/mL    MDA, Urine <200 ng/mL    MDEA, Urine <200 ng/mL    Phentermine,Urine <200 ng/mL    Amphetamines,Urine <50 ng/mL    MDMA, Urine <200 ng/mL   Confirmation Opiate/Opioid/Benzo Prescription Compliance    Collection Time: 23  4:54 PM   Result Value Ref Range    Clonazepam <25 <25 ng/mL    7-Aminoclonazepam <25 <25 ng/mL    Alprazolam <25 <25 ng/mL    Alpha-Hydroxyalprazolam <25 <25 ng/mL    Midazolam <25 <25 ng/mL    Alpha-Hydroxymidazolam <25 <25 ng/mL    Chlordiazepoxide <25 <25 ng/mL    Diazepam <25 <25 ng/mL    Nordiazepam <25 <25 ng/mL    Temazepam <25 <25 ng/mL    Oxazepam <25 <25 ng/mL    Lorazepam <25 <25 ng/mL    Methadone <25 <25 ng/mL    EDDP <25 <25 ng/mL    " 6-Acetylmorphine <25 <25 ng/mL    Codeine <50 <50 ng/mL    Hydrocodone <25 <25 ng/mL    Hydromorphone <25 <25 ng/mL    Morphine  <50 <50 ng/mL    Norhydrocodone <25 <25 ng/mL    Noroxycodone <25 <25 ng/mL    Oxycodone <25 <25 ng/mL    Oxymorphone <25 <25 ng/mL    Fentanyl <2.5 <2.5 ng/mL    Norfentanyl <2.5 <2.5 ng/mL    Tramadol <50 <50 ng/mL    O-Desmethyltramadol <50 <50 ng/mL    Zolpidem <25 <25 ng/mL    Zolpidem Metabolite (ZCA) <25 <25 ng/mL   Screen Opiate/Opioid/Benzo Prescription Compliance    Collection Time: 11/29/23  4:54 PM   Result Value Ref Range    Creatinine, Urine Random 153.4 20.0 - 320.0 mg/dL    Amphetamine Screen, Urine Presumptive Positive (A) Presumptive Negative    Barbiturate Screen, Urine Presumptive Negative Presumptive Negative    Cannabinoid Screen, Urine Presumptive Negative Presumptive Negative    Cocaine Metabolite Screen, Urine Presumptive Negative Presumptive Negative    PCP Screen, Urine Presumptive Negative Presumptive Negative   Tapentadol Confirmation, Urine    Collection Time: 11/29/23  4:54 PM   Result Value Ref Range    Tapentadol >1,000 (H) <25 ng/mL    N-Desmethyltapentadol >1,000 (H) <25 ng/mL   Buprenorphine Confirm,Urine    Collection Time: 11/29/23  4:54 PM   Result Value Ref Range    BUPRENORPHINE GLUC, URINE <5 ng/mL    BUPRENORPHINE ,URINE <2 ng/mL    NALOXONE, URINE <100 ng/mL    NORBUPRENORPHINE GLUC,URINE <5 ng/mL    NORBUPRENORPHINE, URINE <2 ng/mL     Results are as expected.     Controlled Substance Agreement: 5/14/24  Date of the Last Agreement: 5/14/24  Reviewed Controlled Substance Agreement including but not limited to the benefits, risks, and alternatives to treatment with a Controlled Substance medication(s).    Monitoring and compliance: 5/14/24    ORT: 5/14/24 score 3 low    PDUQ: 5/14/24 score 6    Office Agreement: 5/14/24      Review of Systems    ROS:   General: No fevers, chills, weight loss  Skin: Negative for lesions  Eyes: No acute vision  changes  Ears: No vertigo  Nose, mouth, throat: No difficulty swallowing or speaking  Respiratory: No cough, shortness of breath, cyanosis  Cardiovascular: Negative for chest pain syncope or palpitation  Gastrointestinal: No constipation, nausea, vomiting  Neurological: Positive for intermittent headache   Psychological: Negative for severe or debilitating anxiety, depression. Negative memory loss  Musculoskeletal: Positive for arthralgia, myalgia, pain and joint stiffness/swelling  Endocrine: Negative for weight gain, appetite changes, excessive sweating  Allergy/immune: Negative    All 13 systems were reviewed and are within normal levels except as noted or in the history of present illness.  Positive or pertinent negative responses are noted or were in the history of present illness. As noted, the patient denies significant or impairing weakness in the bilateral upper and lower extremities, medication induced constipation, and bowel or bladder incontinence.     Current Outpatient Medications:     albuterol 2.5 mg /3 mL (0.083 %) nebulizer solution, inhale the contents of one vial via nebulizer every 4 hours as needed, Disp: , Rfl:     ALPRAZolam (Xanax) 1 mg tablet, Take 1 tablet (1 mg) by mouth as needed at bedtime for sleep., Disp: 20 tablet, Rfl: 0    amLODIPine (Norvasc) 2.5 mg tablet, Take 1 tablet (2.5 mg) by mouth once daily., Disp: , Rfl:     amoxicillin (Amoxil) 500 mg capsule, Take 1 capsule (500 mg) by mouth every 12 hours., Disp: , Rfl:     ascorbic acid, vitamin C, 500 mg capsule, Take 1 capsule by mouth once daily., Disp: , Rfl:     benzonatate (Tessalon) 100 mg capsule, TAKE ONE CAPSULE BY MOUTH THREE TIMES DAILY, Disp: , Rfl:     benzoyl peroxide (Benzac AC) 10 % external wash, Apply topically once daily., Disp: , Rfl:     benztropine (Cogentin) 2 mg tablet, Take 1 tablet (2 mg) by mouth once daily., Disp: , Rfl:     buPROPion XL (Wellbutrin XL) 150 mg 24 hr tablet, Take 1 tablet (150 mg) by mouth  once daily. Do not crush, chew, or split., Disp: 90 tablet, Rfl: 3    buPROPion XL (Wellbutrin XL) 300 mg 24 hr tablet, Take 1 tablet (300 mg) by mouth once daily in the morning. Do not crush, chew, or split., Disp: 90 tablet, Rfl: 3    carbidopa-levodopa-entacapone (Stalevo) -200 mg tablet, Take 1 tablet by mouth every 4 hours., Disp: 540 tablet, Rfl: 1    carvedilol (Coreg) 25 mg tablet, Take 1 tablet (25 mg) by mouth 2 times daily (morning and late afternoon)., Disp: , Rfl:     clindamycin (Cleocin T) 1 % lotion, 1 Application, Disp: , Rfl:     clobetasol (Temovate) 0.05 % external solution, Apply topically 2 times a day., Disp: , Rfl:     clobetasoL 0.05 % lotion, One application as needed for flaring only.not for daily use. For scalp and hands only, Disp: 118 mL, Rfl: 3    cloNIDine (Catapres) 0.1 mg tablet, Take 1 tablet (0.1 mg) by mouth once daily., Disp: , Rfl:     cloNIDine (Catapres) 0.2 mg tablet, Take 1 tablet (0.2 mg) by mouth once daily at bedtime., Disp: 90 tablet, Rfl: 3    Cosentyx Pen, 2 Pens, 150 mg/mL self-injector pen, Inject 2 mL (300 mg) under the skin every 30 (thirty) days., Disp: 2 mL, Rfl: 2    cyanocobalamin, vitamin B-12, 1,000 mcg/mL kit, Inject 1,000 mcg as directed every 28 (twenty-eight) days., Disp: 1 kit, Rfl: 11    diclofenac sodium (Voltaren) 1 % gel gel, Apply 4.5 inches (4 g) topically 2 times a day as needed., Disp: , Rfl:     doxycycline (Vibramycin) 100 mg capsule, 1 capsule (100 mg)., Disp: , Rfl:     DULoxetine (Cymbalta) 60 mg DR capsule, TAKE 1 CAPSULE BY MOUTH DAILY, Disp: 90 capsule, Rfl: 1    eplerenone (Inspra) 25 mg tablet, Take 1 tablet (25 mg) by mouth once daily., Disp: , Rfl:     ergocalciferol (Vitamin D-2) 1.25 MG (92698 UT) capsule, Take 1 capsule (50,000 Units) by mouth 1 (one) time per week., Disp: , Rfl:     ezetimibe (Zetia) 10 mg tablet, Take 1 tablet (10 mg) by mouth once daily., Disp: 90 tablet, Rfl: 3    fluticasone furoate-vilanteroL (Breo  Ellipta) 200-25 mcg/dose inhaler, 1puff daily, Inhalation, Disp: , Rfl:     ipratropium-albuteroL (Duo-Neb) 0.5-2.5 mg/3 mL nebulizer solution, Take 3 mL by nebulization every 4 hours if needed for wheezing., Disp: , Rfl:     ketoconazole (NIZOral) 2 % cream, 1 Application, Disp: , Rfl:     lactobacillus acidophilus (Lactobacillus acidoph-L.bulgar) tablet tablet, Take 1 tablet by mouth once daily., Disp: 90 tablet, Rfl: 0    leflunomide (Arava) 10 mg tablet, Take 1 tablet (10 mg) by mouth once daily., Disp: 90 tablet, Rfl: 0    levalbuterol (Xopenex) 1.25 mg/3 mL nebulizer solution, Take 1 ampule by nebulization 3 times a day., Disp: , Rfl:     metoprolol succinate XL (Toprol-XL) 50 mg 24 hr tablet, Take 1 tablet (50 mg) by mouth once daily., Disp: , Rfl:     metroNIDAZOLE (Metrocream) 0.75 % cream, 1 Application, Disp: , Rfl:     montelukast (Singulair) 10 mg tablet, Take 1 tablet (10 mg) by mouth once daily at bedtime., Disp: , Rfl:     nebulizer and compressor device, use q4-6 hours with albuterol PRN cough/wheeze, Disp: , Rfl:     nebulizers (Devilbiss Disposable Nebulizer) misc, every 4 hours if needed., Disp: , Rfl:     olmesartan (BENIcar) 40 mg tablet, Take 1 tablet (40 mg) by mouth once daily., Disp: , Rfl:     omega-3 acid ethyl esters (Lovaza) 1 gram capsule, Take 2 capsules (2 g) by mouth 2 times a day., Disp: 360 capsule, Rfl: 3    omeprazole (PriLOSEC) 40 mg DR capsule, Take 1 capsule (40 mg) by mouth once daily in the morning. Take before meals., Disp: 90 capsule, Rfl: 3    ondansetron ODT (Zofran-ODT) 4 mg disintegrating tablet, Take 1 tablet (4 mg) by mouth every 8 hours if needed for nausea or vomiting., Disp: 90 tablet, Rfl: 0    pioglitazone (Actos) 15 mg tablet, Take 1 tablet (15 mg) by mouth once daily., Disp: , Rfl:     ProAir HFA 90 mcg/actuation inhaler, , Disp: , Rfl:     Repatha SureClick 140 mg/mL injection, Inject 1 mL (140 mg) under the skin every 14 (fourteen) days., Disp: 6 mL, Rfl:  3    Spiriva Respimat 1.25 mcg/actuation inhaler, Inhale once daily., Disp: , Rfl:     spironolactone (Aldactone) 25 mg tablet, 1 tablet (25 mg)., Disp: , Rfl:     SUMAtriptan (Imitrex) 25 mg tablet, Take 1 tablet (25 mg) by mouth 1 time if needed for migraine. May repeat in 2 hours if no improvement. Max 200 mg/24 hr, Disp: 9 tablet, Rfl: 2    tezepelumab-ekko (Tezspire) SubQ Pen Injector, Inject 210 mg under the skin., Disp: , Rfl:     cetirizine-pseudoephedrine (ZyrTEC-D) 5-120 mg 12 hr tablet, Take 1 tablet by mouth 2 times a day., Disp: 180 tablet, Rfl: 0    meloxicam (Mobic) 15 mg tablet, Take 1 tablet (15 mg) by mouth once daily. (Patient not taking: Reported on 5/14/2024), Disp: 30 tablet, Rfl: 3    orphenadrine (Norflex) 100 mg 12 hr tablet, Take 1 tablet (100 mg) by mouth 2 times a day as needed for muscle spasms., Disp: 60 tablet, Rfl: 3    pregabalin (Lyrica) 75 mg capsule, Take 1 capsule (75 mg) by mouth 3 times a day., Disp: 90 capsule, Rfl: 2    tapentadol (Nucynta) 50 mg tablet, Take 1 tablet (50 mg) by mouth 3 times a day as needed for severe pain (7 - 10)., Disp: 90 tablet, Rfl: 0     Past Medical History:   Diagnosis Date    Acute upper respiratory infection, unspecified 07/26/2016    Viral URI with cough    Acute upper respiratory infection, unspecified 11/15/2017    Viral URI with cough    Allergy status to unspecified drugs, medicaments and biological substances     History of allergy    Chronic maxillary sinusitis 08/27/2018    Maxillary sinusitis, unspecified chronicity    Dystonia, unspecified 04/08/2014    Dystonia    Encounter for other screening for malignant neoplasm of breast 01/22/2018    Breast screening    Encounter for screening for respiratory tuberculosis 11/11/2013    Screening examination for pulmonary tuberculosis    Essential (primary) hypertension 12/27/2017    Benign essential hypertension    Hypersomnia, unspecified 04/13/2015    Sleeps too much    Idiopathic sleep related  nonobstructive alveolar hypoventilation     Nocturnal hypoxemia    Idiopathic sleep related nonobstructive alveolar hypoventilation 02/09/2018    Nocturnal hypoxia    Impaired fasting glucose 01/22/2018    Impaired fasting glucose    Insect bite (nonvenomous) of lower back and pelvis, initial encounter 03/29/2018    Tick bite of back    Left lower quadrant pain 01/07/2019    Left lower quadrant pain    Local infection of the skin and subcutaneous tissue, unspecified 07/28/2017    Skin infection    Low back pain, unspecified 05/23/2019    Acute low back pain    Other conditions influencing health status 02/27/2017    Sprain of right thumb, unspecified site of finger, initial encounter    Other malaise 04/16/2018    Malaise and fatigue    Other microscopic hematuria 03/30/2018    Microscopic hematuria    Other specified cough 09/07/2018    Productive cough    Other specified health status 01/07/2019    Acute medical illness    Other symptoms and signs involving the musculoskeletal system 07/12/2018    Weakness of right lower extremity    Other symptoms and signs involving the musculoskeletal system 03/21/2018    Polyarticular joint involvement    Other symptoms and signs involving the musculoskeletal system 07/12/2018    RUE weakness    Pain in right lower leg 01/27/2016    Right calf pain    Pain in unspecified hip 01/20/2016    Hip pain    Pelvic and perineal pain 04/17/2018    Pelvic pain    Personal history of diseases of the skin and subcutaneous tissue 08/07/2018    History of dermatitis    Personal history of other (healed) physical injury and trauma 08/07/2018    History of insect bite    Personal history of other diseases of the circulatory system     History of hypertension    Personal history of other diseases of the female genital tract 11/24/2015    History of menorrhagia    Personal history of other diseases of the musculoskeletal system and connective tissue 03/14/2018    History of tendinitis     Personal history of other diseases of the nervous system and sense organs 04/08/2014    History of Parkinson's disease    Personal history of other diseases of the respiratory system 01/07/2019    History of acute bronchitis    Personal history of other diseases of the respiratory system 10/05/2018    History of paranasal sinus pain    Personal history of other specified conditions 04/13/2015    History of snoring    Personal history of other specified conditions 09/11/2017    History of headache    Personal history of other specified conditions 09/07/2018    History of persistent cough    Personal history of other specified conditions 04/08/2014    History of memory loss    Personal history of other specified conditions 03/26/2018    History of left flank pain    Personal history of other specified conditions     History of heartburn    Personal history of urinary calculi 03/26/2018    Personal history of urinary calculi    Personal history of urinary calculi 03/26/2018    History of renal calculi    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/2017    Plantar fasciitis, left    Primary insomnia 04/13/2015    Primary insomnia    Rash and other nonspecific skin eruption 04/16/2018    Rash    Unspecified abdominal pain 01/07/2019    Left sided abdominal pain    Urinary tract infection, site not specified 10/19/2018    UTI (urinary tract infection), bacterial    Urinary tract infection, site not specified 10/18/2018    Recurrent UTI    Vitamin D deficiency, unspecified 04/22/2016    Vitamin D deficiency    Zoster with other complications 11/25/2015    Herpes zoster dermatitis        Past Surgical History:   Procedure Laterality Date    CT ANGIO CORONARY ART WITH HEARTFLOW IF SCORE >30%  3/18/2020    CT HEART CORONARY ANGIOGRAM 3/18/2020 [facility]    HAND TENDON SURGERY  11/11/2013    Hand Incision Tendon Sheath Of A Finger    HYSTERECTOMY  03/04/2016    Hysterectomy    LITHOTRIPSY  11/11/2013    Renal Lithotripsy     "TONSILLECTOMY  12/19/2013    Tonsillectomy With Adenoidectomy        Family History   Problem Relation Name Age of Onset    Asthma Mother      Hypertension Mother      Irregular heart beat Mother      Allergies Father      Heart disease Father      Hypertension Father      Sinusitis Father      Allergies Sister      Asthma Daughter      Allergies Son      Heart disease Maternal Grandmother      Breast cancer Paternal Grandmother      Heart disease Paternal Grandfather      Lung cancer Paternal Grandfather      Dementia Paternal Great-Grandfather          Allergies   Allergen Reactions    Clindamycin Anaphylaxis    Dupilumab Anaphylaxis    Hydrochlorothiazide Anaphylaxis, Itching and Swelling    Sulfamethoxazole-Trimethoprim Anaphylaxis    Cefazolin Hives, Other and Swelling     STATES TONGUE SPLITS    Atorvastatin Hives    Cephalexin Other    Clarithromycin Swelling     hives, tongue swelling    Nitrofurantoin Monohyd/M-Cryst Hives    Sulfa (Sulfonamide Antibiotics) Hives    Sulfacetamide Hives        Objective     Visit Vitals  /70   Pulse 78   Resp 16   Ht 1.676 m (5' 6\")   Wt 106 kg (233 lb 6.4 oz)   SpO2 94%   BMI 37.67 kg/m²   OB Status Having periods   Smoking Status Never   BSA 2.22 m²        Physical Exam    PE:  General: Well-developed, well-nourished, no acute distress. The patient demonstrates no pain behavior, symptom magnification or overt drug-seeking behavior.  Eye: Pupils appropriate for room lighting  Neck/thyroid: No obvious goiter or enlargement of neck noted  Respiratory exam: Normal respiratory effort, unlabored respiration. No accessory muscle use noted  Cardiac exam: Bilateral radial pulses intact  Abdominal: Nondistended  Spine, lumbar: The patient is able to rise from a seated to standing position without hesitancy, push off, or delay. Gait is grossly nonantalgic. Tenderness to paraspinous musculature is noted mid to lower lumbar spine.  Multiple myofascial tender points/muscle " tightness most noted in mid thoracic spine.  Flexion and extension intact with extension increasing pain to mid back.  Seated straight leg raise positive right lower extremity.  Ortho: Widespread joint pain most bothersome currently to her right elbow.  Pain with active/passive range of motion.  Neurologic exam: Muscle strength is antigravity in all 4 extremities.  Equal muscle strength bilateral upper and lower extremities 5/5.  Psychiatric exam: Judgment and insight normal, affect normal, speech is fluent, affect appropriate, demonstrating no signs of hypersomnolence, sedation, or confusion        Assessment/Plan   Problem List Items Addressed This Visit             ICD-10-CM    Arthralgia of multiple sites M25.50     43-year-old female with a complicated medical history and multiple comorbid conditions presenting for follow-up of back pain radiating from mid back through lumbar spine as well as widespread polyarticular pain most bothersome to bilateral elbows.  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  Patient was supposed to restart her Cosentyx which was again interrupted secondary to insurance issues.  She states at today's office visit that she did receive her dose of Cosentyx and will be starting it on 5/15/2024.  She did feel when she was taking Cosentyx that she was beginning to feel some pain relief in multiple joints.  She held off on physical therapy secondary to not having her Cosentyx and noting increasing pain as well as having a URI which lasted for several months.  She states that she is recovering from her URI and after she has taken Cosentyx for approximately 1 month plans to proceed with a formal course of physical therapy targeting mid to low back pain, myofascial pain as well as polyarticular pain.  I do believe that the patient can benefit from a formal course of physical therapy.  Reviewed recent lumbar and thoracic MRI essentially similar to previous  imaging.  Discussed that she may benefit from an epidural steroid injection depending on results of physical therapy.  She does not want to proceed with epidural steroid injections at this time targeting lumbar radicular symptoms.  She will continue to follow closely with orthopedics for bilateral elbow pain.  She also will continue to follow closely with rheumatology and dermatology.   Currently managing her pain with Nucynta 50 mg up to 3 times per day as needed.  Again discussed with patient that we would like to wean her dose of Nucynta and potentially transition her to buprenorphine in the future which I think may be more beneficial.  The patient will try for the next 2 to 3 months to decrease her use of Nucynta to twice daily.  We will discuss further weaning at her next office visit.  She will continue Lyrica 75 mg 3 times daily.  Unsure how the patient is taking this medication as she filled last in October 2023.  She states that she takes it daily, although does not always take it 3 times per day.  Advised patient to resume her Lyrica 75 mg 3 times daily for optimal pain relief.  She will continue orphenadrine for muscle tightness and spasm and duloxetine as prescribed by her PCP.  She also continues to take a small dose of Xanax using sparingly for panic attacks.  She assures me that she does not take this with her opiate.  The patient does endorse some confusion with medication administration.  She states that her  oversees all medication administration.  She also uses a pill organizer.  Plan discussed with patient at today's office visit.    -We will continue the patient on Nucynta 50 mg up to 3 times per day as needed for pain for the next 2 to 3 months.  This will allow the patient time to begin receiving benefit from her Cosentyx treatment.  Patient will also begin a formal course of physical therapy after she restarts her Cosentyx and has given this medication time to provide some benefit and  she is able to fully participate in PT.  When we see the patient back in the office we will again discuss weaning Nucynta dose so that we may be able to transition to buprenorphine.    -Patient will proceed with a formal course of physical therapy.  -Patient may benefit from epidural steroid injections targeting lumbar radicular symptoms.  Further discussion after she has completed a formal course of physical therapy.    -Patient will continue Lyrica, duloxetine and orphenadrine as ordered.  She continues to tolerate these medications without adverse effects.  Recommended that patient take her Lyrica 75 mg 3 times per day as prescribed for optimal pain relief.  -Patient has discontinued all oral NSAIDs at this time.     -Patient will follow-up in 3 months or sooner if needed.    MEDICAL DECISION MAKING:    My impressions and treatment recommendations were discussed in detail with the patient, who verbalized understanding and had no further questions prior to discharge. Given the patient's report of reduced pain and improved functional ability without adverse effects,  it is reasonable to continue Nucynta 50 mg up to 3 times per day as needed for pain.  The terms of the opioid agreement as well as the potential risks and adverse effects of the patient's medication regimen were discussed in detail. This includes if applicable due to dosage of medication permission to discuss and coordinate care with other treatment providers relevant to the patients condition. The patient verbalized understanding.    Treatment goals were discussed in detail with the patient.  These goals include reduction of pain levels, improved levels of functioning, avoidance of medication side effect and lowest medication dose possible to achieve  these goals.  The patient was in full agreement with these goals.  Also discussed is the understanding that pain may not be eliminated by medication but that the goal of a better sustaining life through  use of medication is appropriate.  Lifestyle modifications including weight management, stretching, diet, exercise and smoking are addressed at each office visit.  The patient provided a urine drug screen for routine monitoring and compliance.  This test may be given as frequently as every month based on the patient's individual opiate risk stratification and prescriber concerns for any aberrancies.  This test is indicated given the use of controlled substances for the patient's medical condition.  Unless otherwise noted the prior (s) urine drug testing results were consistent with prescribed medications.  There is no evidence of illicit drug use or additional medications ingested.     Risks and side effects of chronic opioid therapy including but not limited to tolerance, dependence, constipation, hyperalgesia, cognitive side effects, addiction and possible death due to overuse and or misuse were discussed. I also discussed that such medications when co-administered with other sedative agents including but not limited to alcohol, benzodiazepines, sedative hypnotics and illegal drugs could pose life threatening consequences including death.  I also explained the impact that the administration of such medication has on a patient with obstructive sleep apnea and continued recommendations for use of apnea devices if ordered are prescribed by other physicians.  In order to effectively and safely treat the pain, I also emphasized the importance of compliance with the treatment plan as well as compliance with the terms of the opioid agreement which was reviewed in detail. I explained the importance of being responsible with the medications and to take these only as prescribed, never in excess and never for reasons other than pain reduction. The patient was counseled on keeping the medications safe and locked away from children and other adults as well as disposal methods and options. The patient understood the risks and  instructions.      I also discussed with the patient in detail that based on the clinical response to the opioid medications and improvements of activities of daily living, sleep, and work performance in light of compliance with the treatment plan we can continue this form of therapy for the above chronic  pain.  The goal and rationale used for current treatment with chronic opioid medication is to control the pain and alleviate disability induced by the chronic pain condition noted above after failures of other non-opioid and nonpharmacological modalities to treat the chronic pain and the symptoms associated with have failed.  The patient understood the goals in terms of the above treatment plan and had no further questions prior to leaving the office today.    Given the patient's total MED, general use of daily opiates, or other coadministered medications in various classes the patient was offered a prescription for Narcan.  I instructed the patient that Narcan is for emergency use only and is worse suspected or known opiate overdose.  Call 911 prior to administration of this medication to activate the emergency response team.  It is imperative to fill this medication in order to demonstrate understanding of the gravity of possible side effects including respiratory depression and risk of overdose of this opiate load or medication combination.  As such patients will be required to bring Narcan prescriptions to follow-up appointments as part of the compliance with continued opiate care.    Disclaimer: This note was transcribed using an audio transcription device.  As such, minor errors may be present with regard to spelling, punctuation, and inadvertent word insertion.  Please disregard such errors.             Relevant Medications    tapentadol (Nucynta) 50 mg tablet    pregabalin (Lyrica) 75 mg capsule    Gouty arthropathy M10.9    Relevant Medications    tapentadol (Nucynta) 50 mg tablet    Psoriatic arthropathy  (Multi) L40.50    Relevant Medications    tapentadol (Nucynta) 50 mg tablet    Elbow tendinitis M77.8    Relevant Medications    pregabalin (Lyrica) 75 mg capsule    Myofascial pain M79.18    Right lumbar radiculopathy M54.16    Relevant Medications    pregabalin (Lyrica) 75 mg capsule    Spasm of thoracic back muscle M62.830    Relevant Medications    pregabalin (Lyrica) 75 mg capsule    orphenadrine (Norflex) 100 mg 12 hr tablet     Other Visit Diagnoses         Codes    Long term (current) use of opiate analgesic    -  Primary Z79.891    Relevant Orders    Buprenorphine Confirm,Urine    Tapentadol Confirmation, Urine    Alcohol, Urine    Opiate/Opioid/Benzo Prescription Compliance    OOB Internal Tracking    Lumbar disc herniation with radiculopathy     M51.16

## 2024-05-14 NOTE — ASSESSMENT & PLAN NOTE
43-year-old female with a complicated medical history and multiple comorbid conditions presenting for follow-up of back pain radiating from mid back through lumbar spine as well as widespread polyarticular pain most bothersome to bilateral elbows.  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  Patient was supposed to restart her Cosentyx which was again interrupted secondary to insurance issues.  She states at today's office visit that she did receive her dose of Cosentyx and will be starting it on 5/15/2024.  She did feel when she was taking Cosentyx that she was beginning to feel some pain relief in multiple joints.  She held off on physical therapy secondary to not having her Cosentyx and noting increasing pain as well as having a URI which lasted for several months.  She states that she is recovering from her URI and after she has taken Cosentyx for approximately 1 month plans to proceed with a formal course of physical therapy targeting mid to low back pain, myofascial pain as well as polyarticular pain.  I do believe that the patient can benefit from a formal course of physical therapy.  Reviewed recent lumbar and thoracic MRI essentially similar to previous imaging.  Discussed that she may benefit from an epidural steroid injection depending on results of physical therapy.  She does not want to proceed with epidural steroid injections at this time targeting lumbar radicular symptoms.  She will continue to follow closely with orthopedics for bilateral elbow pain.  She also will continue to follow closely with rheumatology and dermatology.   Currently managing her pain with Nucynta 50 mg up to 3 times per day as needed.  Again discussed with patient that we would like to wean her dose of Nucynta and potentially transition her to buprenorphine in the future which I think may be more beneficial.  The patient will try for the next 2 to 3 months to decrease her use of Nucynta to  twice daily.  We will discuss further weaning at her next office visit.  She will continue Lyrica 75 mg 3 times daily.  Unsure how the patient is taking this medication as she filled last in October 2023.  She states that she takes it daily, although does not always take it 3 times per day.  Advised patient to resume her Lyrica 75 mg 3 times daily for optimal pain relief.  She will continue orphenadrine for muscle tightness and spasm and duloxetine as prescribed by her PCP.  She also continues to take a small dose of Xanax using sparingly for panic attacks.  She assures me that she does not take this with her opiate.  The patient does endorse some confusion with medication administration.  She states that her  oversees all medication administration.  She also uses a pill organizer.  Plan discussed with patient at today's office visit.    -We will continue the patient on Nucynta 50 mg up to 3 times per day as needed for pain for the next 2 to 3 months.  This will allow the patient time to begin receiving benefit from her Cosentyx treatment.  Patient will also begin a formal course of physical therapy after she restarts her Cosentyx and has given this medication time to provide some benefit and she is able to fully participate in PT.  When we see the patient back in the office we will again discuss weaning Nucynta dose so that we may be able to transition to buprenorphine.    -Patient will proceed with a formal course of physical therapy.  -Patient may benefit from epidural steroid injections targeting lumbar radicular symptoms.  Further discussion after she has completed a formal course of physical therapy.    -Patient will continue Lyrica, duloxetine and orphenadrine as ordered.  She continues to tolerate these medications without adverse effects.  Recommended that patient take her Lyrica 75 mg 3 times per day as prescribed for optimal pain relief.  -Patient has discontinued all oral NSAIDs at this time.      -Patient will follow-up in 3 months or sooner if needed.    MEDICAL DECISION MAKING:    My impressions and treatment recommendations were discussed in detail with the patient, who verbalized understanding and had no further questions prior to discharge. Given the patient's report of reduced pain and improved functional ability without adverse effects,  it is reasonable to continue Nucynta 50 mg up to 3 times per day as needed for pain.  The terms of the opioid agreement as well as the potential risks and adverse effects of the patient's medication regimen were discussed in detail. This includes if applicable due to dosage of medication permission to discuss and coordinate care with other treatment providers relevant to the patients condition. The patient verbalized understanding.    Treatment goals were discussed in detail with the patient.  These goals include reduction of pain levels, improved levels of functioning, avoidance of medication side effect and lowest medication dose possible to achieve  these goals.  The patient was in full agreement with these goals.  Also discussed is the understanding that pain may not be eliminated by medication but that the goal of a better sustaining life through use of medication is appropriate.  Lifestyle modifications including weight management, stretching, diet, exercise and smoking are addressed at each office visit.  The patient provided a urine drug screen for routine monitoring and compliance.  This test may be given as frequently as every month based on the patient's individual opiate risk stratification and prescriber concerns for any aberrancies.  This test is indicated given the use of controlled substances for the patient's medical condition.  Unless otherwise noted the prior (s) urine drug testing results were consistent with prescribed medications.  There is no evidence of illicit drug use or additional medications ingested.     Risks and side effects of chronic  opioid therapy including but not limited to tolerance, dependence, constipation, hyperalgesia, cognitive side effects, addiction and possible death due to overuse and or misuse were discussed. I also discussed that such medications when co-administered with other sedative agents including but not limited to alcohol, benzodiazepines, sedative hypnotics and illegal drugs could pose life threatening consequences including death.  I also explained the impact that the administration of such medication has on a patient with obstructive sleep apnea and continued recommendations for use of apnea devices if ordered are prescribed by other physicians.  In order to effectively and safely treat the pain, I also emphasized the importance of compliance with the treatment plan as well as compliance with the terms of the opioid agreement which was reviewed in detail. I explained the importance of being responsible with the medications and to take these only as prescribed, never in excess and never for reasons other than pain reduction. The patient was counseled on keeping the medications safe and locked away from children and other adults as well as disposal methods and options. The patient understood the risks and instructions.      I also discussed with the patient in detail that based on the clinical response to the opioid medications and improvements of activities of daily living, sleep, and work performance in light of compliance with the treatment plan we can continue this form of therapy for the above chronic  pain.  The goal and rationale used for current treatment with chronic opioid medication is to control the pain and alleviate disability induced by the chronic pain condition noted above after failures of other non-opioid and nonpharmacological modalities to treat the chronic pain and the symptoms associated with have failed.  The patient understood the goals in terms of the above treatment plan and had no further  questions prior to leaving the office today.    Given the patient's total MED, general use of daily opiates, or other coadministered medications in various classes the patient was offered a prescription for Narcan.  I instructed the patient that Narcan is for emergency use only and is worse suspected or known opiate overdose.  Call 911 prior to administration of this medication to activate the emergency response team.  It is imperative to fill this medication in order to demonstrate understanding of the gravity of possible side effects including respiratory depression and risk of overdose of this opiate load or medication combination.  As such patients will be required to bring Narcan prescriptions to follow-up appointments as part of the compliance with continued opiate care.    Disclaimer: This note was transcribed using an audio transcription device.  As such, minor errors may be present with regard to spelling, punctuation, and inadvertent word insertion.  Please disregard such errors.

## 2024-05-15 ENCOUNTER — PHARMACY VISIT (OUTPATIENT)
Dept: PHARMACY | Facility: CLINIC | Age: 44
End: 2024-05-15
Payer: MEDICAID

## 2024-05-15 LAB
AMPHETAMINES UR QL SCN: ABNORMAL
BARBITURATES UR QL SCN: ABNORMAL
BZE UR QL SCN: ABNORMAL
CANNABINOIDS UR QL SCN: ABNORMAL
CREAT UR-MCNC: 173.5 MG/DL (ref 20–320)
ETHANOL ?TM UR-MCNC: <10 MG/DL
PCP UR QL SCN: ABNORMAL

## 2024-05-15 NOTE — PROGRESS NOTES
"[facility]  Nichelle Izaguirre is a 43 y.o. female was referred to Clinical Pharmacy Team for hypercholesterolemia/hyperlipidemia management.     Referring Provider:  Akosua Richardson    Last Appointment w/ Pharmacist: 3/6/2024  Pharmacist Name: Cynthia Crook    ____________________________________________________________________  PHARMACY ASSESSMENT    Review of Past Appointment:   - Patient was referred to clinical pharmacy for cholesterol management. Patient was taking Repatha 140 g q14 days without any complications. Patient has noted statin intolerance. Patient was unable to take fish oil due to acid reflux and vomiting.   - Patient is on Medicaid insurance which requires PA for Repatha and Vascepa.   Both were denied originally   Medicaid prefers Lovaza over Vascepa so prescription for Lovaza was [facility]   Appeal was entered for Repatha - this was approved and will  on 2025  - Patient was started on Zetia 10 mg every day at last appnt  - Also starting on Lovaza 2 g BID   - Of note, patient has no showed multiple appnts with pharmacy since first being seen in March     RELEVANT LAB RESULTS  Lab Results   Component Value Date    BILITOT 0.3 2024    CALCIUM 9.7 2024    CO2 27 2024     2024    CREATININE 0.58 2024    GLUCOSE 80 2024    ALKPHOS 53 2024    K 4.0 2024    PROT 7.3 2024     2024    AST 14 2024    ALT 16 2024    BUN 11 2024    ANIONGAP 16 2024    MG 1.90 2020    PHOS 3.7 2023    ALBUMIN 4.4 2024    AMYLASE 18 (L) 2021    LIPASE 14 2021    GFRF >90 2023     Lab Results   Component Value Date    TRIG 336 (H) 2024    CHOL 264 (H) 2024    LDLCALC 152 (H) 2024    HDL 45.0 2024     No results found for: \"BMCBC\", \"CBCDIF\" "     _____________________________________________________________________________  HYPERCHOLESTEROLEMIA ASSESSMENT    RECENT LIPID PANEL (DATE): 2/5/2024  Lab Results   Component Value Date    TRIG 336 (H) 02/05/2024    CHOL 264 (H) 02/05/2024    LDLCALC 152 (H) 02/05/2024    HDL 45.0 02/05/2024          ASCVD SCORE: The 10-year ASCVD risk score (Amarilis DK, et al., 2019) is: 1.9%    Values used to calculate the score:      Age: 43 years      Sex: Female      Is Non- : No      Diabetic: No      Tobacco smoker: No      Systolic Blood Pressure: 114 mmHg      Is BP treated: Yes      HDL Cholesterol: 45 mg/dL      Total Cholesterol: 264 mg/dL  Coronary Heart Disease (MI, angina, coronary artery stenosis): No - but noted family history of CAD              History of ischemic stroke? No              History of carotid artery stenosis? No              Peripheral artery disease? No   ASCVD RISK FACTORS:               CKD? No              Diabetes? No              HTN? Yes              Persistently elevated LDL? Yes              Elevated triglycerides? Yes              Inflammatory diseases (rheumatoid arthritis, psoriasis, HIV)? Yes - inflammatory arthropathy, rheumatoid arthritis, Lyme disease, MBL    CURRENT PHARMACOTHERAPY  - Statin? No - noted statin intolerance  - Ezetimibe? No  - PCSK9-I? Yes, describe: Repatha   - Other lipid lowering agents? No    UPDATE ON PHARMACOTHERAPY:   Affordability/Accessibility: Patient has Medicaid insurance. Requires PA for Repatha.   Adherence/Organization: See blow  - Repatha 140 mg [id number] - patient has been out since about 6 months but got delivery today; will start ASAP   - Lovaza 2 g BID - patient received today; has not been taking up to this point  - Zetia 10 mg every day - continuing at prescribed   Adverse Effects: None to report at this time    UPDATE ON DISEASE STATE MANAGEMENT:  Recent Hospitalizations: No  Diet: Pt states she has POTS so when her  sodium drops, she can feel it; doesn't eat red meat often; patient does admit to high carb diet (pasta); only eating about once day   Exercise: No  Tobacco Use: No  Alcohol Use: No  Next Lipid Panel: before visit with Dr. Richardson on 7/1/2024      DISCUSSION/NOTES:  - Patient received Repatha and Lovaza today. She has been off the Repatha for about 6 months; new start Lovaza. Patient has been taking Zetia 10 mg every day since last visit (3/2024) - no SE to report at this time. Patient confirms that she is going to start Repatha and Lovaza as soon as possible. Confirmed with patient that she should get repeat lipid panel down 3-4 days before seeing Dr. Richardson on 7//1/2024. Will follow up in 3 months to check on tolerability and see lab work.   - Patient mentions that she is also having trouble obtaining her Carbidopa-Levodopa. Confirmed with PCP ([facility]) that she is agreeable to send referral to PCP clinical pharmacists to help with issue. Referral has been placed.       PATIENT EDUCATION/DISCUSSION:  - Counseled patient on MOA, expectations, side effects, duration of therapy, contraindications, administration, and monitoring parameters  - Answered all patient questions and concerns  ___________________________________________________________________    RECOMMENDATIONS/PLAN  1. CONTINUE:   Zetia 10 mg every day  2. START:   Repatha 140 mg [id number]  Lovaza 2 g BID   3. Follow up 3 months    Next Cardiology Appointment: 7/1/2024  Clinical Pharmacist follow up: 8/19/2024  Type of Encounter: Coty Crook PharmD    Verbal consent to manage patient's drug therapy was obtained from the patient . They were informed they may decline to participate or withdraw from participation in pharmacy services at any time.    Continue all meds under the continuation of care with the referring provider and clinical pharmacy team.

## 2024-05-16 ENCOUNTER — SPECIALTY PHARMACY (OUTPATIENT)
Dept: PHARMACY | Facility: CLINIC | Age: 44
End: 2024-05-16

## 2024-05-16 NOTE — PROGRESS NOTES
Patient declined need for first fill assessment education regarding Repatha.  She is now resuming med after insurance appeal and was previously filling Repatha with [facility] as well.    She denied any questions or concerns for a pharmacist at this time.

## 2024-05-17 ENCOUNTER — TELEMEDICINE (OUTPATIENT)
Dept: PHARMACY | Facility: HOSPITAL | Age: 44
End: 2024-05-17
Payer: MEDICAID

## 2024-05-17 DIAGNOSIS — G20.C PARKINSONISM, UNSPECIFIED PARKINSONISM TYPE (MULTI): Primary | ICD-10-CM

## 2024-05-17 LAB
1OH-MIDAZOLAM UR CFM-MCNC: <25 NG/ML
6MAM UR CFM-MCNC: <25 NG/ML
7AMINOCLONAZEPAM UR CFM-MCNC: <25 NG/ML
A-OH ALPRAZ UR CFM-MCNC: 291 NG/ML
ALPRAZ UR CFM-MCNC: 261 NG/ML
CHLORDIAZEP UR CFM-MCNC: <25 NG/ML
CLONAZEPAM UR CFM-MCNC: <25 NG/ML
CODEINE UR CFM-MCNC: <50 NG/ML
DIAZEPAM UR CFM-MCNC: <25 NG/ML
EDDP UR CFM-MCNC: <25 NG/ML
FENTANYL UR CFM-MCNC: <2.5 NG/ML
HYDROCODONE CTO UR CFM-MCNC: <25 NG/ML
HYDROMORPHONE UR CFM-MCNC: <25 NG/ML
LORAZEPAM UR CFM-MCNC: <25 NG/ML
METHADONE UR CFM-MCNC: <25 NG/ML
MIDAZOLAM UR CFM-MCNC: <25 NG/ML
MORPHINE UR CFM-MCNC: <50 NG/ML
NORDIAZEPAM UR CFM-MCNC: <25 NG/ML
NORFENTANYL UR CFM-MCNC: <2.5 NG/ML
NORHYDROCODONE UR CFM-MCNC: <25 NG/ML
NOROXYCODONE UR CFM-MCNC: <25 NG/ML
NORTAPENTADOL UR CFM-MCNC: >1000 NG/ML
NORTRAMADOL UR-MCNC: <50 NG/ML
OXAZEPAM UR CFM-MCNC: <25 NG/ML
OXYCODONE UR CFM-MCNC: <25 NG/ML
OXYMORPHONE UR CFM-MCNC: <25 NG/ML
TAPENTADOL UR CFM-MCNC: >1000 NG/ML
TEMAZEPAM UR CFM-MCNC: <25 NG/ML
TRAMADOL UR CFM-MCNC: <50 NG/ML
ZOLPIDEM UR CFM-MCNC: <25 NG/ML
ZOLPIDEM UR-MCNC: <25 NG/ML

## 2024-05-17 NOTE — Clinical Note
Renan Richardson. Patient received Repatha and Lovaza today. She has been off the Repatha for about 6 months; new start Lovaza. Patient has been taking Zetia 10 mg every day since last visit (3/2024) - no SE to report at this time. Patient confirms that she is going to start Repatha and Lovaza as soon as possible. Confirmed with patient that she should get repeat lipid panel down 3-4 days before seeing Dr. Richardson on 7//1/2024. Will follow up in 3 months to check on tolerability and see lab work.

## 2024-05-18 LAB
BUPRENORPHINE UR-MCNC: <2 NG/ML
BUPRENORPHINE UR-MCNC: <5 NG/ML
NALOXONE UR CFM-MCNC: <100 NG/ML
NORBUPRENORPHINE UR CFM-MCNC: <5 NG/ML
NORBUPRENORPHINE UR-MCNC: <2 NG/ML

## 2024-05-20 DIAGNOSIS — M10.9 GOUTY ARTHROPATHY: ICD-10-CM

## 2024-05-20 RX ORDER — ALLOPURINOL 300 MG/1
300 TABLET ORAL DAILY
Qty: 90 TABLET | Refills: 0 | Status: SHIPPED | OUTPATIENT
Start: 2024-05-20

## 2024-05-20 RX ORDER — ALLOPURINOL 100 MG/1
100 TABLET ORAL DAILY
Qty: 90 TABLET | Refills: 0 | Status: SHIPPED | OUTPATIENT
Start: 2024-05-20

## 2024-05-22 ENCOUNTER — APPOINTMENT (OUTPATIENT)
Dept: ORTHOPEDIC SURGERY | Facility: CLINIC | Age: 44
End: 2024-05-22
Payer: MEDICAID

## 2024-06-06 ENCOUNTER — SPECIALTY PHARMACY (OUTPATIENT)
Dept: PHARMACY | Facility: CLINIC | Age: 44
End: 2024-06-06

## 2024-06-06 ENCOUNTER — PHARMACY VISIT (OUTPATIENT)
Dept: PHARMACY | Facility: CLINIC | Age: 44
End: 2024-06-06
Payer: MEDICAID

## 2024-06-06 PROCEDURE — RXMED WILLOW AMBULATORY MEDICATION CHARGE

## 2024-06-14 DIAGNOSIS — F33.41 RECURRENT MAJOR DEPRESSIVE DISORDER, IN PARTIAL REMISSION (CMS-HCC): ICD-10-CM

## 2024-06-14 RX ORDER — QUETIAPINE FUMARATE 25 MG/1
25 TABLET, FILM COATED ORAL 2 TIMES DAILY PRN
Qty: 60 TABLET | Refills: 0 | Status: SHIPPED | OUTPATIENT
Start: 2024-06-14 | End: 2024-08-13

## 2024-06-18 ENCOUNTER — APPOINTMENT (OUTPATIENT)
Dept: RHEUMATOLOGY | Facility: CLINIC | Age: 44
End: 2024-06-18
Payer: MEDICAID

## 2024-06-19 ENCOUNTER — OFFICE VISIT (OUTPATIENT)
Dept: RHEUMATOLOGY | Facility: CLINIC | Age: 44
End: 2024-06-19
Payer: MEDICAID

## 2024-06-19 ENCOUNTER — LAB (OUTPATIENT)
Dept: LAB | Facility: LAB | Age: 44
End: 2024-06-19
Payer: MEDICAID

## 2024-06-19 VITALS
BODY MASS INDEX: 36.7 KG/M2 | RESPIRATION RATE: 16 BRPM | HEART RATE: 88 BPM | WEIGHT: 227.4 LBS | TEMPERATURE: 97.2 F | DIASTOLIC BLOOD PRESSURE: 87 MMHG | SYSTOLIC BLOOD PRESSURE: 130 MMHG

## 2024-06-19 DIAGNOSIS — L40.50 PSORIATIC ARTHROPATHY (MULTI): Primary | ICD-10-CM

## 2024-06-19 DIAGNOSIS — L40.50 PSORIATIC ARTHROPATHY (MULTI): ICD-10-CM

## 2024-06-19 DIAGNOSIS — M10.9 GOUTY ARTHROPATHY: ICD-10-CM

## 2024-06-19 LAB
ALBUMIN SERPL BCP-MCNC: 4.3 G/DL (ref 3.4–5)
ALP SERPL-CCNC: 54 U/L (ref 33–110)
ALT SERPL W P-5'-P-CCNC: 18 U/L (ref 7–45)
ANION GAP SERPL CALC-SCNC: 11 MMOL/L (ref 10–20)
AST SERPL W P-5'-P-CCNC: 17 U/L (ref 9–39)
B-HCG SERPL-ACNC: 3 MIU/ML
BASOPHILS # BLD AUTO: 0.03 X10*3/UL (ref 0–0.1)
BASOPHILS NFR BLD AUTO: 0.7 %
BILIRUB SERPL-MCNC: 0.3 MG/DL (ref 0–1.2)
BUN SERPL-MCNC: 17 MG/DL (ref 6–23)
CALCIUM SERPL-MCNC: 9.9 MG/DL (ref 8.6–10.6)
CHLORIDE SERPL-SCNC: 103 MMOL/L (ref 98–107)
CO2 SERPL-SCNC: 30 MMOL/L (ref 21–32)
CREAT SERPL-MCNC: 0.58 MG/DL (ref 0.5–1.05)
CRP SERPL-MCNC: <0.1 MG/DL
EGFRCR SERPLBLD CKD-EPI 2021: >90 ML/MIN/1.73M*2
EOSINOPHIL # BLD AUTO: 0.01 X10*3/UL (ref 0–0.7)
EOSINOPHIL NFR BLD AUTO: 0.2 %
ERYTHROCYTE [DISTWIDTH] IN BLOOD BY AUTOMATED COUNT: 14.5 % (ref 11.5–14.5)
ERYTHROCYTE [SEDIMENTATION RATE] IN BLOOD BY WESTERGREN METHOD: 11 MM/H (ref 0–20)
GLUCOSE SERPL-MCNC: 99 MG/DL (ref 74–99)
HCT VFR BLD AUTO: 34.3 % (ref 36–46)
HGB BLD-MCNC: 11.2 G/DL (ref 12–16)
IMM GRANULOCYTES # BLD AUTO: 0 X10*3/UL (ref 0–0.7)
IMM GRANULOCYTES NFR BLD AUTO: 0 % (ref 0–0.9)
LYMPHOCYTES # BLD AUTO: 2.39 X10*3/UL (ref 1.2–4.8)
LYMPHOCYTES NFR BLD AUTO: 54.6 %
MCH RBC QN AUTO: 29.9 PG (ref 26–34)
MCHC RBC AUTO-ENTMCNC: 32.7 G/DL (ref 32–36)
MCV RBC AUTO: 92 FL (ref 80–100)
MONOCYTES # BLD AUTO: 0.54 X10*3/UL (ref 0.1–1)
MONOCYTES NFR BLD AUTO: 12.3 %
NEUTROPHILS # BLD AUTO: 1.41 X10*3/UL (ref 1.2–7.7)
NEUTROPHILS NFR BLD AUTO: 32.2 %
NRBC BLD-RTO: 0 /100 WBCS (ref 0–0)
PLATELET # BLD AUTO: 282 X10*3/UL (ref 150–450)
POTASSIUM SERPL-SCNC: 4.4 MMOL/L (ref 3.5–5.3)
PROT SERPL-MCNC: 7.1 G/DL (ref 6.4–8.2)
RBC # BLD AUTO: 3.75 X10*6/UL (ref 4–5.2)
SODIUM SERPL-SCNC: 140 MMOL/L (ref 136–145)
WBC # BLD AUTO: 4.4 X10*3/UL (ref 4.4–11.3)

## 2024-06-19 PROCEDURE — 85025 COMPLETE CBC W/AUTO DIFF WBC: CPT

## 2024-06-19 PROCEDURE — 99214 OFFICE O/P EST MOD 30 MIN: CPT | Performed by: INTERNAL MEDICINE

## 2024-06-19 PROCEDURE — 80053 COMPREHEN METABOLIC PANEL: CPT

## 2024-06-19 PROCEDURE — 3079F DIAST BP 80-89 MM HG: CPT | Performed by: INTERNAL MEDICINE

## 2024-06-19 PROCEDURE — 1036F TOBACCO NON-USER: CPT | Performed by: INTERNAL MEDICINE

## 2024-06-19 PROCEDURE — 36415 COLL VENOUS BLD VENIPUNCTURE: CPT

## 2024-06-19 PROCEDURE — 85652 RBC SED RATE AUTOMATED: CPT

## 2024-06-19 PROCEDURE — 84702 CHORIONIC GONADOTROPIN TEST: CPT

## 2024-06-19 PROCEDURE — 86140 C-REACTIVE PROTEIN: CPT

## 2024-06-19 PROCEDURE — 3075F SYST BP GE 130 - 139MM HG: CPT | Performed by: INTERNAL MEDICINE

## 2024-06-19 ASSESSMENT — PAIN SCALES - GENERAL: PAINLEVEL: 0-NO PAIN

## 2024-06-19 NOTE — PROGRESS NOTES
Follow-up Rheumatology Patient Visit    Chief Complaint:  Nichelle Izaguirre is a 44 y.o. female presenting today for Follow-up.    History of Presenting Problem:   43 y/o female with Hx of psoriatic arthritis, gout, asthma, Parkinsonism/Dystonia, Immunodeficiency disorder (MBL) on prophylaxis antibiotics with amoxicillin presents for follow-up  She is tolerating leflunomide currently taking 10 mg daily now  She is taking allopurinol 400 mg daily  She  is finally getting  Cosentyx consistently x 2   She is not sexual active although she is , she report  is dealing with a medical condition.   She is having irregular periods, she report she is possible menopausal   Previous meds ;  MTX  Otezla      Problem List:   Patient Active Problem List   Diagnosis    Allergic rhinitis    Anxiety disorder    Arthralgia of multiple sites    Bariatric surgery status    Benign essential hypertension    Bowel disease, inflammatory    Immunodeficiency syndrome (Multi)    Chronic diarrhea    Continuous LLQ abdominal pain    Depression    Essential familial hyperlipidemia    GERD (gastroesophageal reflux disease)    Gouty arthropathy    Hoarseness    Hot flashes    Hyperlipidemia    Hypertriglyceridemia    Hypothyroidism (acquired)    Inflammatory arthropathy    Rheumatoid arthritis (Multi)    Hypersomnia    Labile hypertension    Low vitamin D level    Lyme disease    Mannose-binding lectin deficiency (Multi)    Methadone use    Migraine without status migrainosus, not intractable    Morbid obesity (Multi)    Mixed incontinence urge and stress    Osteoporosis    Obstructive sleep apnea, adult    DRD (DOPA-responsive dystonia)    Post herpetic neuralgia    Polyphagia    POTS (postural orthostatic tachycardia syndrome)    Prediabetes    Psoriatic arthropathy (Multi)    Steroid withdrawal syndrome with complication (Multi)    Steroid-dependent asthma (HHS-HCC)    Vitamin B12 deficiency    Vitamin D deficiency    Gait  disturbance    Bilateral leg edema    Cystocele with uterine descensus    Dyspnea on effort    Elbow tendinitis    Foreign body in ear, right, initial encounter    Frequent falls    Hyperglycemia    Hyperuricemia    Incomplete bladder emptying    Insomnia, organic    Intermittent palpitations    Lower back pain    Maxillary sinusitis    Myofascial pain    Orthostatic intolerance    Other ovarian cyst, unspecified side    Pelvic floor weakness    Perioral dermatitis    Peripheral neuropathy    Postural dizziness    Psoriasis vulgaris    Recurrent urinary tract infection    Renal lesion    Right lumbar radiculopathy    Rosacea, unspecified    Sacroiliac joint dysfunction of right side    Snoring    Spasm of thoracic back muscle    Syncope    Urinary frequency    Panic attacks       Past Medical History:   Past Medical History:   Diagnosis Date    Acute upper respiratory infection, unspecified 07/26/2016    Viral URI with cough    Acute upper respiratory infection, unspecified 11/15/2017    Viral URI with cough    Allergy status to unspecified drugs, medicaments and biological substances     History of allergy    Chronic maxillary sinusitis 08/27/2018    Maxillary sinusitis, unspecified chronicity    Dystonia, unspecified 04/08/2014    Dystonia    Encounter for other screening for malignant neoplasm of breast 01/22/2018    Breast screening    Encounter for screening for respiratory tuberculosis 11/11/2013    Screening examination for pulmonary tuberculosis    Essential (primary) hypertension 12/27/2017    Benign essential hypertension    Hypersomnia, unspecified 04/13/2015    Sleeps too much    Idiopathic sleep related nonobstructive alveolar hypoventilation     Nocturnal hypoxemia    Idiopathic sleep related nonobstructive alveolar hypoventilation 02/09/2018    Nocturnal hypoxia    Impaired fasting glucose 01/22/2018    Impaired fasting glucose    Insect bite (nonvenomous) of lower back and pelvis, initial encounter  03/29/2018    Tick bite of back    Left lower quadrant pain 01/07/2019    Left lower quadrant pain    Local infection of the skin and subcutaneous tissue, unspecified 07/28/2017    Skin infection    Low back pain, unspecified 05/23/2019    Acute low back pain    Other conditions influencing health status 02/27/2017    Sprain of right thumb, unspecified site of finger, initial encounter    Other malaise 04/16/2018    Malaise and fatigue    Other microscopic hematuria 03/30/2018    Microscopic hematuria    Other specified cough 09/07/2018    Productive cough    Other specified health status 01/07/2019    Acute medical illness    Other symptoms and signs involving the musculoskeletal system 07/12/2018    Weakness of right lower extremity    Other symptoms and signs involving the musculoskeletal system 03/21/2018    Polyarticular joint involvement    Other symptoms and signs involving the musculoskeletal system 07/12/2018    RUE weakness    Pain in right lower leg 01/27/2016    Right calf pain    Pain in unspecified hip 01/20/2016    Hip pain    Pelvic and perineal pain 04/17/2018    Pelvic pain    Personal history of diseases of the skin and subcutaneous tissue 08/07/2018    History of dermatitis    Personal history of other (healed) physical injury and trauma 08/07/2018    History of insect bite    Personal history of other diseases of the circulatory system     History of hypertension    Personal history of other diseases of the female genital tract 11/24/2015    History of menorrhagia    Personal history of other diseases of the musculoskeletal system and connective tissue 03/14/2018    History of tendinitis    Personal history of other diseases of the nervous system and sense organs 04/08/2014    History of Parkinson's disease    Personal history of other diseases of the respiratory system 01/07/2019    History of acute bronchitis    Personal history of other diseases of the respiratory system 10/05/2018    History  of paranasal sinus pain    Personal history of other specified conditions 04/13/2015    History of snoring    Personal history of other specified conditions 09/11/2017    History of headache    Personal history of other specified conditions 09/07/2018    History of persistent cough    Personal history of other specified conditions 04/08/2014    History of memory loss    Personal history of other specified conditions 03/26/2018    History of left flank pain    Personal history of other specified conditions     History of heartburn    Personal history of urinary calculi 03/26/2018    Personal history of urinary calculi    Personal history of urinary calculi 03/26/2018    History of renal calculi    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/2017    Plantar fasciitis, left    Primary insomnia 04/13/2015    Primary insomnia    Rash and other nonspecific skin eruption 04/16/2018    Rash    Unspecified abdominal pain 01/07/2019    Left sided abdominal pain    Urinary tract infection, site not specified 10/19/2018    UTI (urinary tract infection), bacterial    Urinary tract infection, site not specified 10/18/2018    Recurrent UTI    Vitamin D deficiency, unspecified 04/22/2016    Vitamin D deficiency    Zoster with other complications 11/25/2015    Herpes zoster dermatitis       Surgical History:   Past Surgical History:   Procedure Laterality Date    CT ANGIO CORONARY ART WITH HEARTFLOW IF SCORE >30%  3/18/2020    CT HEART CORONARY ANGIOGRAM 3/18/2020 [facility]    HAND TENDON SURGERY  11/11/2013    Hand Incision Tendon Sheath Of A Finger    HYSTERECTOMY  03/04/2016    Hysterectomy    LITHOTRIPSY  11/11/2013    Renal Lithotripsy    TONSILLECTOMY  12/19/2013    Tonsillectomy With Adenoidectomy        Allergies:   Allergies   Allergen Reactions    Clindamycin Anaphylaxis    Dupilumab Anaphylaxis    Hydrochlorothiazide Anaphylaxis, Itching and Swelling    Sulfamethoxazole-Trimethoprim Anaphylaxis    Cefazolin Hives, Other and  Swelling     STATES TONGUE SPLITS    Atorvastatin Hives    Cephalexin Other    Clarithromycin Swelling     hives, tongue swelling    Nitrofurantoin Monohyd/M-Cryst Hives    Sulfa (Sulfonamide Antibiotics) Hives    Sulfacetamide Hives       Medications:   Current Outpatient Medications:     albuterol 2.5 mg /3 mL (0.083 %) nebulizer solution, inhale the contents of one vial via nebulizer every 4 hours as needed, Disp: , Rfl:     allopurinol (Zyloprim) 100 mg tablet, TAKE ONE TABLET BY MOUTH EVERY DAY, Disp: 90 tablet, Rfl: 0    allopurinol (Zyloprim) 300 mg tablet, TAKE ONE TABLET BY MOUTH EVERY DAY, Disp: 90 tablet, Rfl: 0    ALPRAZolam (Xanax) 1 mg tablet, Take 1 tablet (1 mg) by mouth as needed at bedtime for sleep., Disp: 20 tablet, Rfl: 0    amLODIPine (Norvasc) 2.5 mg tablet, Take 1 tablet (2.5 mg) by mouth once daily., Disp: , Rfl:     amoxicillin (Amoxil) 500 mg capsule, Take 1 capsule (500 mg) by mouth every 12 hours., Disp: , Rfl:     ascorbic acid, vitamin C, 500 mg capsule, Take 1 capsule by mouth once daily., Disp: , Rfl:     benzonatate (Tessalon) 100 mg capsule, TAKE ONE CAPSULE BY MOUTH THREE TIMES DAILY, Disp: , Rfl:     benzoyl peroxide (Benzac AC) 10 % external wash, Apply topically once daily., Disp: , Rfl:     benztropine (Cogentin) 2 mg tablet, Take 1 tablet (2 mg) by mouth once daily., Disp: , Rfl:     buPROPion XL (Wellbutrin XL) 150 mg 24 hr tablet, Take 1 tablet (150 mg) by mouth once daily. Do not crush, chew, or split., Disp: 90 tablet, Rfl: 3    buPROPion XL (Wellbutrin XL) 300 mg 24 hr tablet, Take 1 tablet (300 mg) by mouth once daily in the morning. Do not crush, chew, or split., Disp: 90 tablet, Rfl: 3    carbidopa-levodopa-entacapone (Stalevo) -200 mg tablet, Take 1 tablet by mouth every 4 hours., Disp: 540 tablet, Rfl: 1    carvedilol (Coreg) 25 mg tablet, Take 1 tablet (25 mg) by mouth 2 times daily (morning and late afternoon)., Disp: , Rfl:     cetirizine-pseudoephedrine  (ZyrTEC-D) 5-120 mg 12 hr tablet, Take 1 tablet by mouth 2 times a day., Disp: 180 tablet, Rfl: 0    clindamycin (Cleocin T) 1 % lotion, 1 Application, Disp: , Rfl:     clobetasol (Temovate) 0.05 % external solution, Apply topically 2 times a day., Disp: , Rfl:     clobetasoL 0.05 % lotion, One application as needed for flaring only.not for daily use. For scalp and hands only, Disp: 118 mL, Rfl: 3    cloNIDine (Catapres) 0.1 mg tablet, Take 1 tablet (0.1 mg) by mouth once daily., Disp: , Rfl:     cloNIDine (Catapres) 0.2 mg tablet, Take 1 tablet (0.2 mg) by mouth once daily at bedtime., Disp: 90 tablet, Rfl: 3    Cosentyx Pen, 2 Pens, 150 mg/mL self-injector pen, Inject 2 mL (300 mg) under the skin every 30 (thirty) days., Disp: 2 mL, Rfl: 2    cyanocobalamin, vitamin B-12, 1,000 mcg/mL kit, Inject 1,000 mcg as directed every 28 (twenty-eight) days., Disp: 1 kit, Rfl: 11    diclofenac sodium (Voltaren) 1 % gel gel, Apply 4.5 inches (4 g) topically 2 times a day as needed., Disp: , Rfl:     doxycycline (Vibramycin) 100 mg capsule, 1 capsule (100 mg)., Disp: , Rfl:     DULoxetine (Cymbalta) 60 mg DR capsule, TAKE 1 CAPSULE BY MOUTH DAILY, Disp: 90 capsule, Rfl: 1    eplerenone (Inspra) 25 mg tablet, Take 1 tablet (25 mg) by mouth once daily., Disp: , Rfl:     ergocalciferol (Vitamin D-2) 1.25 MG (77485 UT) capsule, Take 1 capsule (50,000 Units) by mouth 1 (one) time per week., Disp: , Rfl:     ezetimibe (Zetia) 10 mg tablet, Take 1 tablet (10 mg) by mouth once daily., Disp: 90 tablet, Rfl: 3    fluticasone furoate-vilanteroL (Breo Ellipta) 200-25 mcg/dose inhaler, 1puff daily, Inhalation, Disp: , Rfl:     ipratropium-albuteroL (Duo-Neb) 0.5-2.5 mg/3 mL nebulizer solution, Take 3 mL by nebulization every 4 hours if needed for wheezing., Disp: , Rfl:     ketoconazole (NIZOral) 2 % cream, 1 Application, Disp: , Rfl:     lactobacillus acidophilus (Lactobacillus acidoph-L.bulgar) tablet tablet, Take 1 tablet by mouth once  daily., Disp: 90 tablet, Rfl: 0    levalbuterol (Xopenex) 1.25 mg/3 mL nebulizer solution, Take 1 ampule by nebulization 3 times a day., Disp: , Rfl:     meloxicam (Mobic) 15 mg tablet, Take 1 tablet (15 mg) by mouth once daily. (Patient not taking: Reported on 5/14/2024), Disp: 30 tablet, Rfl: 3    metoprolol succinate XL (Toprol-XL) 50 mg 24 hr tablet, Take 1 tablet (50 mg) by mouth once daily., Disp: , Rfl:     metroNIDAZOLE (Metrocream) 0.75 % cream, 1 Application, Disp: , Rfl:     montelukast (Singulair) 10 mg tablet, Take 1 tablet (10 mg) by mouth once daily at bedtime., Disp: , Rfl:     nebulizer and compressor device, use q4-6 hours with albuterol PRN cough/wheeze, Disp: , Rfl:     nebulizers (logtrust Disposable Nebulizer) misc, every 4 hours if needed., Disp: , Rfl:     olmesartan (BENIcar) 40 mg tablet, Take 1 tablet (40 mg) by mouth once daily., Disp: , Rfl:     omega-3 acid ethyl esters (Lovaza) 1 gram capsule, Take 2 capsules (2 g) by mouth 2 times a day., Disp: 360 capsule, Rfl: 3    omeprazole (PriLOSEC) 40 mg DR capsule, Take 1 capsule (40 mg) by mouth once daily in the morning. Take before meals., Disp: 90 capsule, Rfl: 3    ondansetron ODT (Zofran-ODT) 4 mg disintegrating tablet, Take 1 tablet (4 mg) by mouth every 8 hours if needed for nausea or vomiting., Disp: 90 tablet, Rfl: 0    orphenadrine (Norflex) 100 mg 12 hr tablet, Take 1 tablet (100 mg) by mouth 2 times a day as needed for muscle spasms., Disp: 60 tablet, Rfl: 3    pioglitazone (Actos) 15 mg tablet, Take 1 tablet (15 mg) by mouth once daily., Disp: , Rfl:     pregabalin (Lyrica) 75 mg capsule, Take 1 capsule (75 mg) by mouth 3 times a day., Disp: 90 capsule, Rfl: 2    ProAir HFA 90 mcg/actuation inhaler, , Disp: , Rfl:     QUEtiapine (SEROquel) 25 mg tablet, Take 1 tablet (25 mg) by mouth 2 times a day as needed (as needed sleep or agitation)., Disp: 60 tablet, Rfl: 0    Repatha SureClick 140 mg/mL injection, Inject 1 mL (140 mg)  under the skin every 14 (fourteen) days., Disp: 6 mL, Rfl: 3    Spiriva Respimat 1.25 mcg/actuation inhaler, Inhale once daily., Disp: , Rfl:     spironolactone (Aldactone) 25 mg tablet, 1 tablet (25 mg)., Disp: , Rfl:     SUMAtriptan (Imitrex) 25 mg tablet, Take 1 tablet (25 mg) by mouth 1 time if needed for migraine. May repeat in 2 hours if no improvement. Max 200 mg/24 hr, Disp: 9 tablet, Rfl: 2    tezepelumab-ekko (Tezspire) SubQ Pen Injector, Inject 210 mg under the skin., Disp: , Rfl:       Objective   Physical Examination:    Visit Vitals  /87   Pulse 88   Temp 36.2 °C (97.2 °F)   Resp 16   Wt 103 kg (227 lb 6.4 oz)   BMI 36.70 kg/m²   OB Status Having periods   Smoking Status Never   BSA 2.19 m²        Gen: NAD, A&O x 3  HEENT: clear sclera and conjunctiva,     Musculoskeletal:   Neck; WNL, full ROM  Shoulder: WNL, full ROM  Elbow:WNL, full ROM, no effusion noted  Wrist and fingers;no active synovitis noted, Full ROM in the Wrist , Good fist and   Knees:  No effusions or crepitation, full ROM.  Hips; WNL, full ROM, Negative Cabrera test  Ankle, Feet; WNL, full ROM    Skin: No rashes or lesions seen, no nail changes  Neuro: A&O x3, Normal Gait    Procedures :None    Orders:  No orders of the defined types were placed in this encounter.       Provider Impression:   Assessment/Plan   No diagnosis found.       44 y/o female with Hx of asthma, Parkinsonism/Dystonia, Immunodeficiency disorder (MBL) on prophylaxis antibiotics with amoxicillin present for pain in the right elbow and bilateral knee. Workup shows elevated uric acid levels and positive Family History on the maternal side. underlying gout and possible underlying PSA. MRI show severe lateral epicondylitis. Concerned that this may be due to underlying PSA given the inflammatory findings  -Continue Cosentyx 300 mg monthly  -Continue leflunomide 10 mg  -Continue Allopurinol 400 mg daily  -Check routine labs   -F/u in 4 months

## 2024-06-24 DIAGNOSIS — M25.50 ARTHRALGIA OF MULTIPLE SITES: Primary | ICD-10-CM

## 2024-06-24 DIAGNOSIS — M19.90 INFLAMMATORY ARTHROPATHY: ICD-10-CM

## 2024-06-24 NOTE — TELEPHONE ENCOUNTER
Pt is requesting refill of   Nucynta 50 mg 1 tablet po TID Elite                                                        LV:   5/14/24                   NV:   8/5/24               [place]ARRS reviewed with [facility]:  5/14/24 #90/30 days                          Pended RX to JEMIMA Rosenbaum for transmission to pharmacy.

## 2024-06-27 DIAGNOSIS — R53.83 OTHER FATIGUE: Primary | ICD-10-CM

## 2024-07-01 ENCOUNTER — LAB (OUTPATIENT)
Dept: LAB | Facility: LAB | Age: 44
End: 2024-07-01
Payer: MEDICAID

## 2024-07-01 ENCOUNTER — OFFICE VISIT (OUTPATIENT)
Dept: CARDIOLOGY | Facility: HOSPITAL | Age: 44
End: 2024-07-01
Payer: MEDICAID

## 2024-07-01 VITALS
HEART RATE: 98 BPM | HEIGHT: 64 IN | DIASTOLIC BLOOD PRESSURE: 82 MMHG | BODY MASS INDEX: 38.64 KG/M2 | SYSTOLIC BLOOD PRESSURE: 123 MMHG | WEIGHT: 226.3 LBS | OXYGEN SATURATION: 92 %

## 2024-07-01 DIAGNOSIS — R09.89 LABILE HYPERTENSION: ICD-10-CM

## 2024-07-01 DIAGNOSIS — E78.1 HYPERTRIGLYCERIDEMIA: ICD-10-CM

## 2024-07-01 DIAGNOSIS — R53.83 OTHER FATIGUE: ICD-10-CM

## 2024-07-01 DIAGNOSIS — G47.33 OBSTRUCTIVE SLEEP APNEA, ADULT: ICD-10-CM

## 2024-07-01 DIAGNOSIS — E78.49 ESSENTIAL FAMILIAL HYPERLIPIDEMIA: ICD-10-CM

## 2024-07-01 DIAGNOSIS — E78.01 FAMILIAL HYPERCHOLESTEROLEMIA: Primary | ICD-10-CM

## 2024-07-01 DIAGNOSIS — R55 SYNCOPE, UNSPECIFIED SYNCOPE TYPE: ICD-10-CM

## 2024-07-01 LAB
25(OH)D3 SERPL-MCNC: 19 NG/ML (ref 30–100)
CHOLEST SERPL-MCNC: 264 MG/DL (ref 0–199)
CHOLESTEROL/HDL RATIO: 7.1
FERRITIN SERPL-MCNC: 26 NG/ML (ref 8–150)
HDLC SERPL-MCNC: 37.1 MG/DL
IRON SATN MFR SERPL: 16 % (ref 25–45)
IRON SERPL-MCNC: 70 UG/DL (ref 35–150)
LDLC SERPL CALC-MCNC: ABNORMAL MG/DL
NON HDL CHOLESTEROL: 227 MG/DL (ref 0–149)
TIBC SERPL-MCNC: 444 UG/DL (ref 240–445)
TRANSFERRIN SERPL-MCNC: 353 MG/DL (ref 200–360)
TRIGL SERPL-MCNC: 417 MG/DL (ref 0–149)
TSH SERPL-ACNC: 0.61 MIU/L (ref 0.44–3.98)
UIBC SERPL-MCNC: 374 UG/DL (ref 110–370)
VIT B12 SERPL-MCNC: 261 PG/ML (ref 211–911)
VLDL: ABNORMAL

## 2024-07-01 PROCEDURE — 82607 VITAMIN B-12: CPT

## 2024-07-01 PROCEDURE — 3074F SYST BP LT 130 MM HG: CPT | Performed by: INTERNAL MEDICINE

## 2024-07-01 PROCEDURE — 82728 ASSAY OF FERRITIN: CPT

## 2024-07-01 PROCEDURE — 82306 VITAMIN D 25 HYDROXY: CPT

## 2024-07-01 PROCEDURE — 99214 OFFICE O/P EST MOD 30 MIN: CPT | Performed by: INTERNAL MEDICINE

## 2024-07-01 PROCEDURE — 84466 ASSAY OF TRANSFERRIN: CPT

## 2024-07-01 PROCEDURE — 36415 COLL VENOUS BLD VENIPUNCTURE: CPT

## 2024-07-01 PROCEDURE — 83550 IRON BINDING TEST: CPT

## 2024-07-01 PROCEDURE — 3079F DIAST BP 80-89 MM HG: CPT | Performed by: INTERNAL MEDICINE

## 2024-07-01 PROCEDURE — 84443 ASSAY THYROID STIM HORMONE: CPT

## 2024-07-01 PROCEDURE — 83540 ASSAY OF IRON: CPT

## 2024-07-01 PROCEDURE — 80061 LIPID PANEL: CPT

## 2024-07-01 NOTE — PROGRESS NOTES
Primary Care Physician: NAYELI Yadav-CNP      Date of Visit: 07/01/2024 11:00 AM EDT  Location of visit: [facility] [place]   Type of Visit: Established Patient     HPI / Summary:   Patient is a 44 year old woman with HTN, hyperlipidemia ( at FH levels) with strong family history of CAD , with morbid obesity, h/o asthma, GUICHO ( using CPAP) and MBL with statin intolerance who had a syncopal episode( in November 2020, still with significant issues with joint pain and uncontrolled HTN also s/p COVID infection in late Dec 2021 who returns for followup.     Patient with significant issues with pain from MBL who has had issues controlling her BP when pain is worst. BP meds have been adjusted over time. The higher BP in the afternoon did correlate when her pain is worse. Continues to work with pain management     Cardiac work up :  ST scan for CAC: total 0  Cardiac Echo Jan 23 2020: normal LV size and systolic function with LVEF 65-70% technically difficult study. impaired relaxation, no significant valvular pathology.  Event monitor from 11/19/2020-12/19/2020: min HR 70 bpm, max 132, no SVEs VEs noted. skipped beats, lightheadedness and heart racing corresponded to sinus rhythm and sinus tachycardia.   CTA with heart flow 3/18/2020: normal chamber sizes, normal coronary anatomy without evidence for atherosclerotic changes or stenotic disease.   Renal artery ultrasound 1/23/2020- no evidence for renal artery stenosis. bilateral renal veins widely patent.  VMA and metanephrines were normal, TSH was normal.      She has had syncopal episodes over the years.  She wore a 30 day( Nov 2020) monitor and log notes occasional skipped beats, some SOB and CP and heart racing. Monitor during those episodes showed sinus rhythm and sinus tachycardia, and did not show any worrisome arrhythmias. Had not had syncopal episodes from Nov 2020 til at some point in  2022. She recently was diagnosed with POTs and was working with a  neurologist. Most recently had a syncopal episodes without any warning Oct 2023. ( Holding grandchild, fell and broke a table in child's room). More recently she does note an increase in her palpitations. Heart races for seconds then stops. Had tried to wear 30 day live monitor but had significant skin reaction to any leads( even sensitive leads) so unable to wear for any extended period of time.   She previously was using  CPAP for GUICHO, but has not been able to tolerate following COVID. She still needs to return to sleep medicine.  Has hyperlipidemia at FH levels and is statin intolerant and had been on  repatha . Also has elevated triglycerides and was on over the counter fish oil, but could not tolerate due to reflux issues and vomiting. She had tolerated vascepa in the past though could not afford so d/cd.      She saw Dr Gentile for medical weight. On meds ( monjaro) she had initially lost 55 lbs, however insurance stopped paying for meds and could not afford . Off meds she has regained 35 lbs.  She is routinely using her CPAP., however still not sleeping well. She does no  exercise due to pain. Her BP varies a lot through the day depending on her pain levels. Often around 120s/75mmHg, but if gets too much pain her BP rises to 190mmHg systolic and will use clonidine to bring down ( though gets tired).  She does take clonidine at bedtime a well. She intermittently has LE swelling. She uses support stockings. She denies CP or SOB at rest or with ADLs. She typically needs to use the additional dose of 0.1 mg clonidine 1-2 x per week. Additionally she can occasionally be dizzy and have low BP which she then treats with volume expanders( Gatorade etc) and uses support socks about 1-2 x per week.     Currently her Bp is ranging from 98//113mmHg which does not appear to correlate with pain levels at this point. She plays with young kids she watches and moves a lot with them and walks with them She gets about 4 k  steps daily. She does get dizziness with HR running slow as well as when running fast. She gets pounding headaches. oint. HR ranges from 41 to 160 bpm with minimal activity. She is currently on carvedilol but her neurologist added metoprolol on top of that for POTs.       ROS: Full 10 pt review of symptoms of negative unless discussed above.     Problems:   Patient Active Problem List   Diagnosis    Allergic rhinitis    Anxiety disorder    Arthralgia of multiple sites    Bariatric surgery status    Benign essential hypertension    Bowel disease, inflammatory    Immunodeficiency syndrome (Multi)    Chronic diarrhea    Continuous LLQ abdominal pain    Depression    Essential familial hyperlipidemia    GERD (gastroesophageal reflux disease)    Gouty arthropathy    Hoarseness    Hot flashes    Hyperlipidemia    Hypertriglyceridemia    Hypothyroidism (acquired)    Inflammatory arthropathy    Rheumatoid arthritis (Multi)    Hypersomnia    Labile hypertension    Low vitamin D level    Lyme disease    Mannose-binding lectin deficiency (Multi)    Methadone use    Migraine without status migrainosus, not intractable    Morbid obesity (Multi)    Mixed incontinence urge and stress    Osteoporosis    Obstructive sleep apnea, adult    DRD (DOPA-responsive dystonia)    Post herpetic neuralgia    Polyphagia    POTS (postural orthostatic tachycardia syndrome)    Prediabetes    Psoriatic arthropathy (Multi)    Steroid withdrawal syndrome with complication (Multi)    Steroid-dependent asthma ([facility])    Vitamin B12 deficiency    Vitamin D deficiency    Gait disturbance    Bilateral leg edema    Cystocele with uterine descensus    Dyspnea on effort    Elbow tendinitis    Foreign body in ear, right, initial encounter    Frequent falls    Hyperglycemia    Hyperuricemia    Incomplete bladder emptying    Insomnia, organic    Intermittent palpitations    Lower back pain    Maxillary sinusitis    Myofascial pain    Orthostatic intolerance     Other ovarian cyst, unspecified side    Pelvic floor weakness    Perioral dermatitis    Peripheral neuropathy    Postural dizziness    Psoriasis vulgaris    Recurrent urinary tract infection    Renal lesion    Right lumbar radiculopathy    Rosacea, unspecified    Sacroiliac joint dysfunction of right side    Snoring    Spasm of thoracic back muscle    Syncope    Urinary frequency    Panic attacks     Medical History:   Past Medical History:   Diagnosis Date    Acute upper respiratory infection, unspecified 07/26/2016    Viral URI with cough    Acute upper respiratory infection, unspecified 11/15/2017    Viral URI with cough    Allergy status to unspecified drugs, medicaments and biological substances     History of allergy    Chronic maxillary sinusitis 08/27/2018    Maxillary sinusitis, unspecified chronicity    Dystonia, unspecified 04/08/2014    Dystonia    Encounter for other screening for malignant neoplasm of breast 01/22/2018    Breast screening    Encounter for screening for respiratory tuberculosis 11/11/2013    Screening examination for pulmonary tuberculosis    Essential (primary) hypertension 12/27/2017    Benign essential hypertension    Hypersomnia, unspecified 04/13/2015    Sleeps too much    Idiopathic sleep related nonobstructive alveolar hypoventilation     Nocturnal hypoxemia    Idiopathic sleep related nonobstructive alveolar hypoventilation 02/09/2018    Nocturnal hypoxia    Impaired fasting glucose 01/22/2018    Impaired fasting glucose    Insect bite (nonvenomous) of lower back and pelvis, initial encounter 03/29/2018    Tick bite of back    Left lower quadrant pain 01/07/2019    Left lower quadrant pain    Local infection of the skin and subcutaneous tissue, unspecified 07/28/2017    Skin infection    Low back pain, unspecified 05/23/2019    Acute low back pain    Other conditions influencing health status 02/27/2017    Sprain of right thumb, unspecified site of finger, initial encounter     Other malaise 04/16/2018    Malaise and fatigue    Other microscopic hematuria 03/30/2018    Microscopic hematuria    Other specified cough 09/07/2018    Productive cough    Other specified health status 01/07/2019    Acute medical illness    Other symptoms and signs involving the musculoskeletal system 07/12/2018    Weakness of right lower extremity    Other symptoms and signs involving the musculoskeletal system 03/21/2018    Polyarticular joint involvement    Other symptoms and signs involving the musculoskeletal system 07/12/2018    RUE weakness    Pain in right lower leg 01/27/2016    Right calf pain    Pain in unspecified hip 01/20/2016    Hip pain    Pelvic and perineal pain 04/17/2018    Pelvic pain    Personal history of diseases of the skin and subcutaneous tissue 08/07/2018    History of dermatitis    Personal history of other (healed) physical injury and trauma 08/07/2018    History of insect bite    Personal history of other diseases of the circulatory system     History of hypertension    Personal history of other diseases of the female genital tract 11/24/2015    History of menorrhagia    Personal history of other diseases of the musculoskeletal system and connective tissue 03/14/2018    History of tendinitis    Personal history of other diseases of the nervous system and sense organs 04/08/2014    History of Parkinson's disease    Personal history of other diseases of the respiratory system 01/07/2019    History of acute bronchitis    Personal history of other diseases of the respiratory system 10/05/2018    History of paranasal sinus pain    Personal history of other specified conditions 04/13/2015    History of snoring    Personal history of other specified conditions 09/11/2017    History of headache    Personal history of other specified conditions 09/07/2018    History of persistent cough    Personal history of other specified conditions 04/08/2014    History of memory loss    Personal  history of other specified conditions 03/26/2018    History of left flank pain    Personal history of other specified conditions     History of heartburn    Personal history of urinary calculi 03/26/2018    Personal history of urinary calculi    Personal history of urinary calculi 03/26/2018    History of renal calculi    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/2017    Plantar fasciitis, left    Primary insomnia 04/13/2015    Primary insomnia    Rash and other nonspecific skin eruption 04/16/2018    Rash    Unspecified abdominal pain 01/07/2019    Left sided abdominal pain    Urinary tract infection, site not specified 10/19/2018    UTI (urinary tract infection), bacterial    Urinary tract infection, site not specified 10/18/2018    Recurrent UTI    Vitamin D deficiency, unspecified 04/22/2016    Vitamin D deficiency    Zoster with other complications 11/25/2015    Herpes zoster dermatitis     Surgical Hx:   Past Surgical History:   Procedure Laterality Date    CT ANGIO CORONARY ART WITH HEARTFLOW IF SCORE >30%  3/18/2020    CT HEART CORONARY ANGIOGRAM 3/18/2020 [facility]    HAND TENDON SURGERY  11/11/2013    Hand Incision Tendon Sheath Of A Finger    HYSTERECTOMY  03/04/2016    Hysterectomy    LITHOTRIPSY  11/11/2013    Renal Lithotripsy    TONSILLECTOMY  12/19/2013    Tonsillectomy With Adenoidectomy      Social Hx:   Tobacco Use: Low Risk  (7/1/2024)    Patient History     Smoking Tobacco Use: Never     Smokeless Tobacco Use: Never     Passive Exposure: Not on file     Alcohol Use: Not on file     Family Hx:   Family History   Problem Relation Name Age of Onset    Asthma Mother      Hypertension Mother      Irregular heart beat Mother      Allergies Father      Heart disease Father      Hypertension Father      Sinusitis Father      Allergies Sister      Asthma Daughter      Allergies Son      Heart disease Maternal Grandmother      Breast cancer Paternal Grandmother      Heart disease Paternal Grandfather       "Lung cancer Paternal Grandfather      Dementia Paternal Great-Grandfather        Exam:   Vitals:   Vitals:    07/01/24 1112   BP: 123/82   Pulse: 98   SpO2: 92%   Weight: 103 kg (226 lb 4.8 oz)   Height: 1.626 m (5' 4\")     Wt Readings from Last 5 Encounters:   07/01/24 103 kg (226 lb 4.8 oz)   06/19/24 103 kg (227 lb 6.4 oz)   05/14/24 106 kg (233 lb 6.4 oz)   05/10/24 99.8 kg (220 lb)   04/25/24 104 kg (230 lb)      Constitutional:       Appearance: Healthy appearance. Not in distress.   Pulmonary:      Effort: Pulmonary effort is normal.      Breath sounds: Normal breath sounds.   Cardiovascular:      Normal rate. Regular rhythm. S1 with normal intensity. S2 with normal intensity.       Murmurs: There is no murmur.   Edema:     Peripheral edema absent.   Abdominal:      General: Bowel sounds are normal.      Palpations: Abdomen is soft.   Neurological:      Mental Status: Alert and oriented to person, place and time.       Labs:   Recent Labs     06/19/24  1550 05/03/24  1549 02/21/24  1559 11/14/23  1615 09/18/23  1317 08/24/23  1551 08/14/23  1157 03/10/23  1329   WBC 4.4 4.9 6.0 5.4 4.6 5.9 4.5 5.4   HGB 11.2* 10.7* 12.6 11.2* 11.9* 11.8* 12.0 12.3   HCT 34.3* 33.7* 38.9 34.9* 36.2 37.5 36.7 36.8    292 334 296 334 325 301 287   MCV 92 93 93 95 94 98 96 94     Recent Labs     06/19/24  1550 02/21/24  1559 11/14/23  1615 09/18/23  1317 06/16/23  1705 03/10/23  1329 12/08/22  1530 06/30/22  1515    139 140 137 139 135* 139 140   K 4.4 4.0 4.2 4.2 3.9 4.1 3.9 3.9    100 101 102 103 101 101 102   BUN 17 11 19 11 15 12 13 18   CREATININE 0.58 0.58 0.68 0.59 0.77 0.60 0.60 0.66      Recent Labs     07/01/24  1244 02/21/23  1251 04/09/21  1204 11/19/20  1300 05/27/20  1242 06/20/19  1416   HGBA1C  --  4.6 5.0 5.0 5.2 5.1   FERRITIN 26  --   --  42  --   --    TIBC 444  --   --  486*  --   --    IRONSAT 16*  --   --  25  --   --      Lab Results   Component Value Date    CHOL 264 (H) 07/01/2024    " HDL 37.1 07/01/2024    LDLF 121 (H) 03/10/2023    TRIG 417 (H) 07/01/2024   LDL 7/1/2024: 152    Notable Studies: imaging personally reviewed and summarized by me below  EKG:  Encounter Date: 01/04/24   ECG 12 lead (Clinic Performed)   Result Value    Ventricular Rate 90    Atrial Rate 90    OR Interval 164    QRS Duration 92    QT Interval 364    QTC Calculation(Bazett) 445    P Axis 53    R Axis 12    T Axis 53    QRS Count 15    Q Onset 222    P Onset 140    P Offset 197    T Offset 404    QTC Fredericia 416    Narrative    Normal sinus rhythm  Normal ECG  When compared with ECG of 10-FEB-2022 10:42,  No significant change was found  Confirmed by Tan Fishman ([id number]) on 2/18/2024 9:44:51 PM     Echo 2/5/2024:  PHYSICIAN INTERPRETATION:  Left Ventricle: The left ventricular systolic function is normal, with an estimated ejection fraction of 60-65%. There are no regional wall motion abnormalities. The left ventricular cavity size is normal. Spectral Doppler shows a normal pattern of left ventricular diastolic filling.  Left Atrium: The left atrium is normal in size.  Right Ventricle: The right ventricle is normal in size. There is normal right ventricular global systolic function.  Right Atrium: The right atrium is normal in size.  Aortic Valve: The aortic valve is trileaflet. There is no evidence of aortic valve regurgitation. The peak instantaneous gradient of the aortic valve is 3.4 mmHg. The mean gradient of the aortic valve is 2.0 mmHg.  Mitral Valve: The mitral valve is normal in structure. There is trace mitral valve regurgitation.  Tricuspid Valve: The tricuspid valve is structurally normal. There is trace tricuspid regurgitation. The right ventricular systolic pressure is unable to be estimated.  Pulmonic Valve: The pulmonic valve is structurally normal. There is physiologic pulmonic valve regurgitation.  Pericardium: There is a trivial pericardial effusion.  Aorta: The aortic root is normal.  Systemic  Veins: The inferior vena cava appears to be of normal size.  In comparison to the previous echocardiogram(s): Compared with study from 1/23/2020,. Nompared wtihthe prior examfro m1/23/2020, there are no significant changes though endocardial definition was suboptimal on today's exam and would have benefitted from use of echocontrast.        CONCLUSIONS:   1. Left ventricular systolic function is normal with a 60-65% estimated ejection fraction.   2. Nompared wtihthe prior examfro m1/23/2020, there are no significant changes though endocardial definition was suboptimal on today's exam and would have benefitted from use of echocontrast.      Cardiac stress test MRI 3/15/2022:   1. Normal LV size (EDVi 56 ml/m2) with mildly reduced systolic   function (LVEF 47 %) .Global hypokinesis  without focal wall motion abnormality  2. Questionable myocardial inflammation/edema on limited T2-weighted   imaging (T2 mapping). Would correlate  with biomarkers and clinical status for myocarditis.   3. No evidence of myocardial fibrosis, infiltration or infarction   based on late gadolinium imaging      LEFT VENTRICLE: 1. Normal LV size (EDVi 56 ml/m2) mildly reduced   systolic function (LVEF 47 %).   2. Global hypokinesis without focal wall motion abnormality  3. Normal LV wall thickness and indexed LV mass.   4. Normal ECV 25%.   5. Limited T2 mapping due to artifacts. Patchy elevated native T2   values (>55msec ms) , which can be seen  with myocarditis. Would correlate with biomarkers and clinical status   for myocarditis.   6. Following administration of gadolinium, in the early phase, there   is no evidence of LV thrombus or MVO.   7. In the late phase, there is no significant myocardial enhancement.  Quantitative LVEF 47 %.     VIABILITY: Hyperenhancement is normal.     RIGHT VENTRICLE: Quantitative RVEF 48 %.     LV/RV SEPTUM: The LV/RV septum is normal.     LA/RA SEPTUM: The LA/RA septum is normal.     LEFT ATRIUM: Severe left  atrial     RIGHT ATRIUM: Mild right atrial enlargement     PERICARDIUM: Trivial pericardial effusion     PLEURAL EFFUSION: No pleural effusion     AORTIC VALVE: Peak aortic valve velocity 75 cm/sec. Aortic   regurgitant volume 3 ml. Aortic regurgitant fraction 8 %.     MITRAL VALVE: There is mild mitral regurgitation with regurgitant   volume 15ml, regurgitant fraction 30%.      AORTIC ROOT: The aortic root is normal.      Cardiac Monitor 3/2024:  no Afib, sinus rhythm with no significant arrhythmias ( but did not wear for a prolonged period of time)       Current Outpatient Medications   Medication Instructions    albuterol 2.5 mg /3 mL (0.083 %) nebulizer solution inhale the contents of one vial via nebulizer every 4 hours as needed    allopurinol (ZYLOPRIM) 100 mg, oral, Daily    allopurinol (ZYLOPRIM) 300 mg, oral, Daily    amLODIPine (NORVASC) 2.5 mg, oral, Daily    amoxicillin (AMOXIL) 500 mg, oral, Daily    ascorbic acid, vitamin C, 500 mg capsule 1 capsule, oral, Daily    benzoyl peroxide (Benzac AC) 10 % external wash Topical, As needed    benztropine (COGENTIN) 2 mg, oral, Daily    buPROPion XL (WELLBUTRIN XL) 150 mg, oral, Daily, Do not crush, chew, or split.    buPROPion XL (WELLBUTRIN XL) 300 mg, oral, Every morning, Do not crush, chew, or split.    carbidopa-levodopa-entacapone (Stalevo) -200 mg tablet 1 tablet, oral, Every 4 hours    carvedilol (Coreg) 25 mg tablet 1 tablet, oral, 2 times daily (morning and late afternoon)    cetirizine-pseudoephedrine (ZyrTEC-D) 5-120 mg 12 hr tablet 1 tablet, oral, 2 times daily    clindamycin (Cleocin T) 1 % lotion 1 Application    clobetasol (Temovate) 0.05 % external solution Topical, 2 times daily    clobetasoL 0.05 % lotion One application as needed for flaring only.not for daily use. For scalp and hands only    cloNIDine (CATAPRES) 0.1 mg, oral, As needed    cloNIDine (CATAPRES) 0.2 mg, oral, Nightly    Cosentyx Pen (2 Pens) 300 mg, subcutaneous, Every  30 days    cyanocobalamin (vitamin B-12) 1,000 mcg, injection, Every 28 days    diclofenac sodium (VOLTAREN) 4 g, Topical, 2 times daily PRN    doxycycline (Vibramycin) 100 mg capsule 1 capsule    DULoxetine (CYMBALTA) 60 mg, oral, Daily    eplerenone (Inspra) 25 mg tablet 1 tablet, oral, Daily    [START ON 7/7/2024] ergocalciferol (VITAMIN D-2) 50,000 Units, oral, Once Weekly    ezetimibe (ZETIA) 10 mg, oral, Daily    fluticasone furoate-vilanteroL (Breo Ellipta) 200-25 mcg/dose inhaler 1puff daily, Inhalation    ipratropium-albuteroL (Duo-Neb) 0.5-2.5 mg/3 mL nebulizer solution 3 mL, nebulization, Every 4 hours PRN    ketoconazole (NIZOral) 2 % cream 1 Application    lactobacillus acidophilus (Lactobacillus acidoph-L.bulgar) tablet tablet 1 tablet, oral, Daily    levalbuterol (Xopenex) 1.25 mg/3 mL nebulizer solution 1 ampule, nebulization, 3 times daily RT    meloxicam (MOBIC) 15 mg, oral, Daily    metoprolol succinate XL (TOPROL-XL) 25 mg, oral, Daily    metroNIDAZOLE (Metrocream) 0.75 % cream 1 Application    montelukast (SINGULAIR) 10 mg, oral, Nightly    nebulizer and compressor device use q4-6 hours with albuterol PRN cough/wheeze    nebulizers (Devilbiss Disposable Nebulizer) misc Every 4 hours PRN    olmesartan (BENICAR) 40 mg, oral, Daily    omega-3 acid ethyl esters (LOVAZA) 2 g, oral, 2 times daily    omeprazole (PRILOSEC) 40 mg, oral, Daily before breakfast    ondansetron ODT (ZOFRAN-ODT) 4 mg, oral, Every 8 hours PRN    orphenadrine (NORFLEX) 100 mg, oral, 2 times daily PRN    pioglitazone (ACTOS) 15 mg, oral, Daily    pregabalin (LYRICA) 75 mg, oral, 3 times daily    ProAir HFA 90 mcg/actuation inhaler     QUEtiapine (SEROQUEL) 25 mg, oral, 2 times daily PRN    Repatha SureClick 140 mg, subcutaneous, Every 14 days    Spiriva Respimat 1.25 mcg/actuation inhaler inhalation, Daily RT    SUMAtriptan (IMITREX) 25 mg, oral, Once as needed, May repeat in 2 hours if no improvement. Max 200 mg/24 hr     tapentadol (NUCYNTA) 50 mg, oral, 3 times daily PRN    Tezspire 210 mg, subcutaneous     Impressions and Plan:    Patient is 44 year old woman with complex medical history with FH, HTN which is poorly controlled, Vitamin D deficiency despite high dose Rx with prior statin intolerance with MBL, asthma with strong family history of CAD who had a CTA with heart flow( 3/2020)showing no significant CAD. Her BP is quite variable /labile appears to run low and high , though will further assess with a 24 hour BP monitor to assess range of BP. Uses clonidine to control BP spikes. More recently diagnosed with POTs and occasionally has low BP which she treats with volume expanders and wears support socks. Was started on metoprolol on top of carvedilol by neurologist ( who left system and she is to see someone else for her POTs) . With diagnosis of  POTs- using volume expanders yony gaotoade and wearing support stockings. Will try to discuss with a POTs neurologist to see if any role to be on 2 beta blockers. Given she feels HR did not change much with addition of metoprolol. Will likely work on titrating that down to discontinue.   She continues her repatha and is tolerating it well. Had been on  zetia and vascepa in the past though stopped due to cost. She is back her zetia, and just started lovaza. Will recheck lipids in about 3 months.  Of note following covid infection at end of December 2021 she had persistent significant SOB on minimal exertion as well as  more tachycardia,. Had cardiac MRI 3/2022 with mildly reduced LV function and questionable myocardial inflammation. Inflammatory markers in blood were normal at that time. She no longer is having SOB. She does note more palpitations ( episodic) and had 1 episode of syncope without warning October 2023, and possibly in March 2024. Will work on getting a loop recorder placed since unable to tolerate patches for cardiac monitors for any extended period of time. . No CP or  SOB or diaphoresis before or after syncope. She had lost 55 lbs on wegovy, but once insurance stopped coverage is no longer able to afford and has regained 35 lb. This weight gain may explain why her LDL is higher than before despite being on repatha.   Plan:  1. Hyperlipidemia/FH and hypertriglyceridemia- To continue repatha,   zetia and recently addedd lovaza. WILl recheck lipid panel in 3 months.   2. Hypertension- Continue current regimen ( amlodipine 2.5 mg daily, olmesartan 40 mg daily and carvedilol 25 mg bid, and clonidine 0.2 mg at bed time). Can use clonidine 0.1 mg in the late afternoon if BP is very high ( > 150mmHg systolic). To continue to check and log BP and HR and review. Will review medications ( specifically beta blockers) with her POTs neurologist. BP swings are till a persistent problem. TO assess with 24 hour BP monitor to assess range and timing of various BP  3. Palpitations - recently increased in frequency as well as a single episode of syncope without warning in October and possibly another one in March. Unable to wear 30 day live monitor, so will get a loop recorder placed. . Cardiac echo with preserved LV systolic function 2/2024.   4. GUICHO- Unable to tolerate CPAP. To follow up with sleep medicine  to discuss alternative rx.    6. Obesity- continue working with Dr Gentile's group. Working with pharmacy clinic to see if we can help with medication costs and coverage . Only GLP-1 agonist her insurance will cover is  trulicity, though does not have FDA indication for weight loss. Pharmacist will be checking with Dr Gentile to see if it would be appropriate to start trulicity.    return to clinic in 3 months'  Patient Instructions:  If you have any questions or need cardiac medication refills, please call my office at [phone],      To reach my office please call [phone]  To schedule an appointment call [phone].           ____________________________________________________________  Akosua Richardson MD  Division of Cardiovascular Medicine  [facility]

## 2024-07-01 NOTE — PATIENT INSTRUCTIONS
1. Obstructive sleep apnea -unable to use  CPAP since COVID- to go back to sleep medicine to explore further treatment options   2. hyperlipidemia- Continue repatha zetia and  vascepa. Due for recheck of lipid panel.   3. Morbid Obesity-Was working with Dr Gentile's group trying to get pharmacy coverage for meds for medical weight loss.   4. Hypertension- Continue amlodipine 2.5 mg daily( swelling resolved on lower dose ) , olmesartan 40 mg and carvedilol 25 mg bid. And takes clonidine 0.2 mg at bed time. Occasionally  takes additional dose  of clonidine 0.1 mg around 3 or 4 in the afternoon if systolic BP > 150mmHg ( now taking 1-2 x per week) .BP appears to be quite labile- will obtain 24 hour BP monitor to assess throughout the day.Note that did a renal artery ultrasound 1/2020 and did not have any renal artery stenosis. Re concern over constant pain issues as well as use of NSAIDs  contributing to BP issues so NSAIDs being held.  Given more recent diagnosis of POTs not likely tolerating the carvedilol and would do better with metoprolol. ( Can increase the clonidine if needed in the future for BP control. Will titrate off carvedilol and increase metoprolol as needed. For now will lower carvedilol to 12.5 mg bid for 1 week then 6.25 mg bid. Pt to check BP and HR and call the office in 1 week to discuss readings. As needed has clonidine 0.1 mg if Bp runs too high in a given day( in addition to her 0.2 mg dose at bedtime.   5.  Pt with worsening of palpitations . Also  dx of POTs. To continue use support stockings/socks( [employer]) as well as volume expanders. Also had one episode of syncope in the fall without any warning unable to wear 30 day monitor due to extreme reaction to adhesives. Again had another syncopal episode in March. Will consider placing loop recorder. ( To discuss with Dr Marie) . Echo technically difficult though normal LV systolic function. Discussed possible loop recorder with Dr Marie who  felt most likely due to dysautonomia rather than arrhythmia. Would benefit from seeing a new POTs neurologist to help with her care ( prior MD has left sytem) will refer to Dr Cherry.   Return to clinic 3 months

## 2024-07-02 DIAGNOSIS — E55.9 VITAMIN D DEFICIENCY: Primary | ICD-10-CM

## 2024-07-02 RX ORDER — ERGOCALCIFEROL 1.25 MG/1
50000 CAPSULE ORAL
Qty: 12 CAPSULE | Refills: 0 | Status: SHIPPED | OUTPATIENT
Start: 2024-07-07 | End: 2024-09-29

## 2024-07-05 ENCOUNTER — SPECIALTY PHARMACY (OUTPATIENT)
Dept: PHARMACY | Facility: CLINIC | Age: 44
End: 2024-07-05

## 2024-07-07 ENCOUNTER — HOSPITAL ENCOUNTER (EMERGENCY)
Facility: HOSPITAL | Age: 44
Discharge: HOME | End: 2024-07-07
Attending: FAMILY MEDICINE
Payer: MEDICAID

## 2024-07-07 ENCOUNTER — APPOINTMENT (OUTPATIENT)
Dept: RADIOLOGY | Facility: HOSPITAL | Age: 44
End: 2024-07-07
Payer: MEDICAID

## 2024-07-07 VITALS
RESPIRATION RATE: 16 BRPM | WEIGHT: 227.07 LBS | DIASTOLIC BLOOD PRESSURE: 85 MMHG | HEART RATE: 96 BPM | TEMPERATURE: 98.5 F | BODY MASS INDEX: 35.64 KG/M2 | HEIGHT: 67 IN | SYSTOLIC BLOOD PRESSURE: 125 MMHG | OXYGEN SATURATION: 96 %

## 2024-07-07 DIAGNOSIS — M54.9 MUSCULOSKELETAL BACK PAIN: Primary | ICD-10-CM

## 2024-07-07 LAB
ALBUMIN SERPL BCP-MCNC: 4.3 G/DL (ref 3.4–5)
ALP SERPL-CCNC: 63 U/L (ref 33–110)
ALT SERPL W P-5'-P-CCNC: 20 U/L (ref 7–45)
ANION GAP SERPL CALC-SCNC: 14 MMOL/L (ref 10–20)
APPEARANCE UR: CLEAR
AST SERPL W P-5'-P-CCNC: 23 U/L (ref 9–39)
BASOPHILS # BLD AUTO: 0.01 X10*3/UL (ref 0–0.1)
BASOPHILS NFR BLD AUTO: 0.2 %
BILIRUB SERPL-MCNC: 0.2 MG/DL (ref 0–1.2)
BILIRUB UR STRIP.AUTO-MCNC: NEGATIVE MG/DL
BUN SERPL-MCNC: 11 MG/DL (ref 6–23)
CALCIUM SERPL-MCNC: 9.6 MG/DL (ref 8.6–10.3)
CHLORIDE SERPL-SCNC: 103 MMOL/L (ref 98–107)
CO2 SERPL-SCNC: 26 MMOL/L (ref 21–32)
COLOR UR: YELLOW
CREAT SERPL-MCNC: 0.63 MG/DL (ref 0.5–1.05)
EGFRCR SERPLBLD CKD-EPI 2021: >90 ML/MIN/1.73M*2
EOSINOPHIL # BLD AUTO: 0.01 X10*3/UL (ref 0–0.7)
EOSINOPHIL NFR BLD AUTO: 0.2 %
ERYTHROCYTE [DISTWIDTH] IN BLOOD BY AUTOMATED COUNT: 14.1 % (ref 11.5–14.5)
GLUCOSE SERPL-MCNC: 101 MG/DL (ref 74–99)
GLUCOSE UR STRIP.AUTO-MCNC: NEGATIVE MG/DL
HCT VFR BLD AUTO: 35 % (ref 36–46)
HGB BLD-MCNC: 11.6 G/DL (ref 12–16)
IMM GRANULOCYTES # BLD AUTO: 0.01 X10*3/UL (ref 0–0.7)
IMM GRANULOCYTES NFR BLD AUTO: 0.2 % (ref 0–0.9)
KETONES UR STRIP.AUTO-MCNC: ABNORMAL MG/DL
LACTATE SERPL-SCNC: 2.3 MMOL/L (ref 0.4–2)
LEUKOCYTE ESTERASE UR QL STRIP.AUTO: NEGATIVE
LYMPHOCYTES # BLD AUTO: 2.06 X10*3/UL (ref 1.2–4.8)
LYMPHOCYTES NFR BLD AUTO: 49.3 %
MCH RBC QN AUTO: 30 PG (ref 26–34)
MCHC RBC AUTO-ENTMCNC: 33.1 G/DL (ref 32–36)
MCV RBC AUTO: 90 FL (ref 80–100)
MONOCYTES # BLD AUTO: 0.5 X10*3/UL (ref 0.1–1)
MONOCYTES NFR BLD AUTO: 12 %
NEUTROPHILS # BLD AUTO: 1.59 X10*3/UL (ref 1.2–7.7)
NEUTROPHILS NFR BLD AUTO: 38.1 %
NITRITE UR QL STRIP.AUTO: NEGATIVE
NRBC BLD-RTO: ABNORMAL /100{WBCS}
PH UR STRIP.AUTO: 7 [PH]
PLATELET # BLD AUTO: 294 X10*3/UL (ref 150–450)
POTASSIUM SERPL-SCNC: 3.8 MMOL/L (ref 3.5–5.3)
PROT SERPL-MCNC: 7.4 G/DL (ref 6.4–8.2)
PROT UR STRIP.AUTO-MCNC: NEGATIVE MG/DL
RBC # BLD AUTO: 3.87 X10*6/UL (ref 4–5.2)
RBC # UR STRIP.AUTO: NEGATIVE /UL
SODIUM SERPL-SCNC: 139 MMOL/L (ref 136–145)
SP GR UR STRIP.AUTO: 1.02
UROBILINOGEN UR STRIP.AUTO-MCNC: 0.2 MG/DL
WBC # BLD AUTO: 4.2 X10*3/UL (ref 4.4–11.3)

## 2024-07-07 PROCEDURE — 99284 EMERGENCY DEPT VISIT MOD MDM: CPT | Mod: 25

## 2024-07-07 PROCEDURE — 74176 CT ABD & PELVIS W/O CONTRAST: CPT | Performed by: STUDENT IN AN ORGANIZED HEALTH CARE EDUCATION/TRAINING PROGRAM

## 2024-07-07 PROCEDURE — 96374 THER/PROPH/DIAG INJ IV PUSH: CPT

## 2024-07-07 PROCEDURE — 96361 HYDRATE IV INFUSION ADD-ON: CPT

## 2024-07-07 PROCEDURE — 74176 CT ABD & PELVIS W/O CONTRAST: CPT

## 2024-07-07 PROCEDURE — 36415 COLL VENOUS BLD VENIPUNCTURE: CPT | Performed by: FAMILY MEDICINE

## 2024-07-07 PROCEDURE — 85025 COMPLETE CBC W/AUTO DIFF WBC: CPT | Performed by: FAMILY MEDICINE

## 2024-07-07 PROCEDURE — 81003 URINALYSIS AUTO W/O SCOPE: CPT | Performed by: FAMILY MEDICINE

## 2024-07-07 PROCEDURE — 96375 TX/PRO/DX INJ NEW DRUG ADDON: CPT

## 2024-07-07 PROCEDURE — 2500000005 HC RX 250 GENERAL PHARMACY W/O HCPCS: Performed by: HOSPITALIST

## 2024-07-07 PROCEDURE — 80053 COMPREHEN METABOLIC PANEL: CPT | Performed by: FAMILY MEDICINE

## 2024-07-07 PROCEDURE — 83605 ASSAY OF LACTIC ACID: CPT | Performed by: FAMILY MEDICINE

## 2024-07-07 PROCEDURE — 2500000004 HC RX 250 GENERAL PHARMACY W/ HCPCS (ALT 636 FOR OP/ED): Performed by: FAMILY MEDICINE

## 2024-07-07 PROCEDURE — 2500000004 HC RX 250 GENERAL PHARMACY W/ HCPCS (ALT 636 FOR OP/ED): Performed by: HOSPITALIST

## 2024-07-07 RX ORDER — LIDOCAINE 560 MG/1
1 PATCH PERCUTANEOUS; TOPICAL; TRANSDERMAL ONCE
Status: DISCONTINUED | OUTPATIENT
Start: 2024-07-07 | End: 2024-07-07 | Stop reason: HOSPADM

## 2024-07-07 RX ORDER — ONDANSETRON HYDROCHLORIDE 2 MG/ML
4 INJECTION, SOLUTION INTRAVENOUS ONCE
Status: COMPLETED | OUTPATIENT
Start: 2024-07-07 | End: 2024-07-07

## 2024-07-07 RX ORDER — ORPHENADRINE CITRATE 30 MG/ML
60 INJECTION INTRAMUSCULAR; INTRAVENOUS ONCE
Status: COMPLETED | OUTPATIENT
Start: 2024-07-07 | End: 2024-07-07

## 2024-07-07 RX ORDER — KETOROLAC TROMETHAMINE 30 MG/ML
30 INJECTION, SOLUTION INTRAMUSCULAR; INTRAVENOUS ONCE
Status: COMPLETED | OUTPATIENT
Start: 2024-07-07 | End: 2024-07-07

## 2024-07-07 ASSESSMENT — PAIN SCALES - GENERAL
PAINLEVEL_OUTOF10: 7
PAINLEVEL_OUTOF10: 6
PAINLEVEL_OUTOF10: 5 - MODERATE PAIN

## 2024-07-07 ASSESSMENT — PAIN DESCRIPTION - LOCATION
LOCATION: BACK
LOCATION: BACK

## 2024-07-07 ASSESSMENT — PAIN DESCRIPTION - PAIN TYPE: TYPE: ACUTE PAIN

## 2024-07-07 ASSESSMENT — PAIN SCALES - PAIN ASSESSMENT IN ADVANCED DEMENTIA (PAINAD): BREATHING: NORMAL

## 2024-07-07 ASSESSMENT — PAIN - FUNCTIONAL ASSESSMENT: PAIN_FUNCTIONAL_ASSESSMENT: 0-10

## 2024-07-07 NOTE — ED PROVIDER NOTES
HPI   Chief Complaint   Patient presents with    Abdominal Pain     Female patient presents to the ED with complaints of urinary frequency, fatigue, headaches, and left lower back pain ongoing for 3 weeks and worsening over the past few days. She states she felt chilled, nauseous and had an episode of diarrhea today. Hx of urosepsis.        44-year-old morbidly obese female with a history of uro-sepsis.  She has a history of kidney stones, and passed a stone 2 weeks ago.    She presents with a 3 wk hx of urinary frequency, fatigue, headaches, and left lower back pain ongoing with worsening symptoms over the past few days.  Since passing the stone, her pain has not changed.  She rates it as 5/10 currently.  9 over 10 at night.  The pain is left lower quadrant, constant, with nausea, sweats, urinary frequency but poor output.      She has had no fevers, chills, dysuria, hematuria, or vomiting.  She notes that when she had urosepsis she had fever.      History provided by:  Patient   used: No      Patient states that approximately at the same time she had a fall at home getting off the toilet.  She collapsed fell on her buttocks and hit her head on the commode.  She did not lose consciousness and did not require medical attention.      Patient History   Past Medical History:   Diagnosis Date    Acute upper respiratory infection, unspecified 07/26/2016    Viral URI with cough    Acute upper respiratory infection, unspecified 11/15/2017    Viral URI with cough    Allergy status to unspecified drugs, medicaments and biological substances     History of allergy    Chronic maxillary sinusitis 08/27/2018    Maxillary sinusitis, unspecified chronicity    Dystonia, unspecified 04/08/2014    Dystonia    Encounter for other screening for malignant neoplasm of breast 01/22/2018    Breast screening    Encounter for screening for respiratory tuberculosis 11/11/2013    Screening examination for pulmonary  tuberculosis    Essential (primary) hypertension 12/27/2017    Benign essential hypertension    Hypersomnia, unspecified 04/13/2015    Sleeps too much    Idiopathic sleep related nonobstructive alveolar hypoventilation     Nocturnal hypoxemia    Idiopathic sleep related nonobstructive alveolar hypoventilation 02/09/2018    Nocturnal hypoxia    Impaired fasting glucose 01/22/2018    Impaired fasting glucose    Insect bite (nonvenomous) of lower back and pelvis, initial encounter 03/29/2018    Tick bite of back    Left lower quadrant pain 01/07/2019    Left lower quadrant pain    Local infection of the skin and subcutaneous tissue, unspecified 07/28/2017    Skin infection    Low back pain, unspecified 05/23/2019    Acute low back pain    Other conditions influencing health status 02/27/2017    Sprain of right thumb, unspecified site of finger, initial encounter    Other malaise 04/16/2018    Malaise and fatigue    Other microscopic hematuria 03/30/2018    Microscopic hematuria    Other specified cough 09/07/2018    Productive cough    Other specified health status 01/07/2019    Acute medical illness    Other symptoms and signs involving the musculoskeletal system 07/12/2018    Weakness of right lower extremity    Other symptoms and signs involving the musculoskeletal system 03/21/2018    Polyarticular joint involvement    Other symptoms and signs involving the musculoskeletal system 07/12/2018    RUE weakness    Pain in right lower leg 01/27/2016    Right calf pain    Pain in unspecified hip 01/20/2016    Hip pain    Pelvic and perineal pain 04/17/2018    Pelvic pain    Personal history of diseases of the skin and subcutaneous tissue 08/07/2018    History of dermatitis    Personal history of other (healed) physical injury and trauma 08/07/2018    History of insect bite    Personal history of other diseases of the circulatory system     History of hypertension    Personal history of other diseases of the female genital  tract 11/24/2015    History of menorrhagia    Personal history of other diseases of the musculoskeletal system and connective tissue 03/14/2018    History of tendinitis    Personal history of other diseases of the nervous system and sense organs 04/08/2014    History of Parkinson's disease    Personal history of other diseases of the respiratory system 01/07/2019    History of acute bronchitis    Personal history of other diseases of the respiratory system 10/05/2018    History of paranasal sinus pain    Personal history of other specified conditions 04/13/2015    History of snoring    Personal history of other specified conditions 09/11/2017    History of headache    Personal history of other specified conditions 09/07/2018    History of persistent cough    Personal history of other specified conditions 04/08/2014    History of memory loss    Personal history of other specified conditions 03/26/2018    History of left flank pain    Personal history of other specified conditions     History of heartburn    Personal history of urinary calculi 03/26/2018    Personal history of urinary calculi    Personal history of urinary calculi 03/26/2018    History of renal calculi    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/2017    Plantar fasciitis, left    Primary insomnia 04/13/2015    Primary insomnia    Rash and other nonspecific skin eruption 04/16/2018    Rash    Unspecified abdominal pain 01/07/2019    Left sided abdominal pain    Urinary tract infection, site not specified 10/19/2018    UTI (urinary tract infection), bacterial    Urinary tract infection, site not specified 10/18/2018    Recurrent UTI    Vitamin D deficiency, unspecified 04/22/2016    Vitamin D deficiency    Zoster with other complications 11/25/2015    Herpes zoster dermatitis     Past Surgical History:   Procedure Laterality Date    CT ANGIO CORONARY ART WITH HEARTFLOW IF SCORE >30%  3/18/2020    CT HEART CORONARY ANGIOGRAM 3/18/2020 [facility]    HAND  TENDON SURGERY  11/11/2013    Hand Incision Tendon Sheath Of A Finger    HYSTERECTOMY  03/04/2016    Hysterectomy    LITHOTRIPSY  11/11/2013    Renal Lithotripsy    TONSILLECTOMY  12/19/2013    Tonsillectomy With Adenoidectomy     Family History   Problem Relation Name Age of Onset    Asthma Mother      Hypertension Mother      Irregular heart beat Mother      Allergies Father      Heart disease Father      Hypertension Father      Sinusitis Father      Allergies Sister      Asthma Daughter      Allergies Son      Heart disease Maternal Grandmother      Breast cancer Paternal Grandmother      Heart disease Paternal Grandfather      Lung cancer Paternal Grandfather      Dementia Paternal Great-Grandfather       Social History     Tobacco Use    Smoking status: Never    Smokeless tobacco: Never   Substance Use Topics    Alcohol use: Never    Drug use: Never       Physical Exam   ED Triage Vitals [07/07/24 1813]   Temperature Heart Rate Respirations BP   36.9 °C (98.5 °F) (!) 103 16 (!) 172/100      Pulse Ox Temp Source Heart Rate Source Patient Position   98 % Oral Monitor Sitting      BP Location FiO2 (%)     Left arm --     Gen.: Alert and oriented ×3, no acute distress  Head: Normocephalic atraumatic  Eyes:  Pupils equal reactive to light, extraocular muscle intact  Neck: No cervical lymphadenopathy thyromegaly, trachea midline   Heart: Regular rate and rhythm, no murmurs rubs or gallops  Lungs: Clear to auscultation bilaterally, no wheezes, rales, or rhonchi  Abdomen: Obese, soft nontender positive bowel sounds, no rebound or guarding  Back: Tenderness to palpation of the left paraspinal muscle along T12-L1  Extremities: No peripheral edema cyanosis or clubbing  Skin: Warm and intact  Neuro: Cranial nerves II 2 through 12 grossly intact, no focal deficits  Psych: Insight and judgment intact    ED Course & MDM      Labs Reviewed   CBC WITH AUTO DIFFERENTIAL - Abnormal       Result Value    WBC 4.2 (*)     nRBC         RBC 3.87 (*)     Hemoglobin 11.6 (*)     Hematocrit 35.0 (*)     MCV 90      MCH 30.0      MCHC 33.1      RDW 14.1      Platelets 294      Neutrophils % 38.1      Immature Granulocytes %, Automated 0.2      Lymphocytes % 49.3      Monocytes % 12.0      Eosinophils % 0.2      Basophils % 0.2      Neutrophils Absolute 1.59      Immature Granulocytes Absolute, Automated 0.01      Lymphocytes Absolute 2.06      Monocytes Absolute 0.50      Eosinophils Absolute 0.01      Basophils Absolute 0.01     COMPREHENSIVE METABOLIC PANEL - Abnormal    Glucose 101 (*)     Sodium 139      Potassium 3.8      Chloride 103      Bicarbonate 26      Anion Gap 14      Urea Nitrogen 11      Creatinine 0.63      eGFR >90      Calcium 9.6      Albumin 4.3      Alkaline Phosphatase 63      Total Protein 7.4      AST 23      Bilirubin, Total 0.2      ALT 20     LACTATE - Abnormal    Lactate 2.3 (*)     Narrative:     Venipuncture immediately after or during the administration of Metamizole may lead to falsely low results. Testing should be performed immediately  prior to Metamizole dosing.   URINALYSIS WITH REFLEX CULTURE AND MICROSCOPIC - Abnormal    Color, Urine Yellow      Appearance, Urine Clear      Specific Gravity, Urine 1.020      pH, Urine 7.0      Protein, Urine NEGATIVE      Glucose, Urine NEGATIVE      Blood, Urine NEGATIVE      Ketones, Urine TRACE (*)     Bilirubin, Urine NEGATIVE      Urobilinogen, Urine 0.2      Nitrite, Urine NEGATIVE      Leukocyte Esterase, Urine NEGATIVE     URINALYSIS WITH REFLEX CULTURE AND MICROSCOPIC    Narrative:     The following orders were created for panel order Urinalysis with Reflex Culture and Microscopic.  Procedure                               Abnormality         Status                     ---------                               -----------         ------                     Urinalysis with Reflex C...[[id number]]  Abnormal            Final result               Extra Urine Smith  Tube[[id number]]                            In process                   Please view results for these tests on the individual orders.   EXTRA URINE GRAY TUBE   HCG, URINE, QUALITATIVE   LACTATE      CT abdomen pelvis wo IV contrast  Result Date: 7/7/2024  Interpreted By:  Mary Ca, STUDY: CT ABDOMEN PELVIS WO IV CONTRAST;  7/7/2024 7:10 pm   INDICATION: Signs/Symptoms:Hx Urosepsis & states she passed a K-stone 2 wks ago. pain unchanged-now 9/10.   COMPARISON: None   ACCESSION NUMBER(S): [id number]   ORDERING CLINICIAN: MATA APPLE   TECHNIQUE: CT of the abdomen and pelvis was performed. Contiguous axial images were obtained at 3 mm slice thickness through the abdomen and pelvis. Coronal and sagittal reconstructions at 3 mm slice thickness were performed.  No intravenous or oral contrast agents were administered.   FINDINGS: Please note that the evaluation of vessels, lymph nodes and organs is limited without intravenous contrast.   LOWER CHEST: Included lung bases are clear without evidence of consolidation or pleural effusion.   Heart is normal in size without pericardial effusion.   Distal esophagus is unremarkable in appearance.   ABDOMEN:   LIVER: Liver is enlarged, measuring up to 22 cm in craniocaudal dimension, and within limits of noncontrast exam does not demonstrate any acute abnormality.   BILE DUCTS: No intrahepatic or extrahepatic biliary dilatation is present.   GALLBLADDER: No gallbladder wall thickening is present. There is no pericholecystic stranding.   PANCREAS: No pancreatic ductal dilatation is present or peripancreatic stranding.   SPLEEN: A new 1.9 x 1.6 cm exophytic hypodensity is present in the spleen in the interim since prior exam in June of 2023, of unclear etiology.   ADRENAL GLANDS: Bilateral adrenal glands are unremarkable.   KIDNEYS AND URETERS: Kidneys are symmetric in size without evidence of hydronephrosis or radiopaque nephrolithiasis present bilaterally. Ureters  are dilated and unremarkable in appearance bilaterally.   PELVIS:   BLADDER: Bladder is decompressed and limited for evaluation.   REPRODUCTIVE ORGANS: Uterus is present. No adnexal masses or fluid collections are identified. Numerous radiopaque phleboliths are present in the pelvis, similar in appearance to prior exam.   BOWEL: Stomach is unremarkable in appearance. No abnormal small bowel dilatation or inflammatory bowel wall thickening is present. Moderate to large volume of solid stool is present in the ascending in transverse colon.   The appendix is unremarkable in appearance.   VESSELS: There is no aneurysmal dilatation of the abdominal aorta. The IVC appears normal.   PERITONEUM/RETROPERITONEUM/LYMPH NODES: There is no evidence of free air, free fluid, or thick-walled collections in the abdomen or pelvis. No abdominopelvic lymphadenopathy is present.   ABDOMINAL WALL: Cutaneous tissues of the abdomen and pelvis do not demonstrate any acute abnormality. Small fat containing umbilical hernia is present.   BONES: Visualized lower thoracic and lumbar spine do not demonstrate any acute abnormalities. No acute osseous abnormality is present in the pelvis.     1.  A new 1.9 x 1.6 cm somewhat expansile hypodensity is present along the capsule in the spleen in the interim since prior exam in June of 2023, possibly representing focal infectious process, although other etiologies are not excluded. Nonemergent MRI is recommended for further characterization. 2. Otherwise, no acute abnormality is identified in the abdomen or pelvis. No radiopaque stone or hydronephrosis is present in the kidneys bilaterally. No abnormal bowel dilatation or inflammatory bowel wall thickening is noted in the left lower quadrant. 3. Moderate to large volume of solid stool is present in the ascending and transverse colon. 4. Hepatomegaly.     MACRO: None   Signed by: Mary Ca 7/7/2024 7:31 PM Dictation workstation:   [id number]       Medical Decision Making  On arrival patient was given 1 L IV fluid and Toradol.  Lab work was only notable for slightly elevated lactic acid 2.3, BBC 4.2, and trace ketones in the urine.  Patient underwent a CT abdomen pelvis which showed large volume stool throughout the ascending and transverse colon, and a exophytic hypodense lesion in the spleen which was not present in prior exam in 2023.  No evidence of kidney stones.  She was additionally given treatment for pain with Norflex IV and lidocaine patch.  Patient is already on numerous chronic pain medication including Cymbalta, Norflex , and Tapentadol. she was advised to follow-up with her pain management doctor.       Alexandr Milan,   07/07/24 2053

## 2024-07-08 DIAGNOSIS — L40.50 PSORIATIC ARTHROPATHY (MULTI): ICD-10-CM

## 2024-07-08 LAB — HOLD SPECIMEN: NORMAL

## 2024-07-08 RX ORDER — SECUKINUMAB 150 MG/ML
300 INJECTION SUBCUTANEOUS
Qty: 2 ML | Refills: 2 | Status: SHIPPED | OUTPATIENT
Start: 2024-07-08

## 2024-07-08 NOTE — DISCHARGE INSTRUCTIONS
Apply lidocaine patch to affected area, 5 hours on 12 hours off.  Perform stretching exercises for your back  Continue taking your chronic pain medication as prescribed  Follow-up with your pain management doctor within 7 to 10 days.

## 2024-07-09 DIAGNOSIS — D73.89 LESION OF SPLEEN: Primary | ICD-10-CM

## 2024-07-09 PROCEDURE — RXMED WILLOW AMBULATORY MEDICATION CHARGE

## 2024-07-11 DIAGNOSIS — D64.9 ANEMIA, UNSPECIFIED TYPE: Primary | ICD-10-CM

## 2024-07-15 ENCOUNTER — SPECIALTY PHARMACY (OUTPATIENT)
Dept: PHARMACY | Facility: CLINIC | Age: 44
End: 2024-07-15

## 2024-07-16 ENCOUNTER — PHARMACY VISIT (OUTPATIENT)
Dept: PHARMACY | Facility: CLINIC | Age: 44
End: 2024-07-16
Payer: MEDICAID

## 2024-07-16 ENCOUNTER — APPOINTMENT (OUTPATIENT)
Dept: BEHAVIORAL HEALTH | Facility: CLINIC | Age: 44
End: 2024-07-16
Payer: MEDICAID

## 2024-07-16 DIAGNOSIS — G43.809 OTHER MIGRAINE WITHOUT STATUS MIGRAINOSUS, NOT INTRACTABLE: ICD-10-CM

## 2024-07-16 DIAGNOSIS — F41.8 OTHER SPECIFIED ANXIETY DISORDERS: ICD-10-CM

## 2024-07-16 DIAGNOSIS — F41.0 PANIC ATTACKS: ICD-10-CM

## 2024-07-16 PROCEDURE — 99214 OFFICE O/P EST MOD 30 MIN: CPT | Performed by: PSYCHIATRY & NEUROLOGY

## 2024-07-16 RX ORDER — ALPRAZOLAM 0.5 MG/1
0.5 TABLET ORAL 3 TIMES DAILY PRN
Qty: 21 TABLET | Refills: 2 | Status: SHIPPED | OUTPATIENT
Start: 2024-07-16 | End: 2025-07-16

## 2024-07-16 RX ORDER — DOXEPIN HYDROCHLORIDE 50 MG/1
50 CAPSULE ORAL NIGHTLY
Qty: 30 CAPSULE | Refills: 3 | Status: SHIPPED | OUTPATIENT
Start: 2024-07-16 | End: 2024-11-13

## 2024-07-16 RX ORDER — ONDANSETRON 4 MG/1
4 TABLET, ORALLY DISINTEGRATING ORAL EVERY 8 HOURS PRN
Qty: 90 TABLET | Refills: 0 | Status: SHIPPED | OUTPATIENT
Start: 2024-07-16

## 2024-07-16 ASSESSMENT — ENCOUNTER SYMPTOMS
NERVOUS/ANXIOUS: 1
SLEEP DISTURBANCE: 1

## 2024-07-16 NOTE — PROGRESS NOTES
Subjective   Patient ID: Nichelle Izaguirre is a 44 y.o. female who presents for med management plus supportive therapy..  HPI Virtual or Telephone Consent    An interactive audio and video telecommunication system which permits real time communications between the patient (at the originating site) and provider (at the distant site) was utilized to provide this telehealth service.   Verbal consent was requested and obtained from Nichelle Izaguirre on this date, 07/16/24 for a telehealth visit.    Patient seen interval psychiatric history reviewed.  Patient reports anxiety has been really bad and very much mirrors what is going on with her son Shaun and prosecution of his uncle.  Uncle is in half-way for the next 6 years on another charge and therefore law enforcement is treating this with less of a sense of urgency in terms of filing charges.  Shaun has PTSD because he is worried about retribution from the uncle.  Anxiety has been affecting blood pressure heart rate and her POTS disorder.  Cannot sleep without Seroquel every night 25 mg.  Wonders if there is something else that will not affect the Parkinson's at all.    Review of Systems   Psychiatric/Behavioral:  Positive for sleep disturbance. The patient is nervous/anxious.        Objective   Physical Exam  Psychiatric:         Attention and Perception: Attention and perception normal.         Mood and Affect: Mood is anxious. Affect is blunt.         Speech: Speech normal.         Behavior: Behavior normal. Behavior is cooperative.         Thought Content: Thought content normal.         Cognition and Memory: Cognition and memory normal.         Judgment: Judgment normal.         Assessment/Plan   Problem List Items Addressed This Visit             ICD-10-CM    Anxiety disorder F41.9     Current situational anxiety secondary to son but also patient being retraumatized by what is happening in the current situation.  Will refill Xanax 0.5 mg as needed and  replace Seroquel with doxepin 50 mg at bedtime.  Seroquel has also been causing weight gain despite not being extra hungry or eating more.  Explained to patient that doxepin may make her hungrier but should not cause weight gain without increased food intake.  Follow-up 2 months         Panic attacks F41.0    Relevant Medications    ALPRAZolam (Xanax) 0.5 mg tablet    doxepin (SINEquan) 50 mg capsule            Harrison Vila MD 07/16/24 1:37 PM

## 2024-07-16 NOTE — ASSESSMENT & PLAN NOTE
Current situational anxiety secondary to son but also patient being retraumatized by what is happening in the current situation.  Will refill Xanax 0.5 mg as needed and replace Seroquel with doxepin 50 mg at bedtime.  Seroquel has also been causing weight gain despite not being extra hungry or eating more.  Explained to patient that doxepin may make her hungrier but should not cause weight gain without increased food intake.  Follow-up 2 months

## 2024-07-22 DIAGNOSIS — G20.C PARKINSONISM, UNSPECIFIED PARKINSONISM TYPE (MULTI): ICD-10-CM

## 2024-07-22 DIAGNOSIS — L40.50 PSORIATIC ARTHROPATHY (MULTI): Primary | ICD-10-CM

## 2024-07-22 RX ORDER — CARBIDOPA, LEVODOPA AND ENTACAPONE 50; 200; 200 MG/1; MG/1; MG/1
1 TABLET, FILM COATED ORAL EVERY 4 HOURS
Qty: 540 TABLET | Refills: 1 | Status: SHIPPED | OUTPATIENT
Start: 2024-07-22 | End: 2025-01-18

## 2024-07-22 RX ORDER — PREDNISONE 5 MG/1
TABLET ORAL
Qty: 9 TABLET | Refills: 0 | Status: SHIPPED | OUTPATIENT
Start: 2024-07-22 | End: 2024-07-26

## 2024-07-23 ENCOUNTER — APPOINTMENT (OUTPATIENT)
Dept: RADIOLOGY | Facility: CLINIC | Age: 44
End: 2024-07-23
Payer: MEDICAID

## 2024-07-24 ENCOUNTER — APPOINTMENT (OUTPATIENT)
Dept: RADIOLOGY | Facility: HOSPITAL | Age: 44
End: 2024-07-24
Payer: MEDICAID

## 2024-07-24 ENCOUNTER — HOSPITAL ENCOUNTER (OUTPATIENT)
Dept: RADIOLOGY | Facility: CLINIC | Age: 44
Discharge: HOME | End: 2024-07-24
Payer: MEDICAID

## 2024-07-24 DIAGNOSIS — D73.89 LESION OF SPLEEN: ICD-10-CM

## 2024-07-24 PROCEDURE — A9575 INJ GADOTERATE MEGLUMI 0.1ML: HCPCS | Performed by: NURSE PRACTITIONER

## 2024-07-24 PROCEDURE — 2550000001 HC RX 255 CONTRASTS: Performed by: NURSE PRACTITIONER

## 2024-07-24 PROCEDURE — 74183 MRI ABD W/O CNTR FLWD CNTR: CPT | Performed by: RADIOLOGY

## 2024-07-24 PROCEDURE — 74183 MRI ABD W/O CNTR FLWD CNTR: CPT

## 2024-07-24 RX ORDER — GADOTERATE MEGLUMINE 376.9 MG/ML
0.2 INJECTION INTRAVENOUS
Status: COMPLETED | OUTPATIENT
Start: 2024-07-24 | End: 2024-07-24

## 2024-08-02 ENCOUNTER — HOSPITAL ENCOUNTER (OUTPATIENT)
Dept: CARDIOLOGY | Facility: CLINIC | Age: 44
Discharge: HOME | End: 2024-08-02
Payer: MEDICAID

## 2024-08-02 DIAGNOSIS — R09.89 LABILE HYPERTENSION: ICD-10-CM

## 2024-08-02 PROCEDURE — 93786 AMBL BP MNTR W/SW REC ONLY: CPT

## 2024-08-05 ENCOUNTER — SPECIALTY PHARMACY (OUTPATIENT)
Dept: PHARMACY | Facility: CLINIC | Age: 44
End: 2024-08-05

## 2024-08-05 ENCOUNTER — OFFICE VISIT (OUTPATIENT)
Dept: PAIN MEDICINE | Facility: HOSPITAL | Age: 44
End: 2024-08-05
Payer: MEDICAID

## 2024-08-05 VITALS
RESPIRATION RATE: 16 BRPM | HEART RATE: 84 BPM | SYSTOLIC BLOOD PRESSURE: 114 MMHG | BODY MASS INDEX: 36.48 KG/M2 | HEIGHT: 66 IN | DIASTOLIC BLOOD PRESSURE: 73 MMHG | OXYGEN SATURATION: 97 % | WEIGHT: 227 LBS

## 2024-08-05 DIAGNOSIS — M77.8 ELBOW TENDINITIS: ICD-10-CM

## 2024-08-05 DIAGNOSIS — M19.90 INFLAMMATORY ARTHROPATHY: ICD-10-CM

## 2024-08-05 DIAGNOSIS — M25.50 ARTHRALGIA OF MULTIPLE SITES: Primary | ICD-10-CM

## 2024-08-05 DIAGNOSIS — G89.29 CHRONIC BILATERAL LOW BACK PAIN WITH RIGHT-SIDED SCIATICA: ICD-10-CM

## 2024-08-05 DIAGNOSIS — M54.16 RIGHT LUMBAR RADICULOPATHY: ICD-10-CM

## 2024-08-05 DIAGNOSIS — M62.830 SPASM OF THORACIC BACK MUSCLE: ICD-10-CM

## 2024-08-05 DIAGNOSIS — M54.41 CHRONIC BILATERAL LOW BACK PAIN WITH RIGHT-SIDED SCIATICA: ICD-10-CM

## 2024-08-05 DIAGNOSIS — M79.18 MYOFASCIAL PAIN: ICD-10-CM

## 2024-08-05 PROCEDURE — 3008F BODY MASS INDEX DOCD: CPT | Performed by: CLINICAL NURSE SPECIALIST

## 2024-08-05 PROCEDURE — 99214 OFFICE O/P EST MOD 30 MIN: CPT | Performed by: CLINICAL NURSE SPECIALIST

## 2024-08-05 PROCEDURE — 1036F TOBACCO NON-USER: CPT | Performed by: CLINICAL NURSE SPECIALIST

## 2024-08-05 PROCEDURE — 3078F DIAST BP <80 MM HG: CPT | Performed by: CLINICAL NURSE SPECIALIST

## 2024-08-05 PROCEDURE — RXMED WILLOW AMBULATORY MEDICATION CHARGE

## 2024-08-05 PROCEDURE — 3074F SYST BP LT 130 MM HG: CPT | Performed by: CLINICAL NURSE SPECIALIST

## 2024-08-05 RX ORDER — PREGABALIN 75 MG/1
75 CAPSULE ORAL 3 TIMES DAILY
Qty: 90 CAPSULE | Refills: 2 | Status: SHIPPED | OUTPATIENT
Start: 2024-08-05 | End: 2024-09-04

## 2024-08-05 RX ORDER — ORPHENADRINE CITRATE 100 MG/1
100 TABLET, EXTENDED RELEASE ORAL 2 TIMES DAILY PRN
Qty: 60 TABLET | Refills: 3 | Status: SHIPPED | OUTPATIENT
Start: 2024-08-05 | End: 2024-09-04

## 2024-08-05 ASSESSMENT — COLUMBIA-SUICIDE SEVERITY RATING SCALE - C-SSRS
1. IN THE PAST MONTH, HAVE YOU WISHED YOU WERE DEAD OR WISHED YOU COULD GO TO SLEEP AND NOT WAKE UP?: NO
2. HAVE YOU ACTUALLY HAD ANY THOUGHTS OF KILLING YOURSELF?: NO
6. HAVE YOU EVER DONE ANYTHING, STARTED TO DO ANYTHING, OR PREPARED TO DO ANYTHING TO END YOUR LIFE?: NO

## 2024-08-05 ASSESSMENT — ENCOUNTER SYMPTOMS
LOSS OF SENSATION IN FEET: 0
OCCASIONAL FEELINGS OF UNSTEADINESS: 0
DEPRESSION: 0

## 2024-08-05 NOTE — PROGRESS NOTES
Subjective   Patient ID: Nichelle Izaguirre is a 44 y.o. female who presents for widespread polyarticular pain as well as mid to low back pain.    HPI  44 year-old female with a complex medical history including psoriatic arthritis, gout, HTN, GUICHO, irritable bowel disease, asthma, parkinsonism/dystonia, immune deficiency syndrome, anxiety and mid to low back pain as well as polyarticular pain presents for follow-up.  Most recent imaging including an MRI in 2021 consistent with lumbar lordosis, normal disc height, small central disc bulge at L5-S1 and L4-5 with no significant foraminal stenosis and moderate lateral recess stenosis at L4-5.  Lumbar x-ray negative with no acute findings.  We discussed injections in the past and she is not interested in pursuing injections for back pain.  Sent for formal course of physical therapy at her last office visit targeting mid and low back pain as well as polyarticular pain.  She is followed closely by rheumatology and was recently started on Cosentyx after obtaining insurance approval.  Also maintained on leflunomide and allopurinol.  Managing her pain with multiple medications including Nucynta 50 mg 3 times daily, Lyrica, duloxetine, orphenadrine.  Attempting to decrease use of Nucynta as she gains relief with disease modifying treatment with rheumatology.  She will use Xanax sparingly for panic attacks.  Presents at today's office visit with chronic polyarticular pain most bothersome to b/l elbows.  Continues to experience mid back pain that will radiate into her lumbar spine and bilateral buttocks intermittently.  She does feel that this pain has slightly improved.  Not complaining of thoracic pain radiating anteriorly to her mid chest and feels that this is also improved.  She has noted a decrease in spasms in her mid back and low back.  She continues to have cramping in bilateral hands most noted in the mornings.  She denies any numbness/tingling, weakness or changes in  "bowel/bladder function.  Pain is aching and throbbing.  This was standing for a prolonged period of time, bending, lifting and with weather changes.  She has been able to restart her Cosentyx and is waiting on her third dose.  Continues leflunomide and allopurinol.  She continues to benefit from Nucynta 50 mg up to 3 times per day as needed for pain.  She tries to take this medication most often 2 times per day and use the third dose only when pain is more severe.  She continues to have some difficulty with medication administration and just recently noticed that she has not been taking her Lyrica 3 times per day, more often has been taking it 1 time per day due to forgetting the second and third dose.  She states that her  is overseeing her medication administration and has provided a pill  with morning, afternoon and night slots.  Continued benefit with orphenadrine, duloxetine and NSAID compounding cream.  She did follow-up with orthopedics for bilateral elbow pain status post recent injection with limited relief.  Patient states she may need to consider elbow surgery in the future.  Not been able to start a formal course of physical therapy due family responsibilities, although she states that she may be able to proceed with physical therapy now that her  has a permanent shift.  She does feel that the medication regimen allows her to be more active and continue to work.  She also has better control of her blood pressure and feels that this has contributed to pain relief.        Location of Pain: pt is here for interval follow up and medication count. Pt states bilateral elbow pain due to psoriatic arthritis. Pt also having mid back pain along spine that radiates down to above the buttock intermittently.      Orthopedic did procedure and said there was decreased blood flow in right elbow- did injections and was told to think about surgery          Pain Score:  \"2/10\"     Treatment: Nucynta 50mg " "TID  Last dose:24 2 doses and 2-3 x a day  Medication Count: 1 pill left in rx bottle  Fill date: 24     Efficacy:   \"40-50% relief\"     Side Effects: denies     Narcan: Pt brought unopened Narcan with her to today's visit.  EXP: -forgot today but states it will  in July so gave harm reduction pamphlet today 24- gave harm reduction pamphlet today 24     Other pain medication/neuromodulator: Norflex-needs refill/Lyrica-needs refill/Cymbalta/ Kingsley's compound cream     Therapeutic Goals: to be able to do more throughout the day     Therapeutic Assessment: It helps the most in the morning     Injections and/or Procedures: denies, does not want to have injections.     Other: no current PT  OARRS:  Santa Dunn, APRN-CNP, APRN-CNS on 2024  4:40 PM  I have personally reviewed the OARRS report for Nichelle Izaguirre. I have considered the risks of abuse, dependence, addiction and diversion    Is the patient prescribed a combination of a benzodiazepine and opioid?  Yes, I feel it is clincially indicated to continue the medication and have discussed with the patient risks/benefits/alternatives.    Last Urine Drug Screen / ordered today: No  Recent Results (from the past 8760 hour(s))   Amphetamine Confirm, Urine    Collection Time: 24  4:32 PM   Result Value Ref Range    Methamphetamine Quant, Ur <200 ng/mL    MDA, Urine <200 ng/mL    MDEA, Urine <200 ng/mL    Phentermine,Urine <200 ng/mL    Amphetamines,Urine <50 ng/mL    MDMA, Urine <200 ng/mL   Confirmation Opiate/Opioid/Benzo Prescription Compliance    Collection Time: 24  4:32 PM   Result Value Ref Range    Clonazepam <25 <25 ng/mL    7-Aminoclonazepam <25 <25 ng/mL    Alprazolam 261 (H) <25 ng/mL    Alpha-Hydroxyalprazolam 291 (H) <25 ng/mL    Midazolam <25 <25 ng/mL    Alpha-Hydroxymidazolam <25 <25 ng/mL    Chlordiazepoxide <25 <25 ng/mL    Diazepam <25 <25 ng/mL    Nordiazepam <25 <25 ng/mL    Temazepam <25 <25 ng/mL    " Oxazepam <25 <25 ng/mL    Lorazepam <25 <25 ng/mL    Methadone <25 <25 ng/mL    EDDP <25 <25 ng/mL    6-Acetylmorphine <25 <25 ng/mL    Codeine <50 <50 ng/mL    Hydrocodone <25 <25 ng/mL    Hydromorphone <25 <25 ng/mL    Morphine  <50 <50 ng/mL    Norhydrocodone <25 <25 ng/mL    Noroxycodone <25 <25 ng/mL    Oxycodone <25 <25 ng/mL    Oxymorphone <25 <25 ng/mL    Fentanyl <2.5 <2.5 ng/mL    Norfentanyl <2.5 <2.5 ng/mL    Tramadol <50 <50 ng/mL    O-Desmethyltramadol <50 <50 ng/mL    Zolpidem <25 <25 ng/mL    Zolpidem Metabolite (ZCA) <25 <25 ng/mL   Screen Opiate/Opioid/Benzo Prescription Compliance    Collection Time: 05/14/24  4:32 PM   Result Value Ref Range    Creatinine, Urine Random 173.5 20.0 - 320.0 mg/dL    Amphetamine Screen, Urine Presumptive Positive (A) Presumptive Negative    Barbiturate Screen, Urine Presumptive Negative Presumptive Negative    Cannabinoid Screen, Urine Presumptive Negative Presumptive Negative    Cocaine Metabolite Screen, Urine Presumptive Negative Presumptive Negative    PCP Screen, Urine Presumptive Negative Presumptive Negative   Tapentadol Confirmation, Urine    Collection Time: 05/14/24  4:32 PM   Result Value Ref Range    Tapentadol >1,000 (H) <25 ng/mL    N-Desmethyltapentadol >1,000 (H) <25 ng/mL   Buprenorphine Confirm,Urine    Collection Time: 05/14/24  4:32 PM   Result Value Ref Range    BUPRENORPHINE GLUC, URINE <5 ng/mL    BUPRENORPHINE ,URINE <2 ng/mL    NALOXONE, URINE <100 ng/mL    NORBUPRENORPHINE GLUC,URINE <5 ng/mL    NORBUPRENORPHINE, URINE <2 ng/mL     Results are as expected.     Controlled Substance Agreement:  Date of the Last Agreement: 5/14/24  Reviewed Controlled Substance Agreement including but not limited to the benefits, risks, and alternatives to treatment with a Controlled Substance medication(s).    Monitoring and compliance:    ORT:    PDUQ:    Office Agreement:      Review of Systems    ROS:   General: No fevers, chills, weight loss  Skin:  Negative for lesions  Eyes: No acute vision changes  Ears: No vertigo  Nose, mouth, throat: No difficulty swallowing or speaking  Respiratory: No cough, shortness of breath, cyanosis  Cardiovascular: Negative for chest pain syncope or palpitation  Gastrointestinal: No constipation, nausea, vomiting  Neurological: Positive for intermittent headache,   Psychological: Negative for severe or debilitating anxiety, depression. Negative memory loss  Musculoskeletal: Positive for arthralgia, myalgia, pain and joint stiffness/cramping  Endocrine: Negative for weight gain, appetite changes, excessive sweating  Allergy/immune: Negative    All 13 systems were reviewed and are within normal levels except as noted or in the history of present illness.  Positive or pertinent negative responses are noted or were in the history of present illness. As noted, the patient denies significant or impairing weakness in the bilateral upper and lower extremities, medication induced constipation, and bowel or bladder incontinence.     Current Outpatient Medications:     albuterol 2.5 mg /3 mL (0.083 %) nebulizer solution, inhale the contents of one vial via nebulizer every 4 hours as needed, Disp: , Rfl:     allopurinol (Zyloprim) 100 mg tablet, TAKE ONE TABLET BY MOUTH EVERY DAY, Disp: 90 tablet, Rfl: 0    allopurinol (Zyloprim) 300 mg tablet, TAKE ONE TABLET BY MOUTH EVERY DAY, Disp: 90 tablet, Rfl: 0    ALPRAZolam (Xanax) 0.5 mg tablet, Take 1 tablet (0.5 mg) by mouth 3 times a day as needed for sleep or anxiety., Disp: 21 tablet, Rfl: 2    amLODIPine (Norvasc) 2.5 mg tablet, Take 1 tablet (2.5 mg) by mouth once daily., Disp: , Rfl:     amoxicillin (Amoxil) 500 mg capsule, Take 1 capsule (500 mg) by mouth once daily., Disp: , Rfl:     ascorbic acid, vitamin C, 500 mg capsule, Take 1 capsule by mouth once daily., Disp: , Rfl:     benzoyl peroxide (Benzac AC) 10 % external wash, Apply topically if needed., Disp: , Rfl:     benztropine  (Cogentin) 2 mg tablet, Take 1 tablet (2 mg) by mouth once daily., Disp: , Rfl:     buPROPion XL (Wellbutrin XL) 150 mg 24 hr tablet, Take 1 tablet (150 mg) by mouth once daily. Do not crush, chew, or split., Disp: 90 tablet, Rfl: 3    buPROPion XL (Wellbutrin XL) 300 mg 24 hr tablet, Take 1 tablet (300 mg) by mouth once daily in the morning. Do not crush, chew, or split., Disp: 90 tablet, Rfl: 3    carbidopa-levodopa-entacapone (Stalevo) -200 mg tablet, Take 1 tablet by mouth every 4 hours., Disp: 540 tablet, Rfl: 1    clindamycin (Cleocin T) 1 % lotion, 1 Application, Disp: , Rfl:     clobetasol (Temovate) 0.05 % external solution, Apply topically 2 times a day., Disp: , Rfl:     clobetasoL 0.05 % lotion, One application as needed for flaring only.not for daily use. For scalp and hands only, Disp: 118 mL, Rfl: 3    cloNIDine (Catapres) 0.1 mg tablet, Take 1 tablet (0.1 mg) by mouth if needed for high blood pressure., Disp: , Rfl:     cloNIDine (Catapres) 0.2 mg tablet, Take 1 tablet (0.2 mg) by mouth once daily at bedtime., Disp: 90 tablet, Rfl: 3    Cosentyx Pen, 2 Pens, 150 mg/mL self-injector pen, Inject 2 mL (300 mg) under the skin every 30 (thirty) days., Disp: 2 mL, Rfl: 2    cyanocobalamin, vitamin B-12, 1,000 mcg/mL kit, Inject 1,000 mcg as directed every 28 (twenty-eight) days., Disp: 1 kit, Rfl: 11    diclofenac sodium (Voltaren) 1 % gel gel, Apply 4.5 inches (4 g) topically 2 times a day as needed., Disp: , Rfl:     doxepin (SINEquan) 50 mg capsule, Take 1 capsule (50 mg) by mouth once daily at bedtime., Disp: 30 capsule, Rfl: 3    doxycycline (Vibramycin) 100 mg capsule, 1 capsule (100 mg)., Disp: , Rfl:     DULoxetine (Cymbalta) 60 mg DR capsule, TAKE 1 CAPSULE BY MOUTH DAILY, Disp: 90 capsule, Rfl: 1    eplerenone (Inspra) 25 mg tablet, Take 1 tablet (25 mg) by mouth once daily., Disp: , Rfl:     ergocalciferol (Vitamin D-2) 1.25 MG (14722 UT) capsule, Take 1 capsule (50,000 Units) by mouth 1  (one) time per week., Disp: 12 capsule, Rfl: 0    ezetimibe (Zetia) 10 mg tablet, Take 1 tablet (10 mg) by mouth once daily., Disp: 90 tablet, Rfl: 3    fluticasone furoate-vilanteroL (Breo Ellipta) 200-25 mcg/dose inhaler, 1puff daily, Inhalation, Disp: , Rfl:     ipratropium-albuteroL (Duo-Neb) 0.5-2.5 mg/3 mL nebulizer solution, Take 3 mL by nebulization every 4 hours if needed for wheezing., Disp: , Rfl:     ketoconazole (NIZOral) 2 % cream, 1 Application, Disp: , Rfl:     lactobacillus acidophilus (Lactobacillus acidoph-L.bulgar) tablet tablet, Take 1 tablet by mouth once daily., Disp: 90 tablet, Rfl: 0    levalbuterol (Xopenex) 1.25 mg/3 mL nebulizer solution, Take 1 ampule by nebulization 3 times a day., Disp: , Rfl:     metoprolol succinate XL (Toprol-XL) 25 mg 24 hr tablet, Take 1 tablet (25 mg) by mouth once daily., Disp: , Rfl:     metroNIDAZOLE (Metrocream) 0.75 % cream, 1 Application, Disp: , Rfl:     montelukast (Singulair) 10 mg tablet, Take 1 tablet (10 mg) by mouth once daily at bedtime., Disp: , Rfl:     nebulizer and compressor device, use q4-6 hours with albuterol PRN cough/wheeze, Disp: , Rfl:     nebulizers (Devilbiss Disposable Nebulizer) misc, every 4 hours if needed., Disp: , Rfl:     olmesartan (BENIcar) 40 mg tablet, Take 1 tablet (40 mg) by mouth once daily., Disp: , Rfl:     omega-3 acid ethyl esters (Lovaza) 1 gram capsule, Take 2 capsules (2 g) by mouth 2 times a day., Disp: 360 capsule, Rfl: 3    ondansetron ODT (Zofran-ODT) 4 mg disintegrating tablet, Take 1 tablet (4 mg) by mouth every 8 hours if needed for nausea or vomiting., Disp: 90 tablet, Rfl: 0    pioglitazone (Actos) 15 mg tablet, Take 1 tablet (15 mg) by mouth once daily., Disp: , Rfl:     ProAir HFA 90 mcg/actuation inhaler, , Disp: , Rfl:     Repatha SureClick 140 mg/mL injection, Inject 1 mL (140 mg) under the skin every 14 (fourteen) days., Disp: 6 mL, Rfl: 3    Spiriva Respimat 1.25 mcg/actuation inhaler, Inhale once  daily., Disp: , Rfl:     SUMAtriptan (Imitrex) 25 mg tablet, Take 1 tablet (25 mg) by mouth 1 time if needed for migraine. May repeat in 2 hours if no improvement. Max 200 mg/24 hr, Disp: 9 tablet, Rfl: 2    tezepelumab-ekko (Tezspire) SubQ Pen Injector, Inject 210 mg under the skin., Disp: , Rfl:     carvedilol (Coreg) 12.5 mg tablet, Take 1 tablet (12.5 mg) by mouth 2 times daily (morning and late afternoon) for 7 days, THEN 0.5 tablets (6.25 mg) 2 times daily (morning and late afternoon) for 14 days., Disp: 28 tablet, Rfl: 1    cetirizine-pseudoephedrine (ZyrTEC-D) 5-120 mg 12 hr tablet, Take 1 tablet by mouth 2 times a day., Disp: 180 tablet, Rfl: 0    meloxicam (Mobic) 15 mg tablet, Take 1 tablet (15 mg) by mouth once daily. (Patient not taking: Reported on 8/5/2024), Disp: 30 tablet, Rfl: 3    omeprazole (PriLOSEC) 40 mg DR capsule, Take 1 capsule (40 mg) by mouth once daily in the morning. Take before meals., Disp: 90 capsule, Rfl: 3    orphenadrine (Norflex) 100 mg 12 hr tablet, Take 1 tablet (100 mg) by mouth 2 times a day as needed for muscle spasms., Disp: 60 tablet, Rfl: 3    pregabalin (Lyrica) 75 mg capsule, Take 1 capsule (75 mg) by mouth 3 times a day., Disp: 90 capsule, Rfl: 2    tapentadol (Nucynta) 50 mg tablet, Take 1 tablet (50 mg) by mouth every 8 hours if needed for severe pain (7 - 10)., Disp: 90 tablet, Rfl: 0     Past Medical History:   Diagnosis Date    Acute upper respiratory infection, unspecified 07/26/2016    Viral URI with cough    Acute upper respiratory infection, unspecified 11/15/2017    Viral URI with cough    Allergy status to unspecified drugs, medicaments and biological substances     History of allergy    Chronic maxillary sinusitis 08/27/2018    Maxillary sinusitis, unspecified chronicity    Dystonia, unspecified 04/08/2014    Dystonia    Encounter for other screening for malignant neoplasm of breast 01/22/2018    Breast screening    Encounter for screening for respiratory  tuberculosis 11/11/2013    Screening examination for pulmonary tuberculosis    Essential (primary) hypertension 12/27/2017    Benign essential hypertension    Hypersomnia, unspecified 04/13/2015    Sleeps too much    Idiopathic sleep related nonobstructive alveolar hypoventilation     Nocturnal hypoxemia    Idiopathic sleep related nonobstructive alveolar hypoventilation 02/09/2018    Nocturnal hypoxia    Impaired fasting glucose 01/22/2018    Impaired fasting glucose    Insect bite (nonvenomous) of lower back and pelvis, initial encounter 03/29/2018    Tick bite of back    Left lower quadrant pain 01/07/2019    Left lower quadrant pain    Local infection of the skin and subcutaneous tissue, unspecified 07/28/2017    Skin infection    Low back pain, unspecified 05/23/2019    Acute low back pain    Other conditions influencing health status 02/27/2017    Sprain of right thumb, unspecified site of finger, initial encounter    Other malaise 04/16/2018    Malaise and fatigue    Other microscopic hematuria 03/30/2018    Microscopic hematuria    Other specified cough 09/07/2018    Productive cough    Other specified health status 01/07/2019    Acute medical illness    Other symptoms and signs involving the musculoskeletal system 07/12/2018    Weakness of right lower extremity    Other symptoms and signs involving the musculoskeletal system 03/21/2018    Polyarticular joint involvement    Other symptoms and signs involving the musculoskeletal system 07/12/2018    RUE weakness    Pain in right lower leg 01/27/2016    Right calf pain    Pain in unspecified hip 01/20/2016    Hip pain    Pelvic and perineal pain 04/17/2018    Pelvic pain    Personal history of diseases of the skin and subcutaneous tissue 08/07/2018    History of dermatitis    Personal history of other (healed) physical injury and trauma 08/07/2018    History of insect bite    Personal history of other diseases of the circulatory system     History of  hypertension    Personal history of other diseases of the female genital tract 11/24/2015    History of menorrhagia    Personal history of other diseases of the musculoskeletal system and connective tissue 03/14/2018    History of tendinitis    Personal history of other diseases of the nervous system and sense organs 04/08/2014    History of Parkinson's disease    Personal history of other diseases of the respiratory system 01/07/2019    History of acute bronchitis    Personal history of other diseases of the respiratory system 10/05/2018    History of paranasal sinus pain    Personal history of other specified conditions 04/13/2015    History of snoring    Personal history of other specified conditions 09/11/2017    History of headache    Personal history of other specified conditions 09/07/2018    History of persistent cough    Personal history of other specified conditions 04/08/2014    History of memory loss    Personal history of other specified conditions 03/26/2018    History of left flank pain    Personal history of other specified conditions     History of heartburn    Personal history of urinary calculi 03/26/2018    Personal history of urinary calculi    Personal history of urinary calculi 03/26/2018    History of renal calculi    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/2017    Plantar fasciitis, left    Primary insomnia 04/13/2015    Primary insomnia    Rash and other nonspecific skin eruption 04/16/2018    Rash    Unspecified abdominal pain 01/07/2019    Left sided abdominal pain    Urinary tract infection, site not specified 10/19/2018    UTI (urinary tract infection), bacterial    Urinary tract infection, site not specified 10/18/2018    Recurrent UTI    Vitamin D deficiency, unspecified 04/22/2016    Vitamin D deficiency    Zoster with other complications 11/25/2015    Herpes zoster dermatitis        Past Surgical History:   Procedure Laterality Date    CT ANGIO CORONARY ART WITH HEARTFLOW IF SCORE >30%   "3/18/2020    CT HEART CORONARY ANGIOGRAM 3/18/2020 [facility]    HAND TENDON SURGERY  11/11/2013    Hand Incision Tendon Sheath Of A Finger    HYSTERECTOMY  03/04/2016    Hysterectomy    LITHOTRIPSY  11/11/2013    Renal Lithotripsy    TONSILLECTOMY  12/19/2013    Tonsillectomy With Adenoidectomy        Family History   Problem Relation Name Age of Onset    Asthma Mother      Hypertension Mother      Irregular heart beat Mother      Allergies Father      Heart disease Father      Hypertension Father      Sinusitis Father      Allergies Sister      Asthma Daughter      Allergies Son      Heart disease Maternal Grandmother      Breast cancer Paternal Grandmother      Heart disease Paternal Grandfather      Lung cancer Paternal Grandfather      Dementia Paternal Great-Grandfather          Allergies   Allergen Reactions    Clindamycin Anaphylaxis    Dupilumab Anaphylaxis    Hydrochlorothiazide Anaphylaxis, Itching and Swelling    Sulfamethoxazole-Trimethoprim Anaphylaxis    Cefazolin Hives, Other and Swelling     STATES TONGUE SPLITS    Atorvastatin Hives    Cephalexin Other    Clarithromycin Swelling     hives, tongue swelling    Nitrofurantoin Monohyd/M-Cryst Hives    Sulfa (Sulfonamide Antibiotics) Hives    Sulfacetamide Hives        Objective     Visit Vitals  /73   Pulse 84   Resp 16   Ht 1.676 m (5' 6\")   Wt 103 kg (227 lb)   SpO2 97%   BMI 36.64 kg/m²   OB Status Having periods   Smoking Status Never   BSA 2.19 m²        Physical Exam    PE:  General: Well-developed, well-nourished, no acute distress. The patient demonstrates no pain behavior, symptom magnification or overt drug-seeking behavior.  Eye: Pupils appropriate for room lighting  Neck/thyroid: No obvious goiter or enlargement of neck noted  Respiratory exam: Normal respiratory effort, unlabored respiration. No accessory muscle use noted  Cardiac exam: Bilateral radial pulses intact  Abdominal: Nondistended  Spine, lumbar: The patient is able to " rise from a seated to standing position without hesitancy, push off, or delay. Gait is grossly nonantalgic. Tenderness to paraspinous musculature is noted mid to lower lumbar spine.  Myofascial tenderness and muscle tightness noted mid thoracic region.  Flexion and extension intact to low back.  Negative straight leg raise.  Ortho: Widespread joint pain most bothersome currently to bilateral elbows right greater than left.  Pain right elbow with active/passive range of motion.  Pain over right lateral epicondyle.  Neurologic exam: Muscle strength is antigravity in all 4 extremities.  Equal muscle strength bilateral lower extremities 5/5.  Psychiatric exam: Judgment and insight normal, affect normal, speech is fluent, affect appropriate, demonstrating no signs of hypersomnolence, sedation, or confusion          Assessment/Plan   Problem List Items Addressed This Visit             ICD-10-CM    Arthralgia of multiple sites - Primary M25.50     44-year-old female with a complicated medical history and multiple comorbid conditions presenting for follow-up of back pain radiating from mid back through lumbar spine as well as widespread polyarticular pain most bothersome to bilateral elbows.  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  She does feel that she is receiving some relief from mid back and lower back pain.  She denies radiating pain from her mid back anteriorly and has noted improvement in spasms.  She is most bothered by her polyarticular pain most noted in bilateral elbows.  Patient has recently restarted her Cosentyx and is continued on leflunomide and allopurinol.  Feel that she is beginning to receive some additional relief with Cosentyx.  She is due to start her third dose in the next few days.  Provided the patient with a referral to physical therapy at her last office visit to help with polyarticular pain as well as back pain.  She has not been able to start physical  therapy due to family issues.  She does feel that at this time she may be able to start a formal course of physical therapy.  I do believe that the patient can benefit from a formal course of physical therapy.  Reviewed recent lumbar and thoracic MRI essentially similar to previous imaging.  Discussed that she may benefit from injections depending on results of physical therapy.  She would like to avoid steroid injections at this time.  He needs close follow-up with orthopedics for bilateral elbow pain.  She states that she may require surgery for her right elbow in the future.  She also will continue to follow closely with rheumatology and dermatology.   Currently managing her pain with Nucynta 50 mg up to 3 times per day as needed.  She is trying to take Nucynta most often 2 times per day and using the third dose when pain is more intense.  We discussed slowly weaning her dose of Nucynta as she gains better relief with Cosentyx.  Discussed that we would like to potentially transition her to buprenorphine in the future if we are able to wean her dose of Nucynta.  Will continue her Nucynta 50 mg up to 3 times per day as needed for the next 1 month.  When she calls for refill of this medication we will decrease her to Nucynta 50 mg 2 times daily.  At that time she will have had 3 doses of Cosentyx.  She will continue Lyrica 75 mg 3 times daily.  Advised patient that taking Lyrica 3 times daily will provide additional neuropathic/pain relief.  Patient states that her  will assist her with medication administration and will make sure that she is taking this medication 3 times daily as prescribed. She will continue orphenadrine for muscle tightness and spasm and duloxetine as prescribed by her PCP.  She also continues to take a small dose of Xanax using sparingly for panic attacks.  She assures me that she does not take this with her opiate.  Plan discussed with patient at today's office visit.    -We will  continue the patient on Nucynta 50 mg up to 3 times per day as needed for pain for the next month.  When she calls for refill we will decrease her dose to Nucynta 50 mg 2 times daily.  Further weaning to be discussed at her next office visit.  Plan remains to transition the patient to buprenorphine.  -Patient is also willing to start a formal course of physical therapy as she is currently endorsing improvement in pain.    -Patient may benefit from epidural steroid injections targeting lumbar radicular symptoms.  Further discussion after she has completed a formal course of physical therapy.    -Patient will continue Lyrica, duloxetine and orphenadrine as ordered.  She continues to tolerate these medications without adverse effects.  Recommended that patient take her Lyrica 75 mg 3 times per day as prescribed for optimal pain relief.  -Continue NSAID compounding cream.  -Patient has discontinued all oral NSAIDs at this time.     -Patient will follow-up in 3 months or sooner if needed.    MEDICAL DECISION MAKING:    My impressions and treatment recommendations were discussed in detail with the patient, who verbalized understanding and had no further questions prior to discharge. Given the patient's report of reduced pain and improved functional ability without adverse effects,  it is reasonable to continue Nucynta 50 mg up to 3 times per day as needed for pain.  The terms of the opioid agreement as well as the potential risks and adverse effects of the patient's medication regimen were discussed in detail. This includes if applicable due to dosage of medication permission to discuss and coordinate care with other treatment providers relevant to the patients condition. The patient verbalized understanding.    Treatment goals were discussed in detail with the patient.  These goals include reduction of pain levels, improved levels of functioning, avoidance of medication side effect and lowest medication dose possible to  achieve  these goals.  The patient was in full agreement with these goals.  Also discussed is the understanding that pain may not be eliminated by medication but that the goal of a better sustaining life through use of medication is appropriate.  Lifestyle modifications including weight management, stretching, diet, exercise and smoking are addressed at each office visit.  The patient provided a urine drug screen for routine monitoring and compliance.  This test may be given as frequently as every month based on the patient's individual opiate risk stratification and prescriber concerns for any aberrancies.  This test is indicated given the use of controlled substances for the patient's medical condition.  Unless otherwise noted the prior (s) urine drug testing results were consistent with prescribed medications.  There is no evidence of illicit drug use or additional medications ingested.     Risks and side effects of chronic opioid therapy including but not limited to tolerance, dependence, constipation, hyperalgesia, cognitive side effects, addiction and possible death due to overuse and or misuse were discussed. I also discussed that such medications when co-administered with other sedative agents including but not limited to alcohol, benzodiazepines, sedative hypnotics and illegal drugs could pose life threatening consequences including death.  I also explained the impact that the administration of such medication has on a patient with obstructive sleep apnea and continued recommendations for use of apnea devices if ordered are prescribed by other physicians.  In order to effectively and safely treat the pain, I also emphasized the importance of compliance with the treatment plan as well as compliance with the terms of the opioid agreement which was reviewed in detail. I explained the importance of being responsible with the medications and to take these only as prescribed, never in excess and never for reasons  other than pain reduction. The patient was counseled on keeping the medications safe and locked away from children and other adults as well as disposal methods and options. The patient understood the risks and instructions.      I also discussed with the patient in detail that based on the clinical response to the opioid medications and improvements of activities of daily living, sleep, and work performance in light of compliance with the treatment plan we can continue this form of therapy for the above chronic  pain.  The goal and rationale used for current treatment with chronic opioid medication is to control the pain and alleviate disability induced by the chronic pain condition noted above after failures of other non-opioid and nonpharmacological modalities to treat the chronic pain and the symptoms associated with have failed.  The patient understood the goals in terms of the above treatment plan and had no further questions prior to leaving the office today.    Given the patient's total MED, general use of daily opiates, or other coadministered medications in various classes the patient was offered a prescription for Narcan.  I instructed the patient that Narcan is for emergency use only and is worse suspected or known opiate overdose.  Call 911 prior to administration of this medication to activate the emergency response team.  It is imperative to fill this medication in order to demonstrate understanding of the gravity of possible side effects including respiratory depression and risk of overdose of this opiate load or medication combination.  As such patients will be required to bring Narcan prescriptions to follow-up appointments as part of the compliance with continued opiate care.    Disclaimer: This note was transcribed using an audio transcription device.  As such, minor errors may be present with regard to spelling, punctuation, and inadvertent word insertion.  Please disregard such errors.              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    Relevant Medications    tapentadol (Nucynta) 50 mg tablet    Other Relevant Orders    Referral to Physical Therapy    Elbow tendinitis M77.8    Relevant Medications    pregabalin (Lyrica) 75 mg capsule    tapentadol (Nucynta) 50 mg tablet    Lower back pain M54.50    Relevant Medications    tapentadol (Nucynta) 50 mg tablet    Other Relevant Orders    Referral to Physical Therapy    Myofascial pain M79.18    Right lumbar radiculopathy M54.16    Relevant Medications    pregabalin (Lyrica) 75 mg capsule    tapentadol (Nucynta) 50 mg tablet    Spasm of thoracic back muscle M62.830    Relevant Medications    pregabalin (Lyrica) 75 mg capsule    orphenadrine (Norflex) 100 mg 12 hr tablet

## 2024-08-05 NOTE — ASSESSMENT & PLAN NOTE
44-year-old female with a complicated medical history and multiple comorbid conditions presenting for follow-up of back pain radiating from mid back through lumbar spine as well as widespread polyarticular pain most bothersome to bilateral elbows.  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  She does feel that she is receiving some relief from mid back and lower back pain.  She denies radiating pain from her mid back anteriorly and has noted improvement in spasms.  She is most bothered by her polyarticular pain most noted in bilateral elbows.  Patient has recently restarted her Cosentyx and is continued on leflunomide and allopurinol.  Feel that she is beginning to receive some additional relief with Cosentyx.  She is due to start her third dose in the next few days.  Provided the patient with a referral to physical therapy at her last office visit to help with polyarticular pain as well as back pain.  She has not been able to start physical therapy due to family issues.  She does feel that at this time she may be able to start a formal course of physical therapy.  I do believe that the patient can benefit from a formal course of physical therapy.  Reviewed recent lumbar and thoracic MRI essentially similar to previous imaging.  Discussed that she may benefit from injections depending on results of physical therapy.  She would like to avoid steroid injections at this time.  He needs close follow-up with orthopedics for bilateral elbow pain.  She states that she may require surgery for her right elbow in the future.  She also will continue to follow closely with rheumatology and dermatology.   Currently managing her pain with Nucynta 50 mg up to 3 times per day as needed.  She is trying to take Nucynta most often 2 times per day and using the third dose when pain is more intense.  We discussed slowly weaning her dose of Nucynta as she gains better relief with Cosentyx.  Discussed  that we would like to potentially transition her to buprenorphine in the future if we are able to wean her dose of Nucynta.  Will continue her Nucynta 50 mg up to 3 times per day as needed for the next 1 month.  When she calls for refill of this medication we will decrease her to Nucynta 50 mg 2 times daily.  At that time she will have had 3 doses of Cosentyx.  She will continue Lyrica 75 mg 3 times daily.  Advised patient that taking Lyrica 3 times daily will provide additional neuropathic/pain relief.  Patient states that her  will assist her with medication administration and will make sure that she is taking this medication 3 times daily as prescribed. She will continue orphenadrine for muscle tightness and spasm and duloxetine as prescribed by her PCP.  She also continues to take a small dose of Xanax using sparingly for panic attacks.  She assures me that she does not take this with her opiate.  Plan discussed with patient at today's office visit.    -We will continue the patient on Nucynta 50 mg up to 3 times per day as needed for pain for the next month.  When she calls for refill we will decrease her dose to Nucynta 50 mg 2 times daily.  Further weaning to be discussed at her next office visit.  Plan remains to transition the patient to buprenorphine.  -Patient is also willing to start a formal course of physical therapy as she is currently endorsing improvement in pain.    -Patient may benefit from epidural steroid injections targeting lumbar radicular symptoms.  Further discussion after she has completed a formal course of physical therapy.    -Patient will continue Lyrica, duloxetine and orphenadrine as ordered.  She continues to tolerate these medications without adverse effects.  Recommended that patient take her Lyrica 75 mg 3 times per day as prescribed for optimal pain relief.  -Continue NSAID compounding cream.  -Patient has discontinued all oral NSAIDs at this time.     -Patient will  follow-up in 3 months or sooner if needed.    MEDICAL DECISION MAKING:    My impressions and treatment recommendations were discussed in detail with the patient, who verbalized understanding and had no further questions prior to discharge. Given the patient's report of reduced pain and improved functional ability without adverse effects,  it is reasonable to continue Nucynta 50 mg up to 3 times per day as needed for pain.  The terms of the opioid agreement as well as the potential risks and adverse effects of the patient's medication regimen were discussed in detail. This includes if applicable due to dosage of medication permission to discuss and coordinate care with other treatment providers relevant to the patients condition. The patient verbalized understanding.    Treatment goals were discussed in detail with the patient.  These goals include reduction of pain levels, improved levels of functioning, avoidance of medication side effect and lowest medication dose possible to achieve  these goals.  The patient was in full agreement with these goals.  Also discussed is the understanding that pain may not be eliminated by medication but that the goal of a better sustaining life through use of medication is appropriate.  Lifestyle modifications including weight management, stretching, diet, exercise and smoking are addressed at each office visit.  The patient provided a urine drug screen for routine monitoring and compliance.  This test may be given as frequently as every month based on the patient's individual opiate risk stratification and prescriber concerns for any aberrancies.  This test is indicated given the use of controlled substances for the patient's medical condition.  Unless otherwise noted the prior (s) urine drug testing results were consistent with prescribed medications.  There is no evidence of illicit drug use or additional medications ingested.     Risks and side effects of chronic opioid therapy  including but not limited to tolerance, dependence, constipation, hyperalgesia, cognitive side effects, addiction and possible death due to overuse and or misuse were discussed. I also discussed that such medications when co-administered with other sedative agents including but not limited to alcohol, benzodiazepines, sedative hypnotics and illegal drugs could pose life threatening consequences including death.  I also explained the impact that the administration of such medication has on a patient with obstructive sleep apnea and continued recommendations for use of apnea devices if ordered are prescribed by other physicians.  In order to effectively and safely treat the pain, I also emphasized the importance of compliance with the treatment plan as well as compliance with the terms of the opioid agreement which was reviewed in detail. I explained the importance of being responsible with the medications and to take these only as prescribed, never in excess and never for reasons other than pain reduction. The patient was counseled on keeping the medications safe and locked away from children and other adults as well as disposal methods and options. The patient understood the risks and instructions.      I also discussed with the patient in detail that based on the clinical response to the opioid medications and improvements of activities of daily living, sleep, and work performance in light of compliance with the treatment plan we can continue this form of therapy for the above chronic  pain.  The goal and rationale used for current treatment with chronic opioid medication is to control the pain and alleviate disability induced by the chronic pain condition noted above after failures of other non-opioid and nonpharmacological modalities to treat the chronic pain and the symptoms associated with have failed.  The patient understood the goals in terms of the above treatment plan and had no further questions prior to  leaving the office today.    Given the patient's total MED, general use of daily opiates, or other coadministered medications in various classes the patient was offered a prescription for Narcan.  I instructed the patient that Narcan is for emergency use only and is worse suspected or known opiate overdose.  Call 911 prior to administration of this medication to activate the emergency response team.  It is imperative to fill this medication in order to demonstrate understanding of the gravity of possible side effects including respiratory depression and risk of overdose of this opiate load or medication combination.  As such patients will be required to bring Narcan prescriptions to follow-up appointments as part of the compliance with continued opiate care.    Disclaimer: This note was transcribed using an audio transcription device.  As such, minor errors may be present with regard to spelling, punctuation, and inadvertent word insertion.  Please disregard such errors.

## 2024-08-07 ENCOUNTER — PHARMACY VISIT (OUTPATIENT)
Dept: PHARMACY | Facility: CLINIC | Age: 44
End: 2024-08-07
Payer: MEDICAID

## 2024-08-13 DIAGNOSIS — K21.9 GASTROESOPHAGEAL REFLUX DISEASE WITHOUT ESOPHAGITIS: ICD-10-CM

## 2024-08-13 RX ORDER — OMEPRAZOLE 40 MG/1
CAPSULE, DELAYED RELEASE ORAL
Qty: 90 CAPSULE | Refills: 3 | Status: SHIPPED | OUTPATIENT
Start: 2024-08-13

## 2024-08-16 ENCOUNTER — PATIENT MESSAGE (OUTPATIENT)
Dept: BEHAVIORAL HEALTH | Facility: CLINIC | Age: 44
End: 2024-08-16
Payer: MEDICAID

## 2024-08-16 DIAGNOSIS — F33.41 RECURRENT MAJOR DEPRESSIVE DISORDER, IN PARTIAL REMISSION (CMS-HCC): ICD-10-CM

## 2024-08-18 RX ORDER — DULOXETIN HYDROCHLORIDE 60 MG/1
60 CAPSULE, DELAYED RELEASE ORAL DAILY
Qty: 90 CAPSULE | Refills: 1 | Status: ON HOLD | OUTPATIENT
Start: 2024-08-18

## 2024-08-19 ENCOUNTER — TELEPHONE (OUTPATIENT)
Dept: BEHAVIORAL HEALTH | Facility: CLINIC | Age: 44
End: 2024-08-19

## 2024-08-19 ENCOUNTER — APPOINTMENT (OUTPATIENT)
Dept: PHARMACY | Facility: HOSPITAL | Age: 44
End: 2024-08-19
Payer: MEDICAID

## 2024-08-19 DIAGNOSIS — E66.9 OBESITY (BMI 35.0-39.9 WITHOUT COMORBIDITY): ICD-10-CM

## 2024-08-19 DIAGNOSIS — E78.1 HYPERTRIGLYCERIDEMIA: Primary | ICD-10-CM

## 2024-08-19 NOTE — Clinical Note
Alyson Richardson, I spoke with Nichelle today regarding her cholesterol management. She is doing well on Repatha, Zetia, and Lovaza with no side effects to note. Discussed most recent lipid panel with patient, as labwork does not show much change since starting new medications. Patient confirms adherence with pharmacotherapy. Had patient explain injection technique, which is appropriate. Patient does recall when previously taking Repatha she did not see results until on therapy for ~6 months. Patient also states her insurance is changing 9/1/24, so will follow up sooner to ensure to coverage issues. Will have patient continue current pharmacotherapy regimen and follow up in 1 month.

## 2024-08-19 NOTE — PROGRESS NOTES
Pharmacist Clinic: Cardiology Management    Nichelle Izaguirre is a 44 y.o. female was referred to Clinical Pharmacy Team for hypercholesterolemia/hyperlipidemia management.     Referring Provider: Akosua Richardson MD    THIS IS A FOLLOW UP PATIENT APPOINTMENT. AT LAST VISIT ON 2024 WITH PHARMACIST (Cynthia Crook).    Appointment was completed by the patient who was reached at .    REVIEW OF PAST APPNT (IF APPLICABLE):   Patient is on Medicaid insurance which required PA for Repatha and Vascepa  Both originally denied  Medicaid prefers Lovaza over Vascepa so prescription for Lov[facility]   Appeal was entered for Repatha - this was approved and will  on 2025  Patient received Repatha and Lovaza today. She has been off the Repatha for about 6 months; new start Lovaza. Patient has been taking Zetia 10 mg every day since last visit (3/2024) - no SE to report at this time. Patient confirms that she is going to start Repatha and Lovaza as soon as possible. Confirmed with patient that she should get repeat lipid panel down 3-4 days before seeing Dr. Richardson on 2024. Will follow up in 3 months to check on tolerability and see lab work.   Patient mentions that she is also having trouble obtaining her Carbidopa-Levodopa. Confirmed with PCP ([facility]) that she is agreeable to send referral to PCP clinical pharmacists to help with issue. Referral has been placed.   Interim History:   Cardiology appointment 2024: No changes made to cholesterol pharmacotherapy. To continue Repatha, Zetia and recently added Lovaza. Will recheck lipid panel in 3 months.       Allergies   Allergen Reactions    Clindamycin Anaphylaxis    Dupilumab Anaphylaxis    Hydrochlorothiazide Anaphylaxis, Itching and Swelling    Sulfamethoxazole-Trimethoprim Anaphylaxis    Cefazolin Hives, Other and Swelling     STATES TONGUE SPLITS    Atorvastatin Hives    Cephalexin Other    Clarithromycin Swelling      hives, tongue swelling    Nitrofurantoin Monohyd/M-Cryst Hives    Sulfa (Sulfonamide Antibiotics) Hives    Sulfacetamide Hives       Past Medical History:   Diagnosis Date    Acute upper respiratory infection, unspecified 07/26/2016    Viral URI with cough    Acute upper respiratory infection, unspecified 11/15/2017    Viral URI with cough    Allergy status to unspecified drugs, medicaments and biological substances     History of allergy    Chronic maxillary sinusitis 08/27/2018    Maxillary sinusitis, unspecified chronicity    Dystonia, unspecified 04/08/2014    Dystonia    Encounter for other screening for malignant neoplasm of breast 01/22/2018    Breast screening    Encounter for screening for respiratory tuberculosis 11/11/2013    Screening examination for pulmonary tuberculosis    Essential (primary) hypertension 12/27/2017    Benign essential hypertension    Hypersomnia, unspecified 04/13/2015    Sleeps too much    Idiopathic sleep related nonobstructive alveolar hypoventilation     Nocturnal hypoxemia    Idiopathic sleep related nonobstructive alveolar hypoventilation 02/09/2018    Nocturnal hypoxia    Impaired fasting glucose 01/22/2018    Impaired fasting glucose    Insect bite (nonvenomous) of lower back and pelvis, initial encounter 03/29/2018    Tick bite of back    Left lower quadrant pain 01/07/2019    Left lower quadrant pain    Local infection of the skin and subcutaneous tissue, unspecified 07/28/2017    Skin infection    Low back pain, unspecified 05/23/2019    Acute low back pain    Other conditions influencing health status 02/27/2017    Sprain of right thumb, unspecified site of finger, initial encounter    Other malaise 04/16/2018    Malaise and fatigue    Other microscopic hematuria 03/30/2018    Microscopic hematuria    Other specified cough 09/07/2018    Productive cough    Other specified health status 01/07/2019    Acute medical illness    Other symptoms and signs involving the  musculoskeletal system 07/12/2018    Weakness of right lower extremity    Other symptoms and signs involving the musculoskeletal system 03/21/2018    Polyarticular joint involvement    Other symptoms and signs involving the musculoskeletal system 07/12/2018    RUE weakness    Pain in right lower leg 01/27/2016    Right calf pain    Pain in unspecified hip 01/20/2016    Hip pain    Pelvic and perineal pain 04/17/2018    Pelvic pain    Personal history of diseases of the skin and subcutaneous tissue 08/07/2018    History of dermatitis    Personal history of other (healed) physical injury and trauma 08/07/2018    History of insect bite    Personal history of other diseases of the circulatory system     History of hypertension    Personal history of other diseases of the female genital tract 11/24/2015    History of menorrhagia    Personal history of other diseases of the musculoskeletal system and connective tissue 03/14/2018    History of tendinitis    Personal history of other diseases of the nervous system and sense organs 04/08/2014    History of Parkinson's disease    Personal history of other diseases of the respiratory system 01/07/2019    History of acute bronchitis    Personal history of other diseases of the respiratory system 10/05/2018    History of paranasal sinus pain    Personal history of other specified conditions 04/13/2015    History of snoring    Personal history of other specified conditions 09/11/2017    History of headache    Personal history of other specified conditions 09/07/2018    History of persistent cough    Personal history of other specified conditions 04/08/2014    History of memory loss    Personal history of other specified conditions 03/26/2018    History of left flank pain    Personal history of other specified conditions     History of heartburn    Personal history of urinary calculi 03/26/2018    Personal history of urinary calculi    Personal history of urinary calculi  03/26/2018    History of renal calculi    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/2017    Plantar fasciitis, left    Primary insomnia 04/13/2015    Primary insomnia    Rash and other nonspecific skin eruption 04/16/2018    Rash    Unspecified abdominal pain 01/07/2019    Left sided abdominal pain    Urinary tract infection, site not specified 10/19/2018    UTI (urinary tract infection), bacterial    Urinary tract infection, site not specified 10/18/2018    Recurrent UTI    Vitamin D deficiency, unspecified 04/22/2016    Vitamin D deficiency    Zoster with other complications 11/25/2015    Herpes zoster dermatitis       Current Outpatient Medications on File Prior to Visit   Medication Sig Dispense Refill    albuterol 2.5 mg /3 mL (0.083 %) nebulizer solution inhale the contents of one vial via nebulizer every 4 hours as needed      allopurinol (Zyloprim) 100 mg tablet TAKE ONE TABLET BY MOUTH EVERY DAY 90 tablet 0    allopurinol (Zyloprim) 300 mg tablet TAKE ONE TABLET BY MOUTH EVERY DAY 90 tablet 0    ALPRAZolam (Xanax) 0.5 mg tablet Take 1 tablet (0.5 mg) by mouth 3 times a day as needed for sleep or anxiety. 21 tablet 2    amLODIPine (Norvasc) 2.5 mg tablet Take 1 tablet (2.5 mg) by mouth once daily.      amoxicillin (Amoxil) 500 mg capsule Take 1 capsule (500 mg) by mouth once daily.      ascorbic acid, vitamin C, 500 mg capsule Take 1 capsule by mouth once daily.      benzoyl peroxide (Benzac AC) 10 % external wash Apply topically if needed.      benztropine (Cogentin) 2 mg tablet Take 1 tablet (2 mg) by mouth once daily.      buPROPion XL (Wellbutrin XL) 150 mg 24 hr tablet Take 1 tablet (150 mg) by mouth once daily. Do not crush, chew, or split. 90 tablet 3    buPROPion XL (Wellbutrin XL) 300 mg 24 hr tablet Take 1 tablet (300 mg) by mouth once daily in the morning. Do not crush, chew, or split. 90 tablet 3    carbidopa-levodopa-entacapone (Stalevo) -200 mg tablet Take 1 tablet by mouth every 4 hours. 540  tablet 1    carvedilol (Coreg) 12.5 mg tablet Take 1 tablet (12.5 mg) by mouth 2 times daily (morning and late afternoon) for 7 days, THEN 0.5 tablets (6.25 mg) 2 times daily (morning and late afternoon) for 14 days. 28 tablet 1    cetirizine-pseudoephedrine (ZyrTEC-D) 5-120 mg 12 hr tablet Take 1 tablet by mouth 2 times a day. 180 tablet 0    clindamycin (Cleocin T) 1 % lotion 1 Application      clobetasol (Temovate) 0.05 % external solution Apply topically 2 times a day.      clobetasoL 0.05 % lotion One application as needed for flaring only.not for daily use. For scalp and hands only 118 mL 3    cloNIDine (Catapres) 0.1 mg tablet Take 1 tablet (0.1 mg) by mouth if needed for high blood pressure.      cloNIDine (Catapres) 0.2 mg tablet Take 1 tablet (0.2 mg) by mouth once daily at bedtime. 90 tablet 3    Cosentyx Pen, 2 Pens, 150 mg/mL self-injector pen Inject 2 mL (300 mg) under the skin every 30 (thirty) days. 2 mL 2    cyanocobalamin, vitamin B-12, 1,000 mcg/mL kit Inject 1,000 mcg as directed every 28 (twenty-eight) days. 1 kit 11    diclofenac sodium (Voltaren) 1 % gel gel Apply 4.5 inches (4 g) topically 2 times a day as needed.      doxepin (SINEquan) 50 mg capsule Take 1 capsule (50 mg) by mouth once daily at bedtime. 30 capsule 3    doxycycline (Vibramycin) 100 mg capsule 1 capsule (100 mg).      DULoxetine (Cymbalta) 60 mg DR capsule Take 1 capsule (60 mg) by mouth once daily. 90 capsule 1    eplerenone (Inspra) 25 mg tablet Take 1 tablet (25 mg) by mouth once daily.      ergocalciferol (Vitamin D-2) 1.25 MG (97380 UT) capsule Take 1 capsule (50,000 Units) by mouth 1 (one) time per week. 12 capsule 0    ezetimibe (Zetia) 10 mg tablet Take 1 tablet (10 mg) by mouth once daily. 90 tablet 3    fluticasone furoate-vilanteroL (Breo Ellipta) 200-25 mcg/dose inhaler 1puff daily, Inhalation      ipratropium-albuteroL (Duo-Neb) 0.5-2.5 mg/3 mL nebulizer solution Take 3 mL by nebulization every 4 hours if needed  for wheezing.      ketoconazole (NIZOral) 2 % cream 1 Application      lactobacillus acidophilus (Lactobacillus acidoph-L.bulgar) tablet tablet Take 1 tablet by mouth once daily. 90 tablet 0    levalbuterol (Xopenex) 1.25 mg/3 mL nebulizer solution Take 1 ampule by nebulization 3 times a day.      meloxicam (Mobic) 15 mg tablet Take 1 tablet (15 mg) by mouth once daily. (Patient not taking: Reported on 8/5/2024) 30 tablet 3    metoprolol succinate XL (Toprol-XL) 25 mg 24 hr tablet Take 1 tablet (25 mg) by mouth once daily.      metroNIDAZOLE (Metrocream) 0.75 % cream 1 Application      montelukast (Singulair) 10 mg tablet Take 1 tablet (10 mg) by mouth once daily at bedtime.      nebulizer and compressor device use q4-6 hours with albuterol PRN cough/wheeze      nebulizers (MLW Squared Disposable Nebulizer) misc every 4 hours if needed.      olmesartan (BENIcar) 40 mg tablet Take 1 tablet (40 mg) by mouth once daily.      omega-3 acid ethyl esters (Lovaza) 1 gram capsule Take 2 capsules (2 g) by mouth 2 times a day. 360 capsule 3    omeprazole (PriLOSEC) 40 mg DR capsule Take 1 capsule (40 mg) by mouth once daily in the morning before meals. 90 capsule 3    ondansetron ODT (Zofran-ODT) 4 mg disintegrating tablet Take 1 tablet (4 mg) by mouth every 8 hours if needed for nausea or vomiting. 90 tablet 0    orphenadrine (Norflex) 100 mg 12 hr tablet Take 1 tablet (100 mg) by mouth 2 times a day as needed for muscle spasms. 60 tablet 3    pioglitazone (Actos) 15 mg tablet Take 1 tablet (15 mg) by mouth once daily.      pregabalin (Lyrica) 75 mg capsule Take 1 capsule (75 mg) by mouth 3 times a day. 90 capsule 2    ProAir HFA 90 mcg/actuation inhaler       Repatha SureClick 140 mg/mL injection Inject 1 mL (140 mg) under the skin every 14 (fourteen) days. 6 mL 3    Spiriva Respimat 1.25 mcg/actuation inhaler Inhale once daily.      SUMAtriptan (Imitrex) 25 mg tablet Take 1 tablet (25 mg) by mouth 1 time if needed for  "migraine. May repeat in 2 hours if no improvement. Max 200 mg/24 hr 9 tablet 2    tapentadol (Nucynta) 50 mg tablet Take 1 tablet (50 mg) by mouth every 8 hours if needed for severe pain (7 - 10). 90 tablet 0    tezepelumab-ekko (Tezspire) SubQ Pen Injector Inject 210 mg under the skin.      [DISCONTINUED] DULoxetine (Cymbalta) 60 mg DR capsule TAKE 1 CAPSULE BY MOUTH DAILY 90 capsule 1    [DISCONTINUED] omeprazole (PriLOSEC) 40 mg DR capsule Take 1 capsule (40 mg) by mouth once daily in the morning. Take before meals. 90 capsule 3     No current facility-administered medications on file prior to visit.         RELEVANT LAB RESULTS  Lab Results   Component Value Date    BILITOT 0.2 07/07/2024    CALCIUM 9.6 07/07/2024    CO2 26 07/07/2024     07/07/2024    CREATININE 0.63 07/07/2024    GLUCOSE 101 (H) 07/07/2024    ALKPHOS 63 07/07/2024    K 3.8 07/07/2024    PROT 7.4 07/07/2024     07/07/2024    AST 23 07/07/2024    ALT 20 07/07/2024    BUN 11 07/07/2024    ANIONGAP 14 07/07/2024    MG 1.90 11/19/2020    PHOS 3.7 09/18/2023    ALBUMIN 4.3 07/07/2024    AMYLASE 18 (L) 11/12/2021    LIPASE 14 11/12/2021    GFRF >90 09/18/2023     Lab Results   Component Value Date    TRIG 417 (H) 07/01/2024    CHOL 264 (H) 07/01/2024    LDLCALC  07/01/2024      Comment:      The calculation of LDL and VLDL are inaccurate when the Triglycerides are greater than 400 mg/dL or when the patient is non-fasting. If LDL measurement is necessary contact the testing laboratory for an alternative LDL assay.                                  Near   Borderline      AGE      Desirable  Optimal    High     High     Very High     0-19 Y     0 - 109     ---    110-129   >/= 130     ----    20-24 Y     0 - 119     ---    120-159   >/= 160     ----      >24 Y     0 -  99   100-129  130-159   160-189     >/=190      HDL 37.1 07/01/2024     No results found for: \"BMCBC\", \"CBCDIF\"     PHARMACEUTICAL ASSESSMENT    Medication Documentation Review " Audit       Reviewed by Santa Dunn, APRN-CNP, APRN-CNS (Nurse Practitioner) on 08/05/24 at 1650      Medication Order Taking? Sig Documenting Provider Last Dose Status   albuterol 2.5 mg /3 mL (0.083 %) nebulizer solution [id number] Yes inhale the contents of one vial via nebulizer every 4 hours as needed Historical Provider, MD Taking Active   allopurinol (Zyloprim) 100 mg tablet [id number] Yes TAKE ONE TABLET BY MOUTH EVERY DAY Mirtha Hilton, DO Taking Active   allopurinol (Zyloprim) 300 mg tablet [id number] Yes TAKE ONE TABLET BY MOUTH EVERY DAY Mirtha Hilton, DO Taking Active   ALPRAZolam (Xanax) 0.5 mg tablet [id number] Yes Take 1 tablet (0.5 mg) by mouth 3 times a day as needed for sleep or anxiety. Harrison Vila MD Taking Active   amLODIPine (Norvasc) 2.5 mg tablet [id number] Yes Take 1 tablet (2.5 mg) by mouth once daily. Historical Provider, MD Taking Active   amoxicillin (Amoxil) 500 mg capsule [id number] Yes Take 1 capsule (500 mg) by mouth once daily. Historical Provider, MD Taking Active   ascorbic acid, vitamin C, 500 mg capsule [id number] Yes Take 1 capsule by mouth once daily. Historical Provider, MD Taking Active   benzoyl peroxide (Benzac AC) 10 % external wash [id number] Yes Apply topically if needed. Historical Provider, MD Taking Active   benztropine (Cogentin) 2 mg tablet [id number] Yes Take 1 tablet (2 mg) by mouth once daily. Historical Provider, MD Taking Active   buPROPion XL (Wellbutrin XL) 150 mg 24 hr tablet [id number] Yes Take 1 tablet (150 mg) by mouth once daily. Do not crush, chew, or split. NAYELI Yadav-CNP Taking Active   buPROPion XL (Wellbutrin XL) 300 mg 24 hr tablet [id number] Yes Take 1 tablet (300 mg) by mouth once daily in the morning. Do not crush, chew, or split. DANIA YadavCNP Taking Active   carbidopa-levodopa-entacapone (Stalevo) -200 mg tablet [id number] Yes Take 1 tablet by mouth every 4 hours. Sandra Lilly, APRN-CNP Taking Active   carvedilol  (Coreg) 12.5 mg tablet [id number]  Take 1 tablet (12.5 mg) by mouth 2 times daily (morning and late afternoon) for 7 days, THEN 0.5 tablets (6.25 mg) 2 times daily (morning and late afternoon) for 14 days. Akosua Richardson MD   24 235   cetirizine-pseudoephedrine (ZyrTEC-D) 5-120 mg 12 hr tablet [id number]  Take 1 tablet by mouth 2 times a day. NAYELI Yadav-CNP   23 235   clindamycin (Cleocin T) 1 % lotion [id number] Yes 1 Application Historical Provider, MD Taking Active   clobetasol (Temovate) 0.05 % external solution [id number] Yes Apply topically 2 times a day. Historical Provider, MD Taking Active   clobetasoL 0.05 % lotion [id number] Yes One application as needed for flaring only.not for daily use. For scalp and hands only Ulises Morataya MD Taking Active   cloNIDine (Catapres) 0.1 mg tablet [id number] Yes Take 1 tablet (0.1 mg) by mouth if needed for high blood pressure. Historical Provider, MD Taking Active   cloNIDine (Catapres) 0.2 mg tablet [id number] Yes Take 1 tablet (0.2 mg) by mouth once daily at bedtime. Harrison Vila MD Taking Active   Cosentyx Pen, 2 Pens, 150 mg/mL self-injector pen [id number] Yes Inject 2 mL (300 mg) under the skin every 30 (thirty) days. Mirtha Hilton DO Taking Active   cyanocobalamin, vitamin B-12, 1,000 mcg/mL kit [id number] Yes Inject 1,000 mcg as directed every 28 (twenty-eight) days. NAYELI Yadav-CNP Taking Active   diclofenac sodium (Voltaren) 1 % gel gel [id number] Yes Apply 4.5 inches (4 g) topically 2 times a day as needed. Historical Provider, MD Taking Active   doxepin (SINEquan) 50 mg capsule [id number] Yes Take 1 capsule (50 mg) by mouth once daily at bedtime. Harrison Vila MD Taking Active   doxycycline (Vibramycin) 100 mg capsule [id number] Yes 1 capsule (100 mg). Historical Provider, MD Taking Active   DULoxetine (Cymbalta) 60 mg DR capsule [id number] Yes TAKE 1 CAPSULE BY MOUTH DAILY Harrison Vila MD Taking Active   eplerenone  (Inspra) 25 mg tablet [id number] Yes Take 1 tablet (25 mg) by mouth once daily. Historical Provider, MD Taking Active   ergocalciferol (Vitamin D-2) 1.25 MG (51778 UT) capsule [id number] Yes Take 1 capsule (50,000 Units) by mouth 1 (one) time per week. Sandra Lilly APRN-CNP Taking Active   ezetimibe (Zetia) 10 mg tablet [id number] Yes Take 1 tablet (10 mg) by mouth once daily. Akosua Richardson MD Taking Active   fluticasone furoate-vilanteroL (Breo Ellipta) 200-25 mcg/dose inhaler [id number] Yes 1puff daily, Inhalation Historical Provider, MD Taking Active   ipratropium-albuteroL (Duo-Neb) 0.5-2.5 mg/3 mL nebulizer solution [id number] Yes Take 3 mL by nebulization every 4 hours if needed for wheezing. Historical Provider, MD Taking Active   ketoconazole (NIZOral) 2 % cream [id number] Yes 1 Application Historical Provider, MD Taking Active   lactobacillus acidophilus (Lactobacillus acidoph-L.bulgar) tablet tablet [id number] Yes Take 1 tablet by mouth once daily. NAYELI Yadav-CNP Taking Active   levalbuterol (Xopenex) 1.25 mg/3 mL nebulizer solution [id number] Yes Take 1 ampule by nebulization 3 times a day. Historical Provider, MD Taking Active   meloxicam (Mobic) 15 mg tablet [id number] No Take 1 tablet (15 mg) by mouth once daily.   Patient not taking: Reported on 8/5/2024    Harrison Jean MD Not Taking Active   metoprolol succinate XL (Toprol-XL) 25 mg 24 hr tablet [id number] Yes Take 1 tablet (25 mg) by mouth once daily. Historical Provider, MD Taking Active   metroNIDAZOLE (Metrocream) 0.75 % cream [id number] Yes 1 Application Historical Provider, MD Taking Active   montelukast (Singulair) 10 mg tablet [id number] Yes Take 1 tablet (10 mg) by mouth once daily at bedtime. Historical Provider, MD Taking Active   nebulizer and compressor device [id number] Yes use q4-6 hours with albuterol PRN cough/wheeze Historical Provider, MD Taking Active   nebulizers (Devilbiss Disposable Nebulizer) misc [id number] Yes every 4  hours if needed. Historical Provider, MD Taking Active   olmesartan (BENIcar) 40 mg tablet [id number] Yes Take 1 tablet (40 mg) by mouth once daily. Historical Provider, MD Taking Active   omega-3 acid ethyl esters (Lovaza) 1 gram capsule [id number] Yes Take 2 capsules (2 g) by mouth 2 times a day. Akosua Richardson MD Taking Active   omeprazole (PriLOSEC) 40 mg DR capsule [id number]  Take 1 capsule (40 mg) by mouth once daily in the morning. Take before meals. DEBBY Yadav  Active   ondansetron ODT (Zofran-ODT) 4 mg disintegrating tablet [id number] Yes Take 1 tablet (4 mg) by mouth every 8 hours if needed for nausea or vomiting. DEBBY Yadav Taking Active     Discontinued 08/05/24 1637   orphenadrine (Norflex) 100 mg 12 hr tablet [id number]  Take 1 tablet (100 mg) by mouth 2 times a day as needed for muscle spasms. DEBBY Mcdermott, APRN-CNS  Active   pioglitazone (Actos) 15 mg tablet [id number] Yes Take 1 tablet (15 mg) by mouth once daily. Historical Provider, MD Taking Active     Discontinued 08/05/24 1636   pregabalin (Lyrica) 75 mg capsule [id number]  Take 1 capsule (75 mg) by mouth 3 times a day. DEBBY Mcdermott, NAYELI-CNS  Active   ProAir HFA 90 mcg/actuation inhaler [id number] Yes  Historical Provider, MD Taking Active   Repatha SureClick 140 mg/mL injection [id number] Yes Inject 1 mL (140 mg) under the skin every 14 (fourteen) days. Akosua Richardson MD Taking Active   Spiriva Respimat 1.25 mcg/actuation inhaler [id number] Yes Inhale once daily. Historical Provider, MD Taking Active   SUMAtriptan (Imitrex) 25 mg tablet [id number] Yes Take 1 tablet (25 mg) by mouth 1 time if needed for migraine. May repeat in 2 hours if no improvement. Max 200 mg/24 hr DEBBY Yadav Taking Active   tapentadol (Nucynta) 50 mg tablet [id number]  Take 1 tablet (50 mg) by mouth every 8 hours if needed for severe pain (7 - 10). NAYELI Mcdermott-CNP, APRN-CNS  Active   tezepelumab-stephanie  (Tezspire) SubQ Pen Injector [id number] Yes Inject 210 mg under the skin. Historical Provider, MD Taking Active                    DISEASE MANAGEMENT ASSESSMENT:     HYPERCHOLESTEROLEMIA ASSESSMENT    RECENT LIPID PANEL (DATE): 7/1/2024  Lab Results   Component Value Date    TRIG 417 (H) 07/01/2024    CHOL 264 (H) 07/01/2024    LDLCALC  07/01/2024      Comment:      The calculation of LDL and VLDL are inaccurate when the Triglycerides are greater than 400 mg/dL or when the patient is non-fasting. If LDL measurement is necessary contact the testing laboratory for an alternative LDL assay.                                  Near   Borderline      AGE      Desirable  Optimal    High     High     Very High     0-19 Y     0 - 109     ---    110-129   >/= 130     ----    20-24 Y     0 - 119     ---    120-159   >/= 160     ----      >24 Y     0 -  99   100-129  130-159   160-189     >/=190      HDL 37.1 07/01/2024          ASCVD SCORE: The 10-year ASCVD risk score (Amarilis RAMOS, et al., 2019) is: 2.7%    Values used to calculate the score:      Age: 44 years      Sex: Female      Is Non- : No      Diabetic: No      Tobacco smoker: No      Systolic Blood Pressure: 114 mmHg      Is BP treated: Yes      HDL Cholesterol: 37.1 mg/dL      Total Cholesterol: 264 mg/dL  Coronary Heart Disease (MI, angina, coronary artery stenosis): No - but noted family history of CAD   History of ischemic stroke? No   History of carotid artery stenosis? No   Peripheral artery disease? No   ASCVD RISK FACTORS:    CKD? No   Diabetes? No   HTN? Yes   Persistently elevated LDL? Yes   Elevated triglycerides? Yes   Inflammatory diseases (rheumatoid arthritis, psoriasis, HIV)? Yes -inflammatory arthropathy, rheumatoid arthritis, Lyme disease, MBL     CURRENT PHARMACOTHERAPY  - Statin? No -noted statin intolerance  - Ezetimibe? Yes, describe: Zetia 10 mg every day   - PCSK9-I? Yes, describe: Repatha 140 mg q14 days  - Other lipid  lowering agents? Yes, describe: Lovaza 2 g BID      RELEVANT PAST MEDICAL HISTORY:   HTN, HLD, Hypertriglyceridemia, Prediabetes, Rheumatoid arthritis, Hx of hyperuricemia      Affordability/Accessibility: Patient has Medicaid insurance. Requires PA for Repatha. Approved through May 2025.  Adherence/Organization: confirms taking Lovaza 2g BID, Repatha q14 days (takes on Sundays). Confirms she did miss one dose of the Repatha as she wasn't sure that week if she had given her Repatha or Cosentyx injection .  Adverse Effects: none, does notice more of a fish oil taste of Lovaza but nothing too bothersome  Recent Hospitalizations: No  Diet:   Breakfast: Typically skips (~90% of the time), will sometimes eat a piece of toast  Lunch: does not eat lunch  Dinner: Typically cooks at home, will have chicken, steak, and rice. Occasional baked potato. Will eat out once every few weekends  Snacks: grapes  Exercise: No consistent routine, does state she runs around a lot as she babysits her grandchildren Monday through Friday  Tobacco Use: No  Alcohol Use: No  Next Lipid Panel: Prior to cardiology appointment 10/31/2024      EDUCATION/COUNSELING:  - Counseled patient on MOA, expectations, side effects, duration of therapy, contraindications, administration, and monitoring parameters  - Answered all patient questions and concerns    DISCUSSION/NOTES:   Today's appointment was 3 month follow up regarding cholesterol management. Patient reports doing well on Repatha and Lovaza, notes she can taste more of the fishy flavor when taking Lovaza but this is not too bothersome for her. No adverse effects to note.   Discussed most recent lipid panel with patient, as labwork does not show much change since starting new medications. Patient confirms adherence with pharmacotherapy. Had patient explain injection technique, which is appropriate. Patient does recall when previously taking Repatha she did not see results until on therapy for ~6  months. Will have patient continue current pharmacotherapy regimen and have repeat lipid panel drawn prior to next cardiology appointment.  Patient also states her insurance will be changing September 1st. She is unsure if she will have Medicaid as secondary, and what prescription coverage will be provided. Confirms she has just received a 90 day supply of medications so will not need a refill soon. Will follow up with patient next month to ensure no problems with coverage of current pharmacotherapy.     ASSESSMENT AND PLAN:    Assessment/Plan   Problem List Items Addressed This Visit       Hypertriglyceridemia - Primary    Relevant Orders    Follow Up In Clinical Pharmacy    LDL cholesterol, direct     Other Visit Diagnoses       Obesity (BMI 35.0-39.9 without comorbidity)        Relevant Orders    Follow Up In Clinical Pharmacy            RECOMMENDATIONS/PLAN    Continue:   Zetia 10 mg every day   Repatha 140 mg q14 days  Lovaza 2 grams BID  Follow up: 1 month    Next Cardiology Appointment: 10/31/2024  Clinical Pharmacist follow up: 9/19/2024  VAF/Application Expiration: No  Type of Encounter: Coty Darby PharmD    Verbal consent to manage patient's drug therapy was obtained from the patient . They were informed they may decline to participate or withdraw from participation in pharmacy services at any time.    Continue all meds under the continuation of care with the referring provider and clinical pharmacy team.

## 2024-08-24 ENCOUNTER — HOSPITAL ENCOUNTER (INPATIENT)
Facility: HOSPITAL | Age: 44
End: 2024-08-24
Attending: INTERNAL MEDICINE | Admitting: INTERNAL MEDICINE
Payer: MEDICAID

## 2024-08-24 ENCOUNTER — APPOINTMENT (OUTPATIENT)
Dept: RADIOLOGY | Facility: HOSPITAL | Age: 44
End: 2024-08-24
Payer: MEDICAID

## 2024-08-24 DIAGNOSIS — L03.213 PRESEPTAL CELLULITIS: Primary | ICD-10-CM

## 2024-08-24 DIAGNOSIS — R22.0 FACIAL SWELLING: ICD-10-CM

## 2024-08-24 DIAGNOSIS — L29.9 ITCHING: ICD-10-CM

## 2024-08-24 DIAGNOSIS — L30.4 INTERTRIGO: ICD-10-CM

## 2024-08-24 LAB
ALBUMIN SERPL BCP-MCNC: 3.8 G/DL (ref 3.4–5)
ALP SERPL-CCNC: 64 U/L (ref 33–110)
ALT SERPL W P-5'-P-CCNC: 16 U/L (ref 7–45)
ANION GAP SERPL CALC-SCNC: 14 MMOL/L (ref 10–20)
AST SERPL W P-5'-P-CCNC: 18 U/L (ref 9–39)
B-HCG SERPL-ACNC: <2 MIU/ML
BASOPHILS # BLD AUTO: 0.01 X10*3/UL (ref 0–0.1)
BASOPHILS NFR BLD AUTO: 0.3 %
BILIRUB SERPL-MCNC: 0.3 MG/DL (ref 0–1.2)
BUN SERPL-MCNC: 14 MG/DL (ref 6–23)
CALCIUM SERPL-MCNC: 9.2 MG/DL (ref 8.6–10.3)
CHLORIDE SERPL-SCNC: 104 MMOL/L (ref 98–107)
CO2 SERPL-SCNC: 23 MMOL/L (ref 21–32)
CREAT SERPL-MCNC: 0.61 MG/DL (ref 0.5–1.05)
CRP SERPL-MCNC: 0.25 MG/DL
EGFRCR SERPLBLD CKD-EPI 2021: >90 ML/MIN/1.73M*2
EOSINOPHIL # BLD AUTO: 0.01 X10*3/UL (ref 0–0.7)
EOSINOPHIL NFR BLD AUTO: 0.3 %
ERYTHROCYTE [DISTWIDTH] IN BLOOD BY AUTOMATED COUNT: 14.7 % (ref 11.5–14.5)
ERYTHROCYTE [SEDIMENTATION RATE] IN BLOOD BY WESTERGREN METHOD: 12 MM/H (ref 0–20)
GLUCOSE SERPL-MCNC: 135 MG/DL (ref 74–99)
HCT VFR BLD AUTO: 33.8 % (ref 36–46)
HGB BLD-MCNC: 11.2 G/DL (ref 12–16)
IMM GRANULOCYTES # BLD AUTO: 0.01 X10*3/UL (ref 0–0.7)
IMM GRANULOCYTES NFR BLD AUTO: 0.3 % (ref 0–0.9)
LACTATE SERPL-SCNC: 1.4 MMOL/L (ref 0.4–2)
LACTATE SERPL-SCNC: 2.3 MMOL/L (ref 0.4–2)
LYMPHOCYTES # BLD AUTO: 1.21 X10*3/UL (ref 1.2–4.8)
LYMPHOCYTES NFR BLD AUTO: 32.3 %
MCH RBC QN AUTO: 30.5 PG (ref 26–34)
MCHC RBC AUTO-ENTMCNC: 33.1 G/DL (ref 32–36)
MCV RBC AUTO: 92 FL (ref 80–100)
MONOCYTES # BLD AUTO: 0.45 X10*3/UL (ref 0.1–1)
MONOCYTES NFR BLD AUTO: 12 %
NEUTROPHILS # BLD AUTO: 2.06 X10*3/UL (ref 1.2–7.7)
NEUTROPHILS NFR BLD AUTO: 54.8 %
NRBC BLD-RTO: 0 /100 WBCS (ref 0–0)
PLATELET # BLD AUTO: 270 X10*3/UL (ref 150–450)
POTASSIUM SERPL-SCNC: 4.2 MMOL/L (ref 3.5–5.3)
PROT SERPL-MCNC: 6.7 G/DL (ref 6.4–8.2)
RBC # BLD AUTO: 3.67 X10*6/UL (ref 4–5.2)
SODIUM SERPL-SCNC: 137 MMOL/L (ref 136–145)
WBC # BLD AUTO: 3.8 X10*3/UL (ref 4.4–11.3)

## 2024-08-24 PROCEDURE — 70481 CT ORBIT/EAR/FOSSA W/DYE: CPT

## 2024-08-24 PROCEDURE — 36415 COLL VENOUS BLD VENIPUNCTURE: CPT

## 2024-08-24 PROCEDURE — 99285 EMERGENCY DEPT VISIT HI MDM: CPT | Mod: 25

## 2024-08-24 PROCEDURE — 87040 BLOOD CULTURE FOR BACTERIA: CPT | Mod: AHULAB

## 2024-08-24 PROCEDURE — 83605 ASSAY OF LACTIC ACID: CPT

## 2024-08-24 PROCEDURE — 99223 1ST HOSP IP/OBS HIGH 75: CPT | Performed by: INTERNAL MEDICINE

## 2024-08-24 PROCEDURE — 84702 CHORIONIC GONADOTROPIN TEST: CPT

## 2024-08-24 PROCEDURE — 70481 CT ORBIT/EAR/FOSSA W/DYE: CPT | Performed by: STUDENT IN AN ORGANIZED HEALTH CARE EDUCATION/TRAINING PROGRAM

## 2024-08-24 PROCEDURE — 85652 RBC SED RATE AUTOMATED: CPT

## 2024-08-24 PROCEDURE — 86140 C-REACTIVE PROTEIN: CPT

## 2024-08-24 PROCEDURE — 2500000002 HC RX 250 W HCPCS SELF ADMINISTERED DRUGS (ALT 637 FOR MEDICARE OP, ALT 636 FOR OP/ED): Performed by: INTERNAL MEDICINE

## 2024-08-24 PROCEDURE — 2500000004 HC RX 250 GENERAL PHARMACY W/ HCPCS (ALT 636 FOR OP/ED)

## 2024-08-24 PROCEDURE — 80053 COMPREHEN METABOLIC PANEL: CPT

## 2024-08-24 PROCEDURE — 2500000001 HC RX 250 WO HCPCS SELF ADMINISTERED DRUGS (ALT 637 FOR MEDICARE OP)

## 2024-08-24 PROCEDURE — 96365 THER/PROPH/DIAG IV INF INIT: CPT

## 2024-08-24 PROCEDURE — 1200000002 HC GENERAL ROOM WITH TELEMETRY DAILY

## 2024-08-24 PROCEDURE — 2500000001 HC RX 250 WO HCPCS SELF ADMINISTERED DRUGS (ALT 637 FOR MEDICARE OP): Performed by: INTERNAL MEDICINE

## 2024-08-24 PROCEDURE — 94640 AIRWAY INHALATION TREATMENT: CPT

## 2024-08-24 PROCEDURE — 96375 TX/PRO/DX INJ NEW DRUG ADDON: CPT

## 2024-08-24 PROCEDURE — 85025 COMPLETE CBC W/AUTO DIFF WBC: CPT

## 2024-08-24 PROCEDURE — 2550000001 HC RX 255 CONTRASTS: Performed by: INTERNAL MEDICINE

## 2024-08-24 RX ORDER — BUPROPION HYDROCHLORIDE 150 MG/1
300 TABLET ORAL EVERY MORNING
Status: DISCONTINUED | OUTPATIENT
Start: 2024-08-25 | End: 2024-08-24 | Stop reason: WASHOUT

## 2024-08-24 RX ORDER — FLUTICASONE FUROATE AND VILANTEROL 200; 25 UG/1; UG/1
1 POWDER RESPIRATORY (INHALATION) DAILY
Status: DISCONTINUED | OUTPATIENT
Start: 2024-08-24 | End: 2024-08-24

## 2024-08-24 RX ORDER — ONDANSETRON 4 MG/1
4 TABLET, FILM COATED ORAL EVERY 8 HOURS PRN
Status: DISCONTINUED | OUTPATIENT
Start: 2024-08-24 | End: 2024-08-27 | Stop reason: HOSPADM

## 2024-08-24 RX ORDER — BENZTROPINE MESYLATE 1 MG/1
2 TABLET ORAL DAILY
Status: DISCONTINUED | OUTPATIENT
Start: 2024-08-24 | End: 2024-08-26

## 2024-08-24 RX ORDER — BUPROPION HYDROCHLORIDE 150 MG/1
150 TABLET ORAL DAILY
Status: DISCONTINUED | OUTPATIENT
Start: 2024-08-24 | End: 2024-08-24

## 2024-08-24 RX ORDER — LEVOFLOXACIN 5 MG/ML
750 INJECTION, SOLUTION INTRAVENOUS ONCE
Status: COMPLETED | OUTPATIENT
Start: 2024-08-24 | End: 2024-08-24

## 2024-08-24 RX ORDER — PANTOPRAZOLE SODIUM 40 MG/10ML
40 INJECTION, POWDER, LYOPHILIZED, FOR SOLUTION INTRAVENOUS
Status: DISCONTINUED | OUTPATIENT
Start: 2024-08-25 | End: 2024-08-27 | Stop reason: HOSPADM

## 2024-08-24 RX ORDER — IPRATROPIUM BROMIDE AND ALBUTEROL SULFATE 2.5; .5 MG/3ML; MG/3ML
3 SOLUTION RESPIRATORY (INHALATION) EVERY 2 HOUR PRN
Status: DISCONTINUED | OUTPATIENT
Start: 2024-08-24 | End: 2024-08-27 | Stop reason: HOSPADM

## 2024-08-24 RX ORDER — ONDANSETRON HYDROCHLORIDE 2 MG/ML
4 INJECTION, SOLUTION INTRAVENOUS EVERY 8 HOURS PRN
Status: DISCONTINUED | OUTPATIENT
Start: 2024-08-24 | End: 2024-08-27 | Stop reason: HOSPADM

## 2024-08-24 RX ORDER — L. ACIDOPHILUS/L.BULGARICUS 1MM CELL
1 TABLET ORAL DAILY
Status: DISCONTINUED | OUTPATIENT
Start: 2024-08-24 | End: 2024-08-24 | Stop reason: CLARIF

## 2024-08-24 RX ORDER — FAMOTIDINE 20 MG/1
20 TABLET, FILM COATED ORAL ONCE
Status: COMPLETED | OUTPATIENT
Start: 2024-08-24 | End: 2024-08-24

## 2024-08-24 RX ORDER — EPLERENONE 25 MG/1
25 TABLET, FILM COATED ORAL DAILY
Status: DISCONTINUED | OUTPATIENT
Start: 2024-08-24 | End: 2024-08-27 | Stop reason: HOSPADM

## 2024-08-24 RX ORDER — EZETIMIBE 10 MG/1
10 TABLET ORAL NIGHTLY
Status: DISCONTINUED | OUTPATIENT
Start: 2024-08-24 | End: 2024-08-27 | Stop reason: HOSPADM

## 2024-08-24 RX ORDER — PANTOPRAZOLE SODIUM 40 MG/1
40 TABLET, DELAYED RELEASE ORAL
Status: DISCONTINUED | OUTPATIENT
Start: 2024-08-25 | End: 2024-08-27 | Stop reason: HOSPADM

## 2024-08-24 RX ORDER — ENOXAPARIN SODIUM 100 MG/ML
40 INJECTION SUBCUTANEOUS EVERY 24 HOURS
Status: DISCONTINUED | OUTPATIENT
Start: 2024-08-24 | End: 2024-08-27 | Stop reason: HOSPADM

## 2024-08-24 RX ORDER — ERGOCALCIFEROL 1.25 MG/1
50000 CAPSULE ORAL
Status: DISCONTINUED | OUTPATIENT
Start: 2024-08-25 | End: 2024-08-27 | Stop reason: HOSPADM

## 2024-08-24 RX ORDER — DIPHENHYDRAMINE HYDROCHLORIDE 50 MG/ML
25 INJECTION INTRAMUSCULAR; INTRAVENOUS EVERY 6 HOURS PRN
Status: DISCONTINUED | OUTPATIENT
Start: 2024-08-24 | End: 2024-08-27 | Stop reason: HOSPADM

## 2024-08-24 RX ORDER — LEVOFLOXACIN 5 MG/ML
750 INJECTION, SOLUTION INTRAVENOUS EVERY 24 HOURS
Status: DISCONTINUED | OUTPATIENT
Start: 2024-08-25 | End: 2024-08-25

## 2024-08-24 RX ORDER — SUMATRIPTAN SUCCINATE 25 MG/1
25 TABLET ORAL ONCE AS NEEDED
Status: DISCONTINUED | OUTPATIENT
Start: 2024-08-24 | End: 2024-08-27 | Stop reason: HOSPADM

## 2024-08-24 RX ORDER — CLONIDINE HYDROCHLORIDE 0.2 MG/1
0.2 TABLET ORAL NIGHTLY
Status: DISCONTINUED | OUTPATIENT
Start: 2024-08-24 | End: 2024-08-27 | Stop reason: HOSPADM

## 2024-08-24 RX ORDER — DOXEPIN HYDROCHLORIDE 25 MG/1
50 CAPSULE ORAL NIGHTLY
Status: DISCONTINUED | OUTPATIENT
Start: 2024-08-24 | End: 2024-08-27 | Stop reason: HOSPADM

## 2024-08-24 RX ORDER — AMOXICILLIN 250 MG
2 CAPSULE ORAL 2 TIMES DAILY
Status: DISCONTINUED | OUTPATIENT
Start: 2024-08-24 | End: 2024-08-27 | Stop reason: HOSPADM

## 2024-08-24 RX ORDER — ALPRAZOLAM 0.5 MG/1
0.5 TABLET ORAL 3 TIMES DAILY PRN
Status: DISCONTINUED | OUTPATIENT
Start: 2024-08-24 | End: 2024-08-27 | Stop reason: HOSPADM

## 2024-08-24 RX ORDER — FORMOTEROL FUMARATE DIHYDRATE 20 UG/2ML
20 SOLUTION RESPIRATORY (INHALATION)
Status: DISCONTINUED | OUTPATIENT
Start: 2024-08-24 | End: 2024-08-27 | Stop reason: HOSPADM

## 2024-08-24 RX ORDER — ACETAMINOPHEN 160 MG/5ML
650 SOLUTION ORAL EVERY 4 HOURS PRN
Status: DISCONTINUED | OUTPATIENT
Start: 2024-08-24 | End: 2024-08-27 | Stop reason: HOSPADM

## 2024-08-24 RX ORDER — CARBIDOPA, LEVODOPA AND ENTACAPONE 50; 200; 200 MG/1; MG/1; MG/1
1 TABLET, FILM COATED ORAL EVERY 4 HOURS
Status: DISCONTINUED | OUTPATIENT
Start: 2024-08-24 | End: 2024-08-27 | Stop reason: HOSPADM

## 2024-08-24 RX ORDER — ALLOPURINOL 100 MG/1
300 TABLET ORAL DAILY
Status: DISCONTINUED | OUTPATIENT
Start: 2024-08-24 | End: 2024-08-24

## 2024-08-24 RX ORDER — IPRATROPIUM BROMIDE AND ALBUTEROL SULFATE 2.5; .5 MG/3ML; MG/3ML
3 SOLUTION RESPIRATORY (INHALATION) EVERY 4 HOURS PRN
Status: DISCONTINUED | OUTPATIENT
Start: 2024-08-24 | End: 2024-08-24

## 2024-08-24 RX ORDER — PIOGLITAZONEHYDROCHLORIDE 15 MG/1
15 TABLET ORAL DAILY
Status: DISCONTINUED | OUTPATIENT
Start: 2024-08-24 | End: 2024-08-27 | Stop reason: HOSPADM

## 2024-08-24 RX ORDER — ALBUTEROL SULFATE 0.83 MG/ML
2.5 SOLUTION RESPIRATORY (INHALATION) EVERY 4 HOURS PRN
Status: DISCONTINUED | OUTPATIENT
Start: 2024-08-24 | End: 2024-08-24

## 2024-08-24 RX ORDER — DIPHENHYDRAMINE HCL 25 MG
25 CAPSULE ORAL ONCE
Status: COMPLETED | OUTPATIENT
Start: 2024-08-24 | End: 2024-08-24

## 2024-08-24 RX ORDER — AMLODIPINE BESYLATE 5 MG/1
2.5 TABLET ORAL DAILY
Status: DISCONTINUED | OUTPATIENT
Start: 2024-08-24 | End: 2024-08-27 | Stop reason: HOSPADM

## 2024-08-24 RX ORDER — ALLOPURINOL 100 MG/1
100 TABLET ORAL DAILY
Status: DISCONTINUED | OUTPATIENT
Start: 2024-08-24 | End: 2024-08-24 | Stop reason: WASHOUT

## 2024-08-24 RX ORDER — METOPROLOL SUCCINATE 25 MG/1
25 TABLET, EXTENDED RELEASE ORAL DAILY
Status: DISCONTINUED | OUTPATIENT
Start: 2024-08-24 | End: 2024-08-26

## 2024-08-24 RX ORDER — PREGABALIN 75 MG/1
75 CAPSULE ORAL 3 TIMES DAILY
Status: DISCONTINUED | OUTPATIENT
Start: 2024-08-24 | End: 2024-08-27 | Stop reason: HOSPADM

## 2024-08-24 RX ORDER — BUDESONIDE 0.5 MG/2ML
0.5 INHALANT ORAL
Status: DISCONTINUED | OUTPATIENT
Start: 2024-08-24 | End: 2024-08-27 | Stop reason: HOSPADM

## 2024-08-24 RX ORDER — BUPROPION HYDROCHLORIDE 150 MG/1
450 TABLET ORAL DAILY
Status: DISCONTINUED | OUTPATIENT
Start: 2024-08-25 | End: 2024-08-27 | Stop reason: HOSPADM

## 2024-08-24 RX ORDER — VALSARTAN 160 MG/1
320 TABLET ORAL DAILY
Status: DISCONTINUED | OUTPATIENT
Start: 2024-08-24 | End: 2024-08-26

## 2024-08-24 RX ORDER — CARVEDILOL 12.5 MG/1
12.5 TABLET ORAL 2 TIMES DAILY
Status: DISCONTINUED | OUTPATIENT
Start: 2024-08-24 | End: 2024-08-27 | Stop reason: HOSPADM

## 2024-08-24 RX ORDER — ACETAMINOPHEN 325 MG/1
650 TABLET ORAL EVERY 4 HOURS PRN
Status: DISCONTINUED | OUTPATIENT
Start: 2024-08-24 | End: 2024-08-27 | Stop reason: HOSPADM

## 2024-08-24 RX ORDER — DULOXETIN HYDROCHLORIDE 30 MG/1
60 CAPSULE, DELAYED RELEASE ORAL DAILY
Status: DISCONTINUED | OUTPATIENT
Start: 2024-08-24 | End: 2024-08-27 | Stop reason: HOSPADM

## 2024-08-24 RX ORDER — ICOSAPENT ETHYL 1 G/1
2 CAPSULE ORAL
Status: DISCONTINUED | OUTPATIENT
Start: 2024-08-25 | End: 2024-08-27 | Stop reason: HOSPADM

## 2024-08-24 RX ORDER — NYSTATIN 100000 [USP'U]/G
1 POWDER TOPICAL ONCE
Status: COMPLETED | OUTPATIENT
Start: 2024-08-24 | End: 2024-08-24

## 2024-08-24 RX ORDER — ACETAMINOPHEN 650 MG/1
650 SUPPOSITORY RECTAL EVERY 4 HOURS PRN
Status: DISCONTINUED | OUTPATIENT
Start: 2024-08-24 | End: 2024-08-27 | Stop reason: HOSPADM

## 2024-08-24 RX ORDER — ORPHENADRINE CITRATE 100 MG/1
100 TABLET, EXTENDED RELEASE ORAL 2 TIMES DAILY PRN
Status: DISCONTINUED | OUTPATIENT
Start: 2024-08-24 | End: 2024-08-27 | Stop reason: HOSPADM

## 2024-08-24 RX ADMIN — NYSTATIN 1 APPLICATION: 100000 POWDER TOPICAL at 18:40

## 2024-08-24 RX ADMIN — METHYLPREDNISOLONE SODIUM SUCCINATE 125 MG: 125 INJECTION, POWDER, FOR SOLUTION INTRAMUSCULAR; INTRAVENOUS at 18:42

## 2024-08-24 RX ADMIN — LEVOFLOXACIN 750 MG: 750 INJECTION, SOLUTION INTRAVENOUS at 18:42

## 2024-08-24 RX ADMIN — IOHEXOL 75 ML: 350 INJECTION, SOLUTION INTRAVENOUS at 15:12

## 2024-08-24 RX ADMIN — SODIUM CHLORIDE 1000 ML: 9 INJECTION, SOLUTION INTRAVENOUS at 14:30

## 2024-08-24 RX ADMIN — BUDESONIDE 0.5 MG: 0.5 INHALANT RESPIRATORY (INHALATION) at 20:00

## 2024-08-24 RX ADMIN — FAMOTIDINE 20 MG: 20 TABLET, FILM COATED ORAL at 18:42

## 2024-08-24 RX ADMIN — FORMOTEROL FUMARATE DIHYDRATE 20 MCG: 20 SOLUTION RESPIRATORY (INHALATION) at 20:00

## 2024-08-24 RX ADMIN — DIPHENHYDRAMINE HYDROCHLORIDE 25 MG: 25 CAPSULE ORAL at 15:55

## 2024-08-24 SDOH — SOCIAL STABILITY: SOCIAL INSECURITY: ARE YOU OR HAVE YOU BEEN THREATENED OR ABUSED PHYSICALLY, EMOTIONALLY, OR SEXUALLY BY ANYONE?: NO

## 2024-08-24 SDOH — SOCIAL STABILITY: SOCIAL INSECURITY: HAS ANYONE EVER THREATENED TO HURT YOUR FAMILY OR YOUR PETS?: NO

## 2024-08-24 SDOH — SOCIAL STABILITY: SOCIAL INSECURITY: DOES ANYONE TRY TO KEEP YOU FROM HAVING/CONTACTING OTHER FRIENDS OR DOING THINGS OUTSIDE YOUR HOME?: NO

## 2024-08-24 SDOH — SOCIAL STABILITY: SOCIAL INSECURITY: HAVE YOU HAD ANY THOUGHTS OF HARMING ANYONE ELSE?: NO

## 2024-08-24 SDOH — SOCIAL STABILITY: SOCIAL INSECURITY: WERE YOU ABLE TO COMPLETE ALL THE BEHAVIORAL HEALTH SCREENINGS?: YES

## 2024-08-24 SDOH — SOCIAL STABILITY: SOCIAL INSECURITY: DO YOU FEEL UNSAFE GOING BACK TO THE PLACE WHERE YOU ARE LIVING?: NO

## 2024-08-24 SDOH — SOCIAL STABILITY: SOCIAL INSECURITY: ABUSE: ADULT

## 2024-08-24 SDOH — SOCIAL STABILITY: SOCIAL INSECURITY: ARE THERE ANY APPARENT SIGNS OF INJURIES/BEHAVIORS THAT COULD BE RELATED TO ABUSE/NEGLECT?: NO

## 2024-08-24 SDOH — SOCIAL STABILITY: SOCIAL INSECURITY: DO YOU FEEL ANYONE HAS EXPLOITED OR TAKEN ADVANTAGE OF YOU FINANCIALLY OR OF YOUR PERSONAL PROPERTY?: NO

## 2024-08-24 SDOH — SOCIAL STABILITY: SOCIAL INSECURITY: HAVE YOU HAD THOUGHTS OF HARMING ANYONE ELSE?: NO

## 2024-08-24 ASSESSMENT — LIFESTYLE VARIABLES
AUDIT-C TOTAL SCORE: 0
AUDIT-C TOTAL SCORE: 0
HOW OFTEN DO YOU HAVE A DRINK CONTAINING ALCOHOL: NEVER
SKIP TO QUESTIONS 9-10: 1
PRESCIPTION_ABUSE_PAST_12_MONTHS: NO
HOW MANY STANDARD DRINKS CONTAINING ALCOHOL DO YOU HAVE ON A TYPICAL DAY: PATIENT DOES NOT DRINK
SUBSTANCE_ABUSE_PAST_12_MONTHS: NO
HOW OFTEN DO YOU HAVE 6 OR MORE DRINKS ON ONE OCCASION: NEVER

## 2024-08-24 ASSESSMENT — ACTIVITIES OF DAILY LIVING (ADL)
FEEDING YOURSELF: INDEPENDENT
HEARING - RIGHT EAR: FUNCTIONAL
GROOMING: INDEPENDENT
WALKS IN HOME: INDEPENDENT
HEARING - LEFT EAR: FUNCTIONAL
TOILETING: INDEPENDENT
ASSISTIVE_DEVICE: EYEGLASSES
PATIENT'S MEMORY ADEQUATE TO SAFELY COMPLETE DAILY ACTIVITIES?: YES
DRESSING YOURSELF: INDEPENDENT
BATHING: INDEPENDENT
LACK_OF_TRANSPORTATION: NO
ADEQUATE_TO_COMPLETE_ADL: YES
JUDGMENT_ADEQUATE_SAFELY_COMPLETE_DAILY_ACTIVITIES: YES

## 2024-08-24 ASSESSMENT — COGNITIVE AND FUNCTIONAL STATUS - GENERAL
CLIMB 3 TO 5 STEPS WITH RAILING: A LITTLE
DAILY ACTIVITIY SCORE: 24
MOBILITY SCORE: 23
PATIENT BASELINE BEDBOUND: NO

## 2024-08-24 ASSESSMENT — PATIENT HEALTH QUESTIONNAIRE - PHQ9
SUM OF ALL RESPONSES TO PHQ9 QUESTIONS 1 & 2: 0
1. LITTLE INTEREST OR PLEASURE IN DOING THINGS: NOT AT ALL
2. FEELING DOWN, DEPRESSED OR HOPELESS: NOT AT ALL

## 2024-08-24 ASSESSMENT — PAIN - FUNCTIONAL ASSESSMENT: PAIN_FUNCTIONAL_ASSESSMENT: 0-10

## 2024-08-24 ASSESSMENT — PAIN DESCRIPTION - PROGRESSION: CLINICAL_PROGRESSION: NOT CHANGED

## 2024-08-24 ASSESSMENT — PAIN SCALES - GENERAL
PAINLEVEL_OUTOF10: 0 - NO PAIN
PAINLEVEL_OUTOF10: 4

## 2024-08-24 NOTE — H&P
History Of Present Illness  Nichelle Izaguirre is a 44 y.o. female with past medical history of psoriatic arthritis, gout, asthma, Parkinsonism/Dystonia, Immunodeficiency disorder (MBL) on prophylaxis antibiotics with amoxicillin who is presenting to [facility] ED with complaint of facial swelling and redness.  Patient stated that on August 21 she was out in her backyard removing the branches from a tree, and work for about an hour pulling those branches, does not recall any direct contact with leaves, or dust hitting her face, and has not noticed any vine wrapped around the tree.  She reports uneventful yard work, and showered shortly after, however the next day she noticed swelling of the lower eyelid of the right eye followed by the top eyelid leading to complete closure of eyelids.  Stated that she arrange a visit with her ophthalmologist, who upon evaluation, diagnosed her with cellulitis, and prescribed oral Levaquin, which she started taking.  She was advised to report to the ED if the rash spread to the left side or symptoms get worse.  Patient reported that commitment to eye and facial swelling, she noticed some bump first on the upper extremities and spread to the lower and under the breast area, continue to worsen, prompting her to come to the ED as recommended by ophthalmologist.  Stated the bumps started as tiny dots and got bigger.  On review of systems patient admits to facial edema and erythema, and denied pruritus, nausea, vomiting, loss of vision, orbital pressure, headache, numbness or tingling sensation, shortness of breath, tongue swelling, lip swelling, or any other symptoms at this time.  In the ED patient was evaluated, lab and imaging completed, and diagnosed with preseptal cellulitis of the right eye.  She was started on Levaquin 750 mg IV piggyback x 1, received Solu-Medrol 125 mg IV push x 1 including several dose of p.o. Benadryl.  CT of the facial bone shows preseptal  cellulitis of the right right eye, with thickening and inflammation of the right preseptal soft tissues involving both the superior and inferior leads.  No soft tissue gas no abscess.     Past Medical History  Past Medical History:   Diagnosis Date    Acute upper respiratory infection, unspecified 07/26/2016    Viral URI with cough    Acute upper respiratory infection, unspecified 11/15/2017    Viral URI with cough    Allergy status to unspecified drugs, medicaments and biological substances     History of allergy    Chronic maxillary sinusitis 08/27/2018    Maxillary sinusitis, unspecified chronicity    Dystonia, unspecified 04/08/2014    Dystonia    Encounter for other screening for malignant neoplasm of breast 01/22/2018    Breast screening    Encounter for screening for respiratory tuberculosis 11/11/2013    Screening examination for pulmonary tuberculosis    Essential (primary) hypertension 12/27/2017    Benign essential hypertension    Hypersomnia, unspecified 04/13/2015    Sleeps too much    Idiopathic sleep related nonobstructive alveolar hypoventilation     Nocturnal hypoxemia    Idiopathic sleep related nonobstructive alveolar hypoventilation 02/09/2018    Nocturnal hypoxia    Impaired fasting glucose 01/22/2018    Impaired fasting glucose    Insect bite (nonvenomous) of lower back and pelvis, initial encounter 03/29/2018    Tick bite of back    Left lower quadrant pain 01/07/2019    Left lower quadrant pain    Local infection of the skin and subcutaneous tissue, unspecified 07/28/2017    Skin infection    Low back pain, unspecified 05/23/2019    Acute low back pain    Other conditions influencing health status 02/27/2017    Sprain of right thumb, unspecified site of finger, initial encounter    Other malaise 04/16/2018    Malaise and fatigue    Other microscopic hematuria 03/30/2018    Microscopic hematuria    Other specified cough 09/07/2018    Productive cough    Other specified health status 01/07/2019     Acute medical illness    Other symptoms and signs involving the musculoskeletal system 07/12/2018    Weakness of right lower extremity    Other symptoms and signs involving the musculoskeletal system 03/21/2018    Polyarticular joint involvement    Other symptoms and signs involving the musculoskeletal system 07/12/2018    RUE weakness    Pain in right lower leg 01/27/2016    Right calf pain    Pain in unspecified hip 01/20/2016    Hip pain    Pelvic and perineal pain 04/17/2018    Pelvic pain    Personal history of diseases of the skin and subcutaneous tissue 08/07/2018    History of dermatitis    Personal history of other (healed) physical injury and trauma 08/07/2018    History of insect bite    Personal history of other diseases of the circulatory system     History of hypertension    Personal history of other diseases of the female genital tract 11/24/2015    History of menorrhagia    Personal history of other diseases of the musculoskeletal system and connective tissue 03/14/2018    History of tendinitis    Personal history of other diseases of the nervous system and sense organs 04/08/2014    History of Parkinson's disease    Personal history of other diseases of the respiratory system 01/07/2019    History of acute bronchitis    Personal history of other diseases of the respiratory system 10/05/2018    History of paranasal sinus pain    Personal history of other specified conditions 04/13/2015    History of snoring    Personal history of other specified conditions 09/11/2017    History of headache    Personal history of other specified conditions 09/07/2018    History of persistent cough    Personal history of other specified conditions 04/08/2014    History of memory loss    Personal history of other specified conditions 03/26/2018    History of left flank pain    Personal history of other specified conditions     History of heartburn    Personal history of urinary calculi 03/26/2018    Personal history  of urinary calculi    Personal history of urinary calculi 03/26/2018    History of renal calculi    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/2017    Plantar fasciitis, left    Primary insomnia 04/13/2015    Primary insomnia    Rash and other nonspecific skin eruption 04/16/2018    Rash    Unspecified abdominal pain 01/07/2019    Left sided abdominal pain    Urinary tract infection, site not specified 10/19/2018    UTI (urinary tract infection), bacterial    Urinary tract infection, site not specified 10/18/2018    Recurrent UTI    Vitamin D deficiency, unspecified 04/22/2016    Vitamin D deficiency    Zoster with other complications 11/25/2015    Herpes zoster dermatitis       Surgical History  Past Surgical History:   Procedure Laterality Date    CT ANGIO CORONARY ART WITH HEARTFLOW IF SCORE >30%  3/18/2020    CT HEART CORONARY ANGIOGRAM 3/18/2020 [facility]    HAND TENDON SURGERY  11/11/2013    Hand Incision Tendon Sheath Of A Finger    HYSTERECTOMY  03/04/2016    Hysterectomy    LITHOTRIPSY  11/11/2013    Renal Lithotripsy    TONSILLECTOMY  12/19/2013    Tonsillectomy With Adenoidectomy        Social History  She reports that she has never smoked. She has never used smokeless tobacco. She reports that she does not drink alcohol and does not use drugs.    Family History  Family History   Problem Relation Name Age of Onset    Asthma Mother      Hypertension Mother      Irregular heart beat Mother      Allergies Father      Heart disease Father      Hypertension Father      Sinusitis Father      Allergies Sister      Asthma Daughter      Allergies Son      Heart disease Maternal Grandmother      Breast cancer Paternal Grandmother      Heart disease Paternal Grandfather      Lung cancer Paternal Grandfather      Dementia Paternal Great-Grandfather          Allergies  Clindamycin, Dupilumab, Hydrochlorothiazide, Sulfamethoxazole-trimethoprim, Cefazolin, Atorvastatin, Cephalexin, Clarithromycin, Nitrofurantoin  "monohyd/m-cryst, Sulfa (sulfonamide antibiotics), and Sulfacetamide    Review of Systems  A 10 point review of systems was conducted with patient admitted to symptom as described in HPI above and denied having any other symptoms at this time.  Physical Exam   Constitutional: Alert active, cooperative not in acute distress  Skin: Erythematous right eye with puffy eyelids, eye close shut, maculopapular rash on the face and upper extremities and under the breasts  Eyes: Edematous and erythematous right eye with bilateral puffy eyelids, pupils reactive to light, conjunctivitis of the right eye  ENMT: Moist mucosal membranes, no exudate  Head / Neck: Atraumatic, normocephalic, supple neck, JVP not visualized  Lungs: Patent airways, CTABL  Heart: RRR, S1S2, no murmurs appreciated, palpable pulses in all extremities  GI: Soft, NT, ND, bowel sounds present in all quadrants  MSK: Moves all extremities freely, no restriction  of ROM, no joint edema  Extremities: Maculopapular rash on the upper extremities and under the breasts, no peripheral edema  : No Marquez catheter inserted  Breast: Deferred  Neurological: AAO x 3 to person, place and date, facial muscles symmetrical, sensation intact, strength 4/4, no acute focal neurological deficits appreciated  Psychological: Appropriate mood and behavior    Last Recorded Vitals  Blood pressure 139/89, pulse 94, temperature 36.6 °C (97.8 °F), resp. rate 16, height 1.626 m (5' 4\"), weight 103 kg (227 lb).    Relevant Results        Scheduled medications  allopurinol, 100 mg, oral, Daily  allopurinol, 300 mg, oral, Daily  amLODIPine, 2.5 mg, oral, Daily  benztropine, 2 mg, oral, Daily  budesonide, 0.5 mg, nebulization, BID  buPROPion XL, 150 mg, oral, Daily  [START ON 8/25/2024] buPROPion XL, 300 mg, oral, q AM  carbidopa-levodopa-entacapone, 1 tablet, oral, q4h  carvedilol, 12.5 mg, oral, BID  cloNIDine, 0.2 mg, oral, Nightly  doxepin, 50 mg, oral, Nightly  DULoxetine, 60 mg, oral, " Daily  enoxaparin, 40 mg, subcutaneous, q24h  eplerenone, 25 mg, oral, Daily  [START ON 8/25/2024] ergocalciferol, 50,000 Units, oral, Every Sunday  ezetimibe, 10 mg, oral, Nightly  formoterol, 20 mcg, nebulization, BID  [START ON 8/25/2024] icosapent ethyL, 2 g, oral, BID  lactobacillus acidophilus, 1 tablet, oral, Daily  [START ON 8/25/2024] methylPREDNISolone sodium succinate (PF), 40 mg, intravenous, q12h  metoprolol succinate XL, 25 mg, oral, Daily  nystatin, 1 Application, Topical, Once  [START ON 8/25/2024] pantoprazole, 40 mg, oral, Daily before breakfast   Or  [START ON 8/25/2024] pantoprazole, 40 mg, intravenous, Daily before breakfast  pioglitazone, 15 mg, oral, Daily  pregabalin, 75 mg, oral, TID  sennosides-docusate sodium, 2 tablet, oral, BID  [START ON 8/25/2024] tiotropium, 2 puff, inhalation, Daily  valsartan, 320 mg, oral, Daily      Continuous medications     PRN medications  PRN medications: acetaminophen **OR** acetaminophen **OR** acetaminophen, ALPRAZolam, diphenhydrAMINE, ipratropium-albuteroL, ondansetron **OR** ondansetron, orphenadrine, SUMAtriptan       Assessment/Plan     44 y.o. female with past medical history of psoriatic arthritis, gout, asthma, Parkinsonism/Dystonia, Immunodeficiency disorder (MBL) on prophylaxis antibiotics with amoxicillin who is presenting to [facility] ED with complaint of facial swelling and redness    Right eye cellulitis: In the setting of possible exposure to environmental allergen  -Experience prodrome of allergic dermatitis of unknown etiology possible oak poison  -Status post 2 days of oral Levaquin from ophthalmologist  -CT scan shows periorbital cellulitis without orbital involvement  -Levaquin 750 mg IV piggyback x 1 in the ED  -Continue Levaquin 750 mg IV piggyback daily  -Received Solu-Medrol 125 mg x 1  -Solu-Medrol 40 mg IV push twice daily  -Benadryl 25 mg IV push every 6 hours as needed dermatitis  -ID consulted: Impression  pending    Hypertension  -Continue home regimen    Parkinsonism/dystonia  -Continue home regimen    Diet  -Regular    DVT prophylaxis  -Lovenox 40 mg subcu daily    Disposition: Presenting with preseptal cellulitis of the right eye with conjunctivitis, need further management, discharge pending clinical improvement.    I spent 55 minutes in the professional and overall care of this patient.      Branden June, DO

## 2024-08-24 NOTE — ED TRIAGE NOTES
Pt to ED for cellulitis of the eye that is now spreading to the other eye, and arms. Pt was diagnosed by her eye doctor and prescribed a PO antibiotic, however her symptoms worsened since her visit. Pt is awake, alert, and ambulatory with stable vitals upon arrival.

## 2024-08-24 NOTE — ED PROVIDER NOTES
HPI   Chief Complaint   Patient presents with    cellulitis       44 year old female past medical history of immune deficiency, RA, psoriatic arthritis presents the ED today with a chief concern of facial swelling.  Patient states that [place] some yard trimming.  She says that when she came in she noticed that her right eye was swollen.  She states that she now has rash on both of her arms as well as the back of her left leg and at the remainder of her face.  She says that the right eye is very swollen.  She states the rash is very itchy however her right eye is not itchy.  She denies any lip or tongue swelling.  She denies fevers.  Denies nausea or vomiting.  She denies shortness of breath or chest pain.  She states that she was seen by her ophthalmologist yesterday who told her she had an infection of the skin around her eye.  She says the reason she came into the ED today was because the swelling worsened and she noticed more lesions throughout her body.  She has never had similar symptoms in the past.  She denies any vomiting or diarrhea.  Denies abdominal pain.  Denies fevers.  Denies vision changes.  She has no other symptoms or concerns at this time.      History provided by:  Patient   used: No            Patient History   Past Medical History:   Diagnosis Date    Acute upper respiratory infection, unspecified 07/26/2016    Viral URI with cough    Acute upper respiratory infection, unspecified 11/15/2017    Viral URI with cough    Allergy status to unspecified drugs, medicaments and biological substances     History of allergy    Chronic maxillary sinusitis 08/27/2018    Maxillary sinusitis, unspecified chronicity    Dystonia, unspecified 04/08/2014    Dystonia    Encounter for other screening for malignant neoplasm of breast 01/22/2018    Breast screening    Encounter for screening for respiratory tuberculosis 11/11/2013    Screening examination for  pulmonary tuberculosis    Essential (primary) hypertension 12/27/2017    Benign essential hypertension    Hypersomnia, unspecified 04/13/2015    Sleeps too much    Idiopathic sleep related nonobstructive alveolar hypoventilation     Nocturnal hypoxemia    Idiopathic sleep related nonobstructive alveolar hypoventilation 02/09/2018    Nocturnal hypoxia    Impaired fasting glucose 01/22/2018    Impaired fasting glucose    Insect bite (nonvenomous) of lower back and pelvis, initial encounter 03/29/2018    Tick bite of back    Left lower quadrant pain 01/07/2019    Left lower quadrant pain    Local infection of the skin and subcutaneous tissue, unspecified 07/28/2017    Skin infection    Low back pain, unspecified 05/23/2019    Acute low back pain    Other conditions influencing health status 02/27/2017    Sprain of right thumb, unspecified site of finger, initial encounter    Other malaise 04/16/2018    Malaise and fatigue    Other microscopic hematuria 03/30/2018    Microscopic hematuria    Other specified cough 09/07/2018    Productive cough    Other specified health status 01/07/2019    Acute medical illness    Other symptoms and signs involving the musculoskeletal system 07/12/2018    Weakness of right lower extremity    Other symptoms and signs involving the musculoskeletal system 03/21/2018    Polyarticular joint involvement    Other symptoms and signs involving the musculoskeletal system 07/12/2018    RUE weakness    Pain in right lower leg 01/27/2016    Right calf pain    Pain in unspecified hip 01/20/2016    Hip pain    Pelvic and perineal pain 04/17/2018    Pelvic pain    Personal history of diseases of the skin and subcutaneous tissue 08/07/2018    History of dermatitis    Personal history of other (healed) physical injury and trauma 08/07/2018    History of insect bite    Personal history of other diseases of the circulatory system     History of hypertension    Personal history of other diseases of the  female genital tract 11/24/2015    History of menorrhagia    Personal history of other diseases of the musculoskeletal system and connective tissue 03/14/2018    History of tendinitis    Personal history of other diseases of the nervous system and sense organs 04/08/2014    History of Parkinson's disease    Personal history of other diseases of the respiratory system 01/07/2019    History of acute bronchitis    Personal history of other diseases of the respiratory system 10/05/2018    History of paranasal sinus pain    Personal history of other specified conditions 04/13/2015    History of snoring    Personal history of other specified conditions 09/11/2017    History of headache    Personal history of other specified conditions 09/07/2018    History of persistent cough    Personal history of other specified conditions 04/08/2014    History of memory loss    Personal history of other specified conditions 03/26/2018    History of left flank pain    Personal history of other specified conditions     History of heartburn    Personal history of urinary calculi 03/26/2018    Personal history of urinary calculi    Personal history of urinary calculi 03/26/2018    History of renal calculi    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/2017    Plantar fasciitis, left    Primary insomnia 04/13/2015    Primary insomnia    Rash and other nonspecific skin eruption 04/16/2018    Rash    Unspecified abdominal pain 01/07/2019    Left sided abdominal pain    Urinary tract infection, site not specified 10/19/2018    UTI (urinary tract infection), bacterial    Urinary tract infection, site not specified 10/18/2018    Recurrent UTI    Vitamin D deficiency, unspecified 04/22/2016    Vitamin D deficiency    Zoster with other complications 11/25/2015    Herpes zoster dermatitis     Past Surgical History:   Procedure Laterality Date    CT ANGIO CORONARY ART WITH HEARTFLOW IF SCORE >30%  3/18/2020    CT HEART CORONARY ANGIOGRAM 3/18/2020 [facility]  [facility]    HAND TENDON SURGERY  11/11/2013    Hand Incision Tendon Sheath Of A Finger    HYSTERECTOMY  03/04/2016    Hysterectomy    LITHOTRIPSY  11/11/2013    Renal Lithotripsy    TONSILLECTOMY  12/19/2013    Tonsillectomy With Adenoidectomy     Family History   Problem Relation Name Age of Onset    Asthma Mother      Hypertension Mother      Irregular heart beat Mother      Allergies Father      Heart disease Father      Hypertension Father      Sinusitis Father      Allergies Sister      Asthma Daughter      Allergies Son      Heart disease Maternal Grandmother      Breast cancer Paternal Grandmother      Heart disease Paternal Grandfather      Lung cancer Paternal Grandfather      Dementia Paternal Great-Grandfather       Social History     Tobacco Use    Smoking status: Never    Smokeless tobacco: Never   Substance Use Topics    Alcohol use: Never    Drug use: Never       Physical Exam   ED Triage Vitals [08/24/24 1230]   Temperature Heart Rate Respirations BP   36.6 °C (97.8 °F) 94 16 139/89      SpO2 Temp src Heart Rate Source Patient Position   -- -- -- --      BP Location FiO2 (%)     -- --       Physical Exam  Gen.: Vitals noted no distress afebrile. Normal phonation, no stridor, no trismus. Patient is handling secretions well without any tripod positioning or drooling.  ENT: TMs clear bilaterally, EACs unremarkable. Mastoids nontender. Posterior oropharynx without erythema, exudate, or swelling. Uvula is in the midline and nonedematous. No Dennis's Angina.  EOMI without nystagmus.  PERRL.  The right eye is swollen shut with soft tissue swelling around the right eye.  There is no discrete induration around the right eye.  The left eye has a mild amount of soft tissue swelling with no induration.  No proptosis bilaterally.  No pain with EOMIs.  She does have an urticarial rash noted on bilateral arms as well as the back of the left popliteal fossa.  The rash is blanchable.  No petechia or purpura.  No  vesicles or bullae.  No lip or tongue swelling.  Also has what appears to be intertrigo underneath bilateral breasts worse on left than the right.  Has multiple satellite lesions.  Neck: Supple. No meningismus through full range of motion. No lymphadenopathy.   Cardiac: Regular rate rhythm no murmur.   Lungs: Good aeration throughout. No adventitious breath sounds.   Abdomen: Soft nontender nonsurgical throughout  Extremities: No peripheral edema. extremities are moist with good skin turgor.  Skin: see above   Neuro: No focal neurologic deficits. cranial nerves II-XII grossly intact.       ED Course & MDM   ED Course as of 08/24/24 1838   Sat Aug 24, 2024   1614 CBC shows no evidence of leukocytosis.  CMP shows no YEFRI or acute liver injury.  Glucose 135.  hCG normal.  ESR and CRP normal.  Initial lactate 2.3 []   1737 CT orbit w IV contrast []   1737 IMPRESSION:  Right preseptal cellulitis without postseptal orbital cellulitis.  Thickened appearance of the right-sided conjunctiva is likely  reactive or due to concomitant conjunctivitis.          MACRO:  None.      Signed by: Zhen Dolan 8/24/2024 4:59 PM  Dictation workstation:   [id number]   []   1745 Repeat lactate 1.4 []   1809 Spoke with Dr. June who accepts admission of this patient  [MC]      ED Course User Index  [] Amado Boucher PA-C         Diagnoses as of 08/24/24 1838   Facial swelling   Itching   Intertrigo                 No data recorded     [place] Coma Scale Score: 15 (08/24/24 1406 : Anita Echols RN)                           Medical Decision Making  44 year old female past medical history of immune deficiency, RA, psoriatic arthritis presents the ED today with a chief concern of facial swelling.  Vital signs reassuring.  Patient overall appears well and is nontoxic-appearing.  Was given Solu-Medrol, famotidine, and Benadryl in the ED.  Was also given a liter of fluids in the ED. patient has no lip or tongue swelling.  No  signs of anaphylaxis. Has full range of motion of the neck without any meningismus.  Satting well on room air.  Not hypoxic.  Not tachycardic.  Afebrile.  Her labs overall reassuring.  Has no leukocytosis.  No YEFRI or acute liver injury.  No electrolyte abnormalities.  Glucose 135.  ESR, CRP, hCG normal.  Initial lactate elevated however repeat is normal.  Her CT of the orbits does show right preseptal cellulitis with post septal orbital cellulitis.  Thickened appearance of right sided conjunctivolikely reactive or due to concomitant conjunctivitis.  On my exam I do not see any evidence of conjunctivitis.  No evidence of orbital cellulitis.  The right eye is actually just swollen shut look swelling and allergic reaction to me rather than preseptal cellulitis.  The rest of the body is itchy.  Does have some intertrigo dermatitis as well underneath bilateral breast.  No evidence of necrotizing soft tissue infection, or deep space infection at this time.  Patient has no visual changes at this time.  Would like to admit to the hospital for treatment of allergic reaction and possibly preseptal cellulitis.  Discussed impression and findings with patient she feels comfortable being admitted to the inpatient unit.  Discussed with Dr. June who accepts admission of this patient to the inpatient unit.  Patient started on IV Levaquin per pharmacy recommendation for preseptal cellulitis.  Also started on nystatin powder    Differential diagnosis: Preseptal cellulitis, orbital cellulitis, allergic reaction, anaphylaxis, conjunctivitis, intertrigo, necrotizing soft tissue infection    Disposition/treatment  1.  Facial swelling  2.  Itching  3.  Intertrigo    Shared decision-making was used patient feels comfortable being admitted to the hospital        Procedure  Procedures     Amado Boucher PA-C  08/24/24 7179

## 2024-08-25 VITALS
SYSTOLIC BLOOD PRESSURE: 145 MMHG | HEART RATE: 97 BPM | TEMPERATURE: 98.8 F | BODY MASS INDEX: 36.14 KG/M2 | DIASTOLIC BLOOD PRESSURE: 88 MMHG | WEIGHT: 224.87 LBS | HEIGHT: 66 IN | OXYGEN SATURATION: 98 % | RESPIRATION RATE: 22 BRPM

## 2024-08-25 LAB
ANION GAP SERPL CALC-SCNC: 16 MMOL/L (ref 10–20)
BACTERIA BLD CULT: NORMAL
BUN SERPL-MCNC: 13 MG/DL (ref 6–23)
CALCIUM SERPL-MCNC: 9.1 MG/DL (ref 8.6–10.3)
CHLORIDE SERPL-SCNC: 104 MMOL/L (ref 98–107)
CO2 SERPL-SCNC: 20 MMOL/L (ref 21–32)
CREAT SERPL-MCNC: 0.62 MG/DL (ref 0.5–1.05)
EGFRCR SERPLBLD CKD-EPI 2021: >90 ML/MIN/1.73M*2
ERYTHROCYTE [DISTWIDTH] IN BLOOD BY AUTOMATED COUNT: 14.9 % (ref 11.5–14.5)
GLUCOSE SERPL-MCNC: 196 MG/DL (ref 74–99)
HCT VFR BLD AUTO: 33.4 % (ref 36–46)
HGB BLD-MCNC: 10.7 G/DL (ref 12–16)
MCH RBC QN AUTO: 30.1 PG (ref 26–34)
MCHC RBC AUTO-ENTMCNC: 32 G/DL (ref 32–36)
MCV RBC AUTO: 94 FL (ref 80–100)
NRBC BLD-RTO: 0 /100 WBCS (ref 0–0)
PLATELET # BLD AUTO: 309 X10*3/UL (ref 150–450)
POTASSIUM SERPL-SCNC: 4.4 MMOL/L (ref 3.5–5.3)
RBC # BLD AUTO: 3.55 X10*6/UL (ref 4–5.2)
SODIUM SERPL-SCNC: 136 MMOL/L (ref 136–145)
WBC # BLD AUTO: 6 X10*3/UL (ref 4.4–11.3)

## 2024-08-25 PROCEDURE — 99232 SBSQ HOSP IP/OBS MODERATE 35: CPT | Performed by: INTERNAL MEDICINE

## 2024-08-25 PROCEDURE — 2500000001 HC RX 250 WO HCPCS SELF ADMINISTERED DRUGS (ALT 637 FOR MEDICARE OP): Performed by: INTERNAL MEDICINE

## 2024-08-25 PROCEDURE — 1200000002 HC GENERAL ROOM WITH TELEMETRY DAILY

## 2024-08-25 PROCEDURE — 2500000004 HC RX 250 GENERAL PHARMACY W/ HCPCS (ALT 636 FOR OP/ED): Performed by: INTERNAL MEDICINE

## 2024-08-25 PROCEDURE — 80048 BASIC METABOLIC PNL TOTAL CA: CPT | Performed by: INTERNAL MEDICINE

## 2024-08-25 PROCEDURE — 87040 BLOOD CULTURE FOR BACTERIA: CPT | Mod: AHULAB | Performed by: INTERNAL MEDICINE

## 2024-08-25 PROCEDURE — 94660 CPAP INITIATION&MGMT: CPT

## 2024-08-25 PROCEDURE — 36415 COLL VENOUS BLD VENIPUNCTURE: CPT | Performed by: INTERNAL MEDICINE

## 2024-08-25 PROCEDURE — 2500000001 HC RX 250 WO HCPCS SELF ADMINISTERED DRUGS (ALT 637 FOR MEDICARE OP): Performed by: PHARMACIST

## 2024-08-25 PROCEDURE — 94640 AIRWAY INHALATION TREATMENT: CPT

## 2024-08-25 PROCEDURE — 85027 COMPLETE CBC AUTOMATED: CPT | Performed by: INTERNAL MEDICINE

## 2024-08-25 PROCEDURE — 2500000004 HC RX 250 GENERAL PHARMACY W/ HCPCS (ALT 636 FOR OP/ED)

## 2024-08-25 PROCEDURE — 2500000002 HC RX 250 W HCPCS SELF ADMINISTERED DRUGS (ALT 637 FOR MEDICARE OP, ALT 636 FOR OP/ED): Performed by: INTERNAL MEDICINE

## 2024-08-25 RX ORDER — VANCOMYCIN HYDROCHLORIDE 1 G/20ML
INJECTION, POWDER, LYOPHILIZED, FOR SOLUTION INTRAVENOUS DAILY PRN
Status: DISCONTINUED | OUTPATIENT
Start: 2024-08-25 | End: 2024-08-27

## 2024-08-25 RX ADMIN — SENNOSIDES AND DOCUSATE SODIUM 2 TABLET: 50; 8.6 TABLET ORAL at 00:41

## 2024-08-25 RX ADMIN — CLONIDINE HYDROCHLORIDE 0.2 MG: 0.2 TABLET ORAL at 20:46

## 2024-08-25 RX ADMIN — CARBIDOPA, LEVODOPA AND ENTACAPONE 1 TABLET: 50; 200; 200 TABLET, FILM COATED ORAL at 05:54

## 2024-08-25 RX ADMIN — ALLOPURINOL 400 MG: 100 TABLET ORAL at 09:27

## 2024-08-25 RX ADMIN — PREGABALIN 75 MG: 75 CAPSULE ORAL at 20:47

## 2024-08-25 RX ADMIN — ENOXAPARIN SODIUM 40 MG: 40 INJECTION SUBCUTANEOUS at 00:40

## 2024-08-25 RX ADMIN — CLONIDINE HYDROCHLORIDE 0.2 MG: 0.2 TABLET ORAL at 00:42

## 2024-08-25 RX ADMIN — PREGABALIN 75 MG: 75 CAPSULE ORAL at 00:41

## 2024-08-25 RX ADMIN — BUDESONIDE 0.5 MG: 0.5 INHALANT RESPIRATORY (INHALATION) at 08:59

## 2024-08-25 RX ADMIN — VANCOMYCIN HYDROCHLORIDE 1500 MG: 1.5 INJECTION, POWDER, LYOPHILIZED, FOR SOLUTION INTRAVENOUS at 22:55

## 2024-08-25 RX ADMIN — METHYLPREDNISOLONE SODIUM SUCCINATE 40 MG: 40 INJECTION, POWDER, FOR SOLUTION INTRAMUSCULAR; INTRAVENOUS at 20:47

## 2024-08-25 RX ADMIN — VANCOMYCIN HYDROCHLORIDE 1500 MG: 1.5 INJECTION, POWDER, LYOPHILIZED, FOR SOLUTION INTRAVENOUS at 10:46

## 2024-08-25 RX ADMIN — PIOGLITAZONE 15 MG: 15 TABLET ORAL at 00:42

## 2024-08-25 RX ADMIN — BUPROPION HYDROCHLORIDE 450 MG: 150 TABLET, EXTENDED RELEASE ORAL at 09:27

## 2024-08-25 RX ADMIN — ICOSAPENT ETHYL 2 G: 1 CAPSULE ORAL at 16:54

## 2024-08-25 RX ADMIN — BUDESONIDE 0.5 MG: 0.5 INHALANT RESPIRATORY (INHALATION) at 19:47

## 2024-08-25 RX ADMIN — CARBIDOPA, LEVODOPA AND ENTACAPONE 1 TABLET: 50; 200; 200 TABLET, FILM COATED ORAL at 17:54

## 2024-08-25 RX ADMIN — CARVEDILOL 12.5 MG: 12.5 TABLET, FILM COATED ORAL at 00:41

## 2024-08-25 RX ADMIN — DOXEPIN HYDROCHLORIDE 50 MG: 25 CAPSULE ORAL at 01:15

## 2024-08-25 RX ADMIN — ALPRAZOLAM 0.5 MG: 0.5 TABLET ORAL at 16:54

## 2024-08-25 RX ADMIN — FORMOTEROL FUMARATE DIHYDRATE 20 MCG: 20 SOLUTION RESPIRATORY (INHALATION) at 19:47

## 2024-08-25 RX ADMIN — TIOTROPIUM BROMIDE INHALATION SPRAY 2 PUFF: 3.12 SPRAY, METERED RESPIRATORY (INHALATION) at 08:56

## 2024-08-25 RX ADMIN — PIPERACILLIN SODIUM AND TAZOBACTAM SODIUM 3.38 G: 3; .375 INJECTION, SOLUTION INTRAVENOUS at 20:46

## 2024-08-25 RX ADMIN — SENNOSIDES AND DOCUSATE SODIUM 2 TABLET: 50; 8.6 TABLET ORAL at 09:27

## 2024-08-25 RX ADMIN — ERGOCALCIFEROL 50000 UNITS: 1.25 CAPSULE ORAL at 09:27

## 2024-08-25 RX ADMIN — CARBIDOPA, LEVODOPA AND ENTACAPONE 1 TABLET: 50; 200; 200 TABLET, FILM COATED ORAL at 10:53

## 2024-08-25 RX ADMIN — PIPERACILLIN SODIUM AND TAZOBACTAM SODIUM 3.38 G: 3; .375 INJECTION, SOLUTION INTRAVENOUS at 14:39

## 2024-08-25 RX ADMIN — EZETIMIBE 10 MG: 10 TABLET ORAL at 00:41

## 2024-08-25 RX ADMIN — ICOSAPENT ETHYL 2 G: 1 CAPSULE ORAL at 09:27

## 2024-08-25 RX ADMIN — VALSARTAN 320 MG: 160 TABLET, FILM COATED ORAL at 00:41

## 2024-08-25 RX ADMIN — PREGABALIN 75 MG: 75 CAPSULE ORAL at 14:39

## 2024-08-25 RX ADMIN — BENZTROPINE MESYLATE 2 MG: 1 TABLET ORAL at 00:40

## 2024-08-25 RX ADMIN — EZETIMIBE 10 MG: 10 TABLET ORAL at 20:47

## 2024-08-25 RX ADMIN — ENOXAPARIN SODIUM 40 MG: 40 INJECTION SUBCUTANEOUS at 20:46

## 2024-08-25 RX ADMIN — EPLERENONE 25 MG: 25 TABLET, FILM COATED ORAL at 09:26

## 2024-08-25 RX ADMIN — AMLODIPINE BESYLATE 2.5 MG: 5 TABLET ORAL at 09:27

## 2024-08-25 RX ADMIN — CARVEDILOL 12.5 MG: 12.5 TABLET, FILM COATED ORAL at 20:47

## 2024-08-25 RX ADMIN — DULOXETINE HYDROCHLORIDE 60 MG: 30 CAPSULE, DELAYED RELEASE ORAL at 09:27

## 2024-08-25 RX ADMIN — PREGABALIN 75 MG: 75 CAPSULE ORAL at 09:27

## 2024-08-25 RX ADMIN — PANTOPRAZOLE SODIUM 40 MG: 40 TABLET, DELAYED RELEASE ORAL at 06:28

## 2024-08-25 RX ADMIN — METHYLPREDNISOLONE SODIUM SUCCINATE 40 MG: 40 INJECTION, POWDER, FOR SOLUTION INTRAMUSCULAR; INTRAVENOUS at 09:28

## 2024-08-25 RX ADMIN — ONDANSETRON 4 MG: 2 INJECTION INTRAMUSCULAR; INTRAVENOUS at 16:55

## 2024-08-25 RX ADMIN — METOPROLOL SUCCINATE 25 MG: 25 TABLET, EXTENDED RELEASE ORAL at 00:42

## 2024-08-25 RX ADMIN — Medication 1 CAPSULE: at 09:27

## 2024-08-25 RX ADMIN — PIPERACILLIN SODIUM AND TAZOBACTAM SODIUM 3.38 G: 3; .375 INJECTION, SOLUTION INTRAVENOUS at 09:28

## 2024-08-25 RX ADMIN — FORMOTEROL FUMARATE DIHYDRATE 20 MCG: 20 SOLUTION RESPIRATORY (INHALATION) at 08:59

## 2024-08-25 RX ADMIN — DOXEPIN HYDROCHLORIDE 50 MG: 25 CAPSULE ORAL at 20:46

## 2024-08-25 RX ADMIN — ORPHENADRINE CITRATE 100 MG: 100 TABLET, EXTENDED RELEASE ORAL at 11:44

## 2024-08-25 ASSESSMENT — COGNITIVE AND FUNCTIONAL STATUS - GENERAL
DAILY ACTIVITIY SCORE: 24
MOBILITY SCORE: 23
CLIMB 3 TO 5 STEPS WITH RAILING: A LITTLE

## 2024-08-25 ASSESSMENT — PAIN SCALES - GENERAL
PAINLEVEL_OUTOF10: 0 - NO PAIN
PAINLEVEL_OUTOF10: 4

## 2024-08-25 ASSESSMENT — PAIN SCALES - WONG BAKER: WONGBAKER_NUMERICALRESPONSE: NO HURT

## 2024-08-25 ASSESSMENT — PAIN DESCRIPTION - ORIENTATION: ORIENTATION: RIGHT;LEFT

## 2024-08-25 ASSESSMENT — PAIN DESCRIPTION - LOCATION: LOCATION: KNEE

## 2024-08-25 NOTE — PROGRESS NOTES
Vancomycin Dosing by Pharmacy- INITIAL    Nichelle Izaguirre is a 44 y.o. year old female who Pharmacy has been consulted for vancomycin dosing for cellulitis, skin and soft tissue. Based on the patient's indication and renal status this patient will be dosed based on a goal AUC of 400-600.     Renal function is currently stable.    Visit Vitals  /65 (BP Location: Left arm, Patient Position: Lying)   Pulse 91   Temp 36.4 °C (97.5 °F) (Temporal)   Resp 20        Lab Results   Component Value Date    CREATININE 0.62 2024    CREATININE 0.61 2024    CREATININE 0.63 2024    CREATININE 0.58 2024        Patient weight is as follows:   Vitals:    24 2216   Weight: 102 kg (224 lb 13.9 oz)       Cultures:  No results found for the encounter in last 14 days.        I/O last 3 completed shifts:  In: 1150 (11.3 mL/kg) [IV Piggyback:1150]  Out: - (0 mL/kg)   Weight: 102 kg   I/O during current shift:  No intake/output data recorded.    Temp (24hrs), Av.3 °C (97.4 °F), Min:35.6 °C (96.1 °F), Max:36.7 °C (98.1 °F)         Assessment/Plan     Patient will not be given a loading dose.  Will initiate vancomycin maintenance,  1500 mg every 12 hours.    This dosing regimen is predicted by [employer] to result in the following pharmacokinetic parameters:  Loading dose: N/A  Regimen: 1500 mg IV every 12 hours.  Start time: 08:48 on 2024  Exposure target: AUC24 (range)400-600 mg/L.hr   AUC24,ss: 546 mg/L.hr  Probability of AUC24 > 400: 81 %  Ctrough,ss: 16.8 mg/L  Probability of Ctrough,ss > 20: 36 %  Probability of nephrotoxicity (Lodise TED ): 12 %    Follow-up level will be ordered on  at AM unless clinically indicated sooner.  Will continue to monitor renal function daily while on vancomycin and order serum creatinine at least every 48 hours if not already ordered.  Follow for continued vancomycin needs, clinical response, and signs/symptoms of toxicity.       Zoila Barrios, PharmD

## 2024-08-25 NOTE — CARE PLAN
The patient's goals for the shift include      The clinical goals for the shift include remain vitally stable    Over the shift, the patient didmake progress toward the following goals.   Problem: Pain - Adult  Goal: Verbalizes/displays adequate comfort level or baseline comfort level  Outcome: Progressing     Problem: Safety - Adult  Goal: Free from fall injury  Outcome: Progressing     Problem: Chronic Conditions and Co-morbidities  Goal: Patient's chronic conditions and co-morbidity symptoms are monitored and maintained or improved  Outcome: Progressing

## 2024-08-25 NOTE — SIGNIFICANT EVENT
08/24/24 2003   Patient Evaluation Program   Pulmonary Status 3   Surgical Status 0   Chest X-Ray 0   Respiratory Pattern 0   Mental Status 0   Breath Sounds 0   Cough 0   Level of Activity 0   Oxygen Required for Sp02 Greater Than or Equal to 92% 0   Respiratory Acuity Score 3   Triage Level Triage - 5, Score of 0-3   Frequency Q2 PRN (Occasional SOB or ENGL[place],  TRIAGE - 5)

## 2024-08-25 NOTE — CONSULTS
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  She denies any trauma or insect bite.  On admission she was afebrile her WBC was 3.8K, hemoglobin 11.2, platelet 2 70K, lactate 2.3, CRP 0.25 and creatinine 0.61.  CT orbits showed right preseptal cellulitis without postseptal orbital cellulitis with concomitant conjunctivitis.  ID is consulted for antibiotic management.    Current Antibiotic:  Levaquin    Medications:  No current facility-administered medications on file prior to encounter.     Current Outpatient Medications on File Prior to Encounter   Medication Sig Dispense Refill    albuterol 2.5 mg /3 mL (0.083 %) nebulizer solution inhale the contents of one vial via nebulizer every 4 hours as needed      allopurinol (Zyloprim) 100 mg tablet TAKE ONE TABLET BY MOUTH EVERY DAY 90 tablet 0    allopurinol (Zyloprim) 300 mg tablet TAKE ONE TABLET BY MOUTH EVERY DAY 90 tablet 0    ALPRAZolam (Xanax) 0.5 mg tablet Take 1 tablet (0.5 mg) by mouth 3 times a day as needed for sleep or anxiety. 21 tablet 2    amLODIPine (Norvasc) 2.5 mg tablet Take 1 tablet (2.5 mg) by mouth once daily.      amoxicillin (Amoxil) 500 mg capsule Take 1 capsule (500 mg) by mouth once daily.      ascorbic acid, vitamin C, 500 mg capsule Take 1 capsule by mouth once daily.      benzoyl peroxide (Benzac AC) 10 % external wash Apply topically if needed.      benztropine (Cogentin) 2 mg tablet Take 1  tablet (2 mg) by mouth once daily.      buPROPion XL (Wellbutrin XL) 150 mg 24 hr tablet Take 1 tablet (150 mg) by mouth once daily. Do not crush, chew, or split. 90 tablet 3    buPROPion XL (Wellbutrin XL) 300 mg 24 hr tablet Take 1 tablet (300 mg) by mouth once daily in the morning. Do not crush, chew, or split. 90 tablet 3    carbidopa-levodopa-entacapone (Stalevo) -200 mg tablet Take 1 tablet by mouth every 4 hours. 540 tablet 1    carvedilol (Coreg) 12.5 mg tablet Take 1 tablet (12.5 mg) by mouth 2 times daily (morning and late afternoon) for 7 days, THEN 0.5 tablets (6.25 mg) 2 times daily (morning and late afternoon) for 14 days. 28 tablet 1    cetirizine-pseudoephedrine (ZyrTEC-D) 5-120 mg 12 hr tablet Take 1 tablet by mouth 2 times a day. 180 tablet 0    clindamycin (Cleocin T) 1 % lotion 1 Application      clobetasol (Temovate) 0.05 % external solution Apply topically 2 times a day.      clobetasoL 0.05 % lotion One application as needed for flaring only.not for daily use. For scalp and hands only 118 mL 3    cloNIDine (Catapres) 0.1 mg tablet Take 1 tablet (0.1 mg) by mouth if needed for high blood pressure.      cloNIDine (Catapres) 0.2 mg tablet Take 1 tablet (0.2 mg) by mouth once daily at bedtime. 90 tablet 3    Cosentyx Pen, 2 Pens, 150 mg/mL self-injector pen Inject 2 mL (300 mg) under the skin every 30 (thirty) days. 2 mL 2    cyanocobalamin, vitamin B-12, 1,000 mcg/mL kit Inject 1,000 mcg as directed every 28 (twenty-eight) days. 1 kit 11    diclofenac sodium (Voltaren) 1 % gel gel Apply 4.5 inches (4 g) topically 2 times a day as needed.      doxepin (SINEquan) 50 mg capsule Take 1 capsule (50 mg) by mouth once daily at bedtime. 30 capsule 3    doxycycline (Vibramycin) 100 mg capsule 1 capsule (100 mg).      DULoxetine (Cymbalta) 60 mg DR capsule Take 1 capsule (60 mg) by mouth once daily. 90 capsule 1    eplerenone (Inspra) 25 mg tablet Take 1 tablet (25 mg) by mouth once daily.       ergocalciferol (Vitamin D-2) 1.25 MG (27721 UT) capsule Take 1 capsule (50,000 Units) by mouth 1 (one) time per week. 12 capsule 0    ezetimibe (Zetia) 10 mg tablet Take 1 tablet (10 mg) by mouth once daily. 90 tablet 3    fluticasone furoate-vilanteroL (Breo Ellipta) 200-25 mcg/dose inhaler 1puff daily, Inhalation      ipratropium-albuteroL (Duo-Neb) 0.5-2.5 mg/3 mL nebulizer solution Take 3 mL by nebulization every 4 hours if needed for wheezing.      ketoconazole (NIZOral) 2 % cream 1 Application      lactobacillus acidophilus (Lactobacillus acidoph-L.bulgar) tablet tablet Take 1 tablet by mouth once daily. 90 tablet 0    levalbuterol (Xopenex) 1.25 mg/3 mL nebulizer solution Take 1 ampule by nebulization 3 times a day.      meloxicam (Mobic) 15 mg tablet Take 1 tablet (15 mg) by mouth once daily. (Patient not taking: Reported on 8/5/2024) 30 tablet 3    metoprolol succinate XL (Toprol-XL) 25 mg 24 hr tablet Take 1 tablet (25 mg) by mouth once daily.      metroNIDAZOLE (Metrocream) 0.75 % cream 1 Application      montelukast (Singulair) 10 mg tablet Take 1 tablet (10 mg) by mouth once daily at bedtime.      nebulizer and compressor device use q4-6 hours with albuterol PRN cough/wheeze      nebulizers (Devilbiss Disposable Nebulizer) misc every 4 hours if needed.      olmesartan (BENIcar) 40 mg tablet Take 1 tablet (40 mg) by mouth once daily.      omega-3 acid ethyl esters (Lovaza) 1 gram capsule Take 2 capsules (2 g) by mouth 2 times a day. 360 capsule 3    omeprazole (PriLOSEC) 40 mg DR capsule Take 1 capsule (40 mg) by mouth once daily in the morning before meals. 90 capsule 3    ondansetron ODT (Zofran-ODT) 4 mg disintegrating tablet Take 1 tablet (4 mg) by mouth every 8 hours if needed for nausea or vomiting. 90 tablet 0    orphenadrine (Norflex) 100 mg 12 hr tablet Take 1 tablet (100 mg) by mouth 2 times a day as needed for muscle spasms. 60 tablet 3    pioglitazone (Actos) 15 mg tablet Take 1 tablet (15  mg) by mouth once daily.      pregabalin (Lyrica) 75 mg capsule Take 1 capsule (75 mg) by mouth 3 times a day. 90 capsule 2    ProAir HFA 90 mcg/actuation inhaler       Repatha SureClick 140 mg/mL injection Inject 1 mL (140 mg) under the skin every 14 (fourteen) days. 6 mL 3    Spiriva Respimat 1.25 mcg/actuation inhaler Inhale once daily.      SUMAtriptan (Imitrex) 25 mg tablet Take 1 tablet (25 mg) by mouth 1 time if needed for migraine. May repeat in 2 hours if no improvement. Max 200 mg/24 hr 9 tablet 2    tapentadol (Nucynta) 50 mg tablet Take 1 tablet (50 mg) by mouth every 8 hours if needed for severe pain (7 - 10). 90 tablet 0    tezepelumab-ekko (Tezspire) SubQ Pen Injector Inject 210 mg under the skin.       Past Medical History:   Diagnosis Date    Acute upper respiratory infection, unspecified 07/26/2016    Viral URI with cough    Acute upper respiratory infection, unspecified 11/15/2017    Viral URI with cough    Allergy status to unspecified drugs, medicaments and biological substances     History of allergy    Chronic maxillary sinusitis 08/27/2018    Maxillary sinusitis, unspecified chronicity    Dystonia, unspecified 04/08/2014    Dystonia    Encounter for other screening for malignant neoplasm of breast 01/22/2018    Breast screening    Encounter for screening for respiratory tuberculosis 11/11/2013    Screening examination for pulmonary tuberculosis    Essential (primary) hypertension 12/27/2017    Benign essential hypertension    Hypersomnia, unspecified 04/13/2015    Sleeps too much    Idiopathic sleep related nonobstructive alveolar hypoventilation     Nocturnal hypoxemia    Idiopathic sleep related nonobstructive alveolar hypoventilation 02/09/2018    Nocturnal hypoxia    Impaired fasting glucose 01/22/2018    Impaired fasting glucose    Insect bite (nonvenomous) of lower back and pelvis, initial encounter 03/29/2018    Tick bite of back    Left lower quadrant pain 01/07/2019    Left lower  quadrant pain    Local infection of the skin and subcutaneous tissue, unspecified 07/28/2017    Skin infection    Low back pain, unspecified 05/23/2019    Acute low back pain    Other conditions influencing health status 02/27/2017    Sprain of right thumb, unspecified site of finger, initial encounter    Other malaise 04/16/2018    Malaise and fatigue    Other microscopic hematuria 03/30/2018    Microscopic hematuria    Other specified cough 09/07/2018    Productive cough    Other specified health status 01/07/2019    Acute medical illness    Other symptoms and signs involving the musculoskeletal system 07/12/2018    Weakness of right lower extremity    Other symptoms and signs involving the musculoskeletal system 03/21/2018    Polyarticular joint involvement    Other symptoms and signs involving the musculoskeletal system 07/12/2018    RUE weakness    Pain in right lower leg 01/27/2016    Right calf pain    Pain in unspecified hip 01/20/2016    Hip pain    Pelvic and perineal pain 04/17/2018    Pelvic pain    Personal history of diseases of the skin and subcutaneous tissue 08/07/2018    History of dermatitis    Personal history of other (healed) physical injury and trauma 08/07/2018    History of insect bite    Personal history of other diseases of the circulatory system     History of hypertension    Personal history of other diseases of the female genital tract 11/24/2015    History of menorrhagia    Personal history of other diseases of the musculoskeletal system and connective tissue 03/14/2018    History of tendinitis    Personal history of other diseases of the nervous system and sense organs 04/08/2014    History of Parkinson's disease    Personal history of other diseases of the respiratory system 01/07/2019    History of acute bronchitis    Personal history of other diseases of the respiratory system 10/05/2018    History of paranasal sinus pain    Personal history of other specified conditions 04/13/2015     History of snoring    Personal history of other specified conditions 09/11/2017    History of headache    Personal history of other specified conditions 09/07/2018    History of persistent cough    Personal history of other specified conditions 04/08/2014    History of memory loss    Personal history of other specified conditions 03/26/2018    History of left flank pain    Personal history of other specified conditions     History of heartburn    Personal history of urinary calculi 03/26/2018    Personal history of urinary calculi    Personal history of urinary calculi 03/26/2018    History of renal calculi    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/2017    Plantar fasciitis, left    Primary insomnia 04/13/2015    Primary insomnia    Rash and other nonspecific skin eruption 04/16/2018    Rash    Unspecified abdominal pain 01/07/2019    Left sided abdominal pain    Urinary tract infection, site not specified 10/19/2018    UTI (urinary tract infection), bacterial    Urinary tract infection, site not specified 10/18/2018    Recurrent UTI    Vitamin D deficiency, unspecified 04/22/2016    Vitamin D deficiency    Zoster with other complications 11/25/2015    Herpes zoster dermatitis     Past Surgical History:   Procedure Laterality Date    CT ANGIO CORONARY ART WITH HEARTFLOW IF SCORE >30%  3/18/2020    CT HEART CORONARY ANGIOGRAM 3/18/2020 [facility]    HAND TENDON SURGERY  11/11/2013    Hand Incision Tendon Sheath Of A Finger    HYSTERECTOMY  03/04/2016    Hysterectomy    LITHOTRIPSY  11/11/2013    Renal Lithotripsy    TONSILLECTOMY  12/19/2013    Tonsillectomy With Adenoidectomy     Social History     Socioeconomic History    Marital status:      Spouse name: Not on file    Number of children: Not on file    Years of education: Not on file    Highest education level: Not on file   Occupational History    Not on file   Tobacco Use    Smoking status: Never    Smokeless tobacco: Never   Substance and Sexual  Activity    Alcohol use: Never    Drug use: Never    Sexual activity: Not on file   Other Topics Concern    Not on file   Social History Narrative    Not on file     Social Determinants of Health     Financial Resource Strain: Medium Risk (8/24/2024)    Overall Financial Resource Strain (CARDIA)     Difficulty of Paying Living Expenses: Somewhat hard   Food Insecurity: Not on file   Transportation Needs: No Transportation Needs (8/24/2024)    PRAPARE - Transportation     Lack of Transportation (Medical): No     Lack of Transportation (Non-Medical): No   Physical Activity: Not on file   Stress: Not on file   Social Connections: Not on file   Intimate Partner Violence: Not on file   Housing Stability: High Risk (8/24/2024)    Housing Stability Vital Sign     Unable to Pay for Housing in the Last Year: Yes     Number of Times Moved in the Last Year: 0     Homeless in the Last Year: No     Family History   Problem Relation Name Age of Onset    Asthma Mother      Hypertension Mother      Irregular heart beat Mother      Allergies Father      Heart disease Father      Hypertension Father      Sinusitis Father      Allergies Sister      Asthma Daughter      Allergies Son      Heart disease Maternal Grandmother      Breast cancer Paternal Grandmother      Heart disease Paternal Grandfather      Lung cancer Paternal Grandfather      Dementia Paternal Great-Grandfather       Allergies   Allergen Reactions    Clindamycin Anaphylaxis    Dupilumab Anaphylaxis    Hydrochlorothiazide Anaphylaxis, Itching and Swelling    Sulfamethoxazole-Trimethoprim Anaphylaxis    Cefazolin Hives, Other and Swelling     STATES TONGUE SPLITS    Atorvastatin Hives    Cephalexin Other    Clarithromycin Swelling     hives, tongue swelling    Nitrofurantoin Monohyd/M-Cryst Hives    Sulfa (Sulfonamide Antibiotics) Hives    Sulfacetamide Hives       Review of Systems:   General: no fevers, chills  Skin: Right facial pain, redness and swelling  Head: no  "headache  ENT: no sore throat   Chest: no chest pain   Resp: no cough, or sob  GI: no nausea, vomiting, or diarrhea   : no dysuria, frequency or hematuria   Ext: no edema         Physical Exam:  Blood pressure 129/65, pulse 91, temperature 36.4 °C (97.5 °F), temperature source Temporal, resp. rate 20, height 1.676 m (5' 5.98\"), weight 102 kg (224 lb 13.9 oz), SpO2 97%.  General: NAD, nontoxic appearing  Skin: no rashes or wounds  Eyes: Right periorbital and facial swelling, and erythema  ENT: no oral thrush or lesions, + dental cavities and missing teeth   Resp: lungs CTA b/l  CV:  normal S1/S2, no murmur   Abd: soft, non-tender  Back: no CVA tenderness   Ext: no edema  Neuro: AAOx3       Lab:  Lab Results   Component Value Date    WBC 6.0 08/25/2024    HGB 10.7 (L) 08/25/2024    HCT 33.4 (L) 08/25/2024    MCV 94 08/25/2024     08/25/2024      Results from last 72 hours   Lab Units 08/24/24  1333   SODIUM mmol/L 137   POTASSIUM mmol/L 4.2   CHLORIDE mmol/L 104   CO2 mmol/L 23   BUN mg/dL 14   CREATININE mg/dL 0.61   GLUCOSE mg/dL 135*   CALCIUM mg/dL 9.2   ANION GAP mmol/L 14   EGFR mL/min/1.73m*2 >90     Results from last 72 hours   Lab Units 08/24/24  1333   ALK PHOS U/L 64   BILIRUBIN TOTAL mg/dL 0.3   PROTEIN TOTAL g/dL 6.7   ALT U/L 16   AST U/L 18   ALBUMIN g/dL 3.8     Estimated Creatinine Clearance: 125 mL/min (by C-G formula based on SCr of 0.61 mg/dL).  C-Reactive Protein   Date/Time Value Ref Range Status   08/24/2024 01:33 PM 0.25 <1.00 mg/dL Final   06/19/2024 03:50 PM <0.10 <1.00 mg/dL Final   02/21/2024 03:59 PM 0.19 <1.00 mg/dL Final     Sedimentation Rate   Date/Time Value Ref Range Status   08/24/2024 01:33 PM 12 0 - 20 mm/h Final   06/19/2024 03:50 PM 11 0 - 20 mm/h Final   02/21/2024 03:59 PM 20 0 - 20 mm/h Final     No results found for: \"HIV1X2\", \"HIVCONF\", \"[id number]\"  No results found for: \"HCVPCRQUANT\"      Cultures/Micro:-     Imaging: reviewed       Assessment:  Patient is a " 44-year-old female with history of parkinsonism/dystonia, asthma, gout, rheumatoid arthritis, psoriatic arthritis, admitted on 8/24 with right-sided facial swelling and redness.    -Right preseptal cellulitis  -Right conjunctivitis  -Immune deficiency disorder (MBL and IgG subtype deficiency) on doxycycline Ppx  -History of MRSA soft tissue infection  -Multiple antibiotic allergies  -Dental cavities     Plan/Recommendations:  -Vancomycin and Zosyn  -Stop Levaquin  -Blood culture x 2  -Ophthalmology and dentistry consult  -Monitor for adverse effects of antibiotics such as nephrotoxicity, diarrhea or rash      Please call ID with any concerns or questions.   Discussed with patient    Rigo Joiner MD  [facility]  #[phone]

## 2024-08-25 NOTE — PROGRESS NOTES
Nichelle Izaguirre is a 44 y.o. female on day 1 of admission presenting with Facial swelling.      Subjective   Patient was seen and examined bedside this morning, stated that she started to feel better, able to open right eye which was closed shut by swelling of the eyelids.       Objective     Last Recorded Vitals  /66 (BP Location: Left arm)   Pulse 100   Temp 35.6 °C (96.1 °F) (Temporal)   Resp 16   Wt 102 kg (224 lb 13.9 oz)   SpO2 96%   Intake/Output last 3 Shifts:    Intake/Output Summary (Last 24 hours) at 8/25/2024 0755  Last data filed at 8/24/2024 2012  Gross per 24 hour   Intake 1150 ml   Output --   Net 1150 ml       Admission Weight  Weight: 103 kg (227 lb) (08/24/24 1230)    Daily Weight  08/24/24 : 102 kg (224 lb 13.9 oz)    Image Results  CT orbit w IV contrast  Narrative: Interpreted By:  Zhen Dolan,   STUDY:  CT ORBIT W IV CONTRAST;  8/24/2024 3:20 pm      INDICATION:  Signs/Symptoms:eye swelling right.      COMPARISON:  Orbit CT 09/18/2019      ACCESSION NUMBER(S):  [id number]      ORDERING CLINICIAN:  ROXANNE MC      TECHNIQUE:  Axial CT images of orbits with coronal and sagittal reformatted  images status post intravenous administration of iodinated contrast.  3D reconstructions were not performed at time of dictation therefore  not used during interpretation.      FINDINGS:  FACIAL BONES: No acute facial bone fracture. The bony orbits are  intact.      ORBITS: There appears to be some thickening of the right conjunctiva.  The extraocular muscles, and optic nerve sheath complexes are intact.  No retrobulbar hematoma. The superior ophthalmic veins are patent. No  proptosis.      SOFT TISSUES: There is thickening and inflammation of the right  preseptal soft tissues involving both the superior and inferior leads  and periorbital soft tissues. There is no soft tissue gas. There is  no abscess.      PARANASAL SINUSES: No hemorrhage or fluid levels in the paranasal  sinuses.       MASTOIDS: Well-aerated.      INTRACRANIAL STRUCTURES: The partially visualized intracranial  structures show no acute abnormality.      OTHER: None.      Impression: Right preseptal cellulitis without postseptal orbital cellulitis.  Thickened appearance of the right-sided conjunctiva is likely  reactive or due to concomitant conjunctivitis.          MACRO:  None.      Signed by: Zhen Dolan 8/24/2024 4:59 PM  Dictation workstation:   [id number]      Physical Exam  Constitutional: Alert active, cooperative not in acute distress  Skin: Erythematous right eye with puffy eyelids, but able to open right eye, maculopapular rash on the face and upper extremities and under the breasts  Eyes: Edematous and erythematous right eye with bilateral puffy eyelids, pupils reactive to light, conjunctivitis of the right eye  ENMT: Moist mucosal membranes, no exudate  Head / Neck: Atraumatic, normocephalic, supple neck, JVP not visualized  Lungs: Patent airways, CTABL  Heart: RRR, S1S2, no murmurs appreciated, palpable pulses in all extremities  GI: Soft, NT, ND, bowel sounds present in all quadrants  MSK: Moves all extremities freely, no restriction  of ROM, no joint edema  Extremities: Maculopapular rash on the upper extremities and under the breasts, no peripheral edema  : No Marquez catheter inserted  Breast: Deferred  Neurological: AAO x 3 to person, place and date, facial muscles symmetrical, sensation intact, strength 4/4, no acute focal neurological deficits appreciated  Psychological: Appropriate mood and behavior  Relevant Results             Scheduled medications  allopurinol, 400 mg, oral, Daily  amLODIPine, 2.5 mg, oral, Daily  benztropine, 2 mg, oral, Daily  budesonide, 0.5 mg, nebulization, BID  buPROPion XL, 450 mg, oral, Daily  carbidopa-levodopa-entacapone, 1 tablet, oral, q4h  carvedilol, 12.5 mg, oral, BID  cloNIDine, 0.2 mg, oral, Nightly  doxepin, 50 mg, oral, Nightly  DULoxetine, 60 mg, oral,  Daily  enoxaparin, 40 mg, subcutaneous, q24h  eplerenone, 25 mg, oral, Daily  ergocalciferol, 50,000 Units, oral, Every Sunday  ezetimibe, 10 mg, oral, Nightly  formoterol, 20 mcg, nebulization, BID  icosapent ethyL, 2 g, oral, BID  lactobacillus acidophilus, 1 capsule, oral, Daily  levoFLOXacin, 750 mg, intravenous, q24h  methylPREDNISolone sodium succinate (PF), 40 mg, intravenous, q12h  metoprolol succinate XL, 25 mg, oral, Daily  pantoprazole, 40 mg, oral, Daily before breakfast   Or  pantoprazole, 40 mg, intravenous, Daily before breakfast  pioglitazone, 15 mg, oral, Daily  pregabalin, 75 mg, oral, TID  sennosides-docusate sodium, 2 tablet, oral, BID  tiotropium, 2 puff, inhalation, Daily  valsartan, 320 mg, oral, Daily      Continuous medications     PRN medications  PRN medications: acetaminophen **OR** acetaminophen **OR** acetaminophen, ALPRAZolam, diphenhydrAMINE, ipratropium-albuteroL, ondansetron **OR** ondansetron, orphenadrine, SUMAtriptan    Assessment/Plan      44 y.o. female with past medical history of psoriatic arthritis, gout, asthma, Parkinsonism/Dystonia, Immunodeficiency disorder (MBL) on prophylaxis antibiotics with amoxicillin who is presenting to [facility] ED with complaint of facial swelling and redness     Right eye cellulitis: In the setting of possible exposure to environmental allergen  -Experience prodrome of allergic dermatitis of unknown etiology possible oak poison  -Status post 2 days of oral Levaquin from ophthalmologist  -CT scan shows periorbital cellulitis without orbital involvement  -Levaquin 750 mg IV piggyback x 1 in the ED  -Continue Levaquin 750 mg IV piggyback daily  -Received Solu-Medrol 125 mg x 1  -Solu-Medrol 40 mg IV push twice daily  -Benadryl 25 mg IV push every 6 hours as needed dermatitis  -ID consulted: Switch Levaquin to Zosyn     Hypertension  -Continue home regimen     Parkinsonism/dystonia  -Continue home regimen     Diet  -Regular     DVT  prophylaxis  -Lovenox 40 mg subcu daily     Disposition: Presenting with preseptal cellulitis of the right eye with conjunctivitis, need further management, discharge pending clinical improvement.              Branden June DO

## 2024-08-26 LAB
ANION GAP SERPL CALC-SCNC: 14 MMOL/L (ref 10–20)
BUN SERPL-MCNC: 17 MG/DL (ref 6–23)
CALCIUM SERPL-MCNC: 8.7 MG/DL (ref 8.6–10.3)
CHLORIDE SERPL-SCNC: 105 MMOL/L (ref 98–107)
CO2 SERPL-SCNC: 21 MMOL/L (ref 21–32)
CREAT SERPL-MCNC: 0.54 MG/DL (ref 0.5–1.05)
EGFRCR SERPLBLD CKD-EPI 2021: >90 ML/MIN/1.73M*2
ERYTHROCYTE [DISTWIDTH] IN BLOOD BY AUTOMATED COUNT: 15.4 % (ref 11.5–14.5)
GLUCOSE SERPL-MCNC: 154 MG/DL (ref 74–99)
HCT VFR BLD AUTO: 32.9 % (ref 36–46)
HGB BLD-MCNC: 10.3 G/DL (ref 12–16)
MCH RBC QN AUTO: 30.3 PG (ref 26–34)
MCHC RBC AUTO-ENTMCNC: 31.3 G/DL (ref 32–36)
MCV RBC AUTO: 97 FL (ref 80–100)
NRBC BLD-RTO: 0 /100 WBCS (ref 0–0)
PLATELET # BLD AUTO: 312 X10*3/UL (ref 150–450)
POTASSIUM SERPL-SCNC: 3.9 MMOL/L (ref 3.5–5.3)
RBC # BLD AUTO: 3.4 X10*6/UL (ref 4–5.2)
SODIUM SERPL-SCNC: 136 MMOL/L (ref 136–145)
VANCOMYCIN SERPL-MCNC: 11.2 UG/ML (ref 5–20)
WBC # BLD AUTO: 11.5 X10*3/UL (ref 4.4–11.3)

## 2024-08-26 PROCEDURE — 85027 COMPLETE CBC AUTOMATED: CPT | Performed by: INTERNAL MEDICINE

## 2024-08-26 PROCEDURE — 94640 AIRWAY INHALATION TREATMENT: CPT

## 2024-08-26 PROCEDURE — 2500000004 HC RX 250 GENERAL PHARMACY W/ HCPCS (ALT 636 FOR OP/ED): Performed by: INTERNAL MEDICINE

## 2024-08-26 PROCEDURE — 2500000004 HC RX 250 GENERAL PHARMACY W/ HCPCS (ALT 636 FOR OP/ED)

## 2024-08-26 PROCEDURE — 80202 ASSAY OF VANCOMYCIN: CPT

## 2024-08-26 PROCEDURE — 2500000002 HC RX 250 W HCPCS SELF ADMINISTERED DRUGS (ALT 637 FOR MEDICARE OP, ALT 636 FOR OP/ED): Performed by: INTERNAL MEDICINE

## 2024-08-26 PROCEDURE — 99232 SBSQ HOSP IP/OBS MODERATE 35: CPT | Performed by: INTERNAL MEDICINE

## 2024-08-26 PROCEDURE — 94760 N-INVAS EAR/PLS OXIMETRY 1: CPT

## 2024-08-26 PROCEDURE — 80048 BASIC METABOLIC PNL TOTAL CA: CPT | Performed by: INTERNAL MEDICINE

## 2024-08-26 PROCEDURE — 1200000002 HC GENERAL ROOM WITH TELEMETRY DAILY

## 2024-08-26 PROCEDURE — 94660 CPAP INITIATION&MGMT: CPT

## 2024-08-26 PROCEDURE — 2500000001 HC RX 250 WO HCPCS SELF ADMINISTERED DRUGS (ALT 637 FOR MEDICARE OP): Performed by: PHARMACIST

## 2024-08-26 PROCEDURE — 2500000001 HC RX 250 WO HCPCS SELF ADMINISTERED DRUGS (ALT 637 FOR MEDICARE OP): Performed by: INTERNAL MEDICINE

## 2024-08-26 PROCEDURE — 36415 COLL VENOUS BLD VENIPUNCTURE: CPT | Performed by: INTERNAL MEDICINE

## 2024-08-26 PROCEDURE — 87081 CULTURE SCREEN ONLY: CPT | Mod: AHULAB | Performed by: INTERNAL MEDICINE

## 2024-08-26 RX ORDER — IBUPROFEN 600 MG/1
600 TABLET ORAL EVERY 6 HOURS PRN
Status: DISCONTINUED | OUTPATIENT
Start: 2024-08-26 | End: 2024-08-27 | Stop reason: HOSPADM

## 2024-08-26 RX ORDER — LEFLUNOMIDE 10 MG/1
10 TABLET ORAL DAILY
Status: DISCONTINUED | OUTPATIENT
Start: 2024-08-26 | End: 2024-08-27 | Stop reason: HOSPADM

## 2024-08-26 RX ORDER — METOPROLOL SUCCINATE 50 MG/1
50 TABLET, EXTENDED RELEASE ORAL DAILY
Status: DISCONTINUED | OUTPATIENT
Start: 2024-08-27 | End: 2024-08-27 | Stop reason: HOSPADM

## 2024-08-26 RX ADMIN — FORMOTEROL FUMARATE DIHYDRATE 20 MCG: 20 SOLUTION RESPIRATORY (INHALATION) at 21:38

## 2024-08-26 RX ADMIN — ACETAMINOPHEN 650 MG: 325 TABLET ORAL at 11:50

## 2024-08-26 RX ADMIN — ORPHENADRINE CITRATE 100 MG: 100 TABLET, EXTENDED RELEASE ORAL at 23:39

## 2024-08-26 RX ADMIN — TAPENTADOL HYDROCHLORIDE 50 MG: 50 TABLET, FILM COATED ORAL at 18:14

## 2024-08-26 RX ADMIN — TIOTROPIUM BROMIDE INHALATION SPRAY 2 PUFF: 3.12 SPRAY, METERED RESPIRATORY (INHALATION) at 07:58

## 2024-08-26 RX ADMIN — METOPROLOL SUCCINATE 25 MG: 25 TABLET, EXTENDED RELEASE ORAL at 10:40

## 2024-08-26 RX ADMIN — Medication 1 CAPSULE: at 09:00

## 2024-08-26 RX ADMIN — BUPROPION HYDROCHLORIDE 450 MG: 150 TABLET, EXTENDED RELEASE ORAL at 10:39

## 2024-08-26 RX ADMIN — PIPERACILLIN SODIUM AND TAZOBACTAM SODIUM 3.38 G: 3; .375 INJECTION, SOLUTION INTRAVENOUS at 15:59

## 2024-08-26 RX ADMIN — ENOXAPARIN SODIUM 40 MG: 40 INJECTION SUBCUTANEOUS at 21:35

## 2024-08-26 RX ADMIN — EZETIMIBE 10 MG: 10 TABLET ORAL at 21:22

## 2024-08-26 RX ADMIN — BUDESONIDE 0.5 MG: 0.5 INHALANT RESPIRATORY (INHALATION) at 21:37

## 2024-08-26 RX ADMIN — VALSARTAN 320 MG: 160 TABLET, FILM COATED ORAL at 10:40

## 2024-08-26 RX ADMIN — ALPRAZOLAM 0.5 MG: 0.5 TABLET ORAL at 21:22

## 2024-08-26 RX ADMIN — BENZTROPINE MESYLATE 2 MG: 1 TABLET ORAL at 10:39

## 2024-08-26 RX ADMIN — FORMOTEROL FUMARATE DIHYDRATE 20 MCG: 20 SOLUTION RESPIRATORY (INHALATION) at 07:59

## 2024-08-26 RX ADMIN — ICOSAPENT ETHYL 2 G: 1 CAPSULE ORAL at 15:59

## 2024-08-26 RX ADMIN — METHYLPREDNISOLONE SODIUM SUCCINATE 40 MG: 40 INJECTION, POWDER, FOR SOLUTION INTRAMUSCULAR; INTRAVENOUS at 10:04

## 2024-08-26 RX ADMIN — PREGABALIN 75 MG: 75 CAPSULE ORAL at 21:22

## 2024-08-26 RX ADMIN — PIOGLITAZONE 15 MG: 15 TABLET ORAL at 10:52

## 2024-08-26 RX ADMIN — DOXEPIN HYDROCHLORIDE 50 MG: 25 CAPSULE ORAL at 21:35

## 2024-08-26 RX ADMIN — PIPERACILLIN SODIUM AND TAZOBACTAM SODIUM 3.38 G: 3; .375 INJECTION, SOLUTION INTRAVENOUS at 10:41

## 2024-08-26 RX ADMIN — PANTOPRAZOLE SODIUM 40 MG: 40 TABLET, DELAYED RELEASE ORAL at 05:47

## 2024-08-26 RX ADMIN — AMLODIPINE BESYLATE 2.5 MG: 5 TABLET ORAL at 09:00

## 2024-08-26 RX ADMIN — SENNOSIDES AND DOCUSATE SODIUM 2 TABLET: 50; 8.6 TABLET ORAL at 21:22

## 2024-08-26 RX ADMIN — ICOSAPENT ETHYL 2 G: 1 CAPSULE ORAL at 10:39

## 2024-08-26 RX ADMIN — PREGABALIN 75 MG: 75 CAPSULE ORAL at 10:40

## 2024-08-26 RX ADMIN — DULOXETINE HYDROCHLORIDE 60 MG: 30 CAPSULE, DELAYED RELEASE ORAL at 10:40

## 2024-08-26 RX ADMIN — METHYLPREDNISOLONE SODIUM SUCCINATE 40 MG: 40 INJECTION, POWDER, FOR SOLUTION INTRAMUSCULAR; INTRAVENOUS at 21:22

## 2024-08-26 RX ADMIN — CARVEDILOL 12.5 MG: 12.5 TABLET, FILM COATED ORAL at 10:39

## 2024-08-26 RX ADMIN — BUDESONIDE 0.5 MG: 0.5 INHALANT RESPIRATORY (INHALATION) at 07:59

## 2024-08-26 RX ADMIN — ALLOPURINOL 400 MG: 100 TABLET ORAL at 10:40

## 2024-08-26 RX ADMIN — CLONIDINE HYDROCHLORIDE 0.2 MG: 0.2 TABLET ORAL at 21:22

## 2024-08-26 RX ADMIN — PIPERACILLIN SODIUM AND TAZOBACTAM SODIUM 3.38 G: 3; .375 INJECTION, SOLUTION INTRAVENOUS at 21:22

## 2024-08-26 RX ADMIN — CARBIDOPA, LEVODOPA AND ENTACAPONE 1 TABLET: 50; 200; 200 TABLET, FILM COATED ORAL at 10:38

## 2024-08-26 RX ADMIN — IBUPROFEN 600 MG: 600 TABLET, FILM COATED ORAL at 16:01

## 2024-08-26 RX ADMIN — VANCOMYCIN HYDROCHLORIDE 1500 MG: 1.5 INJECTION, POWDER, LYOPHILIZED, FOR SOLUTION INTRAVENOUS at 11:51

## 2024-08-26 RX ADMIN — PIPERACILLIN SODIUM AND TAZOBACTAM SODIUM 3.38 G: 3; .375 INJECTION, SOLUTION INTRAVENOUS at 02:49

## 2024-08-26 RX ADMIN — CARBIDOPA, LEVODOPA AND ENTACAPONE 1 TABLET: 50; 200; 200 TABLET, FILM COATED ORAL at 22:16

## 2024-08-26 RX ADMIN — LEFLUNOMIDE 10 MG: 10 TABLET ORAL at 15:59

## 2024-08-26 RX ADMIN — CARBIDOPA, LEVODOPA AND ENTACAPONE 1 TABLET: 50; 200; 200 TABLET, FILM COATED ORAL at 05:47

## 2024-08-26 RX ADMIN — EPLERENONE 25 MG: 25 TABLET, FILM COATED ORAL at 10:39

## 2024-08-26 RX ADMIN — CARBIDOPA, LEVODOPA AND ENTACAPONE 1 TABLET: 50; 200; 200 TABLET, FILM COATED ORAL at 14:06

## 2024-08-26 RX ADMIN — PREGABALIN 75 MG: 75 CAPSULE ORAL at 14:06

## 2024-08-26 RX ADMIN — VANCOMYCIN HYDROCHLORIDE 1500 MG: 1.5 INJECTION, POWDER, LYOPHILIZED, FOR SOLUTION INTRAVENOUS at 21:59

## 2024-08-26 SDOH — SOCIAL STABILITY: SOCIAL INSECURITY
WITHIN THE LAST YEAR, HAVE YOU BEEN KICKED, HIT, SLAPPED, OR OTHERWISE PHYSICALLY HURT BY YOUR PARTNER OR EX-PARTNER?: NO

## 2024-08-26 SDOH — ECONOMIC STABILITY: FOOD INSECURITY: WITHIN THE PAST 12 MONTHS, YOU WORRIED THAT YOUR FOOD WOULD RUN OUT BEFORE YOU GOT MONEY TO BUY MORE.: NEVER TRUE

## 2024-08-26 SDOH — SOCIAL STABILITY: SOCIAL INSECURITY
WITHIN THE LAST YEAR, HAVE TO BEEN RAPED OR FORCED TO HAVE ANY KIND OF SEXUAL ACTIVITY BY YOUR PARTNER OR EX-PARTNER?: NO

## 2024-08-26 SDOH — HEALTH STABILITY: MENTAL HEALTH
STRESS IS WHEN SOMEONE FEELS TENSE, NERVOUS, ANXIOUS, OR CAN'T SLEEP AT NIGHT BECAUSE THEIR MIND IS TROUBLED. HOW STRESSED ARE YOU?: ONLY A LITTLE

## 2024-08-26 SDOH — SOCIAL STABILITY: SOCIAL NETWORK: HOW OFTEN DO YOU GET TOGETHER WITH FRIENDS OR RELATIVES?: MORE THAN THREE TIMES A WEEK

## 2024-08-26 SDOH — ECONOMIC STABILITY: HOUSING INSECURITY: IN THE PAST 12 MONTHS, HOW MANY TIMES HAVE YOU MOVED WHERE YOU WERE LIVING?: 0

## 2024-08-26 SDOH — ECONOMIC STABILITY: INCOME INSECURITY: IN THE LAST 12 MONTHS, WAS THERE A TIME WHEN YOU WERE NOT ABLE TO PAY THE MORTGAGE OR RENT ON TIME?: NO

## 2024-08-26 SDOH — HEALTH STABILITY: MENTAL HEALTH: HOW OFTEN DO YOU HAVE A DRINK CONTAINING ALCOHOL?: NEVER

## 2024-08-26 SDOH — ECONOMIC STABILITY: TRANSPORTATION INSECURITY
IN THE PAST 12 MONTHS, HAS LACK OF TRANSPORTATION KEPT YOU FROM MEETINGS, WORK, OR FROM GETTING THINGS NEEDED FOR DAILY LIVING?: NO

## 2024-08-26 SDOH — HEALTH STABILITY: PHYSICAL HEALTH: ON AVERAGE, HOW MANY MINUTES DO YOU ENGAGE IN EXERCISE AT THIS LEVEL?: 150+ MIN

## 2024-08-26 SDOH — ECONOMIC STABILITY: INCOME INSECURITY: IN THE PAST 12 MONTHS, HAS THE ELECTRIC, GAS, OIL, OR WATER COMPANY THREATENED TO SHUT OFF SERVICE IN YOUR HOME?: NO

## 2024-08-26 SDOH — SOCIAL STABILITY: SOCIAL INSECURITY: WITHIN THE LAST YEAR, HAVE YOU BEEN HUMILIATED OR EMOTIONALLY ABUSED IN OTHER WAYS BY YOUR PARTNER OR EX-PARTNER?: NO

## 2024-08-26 SDOH — ECONOMIC STABILITY: FOOD INSECURITY: WITHIN THE PAST 12 MONTHS, THE FOOD YOU BOUGHT JUST DIDN'T LAST AND YOU DIDN'T HAVE MONEY TO GET MORE.: NEVER TRUE

## 2024-08-26 SDOH — HEALTH STABILITY: PHYSICAL HEALTH: ON AVERAGE, HOW MANY DAYS PER WEEK DO YOU ENGAGE IN MODERATE TO STRENUOUS EXERCISE (LIKE A BRISK WALK)?: 5 DAYS

## 2024-08-26 SDOH — SOCIAL STABILITY: SOCIAL NETWORK: ARE YOU MARRIED, WIDOWED, DIVORCED, SEPARATED, NEVER MARRIED, OR LIVING WITH A PARTNER?: MARRIED

## 2024-08-26 SDOH — ECONOMIC STABILITY: INCOME INSECURITY: HOW HARD IS IT FOR YOU TO PAY FOR THE VERY BASICS LIKE FOOD, HOUSING, MEDICAL CARE, AND HEATING?: NOT VERY HARD

## 2024-08-26 SDOH — SOCIAL STABILITY: SOCIAL NETWORK
DO YOU BELONG TO ANY CLUBS OR ORGANIZATIONS SUCH AS CHURCH GROUPS UNIONS, FRATERNAL OR ATHLETIC GROUPS, OR SCHOOL GROUPS?: NO

## 2024-08-26 SDOH — HEALTH STABILITY: MENTAL HEALTH
HOW OFTEN DO YOU NEED TO HAVE SOMEONE HELP YOU WHEN YOU READ INSTRUCTIONS, PAMPHLETS, OR OTHER WRITTEN MATERIAL FROM YOUR DOCTOR OR PHARMACY?: NEVER

## 2024-08-26 SDOH — ECONOMIC STABILITY: TRANSPORTATION INSECURITY
IN THE PAST 12 MONTHS, HAS THE LACK OF TRANSPORTATION KEPT YOU FROM MEDICAL APPOINTMENTS OR FROM GETTING MEDICATIONS?: NO

## 2024-08-26 SDOH — HEALTH STABILITY: MENTAL HEALTH: HOW OFTEN DO YOU HAVE 6 OR MORE DRINKS ON ONE OCCASION?: NEVER

## 2024-08-26 SDOH — SOCIAL STABILITY: SOCIAL INSECURITY: WITHIN THE LAST YEAR, HAVE YOU BEEN AFRAID OF YOUR PARTNER OR EX-PARTNER?: NO

## 2024-08-26 SDOH — HEALTH STABILITY: MENTAL HEALTH: HOW MANY STANDARD DRINKS CONTAINING ALCOHOL DO YOU HAVE ON A TYPICAL DAY?: PATIENT DOES NOT DRINK

## 2024-08-26 SDOH — SOCIAL STABILITY: SOCIAL NETWORK: HOW OFTEN DO YOU ATTEND CHURCH OR RELIGIOUS SERVICES?: MORE THAN 4 TIMES PER YEAR

## 2024-08-26 SDOH — SOCIAL STABILITY: SOCIAL NETWORK
IN A TYPICAL WEEK, HOW MANY TIMES DO YOU TALK ON THE PHONE WITH FAMILY, FRIENDS, OR NEIGHBORS?: MORE THAN THREE TIMES A WEEK

## 2024-08-26 SDOH — ECONOMIC STABILITY: HOUSING INSECURITY: AT ANY TIME IN THE PAST 12 MONTHS, WERE YOU HOMELESS OR LIVING IN A SHELTER (INCLUDING NOW)?: NO

## 2024-08-26 SDOH — SOCIAL STABILITY: SOCIAL NETWORK: HOW OFTEN DO YOU ATTENT MEETINGS OF THE CLUB OR ORGANIZATION YOU BELONG TO?: NEVER

## 2024-08-26 ASSESSMENT — COGNITIVE AND FUNCTIONAL STATUS - GENERAL
MOBILITY SCORE: 24
DAILY ACTIVITIY SCORE: 24
DAILY ACTIVITIY SCORE: 24
MOBILITY SCORE: 24

## 2024-08-26 ASSESSMENT — PAIN SCALES - GENERAL
PAINLEVEL_OUTOF10: 4
PAINLEVEL_OUTOF10: 4
PAINLEVEL_OUTOF10: 5 - MODERATE PAIN

## 2024-08-26 ASSESSMENT — PAIN DESCRIPTION - DESCRIPTORS
DESCRIPTORS: THROBBING
DESCRIPTORS: THROBBING

## 2024-08-26 ASSESSMENT — PAIN - FUNCTIONAL ASSESSMENT
PAIN_FUNCTIONAL_ASSESSMENT: 0-10

## 2024-08-26 ASSESSMENT — LIFESTYLE VARIABLES
AUDIT-C TOTAL SCORE: 0
SKIP TO QUESTIONS 9-10: 1

## 2024-08-26 ASSESSMENT — PAIN DESCRIPTION - LOCATION
LOCATION: FACE
LOCATION: FACE

## 2024-08-26 ASSESSMENT — ACTIVITIES OF DAILY LIVING (ADL): LACK_OF_TRANSPORTATION: NO

## 2024-08-26 NOTE — CARE PLAN
The patient's goals for the shift include      The clinical goals for the shift include antibiotic therapy    Over the shift, the patient did make progress toward the following goals.   Problem: Pain - Adult  Goal: Verbalizes/displays adequate comfort level or baseline comfort level  Outcome: Progressing     Problem: Safety - Adult  Goal: Free from fall injury  Outcome: Progressing     Problem: Chronic Conditions and Co-morbidities  Goal: Patient's chronic conditions and co-morbidity symptoms are monitored and maintained or improved  Outcome: Progressing

## 2024-08-26 NOTE — PROGRESS NOTES
For follow up of right preseptal cellulitis    Subjective   She says her right facial swelling is much better       Current Facility-Administered Medications   Medication Dose Route Frequency Provider Last Rate Last Admin    acetaminophen (Tylenol) tablet 650 mg  650 mg oral q4h PRN Branden Goudiaby, DO   650 mg at 08/26/24 1150    Or    acetaminophen (Tylenol) oral liquid 650 mg  650 mg nasogastric tube q4h PRN Branden Goudiaby, DO        Or    acetaminophen (Tylenol) suppository 650 mg  650 mg rectal q4h PRN Branden Goudiaby, DO        allopurinol (Zyloprim) tablet 400 mg  400 mg oral Daily Alyce Avendano, PharmD   400 mg at 08/26/24 1040    ALPRAZolam (Xanax) tablet 0.5 mg  0.5 mg oral TID PRN Branden Goudiaby, DO   0.5 mg at 08/25/24 1654    amLODIPine (Norvasc) tablet 2.5 mg  2.5 mg oral Daily Branden Goudiaby, DO   2.5 mg at 08/26/24 0900    budesonide (Pulmicort) 0.5 mg/2 mL nebulizer solution 0.5 mg  0.5 mg nebulization BID Branden Goudiaby, DO   0.5 mg at 08/26/24 0759    buPROPion XL (Wellbutrin XL) 24 hr tablet 450 mg  450 mg oral Daily Alyce Avendano, PharmD   450 mg at 08/26/24 1039    carbidopa-levodopa-entacapone (Stalevo) -200 mg per tablet 1 tablet - PATIENT OWN MEDICATION  1 tablet oral q4h Branden Goudiaby, DO   1 tablet at 08/26/24 1406    [Held by provider] carvedilol (Coreg) tablet 12.5 mg  12.5 mg oral BID Branden Goudiaby, DO   12.5 mg at 08/26/24 1039    cloNIDine (Catapres) tablet 0.2 mg  0.2 mg oral Nightly Branden Goudiaby, DO   0.2 mg at 08/25/24 2046    diphenhydrAMINE (BENADryl) injection 25 mg  25 mg intravenous q6h PRN Branden Goudiaby, DO        doxepin (SINEquan) capsule 50 mg  50 mg oral Nightly [facility] Goudiaby, DO   50 mg at 08/25/24 2046    DULoxetine (Cymbalta) DR capsule 60 mg  60 mg oral Daily [facility] Maniudiaby, DO   60 mg at 08/26/24 1040    enoxaparin (Lovenox) syringe 40 mg  40 mg subcutaneous q24h [facility] Edgaraby, DO   40 mg at 08/25/24 2046     eplerenone (Inspra) tablet 25 mg  25 mg oral Daily Branden Goudiaby, DO   25 mg at 08/26/24 1039    ergocalciferol (Vitamin D-2) capsule 50,000 Units  50,000 Units oral Every Sunday [facility] Goudiaby, DO   50,000 Units at 08/25/24 0927    ezetimibe (Zetia) tablet 10 mg  10 mg oral Nightly Branden Goudiaby, DO   10 mg at 08/25/24 2047    formoterol (Perforomist) 20 mcg/2 mL nebulizer solution 20 mcg  20 mcg nebulization BID Brandenima Goudiaby, DO   20 mcg at 08/26/24 0759    ibuprofen tablet 600 mg  600 mg oral q6h PRN Brandenima Goudiaby, DO   600 mg at 08/26/24 1601    icosapent ethyL (Vascepa) capsule 2 g  2 g oral BID Brandenima Goudiaby, DO   2 g at 08/26/24 1559    ipratropium-albuteroL (Duo-Neb) 0.5-2.5 mg/3 mL nebulizer solution 3 mL  3 mL nebulization q2h PRN Kenna Contreras MD        lactobacillus acidophilus capsule 1 capsule  1 capsule oral Daily Alyce Avendano PharmD   1 capsule at 08/26/24 0900    leflunomide (Arava) tablet 10 mg  10 mg oral Daily Ibrahima Goudiaby, DO   10 mg at 08/26/24 1559    methylPREDNISolone sod succinate (SOLU-Medrol) 40 mg/mL injection 40 mg  40 mg intravenous q12h Brandenima Goudiaby, DO   40 mg at 08/26/24 1004    [START ON 8/27/2024] metoprolol succinate XL (Toprol-XL) 24 hr tablet 50 mg  50 mg oral Daily Branden Goudiaby, DO        ondansetron (Zofran) tablet 4 mg  4 mg oral q8h PRN Branden Goudiaby, DO        Or    ondansetron (Zofran) injection 4 mg  4 mg intravenous q8h PRN [facility] Goudiaby, DO   4 mg at 08/25/24 1655    orphenadrine (Norflex) 12 hr tablet 100 mg  100 mg oral BID PRN Branden Goudiaby, DO   100 mg at 08/25/24 1144    pantoprazole (ProtoNix) EC tablet 40 mg  40 mg oral Daily before breakfast Branden Goudiaby, DO   40 mg at 08/26/24 0547    Or    pantoprazole (ProtoNix) injection 40 mg  40 mg intravenous Daily before breakfast Branden Goudiaby, DO        pioglitazone (Actos) tablet 15 mg  15 mg oral Daily Branden Goudiaby, DO   15 mg at 08/26/24  1052    piperacillin-tazobactam (Zosyn) 3.375 g in dextrose (iso) IV 50 mL  3.375 g intravenous q6h Rigo Joiner MD   Stopped at 08/26/24 1629    pregabalin (Lyrica) capsule 75 mg  75 mg oral TID Branden Goudiaby, DO   75 mg at 08/26/24 1406    sennosides-docusate sodium (Irene-Colace) 8.6-50 mg per tablet 2 tablet  2 tablet oral BID Branden Goudiaby, DO   2 tablet at 08/25/24 0927    SUMAtriptan (Imitrex) tablet 25 mg  25 mg oral Once PRN Branden Goudiaby, DO        tapentadol (Nucynta) tablet 50 mg - PATIENT OWN MEDICATION  50 mg oral q8h PRN Branden Goudiaby, DO   50 mg at 08/26/24 1814    tiotropium (Spiriva Respimat) 2.5 mcg/actuation inhaler 2 puff  2 puff inhalation Daily Branden Goudiaby, DO   2 puff at 08/26/24 0758    vancomycin (Vancocin) 1,500 mg in dextrose 5%  mL  1,500 mg intravenous q12h Zoila Barrios, PharmD   Stopped at 08/26/24 1321    vancomycin (Vancocin) pharmacy to dose - pharmacy monitoring   miscellaneous Daily PRN Rigo Joiner MD            Objective     Last Recorded Vitals  /90   Pulse 88   Temp 36.6 °C (97.9 °F)   Resp 22   Wt 102 kg (224 lb 13.9 oz)   SpO2 98%     General: no acute distress, lying in bed  HEENT: pink pharynx, mild right facial swelling  CVS: RRR  Resp: decreased breath sounds in bases  ABD: soft, NT, ND  EXT: no edema  Skin: no rash     Relevant Results  8/24/2024 blood culture show no growth  8/26/2024 white blood count 11.5    Assessment/Plan     Principal Problem:    Facial swelling    Right preseptal cellulitis, being treated with IV antibiotics.  I have personally and independently reviewed and interpreted her laboratory tests, imaging studies, and the documentation from other healthcare providers.  I am monitoring for side effects from vancomycin and Zosyn which can include rash, diarrhea, bone marrow suppression, and nephrotoxicity.  She has a new leukocytosis.  She is receiving steroids.  The erythema is much better.  It may be reasonable  to discharge her as early as tomorrow on oral treatment.    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

## 2024-08-26 NOTE — PROGRESS NOTES
08/26/24 1453   Discharge Planning   Living Arrangements Spouse/significant other   Support Systems Spouse/significant other   Assistance Needed PTA; Independent w/ADL/IADL, no asst device   Type of Residence Private residence  (demo correct)   Number of Stairs to Enter Residence 3   Number of Stairs Within Residence 15   Home or Post Acute Services None   Expected Discharge Disposition Home   Does the patient need discharge transport arranged? No  (car in lot)   Financial Resource Strain   How hard is it for you to pay for the very basics like food, housing, medical care, and heating? Not very   Housing Stability   In the last 12 months, was there a time when you were not able to pay the mortgage or rent on time? N   In the past 12 months, how many times have you moved where you were living? 0   At any time in the past 12 months, were you homeless or living in a shelter (including now)? N   Transportation Needs   In the past 12 months, has lack of transportation kept you from medical appointments or from getting medications? no  (PCP listed; last office visit; 3 months ago; spouse drives or patient may drive self)   In the past 12 months, has lack of transportation kept you from meetings, work, or from getting things needed for daily living? No     Care Coordinator Note:  Met patient at bedside to discuss discharge planning, explained my role as care coordinator   Plan: ID following; IV abx, blood cx pending  Status: Inpatient d/t facial swelling  Payor: [facility]  Disposition: Home, no needs anticipated    Lidia RANDOLPH, RN T[facility]

## 2024-08-26 NOTE — PROGRESS NOTES
Pharmacy Medication History Review    Nichelle Izaguirre is a 44 y.o. female admitted for Facial swelling. Pharmacy reviewed the patient's oqhhq-cl-evculhbcy medications and allergies for accuracy.    The list below reflectives the updated PTA list. Please review each medication in order reconciliation for additional clarification and justification.  Medications Prior to Admission   Medication Sig Dispense Refill Last Dose    albuterol 2.5 mg /3 mL (0.083 %) nebulizer solution inhale the contents of one vial via nebulizer every 4 hours as needed   Past Month    allopurinol (Zyloprim) 100 mg tablet TAKE ONE TABLET BY MOUTH EVERY DAY 90 tablet 0 8/26/2024    allopurinol (Zyloprim) 300 mg tablet TAKE ONE TABLET BY MOUTH EVERY DAY 90 tablet 0 8/26/2024    ALPRAZolam (Xanax) 0.5 mg tablet Take 1 tablet (0.5 mg) by mouth 3 times a day as needed for sleep or anxiety. 21 tablet 2 8/26/2024    amLODIPine (Norvasc) 2.5 mg tablet Take 1 tablet (2.5 mg) by mouth once daily.   8/26/2024    amoxicillin (Amoxil) 500 mg capsule Take 1 capsule (500 mg) by mouth once daily.   Past Week    ascorbic acid, vitamin C, 500 mg capsule Take 1 capsule by mouth once daily.   Past Week    buPROPion XL (Wellbutrin XL) 150 mg 24 hr tablet Take 1 tablet (150 mg) by mouth once daily. Do not crush, chew, or split. 90 tablet 3 8/26/2024    buPROPion XL (Wellbutrin XL) 300 mg 24 hr tablet Take 1 tablet (300 mg) by mouth once daily in the morning. Do not crush, chew, or split. 90 tablet 3 8/26/2024    carbidopa-levodopa-entacapone (Stalevo) -200 mg tablet Take 1 tablet by mouth every 4 hours. 540 tablet 1 8/26/2024    carvedilol (Coreg) 12.5 mg tablet Take 1 tablet (12.5 mg) by mouth 2 times daily (morning and late afternoon) for 7 days, THEN 0.5 tablets (6.25 mg) 2 times daily (morning and late afternoon) for 14 days. 28 tablet 1 Past Week    cloNIDine (Catapres) 0.1 mg tablet Take 1 tablet (0.1 mg) by mouth if needed for high blood pressure.    Past Week    cloNIDine (Catapres) 0.2 mg tablet Take 1 tablet (0.2 mg) by mouth once daily at bedtime. 90 tablet 3 8/26/2024    cyanocobalamin, vitamin B-12, 1,000 mcg/mL kit Inject 1,000 mcg as directed every 28 (twenty-eight) days. 1 kit 11 Past Month    diclofenac sodium (Voltaren) 1 % gel gel Apply 4.5 inches (4 g) topically 2 times a day as needed.   Past Month    doxepin (SINEquan) 50 mg capsule Take 1 capsule (50 mg) by mouth once daily at bedtime. 30 capsule 3 8/26/2024    doxycycline (Vibramycin) 100 mg capsule 1 capsule (100 mg).   Past Week    DULoxetine (Cymbalta) 60 mg DR capsule Take 1 capsule (60 mg) by mouth once daily. 90 capsule 1 8/26/2024    eplerenone (Inspra) 25 mg tablet Take 1 tablet (25 mg) by mouth once daily.   8/26/2024    ergocalciferol (Vitamin D-2) 1.25 MG (41736 UT) capsule Take 1 capsule (50,000 Units) by mouth 1 (one) time per week. 12 capsule 0 Past Week    ezetimibe (Zetia) 10 mg tablet Take 1 tablet (10 mg) by mouth once daily. 90 tablet 3 8/26/2024    fluticasone furoate-vilanteroL (Breo Ellipta) 200-25 mcg/dose inhaler 1puff daily, Inhalation   8/26/2024    ipratropium-albuteroL (Duo-Neb) 0.5-2.5 mg/3 mL nebulizer solution Take 3 mL by nebulization every 4 hours if needed for wheezing.   Past Month    lactobacillus acidophilus (Lactobacillus acidoph-L.bulgar) tablet tablet Take 1 tablet by mouth once daily. 90 tablet 0 Past Month    levalbuterol (Xopenex) 1.25 mg/3 mL nebulizer solution Take 1 ampule by nebulization 3 times a day.   Past Week    metoprolol succinate XL (Toprol-XL) 25 mg 24 hr tablet Take 2 tablets (50 mg) by mouth once daily.   Past Week    omega-3 acid ethyl esters (Lovaza) 1 gram capsule Take 2 capsules (2 g) by mouth 2 times a day. 360 capsule 3 8/26/2024    omeprazole (PriLOSEC) 40 mg DR capsule Take 1 capsule (40 mg) by mouth once daily in the morning before meals. 90 capsule 3 8/26/2024    ondansetron ODT (Zofran-ODT) 4 mg disintegrating tablet Take 1  tablet (4 mg) by mouth every 8 hours if needed for nausea or vomiting. 90 tablet 0 Past Week    orphenadrine (Norflex) 100 mg 12 hr tablet Take 1 tablet (100 mg) by mouth 2 times a day as needed for muscle spasms. 60 tablet 3 8/26/2024    pioglitazone (Actos) 15 mg tablet Take 1 tablet (15 mg) by mouth once daily.   Past Week    pregabalin (Lyrica) 75 mg capsule Take 1 capsule (75 mg) by mouth 3 times a day. 90 capsule 2 8/26/2024    Repatha SureClick 140 mg/mL injection Inject 1 mL (140 mg) under the skin every 14 (fourteen) days. 6 mL 3 Past Week    Spiriva Respimat 1.25 mcg/actuation inhaler Inhale once daily.   Past Week    SUMAtriptan (Imitrex) 25 mg tablet Take 1 tablet (25 mg) by mouth 1 time if needed for migraine. May repeat in 2 hours if no improvement. Max 200 mg/24 hr 9 tablet 2 Past Month    tapentadol (Nucynta) 50 mg tablet Take 1 tablet (50 mg) by mouth every 8 hours if needed for severe pain (7 - 10). 90 tablet 0 8/26/2024    benzoyl peroxide (Benzac AC) 10 % external wash Apply topically if needed.   Unknown    cetirizine-pseudoephedrine (ZyrTEC-D) 5-120 mg 12 hr tablet Take 1 tablet by mouth 2 times a day. 180 tablet 0 Unknown    clindamycin (Cleocin T) 1 % lotion 1 Application   Unknown    clobetasol (Temovate) 0.05 % external solution Apply topically 2 times a day.   Unknown    clobetasoL 0.05 % lotion One application as needed for flaring only.not for daily use. For scalp and hands only 118 mL 3 Unknown    Cosentyx Pen, 2 Pens, 150 mg/mL self-injector pen Inject 2 mL (300 mg) under the skin every 30 (thirty) days. 2 mL 2     ketoconazole (NIZOral) 2 % cream 1 Application   Unknown    meloxicam (Mobic) 15 mg tablet Take 1 tablet (15 mg) by mouth once daily. (Patient not taking: Reported on 8/5/2024) 30 tablet 3     metroNIDAZOLE (Metrocream) 0.75 % cream 1 Application   Unknown    montelukast (Singulair) 10 mg tablet Take 1 tablet (10 mg) by mouth once daily at bedtime.   More than a month     nebulizer and compressor device use q4-6 hours with albuterol PRN cough/wheeze       nebulizers ([facility] Dis[facility] Nebulizer) misc every 4 hours if needed.       olmesartan (BENIcar) 40 mg tablet Take 1 tablet (40 mg) by mouth once daily.       ProAir HFA 90 mcg/actuation inhaler        tezepelumab-ekko (Tezspire) SubQ Pen Injector Inject 210 mg under the skin.   More than a month        The list below reflectives the updated allergy list. Please review each documented allergy for additional clarification and justification.  Allergies  Reviewed by Brittani Mcgowan, MerlyD on 8/26/2024        Severity Reactions Comments    Clindamycin High Anaphylaxis     Dupilumab High Anaphylaxis     Hydrochlorothiazide High Anaphylaxis, Itching, Swelling     Sulfamethoxazole-trimethoprim High Anaphylaxis     Cefazolin Medium Hives, Other, Swelling STATES TONGUE SPLITS    Atorvastatin Not Specified Hives     Cephalexin Not Specified Other     Clarithromycin Not Specified Swelling hives, tongue swelling    Nitrofurantoin Monohyd/m-cryst Not Specified Hives     Sulfa (sulfonamide Antibiotics) Not Specified Hives     Sulfacetamide Not Specified Hives             Below are additional concerns with the patient's PTA list.  The following updates were made to the Prior to Admission medication list:     Source of Information:     Medications ADDED:   Carvedilol 12.5 mg BID  Medications CHANGED:  Metoprolol Succinate XL 50 mg every day   Medications REMOVED:   N/A  Medications NOT TAKING:   Olmesartan  Meloxicam    Allergy reviewed : Yes        Brittani Mcgowan, MerlyD

## 2024-08-26 NOTE — PROGRESS NOTES
Vancomycin Dosing by Pharmacy- FOLLOW UP    Nichelle Izaguirre is a 44 y.o. year old female who Pharmacy has been consulted for vancomycin dosing for cellulitis, skin and soft tissue. Based on the patient's indication and renal status this patient is being dosed based on a goal AUC of 400-600.     Renal function is currently stable.    Current vancomycin dose: 1500 mg given every 12 hours    Most recent random level: 11.2 mcg/mL    Visit Vitals  /78 (BP Location: Left arm, Patient Position: Lying)   Pulse 88   Temp 36.7 °C (98.1 °F) (Temporal)   Resp 20        Lab Results   Component Value Date    CREATININE 0.54 2024    CREATININE 0.62 2024    CREATININE 0.61 2024    CREATININE 0.63 2024        Patient weight is as follows:   Vitals:    24 2216   Weight: 102 kg (224 lb 13.9 oz)       Cultures:  No results found for the encounter in last 14 days.       I/O last 3 completed shifts:  In: 550 (5.4 mL/kg) [P.O.:400; IV Piggyback:150]  Out: - (0 mL/kg)   Weight: 102 kg   I/O during current shift:  I/O this shift:  In: 240 [P.O.:240]  Out: 0     Temp (24hrs), Av.7 °C (98 °F), Min:36.4 °C (97.5 °F), Max:37.1 °C (98.8 °F)      Assessment/Plan    Within goal AUC range. Continue current vancomycin regimen.    This dosing regimen is predicted by [employer] to result in the following pharmacokinetic parameters:  Loading dose: N/A  Regimen: 1500 mg IV every 12 hours.  Start time: 10:55 on 2024  Exposure target: AUC24 (range)400-600 mg/L.hr   AUC24,ss: 501 mg/L.hr  Probability of AUC24 > 400: 86 %  Ctrough,ss: 15.4 mg/L  Probability of Ctrough,ss > 20: 24 %  Probability of nephrotoxicity (Lodise TED ): 11 %      The next level will be obtained on  at 0500. May be obtained sooner if clinically indicated.   Will continue to monitor renal function daily while on vancomycin and order serum creatinine at least every 48 hours if not already ordered.  Follow for continued vancomycin  needs, clinical response, and signs/symptoms of toxicity.       Suzi Coats, PharmD

## 2024-08-26 NOTE — PROGRESS NOTES
08/26/24 1452   [facility] Disability Status   Are you deaf or do you have serious difficulty hearing? N   Are you blind or do you have serious difficulty seeing, even when wearing glasses? N   Because of a physical, mental, or emotional condition, do you have serious difficulty concentrating, remembering, or making decisions? (5 years old or older) N   Do you have serious difficulty walking or climbing stairs? N   Do you have serious difficulty dressing or bathing? N   Because of a physical, mental, or emotional condition, do you have serious difficulty doing errands alone such as visiting the doctor? N

## 2024-08-27 VITALS
TEMPERATURE: 97.5 F | HEART RATE: 93 BPM | WEIGHT: 224.87 LBS | HEIGHT: 66 IN | RESPIRATION RATE: 18 BRPM | OXYGEN SATURATION: 98 % | DIASTOLIC BLOOD PRESSURE: 94 MMHG | SYSTOLIC BLOOD PRESSURE: 153 MMHG | BODY MASS INDEX: 36.14 KG/M2

## 2024-08-27 LAB
ANION GAP SERPL CALC-SCNC: 12 MMOL/L (ref 10–20)
BUN SERPL-MCNC: 19 MG/DL (ref 6–23)
CALCIUM SERPL-MCNC: 8.6 MG/DL (ref 8.6–10.3)
CHLORIDE SERPL-SCNC: 104 MMOL/L (ref 98–107)
CO2 SERPL-SCNC: 24 MMOL/L (ref 21–32)
CREAT SERPL-MCNC: 0.57 MG/DL (ref 0.5–1.05)
EGFRCR SERPLBLD CKD-EPI 2021: >90 ML/MIN/1.73M*2
ERYTHROCYTE [DISTWIDTH] IN BLOOD BY AUTOMATED COUNT: 15.3 % (ref 11.5–14.5)
GLUCOSE SERPL-MCNC: 125 MG/DL (ref 74–99)
HCT VFR BLD AUTO: 33 % (ref 36–46)
HGB BLD-MCNC: 10.4 G/DL (ref 12–16)
MCH RBC QN AUTO: 29.6 PG (ref 26–34)
MCHC RBC AUTO-ENTMCNC: 31.5 G/DL (ref 32–36)
MCV RBC AUTO: 94 FL (ref 80–100)
NRBC BLD-RTO: 0 /100 WBCS (ref 0–0)
PLATELET # BLD AUTO: 341 X10*3/UL (ref 150–450)
POTASSIUM SERPL-SCNC: 4.3 MMOL/L (ref 3.5–5.3)
RBC # BLD AUTO: 3.51 X10*6/UL (ref 4–5.2)
SODIUM SERPL-SCNC: 136 MMOL/L (ref 136–145)
VANCOMYCIN SERPL-MCNC: 15.5 UG/ML (ref 5–20)
WBC # BLD AUTO: 10.7 X10*3/UL (ref 4.4–11.3)

## 2024-08-27 PROCEDURE — 94640 AIRWAY INHALATION TREATMENT: CPT

## 2024-08-27 PROCEDURE — 2500000004 HC RX 250 GENERAL PHARMACY W/ HCPCS (ALT 636 FOR OP/ED): Performed by: INTERNAL MEDICINE

## 2024-08-27 PROCEDURE — 80202 ASSAY OF VANCOMYCIN: CPT

## 2024-08-27 PROCEDURE — 99239 HOSP IP/OBS DSCHRG MGMT >30: CPT | Performed by: STUDENT IN AN ORGANIZED HEALTH CARE EDUCATION/TRAINING PROGRAM

## 2024-08-27 PROCEDURE — 85027 COMPLETE CBC AUTOMATED: CPT | Performed by: INTERNAL MEDICINE

## 2024-08-27 PROCEDURE — 2500000001 HC RX 250 WO HCPCS SELF ADMINISTERED DRUGS (ALT 637 FOR MEDICARE OP): Performed by: INTERNAL MEDICINE

## 2024-08-27 PROCEDURE — 82374 ASSAY BLOOD CARBON DIOXIDE: CPT | Performed by: INTERNAL MEDICINE

## 2024-08-27 PROCEDURE — 36415 COLL VENOUS BLD VENIPUNCTURE: CPT | Performed by: INTERNAL MEDICINE

## 2024-08-27 PROCEDURE — 2500000001 HC RX 250 WO HCPCS SELF ADMINISTERED DRUGS (ALT 637 FOR MEDICARE OP): Performed by: PHARMACIST

## 2024-08-27 PROCEDURE — 2500000002 HC RX 250 W HCPCS SELF ADMINISTERED DRUGS (ALT 637 FOR MEDICARE OP, ALT 636 FOR OP/ED): Performed by: INTERNAL MEDICINE

## 2024-08-27 RX ORDER — AMOXICILLIN AND CLAVULANATE POTASSIUM 875; 125 MG/1; MG/1
1 TABLET, FILM COATED ORAL EVERY 12 HOURS SCHEDULED
Status: DISCONTINUED | OUTPATIENT
Start: 2024-08-27 | End: 2024-08-27 | Stop reason: HOSPADM

## 2024-08-27 RX ORDER — LEFLUNOMIDE 10 MG/1
10 TABLET ORAL DAILY
Start: 2024-08-27

## 2024-08-27 RX ORDER — AMOXICILLIN AND CLAVULANATE POTASSIUM 875; 125 MG/1; MG/1
1 TABLET, FILM COATED ORAL 2 TIMES DAILY
Qty: 14 TABLET | Refills: 0 | Status: SHIPPED | OUTPATIENT
Start: 2024-08-27 | End: 2024-08-27

## 2024-08-27 RX ORDER — AMOXICILLIN AND CLAVULANATE POTASSIUM 875; 125 MG/1; MG/1
1 TABLET, FILM COATED ORAL 2 TIMES DAILY
Qty: 14 TABLET | Refills: 0 | Status: SHIPPED | OUTPATIENT
Start: 2024-08-27 | End: 2024-09-03

## 2024-08-27 RX ADMIN — PREGABALIN 75 MG: 75 CAPSULE ORAL at 15:20

## 2024-08-27 RX ADMIN — TIOTROPIUM BROMIDE INHALATION SPRAY 2 PUFF: 3.12 SPRAY, METERED RESPIRATORY (INHALATION) at 08:23

## 2024-08-27 RX ADMIN — PIPERACILLIN SODIUM AND TAZOBACTAM SODIUM 3.38 G: 3; .375 INJECTION, SOLUTION INTRAVENOUS at 15:20

## 2024-08-27 RX ADMIN — LEFLUNOMIDE 10 MG: 10 TABLET ORAL at 10:28

## 2024-08-27 RX ADMIN — EPLERENONE 25 MG: 25 TABLET, FILM COATED ORAL at 10:20

## 2024-08-27 RX ADMIN — BUPROPION HYDROCHLORIDE 450 MG: 150 TABLET, EXTENDED RELEASE ORAL at 10:20

## 2024-08-27 RX ADMIN — AMLODIPINE BESYLATE 2.5 MG: 5 TABLET ORAL at 10:20

## 2024-08-27 RX ADMIN — DULOXETINE HYDROCHLORIDE 60 MG: 30 CAPSULE, DELAYED RELEASE ORAL at 10:20

## 2024-08-27 RX ADMIN — PREGABALIN 75 MG: 75 CAPSULE ORAL at 10:20

## 2024-08-27 RX ADMIN — ALLOPURINOL 400 MG: 100 TABLET ORAL at 10:20

## 2024-08-27 RX ADMIN — Medication 1 CAPSULE: at 10:21

## 2024-08-27 RX ADMIN — CARBIDOPA, LEVODOPA AND ENTACAPONE 1 TABLET: 50; 200; 200 TABLET, FILM COATED ORAL at 10:22

## 2024-08-27 RX ADMIN — BUDESONIDE 0.5 MG: 0.5 INHALANT RESPIRATORY (INHALATION) at 08:11

## 2024-08-27 RX ADMIN — METOPROLOL SUCCINATE 50 MG: 50 TABLET, EXTENDED RELEASE ORAL at 10:20

## 2024-08-27 RX ADMIN — PIPERACILLIN SODIUM AND TAZOBACTAM SODIUM 3.38 G: 3; .375 INJECTION, SOLUTION INTRAVENOUS at 10:20

## 2024-08-27 RX ADMIN — METHYLPREDNISOLONE SODIUM SUCCINATE 40 MG: 40 INJECTION, POWDER, FOR SOLUTION INTRAMUSCULAR; INTRAVENOUS at 08:52

## 2024-08-27 RX ADMIN — PIOGLITAZONE 15 MG: 15 TABLET ORAL at 10:29

## 2024-08-27 RX ADMIN — TAPENTADOL HYDROCHLORIDE 50 MG: 50 TABLET, FILM COATED ORAL at 12:49

## 2024-08-27 RX ADMIN — CARBIDOPA, LEVODOPA AND ENTACAPONE 1 TABLET: 50; 200; 200 TABLET, FILM COATED ORAL at 15:20

## 2024-08-27 RX ADMIN — FORMOTEROL FUMARATE DIHYDRATE 20 MCG: 20 SOLUTION RESPIRATORY (INHALATION) at 08:11

## 2024-08-27 RX ADMIN — ICOSAPENT ETHYL 2 G: 1 CAPSULE ORAL at 10:28

## 2024-08-27 RX ADMIN — ORPHENADRINE CITRATE 100 MG: 100 TABLET, EXTENDED RELEASE ORAL at 11:21

## 2024-08-27 RX ADMIN — PANTOPRAZOLE SODIUM 40 MG: 40 TABLET, DELAYED RELEASE ORAL at 06:30

## 2024-08-27 RX ADMIN — PIPERACILLIN SODIUM AND TAZOBACTAM SODIUM 3.38 G: 3; .375 INJECTION, SOLUTION INTRAVENOUS at 03:20

## 2024-08-27 RX ADMIN — CARBIDOPA, LEVODOPA AND ENTACAPONE 1 TABLET: 50; 200; 200 TABLET, FILM COATED ORAL at 06:30

## 2024-08-27 ASSESSMENT — COGNITIVE AND FUNCTIONAL STATUS - GENERAL
DAILY ACTIVITIY SCORE: 24
MOBILITY SCORE: 24

## 2024-08-27 ASSESSMENT — PAIN - FUNCTIONAL ASSESSMENT
PAIN_FUNCTIONAL_ASSESSMENT: 0-10
PAIN_FUNCTIONAL_ASSESSMENT: 0-10

## 2024-08-27 ASSESSMENT — PAIN SCALES - GENERAL
PAINLEVEL_OUTOF10: 0 - NO PAIN
PAINLEVEL_OUTOF10: 2
PAINLEVEL_OUTOF10: 4

## 2024-08-27 ASSESSMENT — ENCOUNTER SYMPTOMS
ABDOMINAL DISTENTION: 0
VOMITING: 0
ABDOMINAL PAIN: 0
FEVER: 0
SHORTNESS OF BREATH: 0

## 2024-08-27 ASSESSMENT — PAIN DESCRIPTION - DESCRIPTORS: DESCRIPTORS: THROBBING

## 2024-08-27 NOTE — PROGRESS NOTES
08/27/24 1551   Current Planned Discharge Disposition   Current Planned Discharge Disposition Home

## 2024-08-27 NOTE — PROGRESS NOTES
Nichelle Izaguirre is a 44 y.o. female on day 2 of admission presenting with Facial swelling.      Subjective   Patient was seen and examined bedside this morning, stated that her eye continue to improve, able to open it much more, admits to periocular pain, he denies having any other pain at this time.       Objective     Last Recorded Vitals  /74 (BP Location: Left arm, Patient Position: Sitting)   Pulse 85   Temp 36.4 °C (97.5 °F) (Oral)   Resp 18   Wt 102 kg (224 lb 13.9 oz)   SpO2 96%   Intake/Output last 3 Shifts:    Intake/Output Summary (Last 24 hours) at 8/26/2024 2341  Last data filed at 8/26/2024 2152  Gross per 24 hour   Intake 1180 ml   Output 0 ml   Net 1180 ml       Admission Weight  Weight: 103 kg (227 lb) (08/24/24 1230)    Daily Weight  08/24/24 : 102 kg (224 lb 13.9 oz)    Image Results  CT orbit w IV contrast  Narrative: Interpreted By:  Zhen Dolan,   STUDY:  CT ORBIT W IV CONTRAST;  8/24/2024 3:20 pm      INDICATION:  Signs/Symptoms:eye swelling right.      COMPARISON:  Orbit CT 09/18/2019      ACCESSION NUMBER(S):  [id number]      ORDERING CLINICIAN:  ROXANNE MC      TECHNIQUE:  Axial CT images of orbits with coronal and sagittal reformatted  images status post intravenous administration of iodinated contrast.  3D reconstructions were not performed at time of dictation therefore  not used during interpretation.      FINDINGS:  FACIAL BONES: No acute facial bone fracture. The bony orbits are  intact.      ORBITS: There appears to be some thickening of the right conjunctiva.  The extraocular muscles, and optic nerve sheath complexes are intact.  No retrobulbar hematoma. The superior ophthalmic veins are patent. No  proptosis.      SOFT TISSUES: There is thickening and inflammation of the right  preseptal soft tissues involving both the superior and inferior leads  and periorbital soft tissues. There is no soft tissue gas. There is  no abscess.      PARANASAL SINUSES: No  hemorrhage or fluid levels in the paranasal  sinuses.      MASTOIDS: Well-aerated.      INTRACRANIAL STRUCTURES: The partially visualized intracranial  structures show no acute abnormality.      OTHER: None.      Impression: Right preseptal cellulitis without postseptal orbital cellulitis.  Thickened appearance of the right-sided conjunctiva is likely  reactive or due to concomitant conjunctivitis.          MACRO:  None.      Signed by: Zhen Dolan 8/24/2024 4:59 PM  Dictation workstation:   [id number]      Physical Exam  Constitutional: Alert active, cooperative not in acute distress  Skin: Erythematous right eye with puffy eyelids, but able to open right eye, maculopapular rash on the face and upper extremities and under the breasts  Eyes: Edematous and erythematous right eye with bilateral puffy eyelids, pupils reactive to light, conjunctivitis of the right eye  ENMT: Moist mucosal membranes, no exudate  Head / Neck: Atraumatic, normocephalic, supple neck, JVP not visualized  Lungs: Patent airways, CTABL  Heart: RRR, S1S2, no murmurs appreciated, palpable pulses in all extremities  GI: Soft, NT, ND, bowel sounds present in all quadrants  MSK: Moves all extremities freely, no restriction  of ROM, no joint edema  Extremities: Maculopapular rash on the upper extremities and under the breasts, no peripheral edema  : No Marquez catheter inserted  Breast: Deferred  Neurological: AAO x 3 to person, place and date, facial muscles symmetrical, sensation intact, strength 4/4, no acute focal neurological deficits appreciated  Psychological: Appropriate mood and behavior  Relevant Results        Relevant Results           Scheduled medications  allopurinol, 400 mg, oral, Daily  amLODIPine, 2.5 mg, oral, Daily  budesonide, 0.5 mg, nebulization, BID  buPROPion XL, 450 mg, oral, Daily  carbidopa-levodopa-entacapone, 1 tablet, oral, q4h  [Held by provider] carvedilol, 12.5 mg, oral, BID  cloNIDine, 0.2 mg, oral,  Nightly  doxepin, 50 mg, oral, Nightly  DULoxetine, 60 mg, oral, Daily  enoxaparin, 40 mg, subcutaneous, q24h  eplerenone, 25 mg, oral, Daily  ergocalciferol, 50,000 Units, oral, Every Sunday  ezetimibe, 10 mg, oral, Nightly  formoterol, 20 mcg, nebulization, BID  icosapent ethyL, 2 g, oral, BID  lactobacillus acidophilus, 1 capsule, oral, Daily  leflunomide, 10 mg, oral, Daily  methylPREDNISolone sodium succinate (PF), 40 mg, intravenous, q12h  [START ON 8/27/2024] metoprolol succinate XL, 50 mg, oral, Daily  pantoprazole, 40 mg, oral, Daily before breakfast   Or  pantoprazole, 40 mg, intravenous, Daily before breakfast  pioglitazone, 15 mg, oral, Daily  piperacillin-tazobactam, 3.375 g, intravenous, q6h  pregabalin, 75 mg, oral, TID  sennosides-docusate sodium, 2 tablet, oral, BID  tiotropium, 2 puff, inhalation, Daily  vancomycin, 1,500 mg, intravenous, q12h      Continuous medications     PRN medications  PRN medications: acetaminophen **OR** acetaminophen **OR** acetaminophen, ALPRAZolam, diphenhydrAMINE, ibuprofen, ipratropium-albuteroL, ondansetron **OR** ondansetron, orphenadrine, SUMAtriptan, tapentadol, vancomycin      Assessment/Plan      44 y.o. female with past medical history of psoriatic arthritis, gout, asthma, Parkinsonism/Dystonia, Immunodeficiency disorder (MBL) on prophylaxis antibiotics with amoxicillin who is presenting to [facility] ED with complaint of facial swelling and redness     Right eye cellulitis: In the setting of possible exposure to environmental allergen  -Experience prodrome of allergic dermatitis of unknown etiology possible oak poison  -Status post 2 days of oral Levaquin from ophthalmologist  -CT scan shows periorbital cellulitis without orbital involvement  -Levaquin 750 mg IV piggyback x 1 in the ED  -Continue Levaquin 750 mg IV piggyback daily  -Received Solu-Medrol 125 mg x 1  -Solu-Medrol 40 mg IV push twice daily  -Benadryl 25 mg IV push every 6 hours as  needed dermatitis  -ID consulted: Switch Levaquin to Zosyn, appreciate evaluation recommendations     Hypertension  -Continue home regimen     Parkinsonism/dystonia  -Continue home regimen     Diet  -Regular     DVT prophylaxis  -Lovenox 40 mg subcu daily     Disposition: Presenting with preseptal cellulitis of the right eye with conjunctivitis, need further management, discharge pending clinical improvement.                    Branden June, DO

## 2024-08-27 NOTE — CARE PLAN
Problem: Pain - Adult  Goal: Verbalizes/displays adequate comfort level or baseline comfort level  Outcome: Progressing     Problem: Safety - Adult  Goal: Free from fall injury  Outcome: Progressing     Problem: Chronic Conditions and Co-morbidities  Goal: Patient's chronic conditions and co-morbidity symptoms are monitored and maintained or improved  Outcome: Progressing   The patient's goals for the shift include      The clinical goals for the shift include remain HDS

## 2024-08-27 NOTE — NURSING NOTE
Discharge instructions provided using teach back method. Pt's health related  risk factors discussed with pt. pt educated to look for any worsening sign and symptoms. Pt educated to seek medical attention if experience any medical emergency. Pt aware to follow up with outpatient clinics as scheduled. Home going meds reviewed with pt. Pt verbalized understanding of disposition and discharge instructions. All questions answered to patient's satisfaction and within nursing scope of practice. Blood pressure 174/100.  Bedside nurse made aware of vitals and to reach out to md.  Pt also to receive last dose of IV antibiotics at 1500. IV to be removed after dose by bedside nurse.    
Patient requests all Lab, Cardiology, and Radiology Results on their Discharge Instructions

## 2024-08-27 NOTE — CARE PLAN
Problem: Pain - Adult  Goal: Verbalizes/displays adequate comfort level or baseline comfort level  Outcome: Adequate for Discharge     Problem: Safety - Adult  Goal: Free from fall injury  Outcome: Adequate for Discharge     Problem: Discharge Planning  Goal: Discharge to home or other facility with appropriate resources  Outcome: Adequate for Discharge     Problem: Chronic Conditions and Co-morbidities  Goal: Patient's chronic conditions and co-morbidity symptoms are monitored and maintained or improved  Outcome: Adequate for Discharge

## 2024-08-27 NOTE — DISCHARGE SUMMARY
[facility] Hospitalist Discharge Summary        Nichelle Valdez: 1980MRN: [id number]    ADMIT DATE: ISCHARGE DATE: 2024    PRIMARYCARE PHYSICIAN: DEBBY Yadav    VISIT STATUS: Inpatient    CODE STATUS: Full Code    DISCHARGE DIAGNOSES:    Principal Problem:    Facial swelling      CONSULTANTS:  ID    HOSPITAL COURSE:    Admitted for R sided preseptal cellulitis (immunocompromised patient). Initially treated w/ IV vanc/zosyn w/ good clinical improvement. ID rec 7D course of Augmentin on DC. She will follow up with her outpatient providers.    DAY OF DISCHARGE:  Review of Systems   Constitutional:  Negative for fever.   Respiratory:  Negative for shortness of breath.    Cardiovascular:  Negative for chest pain.   Gastrointestinal:  Negative for abdominal distention, abdominal pain and vomiting.       Patient Vitals for the past 24 hrs:   BP Temp Temp src Pulse Resp SpO2   24 1125 142/77 37 °C (98.6 °F) Temporal -- 18 94 %   24 0823 -- -- -- -- -- 98 %   24 0811 -- -- -- -- -- 97 %   24 0808 (!) 154/101 36.6 °C (97.9 °F) Temporal 80 18 98 %   24 0328 138/88 36.4 °C (97.5 °F) Oral 75 18 98 %   24 2326 135/74 36.4 °C (97.5 °F) Oral 85 18 96 %   24 2138 -- -- -- -- -- 96 %   24 1935 155/85 36.8 °C (98.2 °F) Oral 101 20 96 %   24 1746 154/90 36.6 °C (97.9 °F) -- 88 22 98 %       Average, Min, and Max forlast 24 hours Vitals:  TEMPERATURE: Temp  Av.6 °C (97.9 °F)  Min: 36.4 °C (97.5 °F)  Max: 37 °C (98.6 °F)    RESPIRATIONS RANGE: Resp  Av  Min: 18  Max: 22    PULSE RANGE: Pulse  Av.8  Min: 75  Max: 101    BLOOD PRESSURE RANGE: Systolic (24hrs), Av , Min:135 , Max:155   ; Diastolic (24hrs), Av, Min:74, Max:101      PULSE OXIMETRYRANGE: SpO2  Av.8 %  Min: 94 %  Max: 98 %    I/O last 3 completed shifts:  In: 1680 (16.5 mL/kg) [P.O.:880; IV Piggyback:800]  Out: 0 (0 mL/kg)   Weight: 102 kg     Physical  Exam  Constitutional:       General: She is not in acute distress.  Neurological:      Mental Status: She is alert.           Discharge Meds       Your medication list        START taking these medications        Instructions Last Dose Given Next Dose Due   amoxicillin-pot clavulanate 875-125 mg tablet  Commonly known as: Augmentin      Take 1 tablet by mouth 2 times a day for 7 days.       leflunomide 10 mg tablet  Commonly known as: Arava      Take 1 tablet (10 mg) by mouth once daily.              CONTINUE taking these medications        Instructions Last Dose Given Next Dose Due   allopurinol 100 mg tablet  Commonly known as: Zyloprim      TAKE ONE TABLET BY MOUTH EVERY DAY       allopurinol 300 mg tablet  Commonly known as: Zyloprim      TAKE ONE TABLET BY MOUTH EVERY DAY       ALPRAZolam 0.5 mg tablet  Commonly known as: Xanax      Take 1 tablet (0.5 mg) by mouth 3 times a day as needed for sleep or anxiety.       amLODIPine 2.5 mg tablet  Commonly known as: Norvasc           amoxicillin 500 mg capsule  Commonly known as: Amoxil           ascorbic acid (vitamin C) 500 mg capsule           benzoyl peroxide 10 % external wash  Commonly known as: Benzac AC           Breo Ellipta 200-25 mcg/dose inhaler  Generic drug: fluticasone furoate-vilanteroL           buPROPion  mg 24 hr tablet  Commonly known as: Wellbutrin XL      Take 1 tablet (150 mg) by mouth once daily. Do not crush, chew, or split.       buPROPion  mg 24 hr tablet  Commonly known as: Wellbutrin XL      Take 1 tablet (300 mg) by mouth once daily in the morning. Do not crush, chew, or split.       carbidopa-levodopa-entacapone -200 mg tablet  Commonly known as: Stalevo      Take 1 tablet by mouth every 4 hours.       carvedilol 12.5 mg tablet  Commonly known as: Coreg  Start taking on: July 8, 2024      Take 1 tablet (12.5 mg) by mouth 2 times daily (morning and late afternoon) for 7 days, THEN 0.5 tablets (6.25 mg) 2 times daily  (morning and late afternoon) for 14 days.       cetirizine-pseudoephedrine 5-120 mg 12 hr tablet  Commonly known as: ZyrTEC-D      Take 1 tablet by mouth 2 times a day.       clindamycin 1 % lotion  Commonly known as: Cleocin T           clobetasol 0.05 % external solution  Commonly known as: Temovate           clobetasoL 0.05 % lotion      One application as needed for flaring only.not for daily use. For scalp and hands only       cloNIDine 0.1 mg tablet  Commonly known as: Catapres           cloNIDine 0.2 mg tablet  Commonly known as: Catapres      Take 1 tablet (0.2 mg) by mouth once daily at bedtime.       Cosentyx Pen (2 Pens) 150 mg/mL self-injector pen  Generic drug: secukinumab      Inject 2 mL (300 mg) under the skin every 30 (thirty) days.       cyanocobalamin (vitamin B-12) 1,000 mcg/mL kit      Inject 1,000 mcg as directed every 28 (twenty-eight) days.       Devilbiss Disposable Nebulizer misc  Generic drug: nebulizers           diclofenac sodium 1 % gel  Commonly known as: Voltaren           doxepin 50 mg capsule  Commonly known as: SINEquan      Take 1 capsule (50 mg) by mouth once daily at bedtime.       doxycycline 100 mg capsule  Commonly known as: Vibramycin           DULoxetine 60 mg DR capsule  Commonly known as: Cymbalta      Take 1 capsule (60 mg) by mouth once daily.       eplerenone 25 mg tablet  Commonly known as: Inspra           ergocalciferol 1.25 MG (11240 UT) capsule  Commonly known as: Vitamin D-2      Take 1 capsule (50,000 Units) by mouth 1 (one) time per week.       ezetimibe 10 mg tablet  Commonly known as: Zetia      Take 1 tablet (10 mg) by mouth once daily.       ipratropium-albuteroL 0.5-2.5 mg/3 mL nebulizer solution  Commonly known as: Duo-Neb           ketoconazole 2 % cream  Commonly known as: NIZOral           lactobacillus acidophilus tablet tablet      Take 1 tablet by mouth once daily.       levalbuterol 1.25 mg/3 mL nebulizer solution  Commonly known as: Xopenex            metoprolol succinate XL 25 mg 24 hr tablet  Commonly known as: Toprol-XL           metroNIDAZOLE 0.75 % cream  Commonly known as: Metrocream           montelukast 10 mg tablet  Commonly known as: Singulair           nebulizer and compressor device           omega-3 acid ethyl esters 1 gram capsule  Commonly known as: Lovaza      Take 2 capsules (2 g) by mouth 2 times a day.       omeprazole 40 mg DR capsule  Commonly known as: PriLOSEC      Take 1 capsule (40 mg) by mouth once daily in the morning before meals.       ondansetron ODT 4 mg disintegrating tablet  Commonly known as: Zofran-ODT      Take 1 tablet (4 mg) by mouth every 8 hours if needed for nausea or vomiting.       orphenadrine 100 mg 12 hr tablet  Commonly known as: Norflex      Take 1 tablet (100 mg) by mouth 2 times a day as needed for muscle spasms.       pioglitazone 15 mg tablet  Commonly known as: Actos           pregabalin 75 mg capsule  Commonly known as: Lyrica      Take 1 capsule (75 mg) by mouth 3 times a day.       albuterol 2.5 mg /3 mL (0.083 %) nebulizer solution           ProAir HFA 90 mcg/actuation inhaler  Generic drug: albuterol           Repatha SureClick 140 mg/mL injection  Generic drug: evolocumab      Inject 1 mL (140 mg) under the skin every 14 (fourteen) days.       Spiriva Respimat 1.25 mcg/actuation inhaler  Generic drug: tiotropium           SUMAtriptan 25 mg tablet  Commonly known as: Imitrex      Take 1 tablet (25 mg) by mouth 1 time if needed for migraine. May repeat in 2 hours if no improvement. Max 200 mg/24 hr       tapentadol 50 mg tablet  Commonly known as: Nucynta      Take 1 tablet (50 mg) by mouth every 8 hours if needed for severe pain (7 - 10).       Tezspire SubQ Pen Injector  Generic drug: tezepelumab-ekko                  STOP taking these medications      meloxicam 15 mg tablet  Commonly known as: Mobic        olmesartan 40 mg tablet  Commonly known as: BENIcar                  Where to Get Your  Medications        These medications were sent to [facility]  [address], [address], [facility] [id number]      Hours: 8 AM to 6 PM Mon-Fri, 9 AM to 1 PM Saturday Phone: [phone]   amoxicillin-pot clavulanate 875-125 mg tablet       Information about where to get these medications is not yet available    Ask your nurse or doctor about these medications  leflunomide 10 mg tablet           FOLLOW UP TESTING, PENDING RESULTS OR REFERRALS AT FOLLOW UP VISIT:   [X] Yes as outlined above   [ ] No    DISPOSITION: Home    FACILITY NAME:     Follow up with PCP DEBBY Yadav    Outpatient Follow-Up Appointments  Future Appointments   Date Time Provider Department Center   9/19/2024  9:30 AM PHARMACY [facility] [id number] [facility]   9/26/2024  2:00 PM Jana Sigala PA-C [id number] [facility]   10/22/2024 10:10 AM Mirtha Hilton DO [id number] [place]   10/31/2024  8:00 AM Leticia Ribeiro, PhD [id number] [place]   10/31/2024 11:00 AM Akosua Richardson MD [id number] [facility]   11/5/2024 10:00 AM DANIA McdermottCNP, [facility] the patient and reviewed chart.  Plan of care was discussed with patient, all questions answered.    DISCHARGE TIME: > 30 minutes providing counseling or in coordination of care. Total time on this day of visit includes record and documentation review before and after visit including documentation and time not explicitly included on EMR time stamp.    Cayla Ferraro MD  [facility] Medicine

## 2024-08-27 NOTE — PROGRESS NOTES
"Nichelle Izaguirre is a 44 y.o. female on day 3 of admission presenting with Facial swelling.    Infectious Diseases service following patient for facial cellulitis     Subjective   Interval History:   No acute events overnight  Afebrile  Right facial swelling and pain continue to improve     Objective   Range of Vitals (last 24 hours)  BP (!) 154/101 (BP Location: Left arm, Patient Position: Lying)   Pulse 80   Temp 36.6 °C (97.9 °F) (Temporal)   Resp 18   Ht 1.676 m (5' 5.98\")   Wt 102 kg (224 lb 13.9 oz)   SpO2 98%   BMI 36.31 kg/m²   Daily Weight  08/24/24 : 102 kg (224 lb 13.9 oz)    Body mass index is 36.31 kg/m².  General: awake, alert, no distress  Cardio: RRR, no murmur  Respiratory: Lungs clear to auscultation bilaterally, normal respiratory effort  Abd: soft, nontender, nondistended  HEENT: mild swelling and erythema right face and forehead       Current Facility-Administered Medications:     acetaminophen (Tylenol) tablet 650 mg, 650 mg, oral, q4h PRN, 650 mg at 08/26/24 1150 **OR** acetaminophen (Tylenol) oral liquid 650 mg, 650 mg, nasogastric tube, q4h PRN **OR** acetaminophen (Tylenol) suppository 650 mg, 650 mg, rectal, q4h PRN, Branden Goudiaby, DO    allopurinol (Zyloprim) tablet 400 mg, 400 mg, oral, Daily, Merly ShawD, 400 mg at 08/26/24 1040    ALPRAZolam (Xanax) tablet 0.5 mg, 0.5 mg, oral, TID PRN, Branden Goudiaby, DO, 0.5 mg at 08/26/24 2122    amLODIPine (Norvasc) tablet 2.5 mg, 2.5 mg, oral, Daily, Branden Goudiaby, DO, 2.5 mg at 08/26/24 0900    amoxicillin-pot clavulanate (Augmentin) 875-125 mg per tablet 1 tablet, 1 tablet, oral, [facility]12h [facility]    budesonide (Pulmicort) 0.5 mg/2 mL nebulizer solution 0.5 mg, 0.5 mg, nebulization, BID, Branden June DO, 0.5 mg at 08/27/24 0811    buPROPion XL (Wellbutrin XL) 24 hr tablet 450 mg, 450 mg, oral, Daily, Alyce Avendano PharmD, 450 mg at 08/26/24 1039    carbidopa-levodopa-entacapone " (Stalevo) -200 mg per tablet 1 tablet - PATIENT OWN MEDICATION, 1 tablet, oral, q4h, [facility] Goudiaby, DO, 1 tablet at 08/27/24 0630    [Held by provider] carvedilol (Coreg) tablet 12.5 mg, 12.5 mg, oral, BID, Branden Goudiaby, DO, 12.5 mg at 08/26/24 1039    cloNIDine (Catapres) tablet 0.2 mg, 0.2 mg, oral, Nightly, Branden Goudiaby, DO, 0.2 mg at 08/26/24 2122    diphenhydrAMINE (BENADryl) injection 25 mg, 25 mg, intravenous, q6h PRN, [facility] Goudiaby, DO    doxepin (SINEquan) capsule 50 mg, 50 mg, oral, Nightly, Branden Goudiaby, DO, 50 mg at 08/26/24 2135    DULoxetine (Cymbalta) DR capsule 60 mg, 60 mg, oral, Daily, [facility] Goudiaby, DO, 60 mg at 08/26/24 1040    enoxaparin (Lovenox) syringe 40 mg, 40 mg, subcutaneous, q24h, [facility] Goudiaby, DO, 40 mg at 08/26/24 2135    eplerenone (Inspra) tablet 25 mg, 25 mg, oral, Daily, Branden Goudiaby, DO, 25 mg at 08/26/24 1039    ergocalciferol (Vitamin D-2) capsule 50,000 Units, 50,000 Units, oral, Every Sunday, [facility] Goudiaby, DO, 50,000 Units at 08/25/24 0927    ezetimibe (Zetia) tablet 10 mg, 10 mg, oral, Nightly, Branden Goudiaby, DO, 10 mg at 08/26/24 2122    formoterol (Perforomist) 20 mcg/2 mL nebulizer solution 20 mcg, 20 mcg, nebulization, BID, Branden Goudiaby, DO, 20 mcg at 08/27/24 0811    ibuprofen tablet 600 mg, 600 mg, oral, q6h PRN, Branden Goudiaby, DO, 600 mg at 08/26/24 1601    icosapent ethyL (Vascepa) capsule 2 g, 2 g, oral, BID, Branden June DO, 2 g at 08/26/24 1559    ipratropium-albuteroL (Duo-Neb) 0.5-2.5 mg/3 mL nebulizer solution 3 mL, 3 mL, nebulization, q2h PRN, Kenna Contreras MD    lactobacillus acidophilus capsule 1 capsule, 1 capsule, oral, Daily, Merly ShawD, 1 capsule at 08/26/24 0900    leflunomide (Arava) tablet 10 mg, 10 mg, oral, Daily, Branden June DO, 10 mg at 08/26/24 1559    methylPREDNISolone sod succinate (SOLU-Medrol) 40 mg/mL injection 40 mg, 40 mg, intravenous, q12h,  Branden Goudiaby, DO, 40 mg at 08/27/24 0852    metoprolol succinate XL (Toprol-XL) 24 hr tablet 50 mg, 50 mg, oral, Daily, Branden Goudiaby, DO    ondansetron (Zofran) tablet 4 mg, 4 mg, oral, q8h PRN **OR** ondansetron (Zofran) injection 4 mg, 4 mg, intravenous, q8h PRN, [facility] Goudiaby, DO, 4 mg at 08/25/24 1655    orphenadrine (Norflex) 12 hr tablet 100 mg, 100 mg, oral, BID PRN, [facility] Goudiaby, DO, 100 mg at 08/26/24 2339    pantoprazole (ProtoNix) EC tablet 40 mg, 40 mg, oral, Daily before breakfast, 40 mg at 08/27/24 0630 **OR** pantoprazole (ProtoNix) injection 40 mg, 40 mg, intravenous, Daily before breakfast, [facility] Goudiaby, DO    pioglitazone (Actos) tablet 15 mg, 15 mg, oral, Daily, Branden Goudiaby, DO, 15 mg at 08/26/24 1052    piperacillin-tazobactam (Zosyn) 3.375 g in dextrose (iso) IV 50 mL, 3.375 g, intravenous, q6h, Darcy Mandujano MD MPH, Stopped at 08/27/24 0350    pregabalin (Lyrica) capsule 75 mg, 75 mg, oral, TID, [facility] Goudiaby, DO, 75 mg at 08/26/24 2122    sennosides-docusate sodium (Irene-Colace) 8.6-50 mg per tablet 2 tablet, 2 tablet, oral, BID, [facility] Goudiaby, DO, 2 tablet at 08/26/24 2122    SUMAtriptan (Imitrex) tablet 25 mg, 25 mg, oral, Once PRN, [facility] Goudiaby, DO    tapentadol (Nucynta) tablet 50 mg - PATIENT OWN MEDICATION, 50 mg, oral, q8h PRN, [facility] Goudiaby, DO, 50 mg at 08/26/24 1814    tiotropium (Spiriva Respimat) 2.5 mcg/actuation inhaler 2 puff, 2 puff, inhalation, Daily, Branden Maniudiaby, DO, 2 puff at 08/27/24 0823    Relevant Results  Labs:  Lab Results   Component Value Date    WBC 10.7 08/27/2024    HGB 10.4 (L) 08/27/2024    HCT 33.0 (L) 08/27/2024    MCV 94 08/27/2024     08/27/2024     Lab Results   Component Value Date    GLUCOSE 125 (H) 08/27/2024    CALCIUM 8.6 08/27/2024     08/27/2024    K 4.3 08/27/2024    CO2 24 08/27/2024     08/27/2024    BUN 19 08/27/2024    CREATININE 0.57 08/27/2024     Results from last 72  hours   Lab Units 24  1333   ALK PHOS U/L 64   BILIRUBIN TOTAL mg/dL 0.3   PROTEIN TOTAL g/dL 6.7   ALT U/L 16   AST U/L 18   ALBUMIN g/dL 3.8       Micro:   blood cultures x 2 negative to date   blood culture x 1 negative to date   MRSA screen pending    Imagin/24 CT orbit:  Right preseptal cellulitis without postseptal orbital cellulitis.  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 Please reconsult PRN    Darcy Mandujano MD  [facility]  Phone: [phone]  Fax: [phone]

## 2024-08-27 NOTE — PROGRESS NOTES
Vancomycin Dosing by Pharmacy- FOLLOW UP    Nichelle Izaguirre is a 44 y.o. year old female who Pharmacy has been consulted for vancomycin dosing for cellulitis, skin and soft tissue. Based on the patient's indication and renal status this patient is being dosed based on a goal AUC of 400-600.     Renal function is currently stable.    Current vancomycin dose: 1500 mg given every 12 hours    Most recent random level: 15.5 mcg/mL    Visit Vitals  /88 (BP Location: Left arm, Patient Position: Lying)   Pulse 75   Temp 36.4 °C (97.5 °F) (Oral)   Resp 18        Lab Results   Component Value Date    CREATININE 0.57 2024    CREATININE 0.54 2024    CREATININE 0.62 2024    CREATININE 0.61 2024        Patient weight is as follows:   Vitals:    24 2216   Weight: 102 kg (224 lb 13.9 oz)       Cultures:  No results found for the encounter in last 14 days.       I/O last 3 completed shifts:  In: 1680 (16.5 mL/kg) [P.O.:880; IV Piggyback:800]  Out: 0 (0 mL/kg)   Weight: 102 kg   I/O during current shift:  No intake/output data recorded.    Temp (24hrs), Av.6 °C (97.8 °F), Min:36.3 °C (97.3 °F), Max:36.8 °C (98.2 °F)      Assessment/Plan    Within goal AUC range. Continue current vancomycin regimen.    This dosing regimen is predicted by [employer] to result in the following pharmacokinetic parameters:  Loading dose: N/A  Regimen: 1500 mg IV every 12 hours.  Start time: 09:59 on 2024  Exposure target: AUC24 (range)400-600 mg/L.hr   AUC24,ss: 458 mg/L.hr  Probability of AUC24 > 400: 84 %  Ctrough,ss: 13.2 mg/L  Probability of Ctrough,ss > 20: 8 %  Probability of nephrotoxicity (Lodise TED ): 8 %    The next level will be obtained on  at 0500. May be obtained sooner if clinically indicated.   Will continue to monitor renal function daily while on vancomycin and order serum creatinine at least every 48 hours if not already ordered.  Follow for continued vancomycin needs, clinical  response, and signs/symptoms of toxicity.       Suzi Coats, PharmD

## 2024-08-28 ENCOUNTER — PATIENT OUTREACH (OUTPATIENT)
Dept: PRIMARY CARE | Facility: CLINIC | Age: 44
End: 2024-08-28
Payer: MEDICAID

## 2024-08-28 ENCOUNTER — DOCUMENTATION (OUTPATIENT)
Dept: PRIMARY CARE | Facility: CLINIC | Age: 44
End: 2024-08-28
Payer: MEDICAID

## 2024-08-28 ENCOUNTER — SPECIALTY PHARMACY (OUTPATIENT)
Dept: PHARMACY | Facility: CLINIC | Age: 44
End: 2024-08-28

## 2024-08-28 LAB — STAPHYLOCOCCUS SPEC CULT: ABNORMAL

## 2024-08-28 PROCEDURE — RXMED WILLOW AMBULATORY MEDICATION CHARGE

## 2024-08-28 NOTE — PROGRESS NOTES
Discharge Facility:[facility]  Discharge Diagnosis:R sided preseptal cellulitis  immunocompromised ,Facial swelling   Admission Date:8/24/24  Discharge Date: 8/27/24    PCP Appointment Date:TASK TO OFFICE  Specialist Appointment Date: N/A  Hospital Encounter and Summary Linked: Yes  See discharge assessment below for further details      Two attempts were made to reach patient within two business days after discharge. Voicemail left with contact information for patient to call back with any non-emergent questions or concerns.      Christiana Sal LPN

## 2024-08-29 LAB
BACTERIA BLD CULT: NORMAL

## 2024-09-04 ENCOUNTER — PHARMACY VISIT (OUTPATIENT)
Dept: PHARMACY | Facility: CLINIC | Age: 44
End: 2024-09-04
Payer: MEDICAID

## 2024-09-04 DIAGNOSIS — J45.909: Primary | ICD-10-CM

## 2024-09-05 DIAGNOSIS — M10.9 GOUTY ARTHROPATHY: ICD-10-CM

## 2024-09-05 RX ORDER — ALLOPURINOL 100 MG/1
100 TABLET ORAL DAILY
Qty: 90 TABLET | Refills: 0 | Status: SHIPPED | OUTPATIENT
Start: 2024-09-05

## 2024-09-09 ENCOUNTER — PATIENT OUTREACH (OUTPATIENT)
Dept: PRIMARY CARE | Facility: CLINIC | Age: 44
End: 2024-09-09
Payer: MEDICAID

## 2024-09-10 ENCOUNTER — SPECIALTY PHARMACY (OUTPATIENT)
Dept: PHARMACY | Facility: CLINIC | Age: 44
End: 2024-09-10

## 2024-09-10 ENCOUNTER — PATIENT MESSAGE (OUTPATIENT)
Dept: CARDIOLOGY | Facility: CLINIC | Age: 44
End: 2024-09-10
Payer: MEDICAID

## 2024-09-10 DIAGNOSIS — E66.01 MORBID OBESITY (MULTI): Primary | ICD-10-CM

## 2024-09-11 ENCOUNTER — PATIENT OUTREACH (OUTPATIENT)
Dept: PRIMARY CARE | Facility: CLINIC | Age: 44
End: 2024-09-11
Payer: MEDICAID

## 2024-09-19 ENCOUNTER — APPOINTMENT (OUTPATIENT)
Dept: PHARMACY | Facility: HOSPITAL | Age: 44
End: 2024-09-19
Payer: MEDICAID

## 2024-09-20 ENCOUNTER — TELEPHONE (OUTPATIENT)
Dept: SCHEDULING | Age: 44
End: 2024-09-20
Payer: COMMERCIAL

## 2024-09-23 NOTE — TELEPHONE ENCOUNTER
Spoke to patient in preparation for anesthesia for dental extractions. Since prior visit noris has had no further syncopal episodes. She had been diagnosed with  POTs by a neurologist and since then she is now using power aid and her betqa blocker was reduced and she is having far less dizziness and doing better overall. Most recent cardiac echo 2/2024 with normal LV systolic function. 24 hour BP monitor demonstrated grade 1 ( borderline control of BP). Cardiac monitor could not be worn for long due to significant skin sensitivity but no significant arrhythmias during the brief time she wore monitor in  winter.  Overall the patient is a relatively low cardiac risk for upcoming dental extractions.

## 2024-09-25 ENCOUNTER — PATIENT OUTREACH (OUTPATIENT)
Dept: PRIMARY CARE | Facility: CLINIC | Age: 44
End: 2024-09-25
Payer: COMMERCIAL

## 2024-09-26 ENCOUNTER — OFFICE VISIT (OUTPATIENT)
Dept: HEMATOLOGY/ONCOLOGY | Facility: CLINIC | Age: 44
End: 2024-09-26
Payer: COMMERCIAL

## 2024-09-26 VITALS
TEMPERATURE: 97.2 F | WEIGHT: 227.63 LBS | OXYGEN SATURATION: 97 % | RESPIRATION RATE: 16 BRPM | SYSTOLIC BLOOD PRESSURE: 153 MMHG | HEIGHT: 66 IN | DIASTOLIC BLOOD PRESSURE: 96 MMHG | BODY MASS INDEX: 36.58 KG/M2 | HEART RATE: 96 BPM

## 2024-09-26 DIAGNOSIS — D50.9 IRON DEFICIENCY ANEMIA, UNSPECIFIED IRON DEFICIENCY ANEMIA TYPE: Primary | ICD-10-CM

## 2024-09-26 DIAGNOSIS — K90.9 IDIOPATHIC STEATORRHEA (HHS-HCC): ICD-10-CM

## 2024-09-26 DIAGNOSIS — L40.50 PSORIATIC ARTHROPATHY (MULTI): ICD-10-CM

## 2024-09-26 DIAGNOSIS — D50.8 IRON DEFICIENCY ANEMIA SECONDARY TO INADEQUATE DIETARY IRON INTAKE: ICD-10-CM

## 2024-09-26 DIAGNOSIS — D64.9 ANEMIA, UNSPECIFIED TYPE: ICD-10-CM

## 2024-09-26 LAB
ALBUMIN SERPL BCP-MCNC: 3.6 G/DL (ref 3.4–5)
ALP SERPL-CCNC: 56 U/L (ref 33–110)
ALT SERPL W P-5'-P-CCNC: 11 U/L (ref 7–45)
ANION GAP SERPL CALC-SCNC: 13 MMOL/L (ref 10–20)
AST SERPL W P-5'-P-CCNC: 12 U/L (ref 9–39)
BASOPHILS # BLD AUTO: 0.01 X10*3/UL (ref 0–0.1)
BASOPHILS NFR BLD AUTO: 0.1 %
BILIRUB SERPL-MCNC: 0.3 MG/DL (ref 0–1.2)
BUN SERPL-MCNC: 13 MG/DL (ref 6–23)
CALCIUM SERPL-MCNC: 8.6 MG/DL (ref 8.6–10.3)
CHLORIDE SERPL-SCNC: 105 MMOL/L (ref 98–107)
CO2 SERPL-SCNC: 24 MMOL/L (ref 21–32)
CREAT SERPL-MCNC: 0.59 MG/DL (ref 0.5–1.05)
EGFRCR SERPLBLD CKD-EPI 2021: >90 ML/MIN/1.73M*2
EOSINOPHIL # BLD AUTO: 0 X10*3/UL (ref 0–0.7)
EOSINOPHIL NFR BLD AUTO: 0 %
ERYTHROCYTE [DISTWIDTH] IN BLOOD BY AUTOMATED COUNT: 14.5 % (ref 11.5–14.5)
FERRITIN SERPL-MCNC: 20 NG/ML (ref 8–150)
FOLATE SERPL-MCNC: 10.8 NG/ML
GLUCOSE SERPL-MCNC: 94 MG/DL (ref 74–99)
HAPTOGLOB SERPL NEPH-MCNC: 124 MG/DL (ref 30–200)
HBV CORE AB SER QL: NONREACTIVE
HBV SURFACE AB SER-ACNC: <3.1 MIU/ML
HCT VFR BLD AUTO: 30.9 % (ref 36–46)
HCV AB SER QL: NONREACTIVE
HGB BLD-MCNC: 10 G/DL (ref 12–16)
HGB RETIC QN: 28 PG (ref 28–38)
IMM GRANULOCYTES # BLD AUTO: 0.02 X10*3/UL (ref 0–0.7)
IMM GRANULOCYTES NFR BLD AUTO: 0.3 % (ref 0–0.9)
IMMATURE RETIC FRACTION: 16.6 %
IRON SATN MFR SERPL: 16 % (ref 25–45)
IRON SERPL-MCNC: 74 UG/DL (ref 35–150)
LDH SERPL L TO P-CCNC: 92 U/L (ref 84–246)
LYMPHOCYTES # BLD AUTO: 1.86 X10*3/UL (ref 1.2–4.8)
LYMPHOCYTES NFR BLD AUTO: 23.5 %
MCH RBC QN AUTO: 29.5 PG (ref 26–34)
MCHC RBC AUTO-ENTMCNC: 32.4 G/DL (ref 32–36)
MCV RBC AUTO: 91 FL (ref 80–100)
MONOCYTES # BLD AUTO: 0.72 X10*3/UL (ref 0.1–1)
MONOCYTES NFR BLD AUTO: 9.1 %
NEUTROPHILS # BLD AUTO: 5.29 X10*3/UL (ref 1.2–7.7)
NEUTROPHILS NFR BLD AUTO: 67 %
NRBC BLD-RTO: ABNORMAL /100{WBCS}
PLATELET # BLD AUTO: 242 X10*3/UL (ref 150–450)
POTASSIUM SERPL-SCNC: 3.8 MMOL/L (ref 3.5–5.3)
PROT SERPL-MCNC: 6.3 G/DL (ref 6.4–8.2)
PROT SERPL-MCNC: 6.5 G/DL (ref 6.4–8.2)
RBC # BLD AUTO: 3.39 X10*6/UL (ref 4–5.2)
RETICS #: 0.06 X10*6/UL (ref 0.02–0.08)
RETICS/RBC NFR AUTO: 1.7 % (ref 0.5–2)
SODIUM SERPL-SCNC: 138 MMOL/L (ref 136–145)
TIBC SERPL-MCNC: 460 UG/DL (ref 240–445)
UIBC SERPL-MCNC: 386 UG/DL (ref 110–370)
VIT B12 SERPL-MCNC: 190 PG/ML (ref 211–911)
WBC # BLD AUTO: 7.9 X10*3/UL (ref 4.4–11.3)

## 2024-09-26 PROCEDURE — 82728 ASSAY OF FERRITIN: CPT | Performed by: PHYSICIAN ASSISTANT

## 2024-09-26 PROCEDURE — 86803 HEPATITIS C AB TEST: CPT | Mod: GEALAB | Performed by: PHYSICIAN ASSISTANT

## 2024-09-26 PROCEDURE — 83010 ASSAY OF HAPTOGLOBIN QUANT: CPT | Mod: GEALAB | Performed by: PHYSICIAN ASSISTANT

## 2024-09-26 PROCEDURE — 83540 ASSAY OF IRON: CPT | Performed by: PHYSICIAN ASSISTANT

## 2024-09-26 PROCEDURE — 99215 OFFICE O/P EST HI 40 MIN: CPT | Performed by: PHYSICIAN ASSISTANT

## 2024-09-26 PROCEDURE — 86704 HEP B CORE ANTIBODY TOTAL: CPT | Mod: GEALAB | Performed by: PHYSICIAN ASSISTANT

## 2024-09-26 PROCEDURE — 86706 HEP B SURFACE ANTIBODY: CPT | Mod: GEALAB | Performed by: PHYSICIAN ASSISTANT

## 2024-09-26 PROCEDURE — 85025 COMPLETE CBC W/AUTO DIFF WBC: CPT | Performed by: PHYSICIAN ASSISTANT

## 2024-09-26 PROCEDURE — 36415 COLL VENOUS BLD VENIPUNCTURE: CPT | Performed by: PHYSICIAN ASSISTANT

## 2024-09-26 PROCEDURE — 83615 LACTATE (LD) (LDH) ENZYME: CPT | Performed by: PHYSICIAN ASSISTANT

## 2024-09-26 PROCEDURE — 80053 COMPREHEN METABOLIC PANEL: CPT | Performed by: PHYSICIAN ASSISTANT

## 2024-09-26 PROCEDURE — 82607 VITAMIN B-12: CPT | Mod: GEALAB | Performed by: PHYSICIAN ASSISTANT

## 2024-09-26 PROCEDURE — 82784 ASSAY IGA/IGD/IGG/IGM EACH: CPT | Mod: GEALAB | Performed by: PHYSICIAN ASSISTANT

## 2024-09-26 PROCEDURE — 87340 HEPATITIS B SURFACE AG IA: CPT | Mod: GEALAB | Performed by: PHYSICIAN ASSISTANT

## 2024-09-26 PROCEDURE — 82746 ASSAY OF FOLIC ACID SERUM: CPT | Mod: GEALAB | Performed by: PHYSICIAN ASSISTANT

## 2024-09-26 PROCEDURE — 83521 IG LIGHT CHAINS FREE EACH: CPT | Mod: GEALAB | Performed by: PHYSICIAN ASSISTANT

## 2024-09-26 PROCEDURE — 85045 AUTOMATED RETICULOCYTE COUNT: CPT | Performed by: PHYSICIAN ASSISTANT

## 2024-09-26 PROCEDURE — 84155 ASSAY OF PROTEIN SERUM: CPT | Mod: GEALAB | Performed by: PHYSICIAN ASSISTANT

## 2024-09-26 RX ORDER — DIPHENHYDRAMINE HYDROCHLORIDE 50 MG/ML
50 INJECTION INTRAMUSCULAR; INTRAVENOUS AS NEEDED
OUTPATIENT
Start: 2024-10-04

## 2024-09-26 RX ORDER — ALBUTEROL SULFATE 0.83 MG/ML
3 SOLUTION RESPIRATORY (INHALATION) AS NEEDED
OUTPATIENT
Start: 2024-10-04

## 2024-09-26 RX ORDER — EPINEPHRINE 0.3 MG/.3ML
0.3 INJECTION SUBCUTANEOUS EVERY 5 MIN PRN
OUTPATIENT
Start: 2024-10-04

## 2024-09-26 RX ORDER — FAMOTIDINE 10 MG/ML
20 INJECTION INTRAVENOUS ONCE AS NEEDED
OUTPATIENT
Start: 2024-10-04

## 2024-09-26 ASSESSMENT — PAIN SCALES - GENERAL: PAINLEVEL: 0-NO PAIN

## 2024-09-27 DIAGNOSIS — K13.79 ORAL PAIN: Primary | ICD-10-CM

## 2024-09-27 DIAGNOSIS — M25.50 ARTHRALGIA OF MULTIPLE SITES: Primary | ICD-10-CM

## 2024-09-27 LAB
HBV SURFACE AG SERPL QL IA: NONREACTIVE
IGA SERPL-MCNC: 209 MG/DL (ref 70–400)
IGG SERPL-MCNC: 993 MG/DL (ref 700–1600)
IGM SERPL-MCNC: 84 MG/DL (ref 40–230)
KAPPA LC SERPL-MCNC: 1.43 MG/DL (ref 0.33–1.94)
KAPPA LC/LAMBDA SER: 1.34 {RATIO} (ref 0.26–1.65)
LAMBDA LC SERPL-MCNC: 1.07 MG/DL (ref 0.57–2.63)

## 2024-09-27 RX ORDER — MAGNESIUM 200 MG
1 TABLET ORAL DAILY
Qty: 90 TABLET | Refills: 1 | Status: SHIPPED | OUTPATIENT
Start: 2024-09-27

## 2024-09-27 RX ORDER — OXYCODONE AND ACETAMINOPHEN 5; 325 MG/1; MG/1
1 TABLET ORAL EVERY 6 HOURS PRN
Qty: 15 TABLET | Refills: 0 | Status: SHIPPED | OUTPATIENT
Start: 2024-09-27 | End: 2024-10-02

## 2024-09-27 NOTE — TELEPHONE ENCOUNTER
Pt is requesting refill of   Nucynta 50 mg tablet po BID (decreased dose per last OV note) [facility]                                                         LV:    8/5/24                  NV:  11/5/24               [place]AR[place] reviewed with [facility]:   8/16/24 #90/30 days                        Pended RX to Dr. Jean for transmission to pharmacy

## 2024-09-27 NOTE — PROGRESS NOTES
Patient had her teeth extracted and is having a lot of pain. Requested something stronger than ibuprofen and tylenol. Asked her oral surgeon but was unable to get anything. OARRS reviewed. Rx sent for acute course of percocet.

## 2024-09-27 NOTE — PROGRESS NOTES
" C/w Reason for Visit  Nichelle Izaguirre is a 44 y.o. female with multiple medical problems including esophagitis and gastritis on PPI referred by Sandra Lilly NP for normocytic anemia form CHALINO likely secondary to malabsorption.    PMH/PSH: HTN, HL, psoriatic arthritis, gout, asthma, Parkinsonism/Dystonia, Immunodeficiency disorder (MBL) on prophylaxis antibiotics with amoxicillin alternating with Doxycycline. Oral surgery, kidney stones, s/p lithotripsy, GUICHO on CPAP, h/o sepsis.   FH: PGM-breast cancer, PGF-stomach cancer, MGM-bone cancer, MGF-stomach/esophagus, maternal aunt-cervical cancer.  Soc Hx: Denies smoking. ETOH, illicit drugs; SAHM, , 3 kids.    History of Present Illness:  Patient with long standing anemia since teenage years due to menses. Patient has been on oral iron for \"many years\" with constipation and no response. Patient had c-scope and EGD in 2/2019 with internal and external hemorrhoids otherwise normal with normal biopsy and esophagitis and gastritis on PPI.    On assessment, reports extreme fatigue. Patient had oral surgery yesterday. Notes that appetite is good but diet not well balanced due to poor dentition. Otherwise doing well.     Review of Systems: All of the systems have been reviewed and are negative for complaints except what is stated in the HPI and/or past medical history.    Allergies and Intolerances:  Allergies   Allergen Reactions    Clindamycin Anaphylaxis    Dupilumab Anaphylaxis    Hydrochlorothiazide Anaphylaxis, Itching and Swelling    Sulfamethoxazole-Trimethoprim Anaphylaxis    Cefazolin Hives, Other and Swelling     STATES TONGUE SPLITS    Atorvastatin Hives    Cephalexin Other    Clarithromycin Swelling     hives, tongue swelling    Nitrofurantoin Monohyd/M-Cryst Hives    Sulfa (Sulfonamide Antibiotics) Hives    Sulfacetamide Hives     Outpatient Medication Profile:  Current Outpatient Medications   Medication Sig Dispense Refill    albuterol 2.5 mg /3 mL " (0.083 %) nebulizer solution inhale the contents of one vial via nebulizer every 4 hours as needed      allopurinol (Zyloprim) 100 mg tablet TAKE ONE TABLET BY MOUTH EVERY DAY 90 tablet 0    allopurinol (Zyloprim) 300 mg tablet TAKE ONE TABLET BY MOUTH EVERY DAY 90 tablet 0    ALPRAZolam (Xanax) 0.5 mg tablet Take 1 tablet (0.5 mg) by mouth 3 times a day as needed for sleep or anxiety. 21 tablet 2    amLODIPine (Norvasc) 2.5 mg tablet Take 1 tablet (2.5 mg) by mouth once daily.      amoxicillin (Amoxil) 500 mg capsule Take 1 capsule (500 mg) by mouth once daily.      ascorbic acid, vitamin C, 500 mg capsule Take 1 capsule by mouth once daily.      benzoyl peroxide (Benzac AC) 10 % external wash Apply topically if needed.      buPROPion XL (Wellbutrin XL) 150 mg 24 hr tablet Take 1 tablet (150 mg) by mouth once daily. Do not crush, chew, or split. 90 tablet 3    buPROPion XL (Wellbutrin XL) 300 mg 24 hr tablet Take 1 tablet (300 mg) by mouth once daily in the morning. Do not crush, chew, or split. 90 tablet 3    carbidopa-levodopa-entacapone (Stalevo) -200 mg tablet Take 1 tablet by mouth every 4 hours. 540 tablet 1    clindamycin (Cleocin T) 1 % lotion 1 Application      clobetasol (Temovate) 0.05 % external solution Apply topically 2 times a day.      clobetasoL 0.05 % lotion One application as needed for flaring only.not for daily use. For scalp and hands only 118 mL 3    cloNIDine (Catapres) 0.1 mg tablet Take 1 tablet (0.1 mg) by mouth if needed for high blood pressure.      cloNIDine (Catapres) 0.2 mg tablet Take 1 tablet (0.2 mg) by mouth once daily at bedtime. 90 tablet 3    Cosentyx Pen, 2 Pens, 150 mg/mL self-injector pen Inject 2 mL (300 mg) under the skin every 30 (thirty) days. 2 mL 2    diclofenac sodium (Voltaren) 1 % gel gel Apply 4.5 inches (4 g) topically 2 times a day as needed.      doxepin (SINEquan) 50 mg capsule Take 1 capsule (50 mg) by mouth once daily at bedtime. 30 capsule 3     doxycycline (Vibramycin) 100 mg capsule 1 capsule (100 mg).      DULoxetine (Cymbalta) 60 mg DR capsule Take 1 capsule (60 mg) by mouth once daily. 90 capsule 1    eplerenone (Inspra) 25 mg tablet Take 1 tablet (25 mg) by mouth once daily.      ergocalciferol (Vitamin D-2) 1.25 MG (78380 UT) capsule Take 1 capsule (50,000 Units) by mouth 1 (one) time per week. 12 capsule 0    ezetimibe (Zetia) 10 mg tablet Take 1 tablet (10 mg) by mouth once daily. 90 tablet 3    fluticasone furoate-vilanteroL (Breo Ellipta) 200-25 mcg/dose inhaler 1puff daily, Inhalation      ipratropium-albuteroL (Duo-Neb) 0.5-2.5 mg/3 mL nebulizer solution Take 3 mL by nebulization every 4 hours if needed for wheezing.      ketoconazole (NIZOral) 2 % cream 1 Application      lactobacillus acidophilus (Lactobacillus acidoph-L.bulgar) tablet tablet Take 1 tablet by mouth once daily. 90 tablet 0    leflunomide (Arava) 10 mg tablet Take 1 tablet (10 mg) by mouth once daily.      levalbuterol (Xopenex) 1.25 mg/3 mL nebulizer solution Take 1 ampule by nebulization 3 times a day.      metoprolol succinate XL (Toprol-XL) 25 mg 24 hr tablet Take 2 tablets (50 mg) by mouth once daily.      metroNIDAZOLE (Metrocream) 0.75 % cream 1 Application      montelukast (Singulair) 10 mg tablet Take 1 tablet (10 mg) by mouth once daily at bedtime.      nebulizer and compressor device use q4-6 hours with albuterol PRN cough/wheeze      nebulizers (Devilbiss Disposable Nebulizer) misc every 4 hours if needed.      omega-3 acid ethyl esters (Lovaza) 1 gram capsule Take 2 capsules (2 g) by mouth 2 times a day. 360 capsule 3    omeprazole (PriLOSEC) 40 mg DR capsule Take 1 capsule (40 mg) by mouth once daily in the morning before meals. 90 capsule 3    ondansetron ODT (Zofran-ODT) 4 mg disintegrating tablet Take 1 tablet (4 mg) by mouth every 8 hours if needed for nausea or vomiting. 90 tablet 0    pioglitazone (Actos) 15 mg tablet Take 1 tablet (15 mg) by mouth once  daily.      ProAir HFA 90 mcg/actuation inhaler       Repatha SureClick 140 mg/mL injection Inject 1 mL (140 mg) under the skin every 14 (fourteen) days. 6 mL 3    Spiriva Respimat 1.25 mcg/actuation inhaler Inhale once daily.      SUMAtriptan (Imitrex) 25 mg tablet Take 1 tablet (25 mg) by mouth 1 time if needed for migraine. May repeat in 2 hours if no improvement. Max 200 mg/24 hr 9 tablet 2    tezepelumab-ekko (Tezspire) SubQ Pen Injector Inject 210 mg under the skin.      tirzepatide, weight loss, (Zepbound) 2.5 mg/0.5 mL injection Inject 2.5 mg under the skin every 7 days. 4 mL 1    carvedilol (Coreg) 12.5 mg tablet Take 1 tablet (12.5 mg) by mouth 2 times daily (morning and late afternoon) for 7 days, THEN 0.5 tablets (6.25 mg) 2 times daily (morning and late afternoon) for 14 days. 28 tablet 1    cetirizine-pseudoephedrine (ZyrTEC-D) 5-120 mg 12 hr tablet Take 1 tablet by mouth 2 times a day. 180 tablet 0    cyanocobalamin, vitamin B-12, 1,000 mcg tablet, sublingual Place 1 tablet (1,000 mcg) under the tongue once daily. 90 tablet 1    orphenadrine (Norflex) 100 mg 12 hr tablet Take 1 tablet (100 mg) by mouth 2 times a day as needed for muscle spasms. 60 tablet 3    oxyCODONE-acetaminophen (Percocet) 5-325 mg tablet Take 1 tablet by mouth every 6 hours if needed for severe pain (7 - 10) for up to 5 days. 15 tablet 0    pregabalin (Lyrica) 75 mg capsule Take 1 capsule (75 mg) by mouth 3 times a day. 90 capsule 2    tapentadol (Nucynta) 50 mg tablet Take 1 tablet (50 mg) by mouth 2 times a day as needed for severe pain (7 - 10). 60 tablet 0     No current facility-administered medications for this visit.        Vitals and Measurements:       8/26/2024    11:26 PM 8/27/2024     3:28 AM 8/27/2024     8:08 AM 8/27/2024    11:25 AM 8/27/2024     2:49 PM 8/27/2024     4:26 PM 9/26/2024     2:25 PM   Vitals   Systolic 135 138 154 142 174 153 153   Diastolic 74 88 101 77 100 94 96   Heart Rate 85 75 80  93  96   Temp  "36.4 °C (97.5 °F) 36.4 °C (97.5 °F) 36.6 °C (97.9 °F) 37 °C (98.6 °F) 36.4 °C (97.5 °F)  36.2 °C (97.2 °F)   Resp 18 18 18 18 18  16   Height (in)       1.664 m (5' 5.51\")    Weight (lb)       227.63   BMI       37.29 kg/m2   BSA (m2)       2.18 m2   Visit Report       Report       Significant value     physical Exam:   Constitutional: alert, awake and oriented, not in acute distress   HEENT: moist mucous membranes, normal nose   Neck: supple, no lymphadenopathy   EYES: PERRL, EOM intact, conjunctiva normal  Skin: no jaundice, rash or erythema  Neurological: AAOx3, no gross focal deficit   Psychiatric: normal mood and behavior     Lab Results:  Lab Results   Component Value Date    WBC 7.9 09/26/2024    NEUTROABS 5.29 09/26/2024    IGABSOL 0.02 09/26/2024    LYMPHSABS 1.86 09/26/2024    MONOSABS 0.72 09/26/2024    EOSABS 0.00 09/26/2024    BASOSABS 0.01 09/26/2024    RBC 3.39 (L) 09/26/2024    MCV 91 09/26/2024    MCHC 32.4 09/26/2024    HGB 10.0 (L) 09/26/2024    HCT 30.9 (L) 09/26/2024     09/26/2024     Lab Results   Component Value Date    RETICCTPCT 1.7 09/26/2024      Lab Results   Component Value Date    CREATININE 0.59 09/26/2024    BUN 13 09/26/2024    EGFR >90 09/26/2024     09/26/2024    K 3.8 09/26/2024     09/26/2024    CO2 24 09/26/2024      Lab Results   Component Value Date    ALT 11 09/26/2024    AST 12 09/26/2024    ALKPHOS 56 09/26/2024    BILITOT 0.3 09/26/2024      Lab Results   Component Value Date    TSH 0.61 07/01/2024     Lab Results   Component Value Date    TSH 0.61 07/01/2024    [id number] 7.3 11/19/2020     Lab Results   Component Value Date    IRON 74 09/26/2024    TIBC 460 (H) 09/26/2024    FERRITIN 20 09/26/2024      Lab Results   Component Value Date    [id number] 190 (L) 09/26/2024      Lab Results   Component Value Date    FOLATE 10.8 09/26/2024     Lab Results   Component Value Date    WILBER NEGATIVE 03/19/2020    RF <10 03/19/2020    SEDRATE 12 08/24/2024      Lab " "Results   Component Value Date    CRP 0.25 08/24/2024      No results found for: \"MUSTAPHA\"  Lab Results   Component Value Date    LDH 92 09/26/2024     Lab Results   Component Value Date    HAPTOGLOBIN 124 09/26/2024     Lab Results   Component Value Date    SPEP NORMAL 09/18/2023     Lab Results   Component Value Date    IGG 1,190 08/24/2023    IGM 47 08/24/2023     08/24/2023     Assessment:    44 y.o. female with multiple medical problems including esophagitis and gastritis on PPI referred for normocytic anemia form CHALINO likely secondary to malabsorption.    c-scope and EGD in 2/2019 with internal and external hemorrhoids otherwise normal with normal biopsy and esophagitis and gastritis on PPI    Plan:    Reviewed and discussed lab, imaging, and pathology results with patient in detail as well as diagnosis, prognosis, and treatment options.    Discussed Venofer x 3 doses    Recommend sublingual Vitamin B12 for now    F/U w/GI-next scoped in 2029    F/U w/PCP    RTC in 2 weeks    Patient verbalized understanding, and all her questions were answered to her satisfaction    60 min spent with patient greater than 50 % of which was spent in consultation and coordination of care.    "

## 2024-09-30 PROCEDURE — RXMED WILLOW AMBULATORY MEDICATION CHARGE

## 2024-09-30 RX ORDER — SECUKINUMAB 150 MG/ML
300 INJECTION SUBCUTANEOUS
Qty: 2 ML | Refills: 2 | Status: SHIPPED | OUTPATIENT
Start: 2024-09-30

## 2024-10-01 ENCOUNTER — PHARMACY VISIT (OUTPATIENT)
Dept: PHARMACY | Facility: CLINIC | Age: 44
End: 2024-10-01
Payer: MEDICAID

## 2024-10-01 LAB
ALBUMIN: 3.6 G/DL (ref 3.4–5)
ALPHA 1 GLOBULIN: 0.3 G/DL (ref 0.2–0.6)
ALPHA 2 GLOBULIN: 0.7 G/DL (ref 0.4–1.1)
BETA GLOBULIN: 0.9 G/DL (ref 0.5–1.2)
GAMMA GLOBULIN: 1 G/DL (ref 0.5–1.4)
IMMUNOFIXATION COMMENT: NORMAL
PATH REVIEW - SERUM IMMUNOFIXATION: NORMAL
PATH REVIEW-SERUM PROTEIN ELECTROPHORESIS: NORMAL
PROTEIN ELECTROPHORESIS COMMENT: NORMAL

## 2024-10-07 ENCOUNTER — INFUSION (OUTPATIENT)
Dept: HEMATOLOGY/ONCOLOGY | Facility: CLINIC | Age: 44
End: 2024-10-07
Payer: COMMERCIAL

## 2024-10-07 VITALS
OXYGEN SATURATION: 98 % | BODY MASS INDEX: 37.09 KG/M2 | RESPIRATION RATE: 18 BRPM | TEMPERATURE: 97.9 F | SYSTOLIC BLOOD PRESSURE: 158 MMHG | DIASTOLIC BLOOD PRESSURE: 102 MMHG | HEART RATE: 69 BPM | WEIGHT: 226.41 LBS

## 2024-10-07 DIAGNOSIS — K90.9 IDIOPATHIC STEATORRHEA (HHS-HCC): ICD-10-CM

## 2024-10-07 DIAGNOSIS — D50.8 IRON DEFICIENCY ANEMIA SECONDARY TO INADEQUATE DIETARY IRON INTAKE: ICD-10-CM

## 2024-10-07 PROCEDURE — 96365 THER/PROPH/DIAG IV INF INIT: CPT | Mod: INF

## 2024-10-07 PROCEDURE — 2500000004 HC RX 250 GENERAL PHARMACY W/ HCPCS (ALT 636 FOR OP/ED): Mod: SE | Performed by: PHYSICIAN ASSISTANT

## 2024-10-07 PROCEDURE — 2500000002 HC RX 250 W HCPCS SELF ADMINISTERED DRUGS (ALT 637 FOR MEDICARE OP, ALT 636 FOR OP/ED): Mod: SE,MUE | Performed by: PHYSICIAN ASSISTANT

## 2024-10-07 PROCEDURE — 96375 TX/PRO/DX INJ NEW DRUG ADDON: CPT | Mod: INF

## 2024-10-07 RX ORDER — DIPHENHYDRAMINE HYDROCHLORIDE 50 MG/ML
25 INJECTION INTRAMUSCULAR; INTRAVENOUS ONCE
OUTPATIENT
Start: 2024-10-11

## 2024-10-07 RX ORDER — EPINEPHRINE 0.3 MG/.3ML
0.3 INJECTION SUBCUTANEOUS EVERY 5 MIN PRN
OUTPATIENT
Start: 2024-10-11

## 2024-10-07 RX ORDER — EPINEPHRINE 0.3 MG/.3ML
0.3 INJECTION SUBCUTANEOUS EVERY 5 MIN PRN
Status: DISCONTINUED | OUTPATIENT
Start: 2024-10-07 | End: 2024-10-07 | Stop reason: HOSPADM

## 2024-10-07 RX ORDER — HEPARIN 100 UNIT/ML
500 SYRINGE INTRAVENOUS AS NEEDED
Status: CANCELLED | OUTPATIENT
Start: 2024-10-07

## 2024-10-07 RX ORDER — FAMOTIDINE 10 MG/ML
20 INJECTION INTRAVENOUS ONCE
OUTPATIENT
Start: 2024-10-11

## 2024-10-07 RX ORDER — DIPHENHYDRAMINE HYDROCHLORIDE 50 MG/ML
50 INJECTION INTRAMUSCULAR; INTRAVENOUS AS NEEDED
Status: COMPLETED | OUTPATIENT
Start: 2024-10-07 | End: 2024-10-07

## 2024-10-07 RX ORDER — ALBUTEROL SULFATE 0.83 MG/ML
3 SOLUTION RESPIRATORY (INHALATION) AS NEEDED
Status: COMPLETED | OUTPATIENT
Start: 2024-10-07 | End: 2024-10-07

## 2024-10-07 RX ORDER — HEPARIN SODIUM,PORCINE/PF 10 UNIT/ML
50 SYRINGE (ML) INTRAVENOUS AS NEEDED
OUTPATIENT
Start: 2024-10-07

## 2024-10-07 RX ORDER — FAMOTIDINE 10 MG/ML
20 INJECTION INTRAVENOUS ONCE AS NEEDED
Status: COMPLETED | OUTPATIENT
Start: 2024-10-07 | End: 2024-10-07

## 2024-10-07 RX ORDER — ALBUTEROL SULFATE 0.83 MG/ML
3 SOLUTION RESPIRATORY (INHALATION) AS NEEDED
OUTPATIENT
Start: 2024-10-11

## 2024-10-07 RX ORDER — FAMOTIDINE 10 MG/ML
20 INJECTION INTRAVENOUS ONCE AS NEEDED
OUTPATIENT
Start: 2024-10-11

## 2024-10-07 RX ORDER — DIPHENHYDRAMINE HYDROCHLORIDE 50 MG/ML
50 INJECTION INTRAMUSCULAR; INTRAVENOUS AS NEEDED
OUTPATIENT
Start: 2024-10-11

## 2024-10-07 RX ORDER — HEPARIN SODIUM,PORCINE/PF 10 UNIT/ML
50 SYRINGE (ML) INTRAVENOUS AS NEEDED
Status: CANCELLED | OUTPATIENT
Start: 2024-10-07

## 2024-10-07 RX ORDER — HEPARIN 100 UNIT/ML
500 SYRINGE INTRAVENOUS AS NEEDED
OUTPATIENT
Start: 2024-10-07

## 2024-10-07 ASSESSMENT — PAIN SCALES - GENERAL: PAINLEVEL: 0-NO PAIN

## 2024-10-07 NOTE — PROGRESS NOTES
Adverse Event Note     Name:Nichelle Izaguirre  : 1980  MRN: [id number]      Adverse Event:  Medication: Venofer  Administered Date/Time:   Reactions/Symptoms Started Time:    Symptoms: (check all that apply)   [] Back Pain   [] Erythema Face     [] Hypotension     [] Rash/Rigors [] Other   [] Bleeding    [] Erythema Hands  [x] Itching              [] Swelling/Edema     [] Chest Pain [] Hives/Urticaria     [] Low platelets    [] Syncope  [x] Throat Tightness   [] Cytopenia  [x] Hypertension       [] Neutropenia    [x] Shortness of Breath  []                   [] Hypoxia               [] Hives   Severity: Moderate   Provider Notified: Yes   Medications Given(Add all medications to the chart via , or treatment plan)   [] Acetaminophen-Tylenol       [x] Famotidine-Pepcid  [] Nitroglycerine-NTG   [x] Albuterol                               [] Hydrocortisone       [] Ondansetron-Zofran   [x] Diphenhydramine-Benadryl  [] Lorazepam-Ativan  [] Naloxone-Narcan   [] Epinephrine-Epi                     [x] Methylprednisone   Additional Details/ Comments:     1527 - Pt used call light to notify RN of SOB/itching to chest and L Face/chest and throat tightness. Infusion stopped, provider and additional RNs called to bedside.        Vitals - 163/117, HR 77, RR 22, 98%, 36.3C  1531 - rescue meds given as ordered per protocol. PACONCETTA at bedside  1532 - chest tightness/SOB worsening       Vitals - 166/100, HR 80,  RR 24, 100%  1534 - Nebulizer started  1538 - chest tightness completely resolved, itching to face persisting       Vitals - 166/124, HR 78, RR 20, 100%, 36.3C  1549 - Symptoms mostly resolved only mild itching to L side face  1602 - All symptoms resolved at this time       Vitals - 149/102, HR 68, RR 18, 99%    1622 - infusion restarted per provider    Total volume infused at time of reaction: 136mL.   RemaininmL    1712 - Pt tolerated remaining infusion without complication. Pt educated on  symptoms of delayed reaction and what to do. Pt provided number for clinic/answering service and verbalized understanding of when to call vs when to seek emergency care.

## 2024-10-07 NOTE — PATIENT INSTRUCTIONS
If any symptoms of a delayed reaction, take benadryl and call our 24/7 number at [phone]. If you do not get a response within 20 minutes, go to the ER immediately for further management.

## 2024-10-07 NOTE — PROGRESS NOTES
Pt tolerated re-challenging infusion without additional complication.  Denies questions, concerns, or comments at this time. Discharged home with steady gait.

## 2024-10-08 DIAGNOSIS — R11.0 NAUSEA: Primary | ICD-10-CM

## 2024-10-08 RX ORDER — PROCHLORPERAZINE MALEATE 10 MG
10 TABLET ORAL 3 TIMES DAILY PRN
Qty: 30 TABLET | Refills: 0 | Status: SHIPPED | OUTPATIENT
Start: 2024-10-08 | End: 2024-12-07

## 2024-10-14 ENCOUNTER — INFUSION (OUTPATIENT)
Dept: HEMATOLOGY/ONCOLOGY | Facility: CLINIC | Age: 44
End: 2024-10-14
Payer: COMMERCIAL

## 2024-10-14 VITALS
HEART RATE: 84 BPM | BODY MASS INDEX: 36.98 KG/M2 | OXYGEN SATURATION: 98 % | DIASTOLIC BLOOD PRESSURE: 89 MMHG | WEIGHT: 225.75 LBS | SYSTOLIC BLOOD PRESSURE: 144 MMHG | RESPIRATION RATE: 17 BRPM | TEMPERATURE: 97.5 F

## 2024-10-14 DIAGNOSIS — K90.9 IDIOPATHIC STEATORRHEA (HHS-HCC): ICD-10-CM

## 2024-10-14 DIAGNOSIS — D50.8 IRON DEFICIENCY ANEMIA SECONDARY TO INADEQUATE DIETARY IRON INTAKE: ICD-10-CM

## 2024-10-14 PROCEDURE — 2500000004 HC RX 250 GENERAL PHARMACY W/ HCPCS (ALT 636 FOR OP/ED): Mod: SE | Performed by: PHYSICIAN ASSISTANT

## 2024-10-14 PROCEDURE — 96365 THER/PROPH/DIAG IV INF INIT: CPT | Mod: INF

## 2024-10-14 PROCEDURE — 96375 TX/PRO/DX INJ NEW DRUG ADDON: CPT | Mod: INF

## 2024-10-14 RX ORDER — DIPHENHYDRAMINE HYDROCHLORIDE 50 MG/ML
50 INJECTION INTRAMUSCULAR; INTRAVENOUS AS NEEDED
Status: DISCONTINUED | OUTPATIENT
Start: 2024-10-14 | End: 2024-10-28 | Stop reason: HOSPADM

## 2024-10-14 RX ORDER — EPINEPHRINE 0.3 MG/.3ML
0.3 INJECTION SUBCUTANEOUS EVERY 5 MIN PRN
Status: DISCONTINUED | OUTPATIENT
Start: 2024-10-14 | End: 2024-10-28 | Stop reason: HOSPADM

## 2024-10-14 RX ORDER — DIPHENHYDRAMINE HYDROCHLORIDE 50 MG/ML
50 INJECTION INTRAMUSCULAR; INTRAVENOUS AS NEEDED
Status: CANCELLED | OUTPATIENT
Start: 2024-10-15

## 2024-10-14 RX ORDER — FAMOTIDINE 10 MG/ML
20 INJECTION INTRAVENOUS ONCE
Status: CANCELLED | OUTPATIENT
Start: 2024-10-15 | End: 2024-10-15

## 2024-10-14 RX ORDER — HEPARIN SODIUM,PORCINE/PF 10 UNIT/ML
50 SYRINGE (ML) INTRAVENOUS AS NEEDED
Status: CANCELLED | OUTPATIENT
Start: 2024-10-14

## 2024-10-14 RX ORDER — FAMOTIDINE 10 MG/ML
20 INJECTION INTRAVENOUS ONCE AS NEEDED
Status: CANCELLED | OUTPATIENT
Start: 2024-10-15

## 2024-10-14 RX ORDER — DIPHENHYDRAMINE HYDROCHLORIDE 50 MG/ML
25 INJECTION INTRAMUSCULAR; INTRAVENOUS ONCE
Status: COMPLETED | OUTPATIENT
Start: 2024-10-14 | End: 2024-10-14

## 2024-10-14 RX ORDER — DIPHENHYDRAMINE HYDROCHLORIDE 50 MG/ML
25 INJECTION INTRAMUSCULAR; INTRAVENOUS ONCE
Status: CANCELLED | OUTPATIENT
Start: 2024-10-15 | End: 2024-10-15

## 2024-10-14 RX ORDER — ALBUTEROL SULFATE 0.83 MG/ML
3 SOLUTION RESPIRATORY (INHALATION) AS NEEDED
Status: CANCELLED | OUTPATIENT
Start: 2024-10-15

## 2024-10-14 RX ORDER — FAMOTIDINE 10 MG/ML
20 INJECTION INTRAVENOUS ONCE AS NEEDED
Status: DISCONTINUED | OUTPATIENT
Start: 2024-10-14 | End: 2024-10-28 | Stop reason: HOSPADM

## 2024-10-14 RX ORDER — EPINEPHRINE 0.3 MG/.3ML
0.3 INJECTION SUBCUTANEOUS EVERY 5 MIN PRN
Status: CANCELLED | OUTPATIENT
Start: 2024-10-15

## 2024-10-14 RX ORDER — HEPARIN 100 UNIT/ML
500 SYRINGE INTRAVENOUS AS NEEDED
Status: CANCELLED | OUTPATIENT
Start: 2024-10-14

## 2024-10-14 RX ORDER — FAMOTIDINE 10 MG/ML
20 INJECTION INTRAVENOUS ONCE
Status: COMPLETED | OUTPATIENT
Start: 2024-10-14 | End: 2024-10-14

## 2024-10-14 RX ORDER — ALBUTEROL SULFATE 0.83 MG/ML
3 SOLUTION RESPIRATORY (INHALATION) AS NEEDED
Status: DISCONTINUED | OUTPATIENT
Start: 2024-10-14 | End: 2024-10-28 | Stop reason: HOSPADM

## 2024-10-14 ASSESSMENT — PAIN SCALES - GENERAL: PAINLEVEL: 0-NO PAIN

## 2024-10-15 DIAGNOSIS — G43.809 OTHER MIGRAINE WITHOUT STATUS MIGRAINOSUS, NOT INTRACTABLE: ICD-10-CM

## 2024-10-15 RX ORDER — SUMATRIPTAN SUCCINATE 25 MG/1
25 TABLET ORAL ONCE AS NEEDED
Qty: 9 TABLET | Refills: 2 | Status: SHIPPED | OUTPATIENT
Start: 2024-10-15

## 2024-10-15 RX ORDER — ONDANSETRON 4 MG/1
4 TABLET, ORALLY DISINTEGRATING ORAL EVERY 8 HOURS PRN
Qty: 90 TABLET | Refills: 0 | Status: SHIPPED | OUTPATIENT
Start: 2024-10-15

## 2024-10-17 ENCOUNTER — APPOINTMENT (OUTPATIENT)
Dept: HEMATOLOGY/ONCOLOGY | Facility: CLINIC | Age: 44
End: 2024-10-17
Payer: COMMERCIAL

## 2024-10-21 ENCOUNTER — INFUSION (OUTPATIENT)
Dept: HEMATOLOGY/ONCOLOGY | Facility: CLINIC | Age: 44
End: 2024-10-21
Payer: COMMERCIAL

## 2024-10-21 ENCOUNTER — APPOINTMENT (OUTPATIENT)
Dept: HEMATOLOGY/ONCOLOGY | Facility: CLINIC | Age: 44
End: 2024-10-21
Payer: COMMERCIAL

## 2024-10-21 VITALS
TEMPERATURE: 97.9 F | WEIGHT: 226.9 LBS | OXYGEN SATURATION: 96 % | RESPIRATION RATE: 16 BRPM | HEART RATE: 86 BPM | SYSTOLIC BLOOD PRESSURE: 159 MMHG | DIASTOLIC BLOOD PRESSURE: 113 MMHG | BODY MASS INDEX: 37.17 KG/M2

## 2024-10-21 DIAGNOSIS — D50.8 IRON DEFICIENCY ANEMIA SECONDARY TO INADEQUATE DIETARY IRON INTAKE: ICD-10-CM

## 2024-10-21 DIAGNOSIS — K90.9 IDIOPATHIC STEATORRHEA (HHS-HCC): ICD-10-CM

## 2024-10-21 PROCEDURE — 2500000004 HC RX 250 GENERAL PHARMACY W/ HCPCS (ALT 636 FOR OP/ED): Mod: SE | Performed by: PHYSICIAN ASSISTANT

## 2024-10-21 PROCEDURE — 96374 THER/PROPH/DIAG INJ IV PUSH: CPT | Mod: INF

## 2024-10-21 PROCEDURE — 96375 TX/PRO/DX INJ NEW DRUG ADDON: CPT | Mod: INF

## 2024-10-21 RX ORDER — FAMOTIDINE 10 MG/ML
20 INJECTION INTRAVENOUS ONCE
Status: COMPLETED | OUTPATIENT
Start: 2024-10-21 | End: 2024-10-21

## 2024-10-21 RX ORDER — DIPHENHYDRAMINE HYDROCHLORIDE 50 MG/ML
25 INJECTION INTRAMUSCULAR; INTRAVENOUS ONCE
Status: CANCELLED | OUTPATIENT
Start: 2024-10-22 | End: 2024-10-22

## 2024-10-21 RX ORDER — DIPHENHYDRAMINE HYDROCHLORIDE 50 MG/ML
25 INJECTION INTRAMUSCULAR; INTRAVENOUS ONCE
Status: COMPLETED | OUTPATIENT
Start: 2024-10-21 | End: 2024-10-21

## 2024-10-21 RX ORDER — HEPARIN SODIUM,PORCINE/PF 10 UNIT/ML
50 SYRINGE (ML) INTRAVENOUS AS NEEDED
OUTPATIENT
Start: 2024-10-21

## 2024-10-21 RX ORDER — FAMOTIDINE 10 MG/ML
20 INJECTION INTRAVENOUS ONCE AS NEEDED
OUTPATIENT
Start: 2024-10-22

## 2024-10-21 RX ORDER — HEPARIN 100 UNIT/ML
500 SYRINGE INTRAVENOUS AS NEEDED
OUTPATIENT
Start: 2024-10-21

## 2024-10-21 RX ORDER — DIPHENHYDRAMINE HYDROCHLORIDE 50 MG/ML
50 INJECTION INTRAMUSCULAR; INTRAVENOUS AS NEEDED
OUTPATIENT
Start: 2024-10-22

## 2024-10-21 RX ORDER — FAMOTIDINE 10 MG/ML
20 INJECTION INTRAVENOUS ONCE AS NEEDED
Status: DISCONTINUED | OUTPATIENT
Start: 2024-10-21 | End: 2024-10-21 | Stop reason: HOSPADM

## 2024-10-21 RX ORDER — FAMOTIDINE 10 MG/ML
20 INJECTION INTRAVENOUS ONCE
Status: CANCELLED | OUTPATIENT
Start: 2024-10-22 | End: 2024-10-22

## 2024-10-21 RX ORDER — ALBUTEROL SULFATE 0.83 MG/ML
3 SOLUTION RESPIRATORY (INHALATION) AS NEEDED
Status: DISCONTINUED | OUTPATIENT
Start: 2024-10-21 | End: 2024-10-21 | Stop reason: HOSPADM

## 2024-10-21 RX ORDER — ALBUTEROL SULFATE 0.83 MG/ML
3 SOLUTION RESPIRATORY (INHALATION) AS NEEDED
OUTPATIENT
Start: 2024-10-22

## 2024-10-21 RX ORDER — DIPHENHYDRAMINE HYDROCHLORIDE 50 MG/ML
50 INJECTION INTRAMUSCULAR; INTRAVENOUS AS NEEDED
Status: DISCONTINUED | OUTPATIENT
Start: 2024-10-21 | End: 2024-10-21 | Stop reason: HOSPADM

## 2024-10-21 RX ORDER — EPINEPHRINE 0.3 MG/.3ML
0.3 INJECTION SUBCUTANEOUS EVERY 5 MIN PRN
OUTPATIENT
Start: 2024-10-22

## 2024-10-21 RX ORDER — EPINEPHRINE 0.3 MG/.3ML
0.3 INJECTION SUBCUTANEOUS EVERY 5 MIN PRN
Status: DISCONTINUED | OUTPATIENT
Start: 2024-10-21 | End: 2024-10-21 | Stop reason: HOSPADM

## 2024-10-21 ASSESSMENT — PAIN SCALES - GENERAL: PAINLEVEL_OUTOF10: 0-NO PAIN

## 2024-10-21 NOTE — PROGRESS NOTES
Pt tolerated infusion without complications. BP elevated post infusion, after 30 min observation BP still elevated with ha, she denies chest tightness/pain, denies SOB. OLIVIA notified of BP and HA, she has hx of HTN and migraines, okay to discharge, encouraged pt to call cardiologist and PCP to manage BP. Pt has taken all of her bp medications today and is not due until this evening. Encouraged pt to monitor BP's at home.

## 2024-10-22 ENCOUNTER — APPOINTMENT (OUTPATIENT)
Dept: RHEUMATOLOGY | Facility: CLINIC | Age: 44
End: 2024-10-22
Payer: MEDICAID

## 2024-10-22 DIAGNOSIS — E55.9 VITAMIN D DEFICIENCY: ICD-10-CM

## 2024-10-22 DIAGNOSIS — M10.9 GOUTY ARTHROPATHY: ICD-10-CM

## 2024-10-22 DIAGNOSIS — L40.50 PSORIATIC ARTHROPATHY (MULTI): Primary | ICD-10-CM

## 2024-10-22 PROCEDURE — 99214 OFFICE O/P EST MOD 30 MIN: CPT | Performed by: INTERNAL MEDICINE

## 2024-10-22 PROCEDURE — 1036F TOBACCO NON-USER: CPT | Performed by: INTERNAL MEDICINE

## 2024-10-22 RX ORDER — LEFLUNOMIDE 10 MG/1
10 TABLET ORAL DAILY
Qty: 90 TABLET | Refills: 0 | Status: SHIPPED | OUTPATIENT
Start: 2024-10-22

## 2024-10-22 RX ORDER — ALLOPURINOL 100 MG/1
100 TABLET ORAL DAILY
Qty: 90 TABLET | Refills: 0 | Status: SHIPPED | OUTPATIENT
Start: 2024-10-22

## 2024-10-22 RX ORDER — ALLOPURINOL 300 MG/1
300 TABLET ORAL DAILY
Qty: 90 TABLET | Refills: 0 | Status: SHIPPED | OUTPATIENT
Start: 2024-10-22

## 2024-10-22 NOTE — PROGRESS NOTES
Follow-up Rheumatology Patient Visit    Chief Complaint:  Nichelle Izaguirre is a 44 y.o. female presenting today for No chief complaint on file..    History of Presenting Problem:   45 y/o female with Hx of psoriatic arthritis, gout, asthma, Parkinsonism/Dystonia, Immunodeficiency disorder (MBL) on prophylaxis antibiotics with amoxicillin presents for follow-up  She is tolerating leflunomide currently taking 10 mg daily now  She is taking allopurinol 400 mg daily  She  is finally getting  Cosentyx consistently x 2   She is not sexual active although she is , she report  is dealing with a medical condition. She is having irregular periods, she report she is possible menopausal.  Today she report Hx of recent admission for orbital cellulitis August 2024. She is recently being evaluated for anemia and has had iron infusion. She report her joint are doing well on her current regimen as long as she get her Cosentyx.  Previous meds ;  MTX  Otezla      Problem List:   Patient Active Problem List   Diagnosis    Allergic rhinitis    Anxiety disorder    Arthralgia of multiple sites    Bariatric surgery status    Benign essential hypertension    Bowel disease, inflammatory    Immunodeficiency syndrome (Multi)    Chronic diarrhea    Continuous LLQ abdominal pain    Depression    Essential familial hyperlipidemia    GERD (gastroesophageal reflux disease)    Gouty arthropathy    Hoarseness    Hot flashes    Hyperlipidemia    Hypertriglyceridemia    Hypothyroidism (acquired)    Inflammatory arthropathy    Rheumatoid arthritis    Hypersomnia    Labile hypertension    Low vitamin D level    Lyme disease    Mannose-binding lectin deficiency (Multi)    Methadone use    Migraine without status migrainosus, not intractable    Morbid obesity (Multi)    Mixed incontinence urge and stress    Osteoporosis    Obstructive sleep apnea, adult    DRD (DOPA-responsive dystonia)    Post herpetic neuralgia    Polyphagia    POTS  (postural orthostatic tachycardia syndrome)    Prediabetes    Psoriatic arthropathy (Multi)    Steroid withdrawal syndrome with complication (Multi)    Steroid-dependent asthma (HHS-HCC)    Vitamin B12 deficiency    Vitamin D deficiency    Gait disturbance    Bilateral leg edema    Cystocele with uterine descensus    Dyspnea on effort    Elbow tendinitis    Foreign body in ear, right, initial encounter    Frequent falls    Hyperglycemia    Hyperuricemia    Incomplete bladder emptying    Insomnia, organic    Intermittent palpitations    Lower back pain    Maxillary sinusitis    Myofascial pain    Orthostatic intolerance    Other ovarian cyst, unspecified side    Pelvic floor weakness    Perioral dermatitis    Peripheral neuropathy    Postural dizziness    Psoriasis vulgaris    Recurrent urinary tract infection    Renal lesion    Right lumbar radiculopathy    Rosacea, unspecified    Sacroiliac joint dysfunction of right side    Snoring    Spasm of thoracic back muscle    Syncope    Urinary frequency    Panic attacks    Facial swelling    Iron deficiency anemia secondary to inadequate dietary iron intake    Idiopathic steatorrhea (HHS-HCC)       Past Medical History:   Past Medical History:   Diagnosis Date    Acute upper respiratory infection, unspecified 07/26/2016    Viral URI with cough    Acute upper respiratory infection, unspecified 11/15/2017    Viral URI with cough    Allergy status to unspecified drugs, medicaments and biological substances     History of allergy    Chronic maxillary sinusitis 08/27/2018    Maxillary sinusitis, unspecified chronicity    Dystonia, unspecified 04/08/2014    Dystonia    Encounter for other screening for malignant neoplasm of breast 01/22/2018    Breast screening    Encounter for screening for respiratory tuberculosis 11/11/2013    Screening examination for pulmonary tuberculosis    Essential (primary) hypertension 12/27/2017    Benign essential hypertension    Hypersomnia,  unspecified 04/13/2015    Sleeps too much    Idiopathic sleep related nonobstructive alveolar hypoventilation     Nocturnal hypoxemia    Idiopathic sleep related nonobstructive alveolar hypoventilation 02/09/2018    Nocturnal hypoxia    Impaired fasting glucose 01/22/2018    Impaired fasting glucose    Insect bite (nonvenomous) of lower back and pelvis, initial encounter 03/29/2018    Tick bite of back    Left lower quadrant pain 01/07/2019    Left lower quadrant pain    Local infection of the skin and subcutaneous tissue, unspecified 07/28/2017    Skin infection    Low back pain, unspecified 05/23/2019    Acute low back pain    Other conditions influencing health status 02/27/2017    Sprain of right thumb, unspecified site of finger, initial encounter    Other malaise 04/16/2018    Malaise and fatigue    Other microscopic hematuria 03/30/2018    Microscopic hematuria    Other specified cough 09/07/2018    Productive cough    Other specified health status 01/07/2019    Acute medical illness    Other symptoms and signs involving the musculoskeletal system 07/12/2018    Weakness of right lower extremity    Other symptoms and signs involving the musculoskeletal system 03/21/2018    Polyarticular joint involvement    Other symptoms and signs involving the musculoskeletal system 07/12/2018    RUE weakness    Pain in right lower leg 01/27/2016    Right calf pain    Pain in unspecified hip 01/20/2016    Hip pain    Pelvic and perineal pain 04/17/2018    Pelvic pain    Personal history of diseases of the skin and subcutaneous tissue 08/07/2018    History of dermatitis    Personal history of other (healed) physical injury and trauma 08/07/2018    History of insect bite    Personal history of other diseases of the circulatory system     History of hypertension    Personal history of other diseases of the female genital tract 11/24/2015    History of menorrhagia    Personal history of other diseases of the musculoskeletal  system and connective tissue 03/14/2018    History of tendinitis    Personal history of other diseases of the nervous system and sense organs 04/08/2014    History of Parkinson's disease    Personal history of other diseases of the respiratory system 01/07/2019    History of acute bronchitis    Personal history of other diseases of the respiratory system 10/05/2018    History of paranasal sinus pain    Personal history of other specified conditions 04/13/2015    History of snoring    Personal history of other specified conditions 09/11/2017    History of headache    Personal history of other specified conditions 09/07/2018    History of persistent cough    Personal history of other specified conditions 04/08/2014    History of memory loss    Personal history of other specified conditions 03/26/2018    History of left flank pain    Personal history of other specified conditions     History of heartburn    Personal history of urinary calculi 03/26/2018    Personal history of urinary calculi    Personal history of urinary calculi 03/26/2018    History of renal calculi    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/2017    Plantar fasciitis, left    Primary insomnia 04/13/2015    Primary insomnia    Rash and other nonspecific skin eruption 04/16/2018    Rash    Unspecified abdominal pain 01/07/2019    Left sided abdominal pain    Urinary tract infection, site not specified 10/19/2018    UTI (urinary tract infection), bacterial    Urinary tract infection, site not specified 10/18/2018    Recurrent UTI    Vitamin D deficiency, unspecified 04/22/2016    Vitamin D deficiency    Zoster with other complications 11/25/2015    Herpes zoster dermatitis       Surgical History:   Past Surgical History:   Procedure Laterality Date    CT ANGIO CORONARY ART WITH HEARTFLOW IF SCORE >30%  3/18/2020    CT HEART CORONARY ANGIOGRAM 3/18/2020 [facility]    HAND TENDON SURGERY  11/11/2013    Hand Incision Tendon Sheath Of A Finger     HYSTERECTOMY  03/04/2016    Hysterectomy    LITHOTRIPSY  11/11/2013    Renal Lithotripsy    TONSILLECTOMY  12/19/2013    Tonsillectomy With Adenoidectomy        Allergies:   Allergies   Allergen Reactions    Clindamycin Anaphylaxis    Dupilumab Anaphylaxis    Hydrochlorothiazide Anaphylaxis, Itching and Swelling    Sulfamethoxazole-Trimethoprim Anaphylaxis    Cefazolin Hives, Other and Swelling     STATES TONGUE SPLITS    Atorvastatin Hives    Cephalexin Other    Clarithromycin Swelling     hives, tongue swelling    Nitrofurantoin Monohyd/M-Cryst Hives    Sulfa (Sulfonamide Antibiotics) Hives    Sulfacetamide Hives       Medications:   Current Outpatient Medications:     albuterol 2.5 mg /3 mL (0.083 %) nebulizer solution, inhale the contents of one vial via nebulizer every 4 hours as needed, Disp: , Rfl:     allopurinol (Zyloprim) 100 mg tablet, Take 1 tablet (100 mg) by mouth once daily., Disp: 90 tablet, Rfl: 0    allopurinol (Zyloprim) 300 mg tablet, Take 1 tablet (300 mg) by mouth once daily., Disp: 90 tablet, Rfl: 0    ALPRAZolam (Xanax) 0.5 mg tablet, Take 1 tablet (0.5 mg) by mouth 3 times a day as needed for sleep or anxiety., Disp: 21 tablet, Rfl: 2    amLODIPine (Norvasc) 2.5 mg tablet, Take 1 tablet (2.5 mg) by mouth once daily., Disp: , Rfl:     amoxicillin (Amoxil) 500 mg capsule, Take 1 capsule (500 mg) by mouth once daily., Disp: , Rfl:     ascorbic acid, vitamin C, 500 mg capsule, Take 1 capsule by mouth once daily., Disp: , Rfl:     benzoyl peroxide (Benzac AC) 10 % external wash, Apply topically if needed., Disp: , Rfl:     buPROPion XL (Wellbutrin XL) 150 mg 24 hr tablet, Take 1 tablet (150 mg) by mouth once daily. Do not crush, chew, or split., Disp: 90 tablet, Rfl: 3    buPROPion XL (Wellbutrin XL) 300 mg 24 hr tablet, Take 1 tablet (300 mg) by mouth once daily in the morning. Do not crush, chew, or split., Disp: 90 tablet, Rfl: 3    carbidopa-levodopa-entacapone (Stalevo) -200 mg tablet,  Take 1 tablet by mouth every 4 hours., Disp: 540 tablet, Rfl: 1    carvedilol (Coreg) 12.5 mg tablet, Take 1 tablet (12.5 mg) by mouth 2 times daily (morning and late afternoon) for 7 days, THEN 0.5 tablets (6.25 mg) 2 times daily (morning and late afternoon) for 14 days., Disp: 28 tablet, Rfl: 1    cetirizine-pseudoephedrine (ZyrTEC-D) 5-120 mg 12 hr tablet, Take 1 tablet by mouth 2 times a day., Disp: 180 tablet, Rfl: 0    clindamycin (Cleocin T) 1 % lotion, 1 Application, Disp: , Rfl:     clobetasol (Temovate) 0.05 % external solution, Apply topically 2 times a day., Disp: , Rfl:     clobetasoL 0.05 % lotion, One application as needed for flaring only.not for daily use. For scalp and hands only, Disp: 118 mL, Rfl: 3    cloNIDine (Catapres) 0.1 mg tablet, Take 1 tablet (0.1 mg) by mouth if needed for high blood pressure., Disp: , Rfl:     cloNIDine (Catapres) 0.2 mg tablet, Take 1 tablet (0.2 mg) by mouth once daily at bedtime., Disp: 90 tablet, Rfl: 3    Cosentyx Pen, 2 Pens, 150 mg/mL self-injector pen, Inject 2 mL (300 mg) under the skin every 30 (thirty) days., Disp: 2 mL, Rfl: 2    cyanocobalamin, vitamin B-12, 1,000 mcg tablet, sublingual, Place 1 tablet (1,000 mcg) under the tongue once daily., Disp: 90 tablet, Rfl: 1    diclofenac sodium (Voltaren) 1 % gel gel, Apply 4.5 inches (4 g) topically 2 times a day as needed., Disp: , Rfl:     doxepin (SINEquan) 50 mg capsule, Take 1 capsule (50 mg) by mouth once daily at bedtime., Disp: 30 capsule, Rfl: 3    doxycycline (Vibramycin) 100 mg capsule, 1 capsule (100 mg)., Disp: , Rfl:     DULoxetine (Cymbalta) 60 mg DR capsule, Take 1 capsule (60 mg) by mouth once daily., Disp: 90 capsule, Rfl: 1    eplerenone (Inspra) 25 mg tablet, Take 1 tablet (25 mg) by mouth once daily., Disp: , Rfl:     ezetimibe (Zetia) 10 mg tablet, Take 1 tablet (10 mg) by mouth once daily., Disp: 90 tablet, Rfl: 3    fluticasone furoate-vilanteroL (Breo Ellipta) 200-25 mcg/dose inhaler,  1puff daily, Inhalation, Disp: , Rfl:     ipratropium-albuteroL (Duo-Neb) 0.5-2.5 mg/3 mL nebulizer solution, Take 3 mL by nebulization every 4 hours if needed for wheezing., Disp: , Rfl:     ketoconazole (NIZOral) 2 % cream, 1 Application, Disp: , Rfl:     lactobacillus acidophilus (Lactobacillus acidoph-L.bulgar) tablet tablet, Take 1 tablet by mouth once daily., Disp: 90 tablet, Rfl: 0    leflunomide (Arava) 10 mg tablet, Take 1 tablet (10 mg) by mouth once daily., Disp: 90 tablet, Rfl: 0    levalbuterol (Xopenex) 1.25 mg/3 mL nebulizer solution, Take 1 ampule by nebulization 3 times a day., Disp: , Rfl:     metoprolol succinate XL (Toprol-XL) 25 mg 24 hr tablet, Take 2 tablets (50 mg) by mouth once daily., Disp: , Rfl:     metroNIDAZOLE (Metrocream) 0.75 % cream, 1 Application, Disp: , Rfl:     montelukast (Singulair) 10 mg tablet, Take 1 tablet (10 mg) by mouth once daily at bedtime., Disp: , Rfl:     nebulizer and compressor device, use q4-6 hours with albuterol PRN cough/wheeze, Disp: , Rfl:     nebulizers (Devilbiss Disposable Nebulizer) misc, every 4 hours if needed., Disp: , Rfl:     omega-3 acid ethyl esters (Lovaza) 1 gram capsule, Take 2 capsules (2 g) by mouth 2 times a day., Disp: 360 capsule, Rfl: 3    omeprazole (PriLOSEC) 40 mg DR capsule, Take 1 capsule (40 mg) by mouth once daily in the morning before meals., Disp: 90 capsule, Rfl: 3    ondansetron ODT (Zofran-ODT) 4 mg disintegrating tablet, Take 1 tablet (4 mg) by mouth every 8 hours if needed for nausea or vomiting., Disp: 90 tablet, Rfl: 0    orphenadrine (Norflex) 100 mg 12 hr tablet, Take 1 tablet (100 mg) by mouth 2 times a day as needed for muscle spasms., Disp: 60 tablet, Rfl: 3    pioglitazone (Actos) 15 mg tablet, Take 1 tablet (15 mg) by mouth once daily., Disp: , Rfl:     pregabalin (Lyrica) 75 mg capsule, Take 1 capsule (75 mg) by mouth 3 times a day., Disp: 90 capsule, Rfl: 2    ProAir HFA 90 mcg/actuation inhaler, , Disp: , Rfl:      prochlorperazine (Compazine) 10 mg tablet, Take 1 tablet (10 mg) by mouth 3 times a day as needed for nausea., Disp: 30 tablet, Rfl: 0    Repatha SureClick 140 mg/mL injection, Inject 1 mL (140 mg) under the skin every 14 (fourteen) days., Disp: 6 mL, Rfl: 3    Spiriva Respimat 1.25 mcg/actuation inhaler, Inhale once daily., Disp: , Rfl:     SUMAtriptan (Imitrex) 25 mg tablet, Take 1 tablet (25 mg) by mouth 1 time if needed for migraine. May repeat in 2 hours if no improvement. Max 200 mg/24 hr, Disp: 9 tablet, Rfl: 2    tapentadol (Nucynta) 50 mg tablet, Take 1 tablet (50 mg) by mouth 2 times a day as needed for severe pain (7 - 10)., Disp: 60 tablet, Rfl: 0    tezepelumab-ekko (Tezspire) SubQ Pen Injector, Inject 210 mg under the skin., Disp: , Rfl:     tirzepatide, weight loss, (Zepbound) 2.5 mg/0.5 mL injection, Inject 2.5 mg under the skin every 7 days., Disp: 4 mL, Rfl: 1  No current facility-administered medications for this visit.    Facility-Administered Medications Ordered in Other Visits:     albuterol 2.5 mg /3 mL (0.083 %) nebulizer solution 3 mL, 3 mL, nebulization, PRN, Jana Sigala PA-C    dextrose 5 % in water (D5W) bolus, 500 mL, intravenous, PRN, DOMINGA Boyd-JOYCELYN    diphenhydrAMINE (BENADryl) injection 50 mg, 50 mg, intravenous, PRN, DOMINGA Boyd-C    EPINEPHrine (Epipen) injection syringe 0.3 mg, 0.3 mg, intramuscular, q5 min PRN, DOMINGA Boyd-JOYCELYN    famotidine PF (Pepcid) injection 20 mg, 20 mg, intravenous, Once PRN, DOMINGA Boyd-JOYCELYN    methylPREDNISolone sod succinate (SOLU-Medrol) 40 mg/mL injection 40 mg, 40 mg, intravenous, PRN, DOMINGA Boyd-C    sodium chloride 0.9 % bolus 500 mL, 500 mL, intravenous, PRN, Jana Sigala PA-C      Objective   Physical Examination:    Visit Vitals  OB Status Having periods   Smoking Status Never       Procedures :None    Orders:  Orders Placed This Encounter   Procedures    Vitamin D 25-Hydroxy,Total (for eval of Vitamin D levels)     Sedimentation Rate    C-Reactive Protein    Uric Acid        Provider Impression:   Assessment/Plan   Encounter Diagnoses   Name Primary?    Psoriatic arthropathy (Multi) Yes    Gouty arthropathy     Vitamin D deficiency           45 y/o female with Hx of asthma, Parkinsonism/Dystonia, Immunodeficiency disorder (MBL) on prophylaxis antibiotics with amoxicillin present for pain in the right elbow and bilateral knee. Workup shows elevated uric acid levels and positive Family History on the maternal side. underlying gout and possible underlying PSA. MRI show severe lateral epicondylitis. Concerned that this may be due to underlying PSA given the inflammatory findings  -Continue Cosentyx 300 mg monthly  -Continue leflunomide 10 mg  -Continue Allopurinol 400 mg daily  -Reviewed recent labs will check uric acid vitamin D and inflammatory markers  -F/u in 3-4 months

## 2024-10-23 ENCOUNTER — SPECIALTY PHARMACY (OUTPATIENT)
Dept: PHARMACY | Facility: CLINIC | Age: 44
End: 2024-10-23

## 2024-10-23 PROCEDURE — RXMED WILLOW AMBULATORY MEDICATION CHARGE

## 2024-10-28 ENCOUNTER — SPECIALTY PHARMACY (OUTPATIENT)
Dept: PHARMACY | Facility: CLINIC | Age: 44
End: 2024-10-28

## 2024-10-29 ENCOUNTER — PHARMACY VISIT (OUTPATIENT)
Dept: PHARMACY | Facility: CLINIC | Age: 44
End: 2024-10-29
Payer: MEDICAID

## 2024-10-30 ENCOUNTER — SPECIALTY PHARMACY (OUTPATIENT)
Dept: PHARMACY | Facility: HOSPITAL | Age: 44
End: 2024-10-30
Payer: COMMERCIAL

## 2024-10-31 ENCOUNTER — APPOINTMENT (OUTPATIENT)
Dept: CARDIOLOGY | Facility: HOSPITAL | Age: 44
End: 2024-10-31
Payer: MEDICAID

## 2024-10-31 ENCOUNTER — APPOINTMENT (OUTPATIENT)
Dept: PSYCHOLOGY | Facility: CLINIC | Age: 44
End: 2024-10-31
Payer: MEDICAID

## 2024-10-31 DIAGNOSIS — B02.29 POST HERPETIC NEURALGIA: ICD-10-CM

## 2024-10-31 DIAGNOSIS — L40.50 PSORIATIC ARTHROPATHY (MULTI): Primary | ICD-10-CM

## 2024-10-31 DIAGNOSIS — F54 PSYCHOLOGICAL FACTORS AFFECTING MEDICAL CONDITION: ICD-10-CM

## 2024-10-31 DIAGNOSIS — F41.9 ANXIETY: ICD-10-CM

## 2024-10-31 DIAGNOSIS — R41.9 COGNITIVE COMPLAINTS: Primary | ICD-10-CM

## 2024-10-31 DIAGNOSIS — M19.90 INFLAMMATORY ARTHROPATHY: ICD-10-CM

## 2024-10-31 DIAGNOSIS — R41.3 MEMORY DISTURBANCE: ICD-10-CM

## 2024-10-31 PROCEDURE — 99211NT NEUROPYSCH TESTING PENDING FINAL BILLING: Performed by: PSYCHOLOGIST

## 2024-10-31 NOTE — PROGRESS NOTES
Neuropsychology Evaluation    Name: Nichelle Izaguirre  : 1980  MRN: [id number]  Referring Provider: DEBBY Yadav    Date of Service: 10/31/2024      Reason for Visit: Ms. Izaguirre was referred for neuropsychological evaluation due to memory difficulty and concern regarding cognitive decline.    Summary/Impressions: The evaluation was complicated, somewhat, by variable performance validity, suggesting a degree of interference from non-neurological (i.e., emotional, behavioral, and/or motivational) factors that affected test performances (which often lacked internal consistency, with wide variability in test scores of the same cognitive domain). As such, performances on testing might reflect her current level of functioning but cannot be assumed to represent her true capabilities. Consequently, only performances falling within expected ranges (or better) can be interpreted with some degree of confidence, and performances falling below expectation cannot be confidently interpreted as evidence of artur impairment.      DIAGNOSTIC IMPRESSIONS:   Cognitive Complaints      Anxiety      Psychological Factors Affecting Medical Condition    The overall clinical picture is not particularly convincing for artur neurologically-based cognitive impairment; rather, the neuropsychological profile is most consistent with intermittent interference from behavioral, motivational, psychological, and/or constitutional factors (e.g., stress, depression, anxiety, fatigue, medication effects) on task engagement.     In Ms. Izaguirre's daily life, stress, depression, anxiety, fluctuating mood, preoccupation with pain/somatic discomfort, medication effects, non-restorative sleep, and daytime fatigue are likely contributing to her experience of cognitive difficulty. Even minimally, these factors may intermittently reduce cognitive effectiveness, typically by depleting cognitive/mental resources and interfering with normal  attention and efficiency of information processing. With attention disturbance, memories are poor due to a failure to encode information versus a loss of learned material. Stress has also been shown to impair the retrieval of stored information. In particular, Ms. Izaguirre's description of cognitive difficulties in daily life seem like lapses in attention and diminished cognitive effectiveness. Psychological/situational factors can also compromise other abilities (e.g., performing complex detail-oriented tasks, attending to multiple tasks at once, acquiring new information, and organizing thoughts/speech) and hinder the ability to adapt to, or develop compensatory strategies for, any subtle cognitive weaknesses/changes that may be present.     Given Ms. Izaguirre’s age and reported symptoms of perimenopause, there may be hormonal contributions to perceived memory difficulties and the present neuropsychological profile. Specifically, cognitive complaints are common during the menopausal transition; research suggests that this process can be associated with fluctuations in aspects of learning/memory as a direct result of hormonal changes (which typically subside as women enter the postmenopausal stage).       Thank you for the opportunity to participate in Ms. Izaguirre's evaluation and care. If I can be of further assistance, please do not hesitate to contact me at [phone].      -- EXTENDED REPORT --      History of Presenting Illness: Ms. Izaguirre is a 44 year-old, right-handed,  female, referred by her PCP (DEBBY Starr) for neuropsychological evaluation of memory difficulty.     RELEVANT LABS/EXAMS (with radiologist impressions of neuroimaging):         -  Brain MRI w/o IV contrast from 9/24/2013 (related to headache and tremor): Unremarkable exam.   -  DaTscan from 9/23/2014: Normal distribution of activity within the bilateral basal ganglia.   -  Head CT w/o IV contrast from 5/23/2017  "(related to upper extremity weakness): No evidence of acute intracranial hemorrhage or mass effect.    -  Brain MRI w/o IV contrast from 1/28/2021 (related to R leg weakness, tingling, syncope): \"Overall, there has been no measurable change from December 23, 2016.\" Findings: A subtle linear focus of increased signal on FLAIR and T2 weighted imaging in the periventricular white matter which again demonstrates a perpendicular orientation with the posterior body of the left lateral ventricle. No diffusion restriction abnormality to suggest acute infarct. No mass effect or midline shift.    Patient Active Problem List   Diagnosis    Allergic rhinitis    Anxiety disorder    Arthralgia of multiple sites    Bariatric surgery status    Benign essential hypertension    Bowel disease, inflammatory    Immunodeficiency syndrome (Multi)    Chronic diarrhea    Continuous LLQ abdominal pain    Depression    Essential familial hyperlipidemia    GERD (gastroesophageal reflux disease)    Gouty arthropathy    Hoarseness    Hot flashes    Hyperlipidemia    Hypertriglyceridemia    Hypothyroidism (acquired)    Inflammatory arthropathy    Rheumatoid arthritis    Hypersomnia    Labile hypertension    Low vitamin D level    Lyme disease    Mannose-binding lectin deficiency (Multi)    Methadone use    Migraine without status migrainosus, not intractable    Morbid obesity (Multi)    Mixed incontinence urge and stress    Osteoporosis    Obstructive sleep apnea, adult    DRD (DOPA-responsive dystonia)    Post herpetic neuralgia    Polyphagia    POTS (postural orthostatic tachycardia syndrome)    Prediabetes    Psoriatic arthropathy (Multi)    Steroid withdrawal syndrome with complication (Multi)    Steroid-dependent asthma ([facility])    Vitamin B12 deficiency    Vitamin D deficiency    Gait disturbance    Bilateral leg edema    Cystocele with uterine descensus    Dyspnea on effort    Elbow tendinitis    Foreign body in ear, right, initial " encounter    Frequent falls    Hyperglycemia    Hyperuricemia    Incomplete bladder emptying    Insomnia, organic    Intermittent palpitations    Lower back pain    Maxillary sinusitis    Myofascial pain    Orthostatic intolerance    Other ovarian cyst, unspecified side    Pelvic floor weakness    Perioral dermatitis    Peripheral neuropathy    Postural dizziness    Psoriasis vulgaris    Recurrent urinary tract infection    Renal lesion    Right lumbar radiculopathy    Rosacea, unspecified    Sacroiliac joint dysfunction of right side    Snoring    Spasm of thoracic back muscle    Syncope    Urinary frequency    Panic attacks    Facial swelling    Iron deficiency anemia secondary to inadequate dietary iron intake    Idiopathic steatorrhea ([facility])     Past Medical History:   Diagnosis Date    Acute upper respiratory infection, unspecified 07/26/2016    Viral URI with cough    Acute upper respiratory infection, unspecified 11/15/2017    Viral URI with cough    Allergy status to unspecified drugs, medicaments and biological substances     History of allergy    Chronic maxillary sinusitis 08/27/2018    Maxillary sinusitis, unspecified chronicity    Dystonia, unspecified 04/08/2014    Dystonia    Encounter for other screening for malignant neoplasm of breast 01/22/2018    Breast screening    Encounter for screening for respiratory tuberculosis 11/11/2013    Screening examination for pulmonary tuberculosis    Essential (primary) hypertension 12/27/2017    Benign essential hypertension    Hypersomnia, unspecified 04/13/2015    Sleeps too much    Idiopathic sleep related nonobstructive alveolar hypoventilation     Nocturnal hypoxemia    Idiopathic sleep related nonobstructive alveolar hypoventilation 02/09/2018    Nocturnal hypoxia    Impaired fasting glucose 01/22/2018    Impaired fasting glucose    Insect bite (nonvenomous) of lower back and pelvis, initial encounter 03/29/2018    Tick bite of back    Left lower  quadrant pain 01/07/2019    Left lower quadrant pain    Local infection of the skin and subcutaneous tissue, unspecified 07/28/2017    Skin infection    Low back pain, unspecified 05/23/2019    Acute low back pain    Other conditions influencing health status 02/27/2017    Sprain of right thumb, unspecified site of finger, initial encounter    Other malaise 04/16/2018    Malaise and fatigue    Other microscopic hematuria 03/30/2018    Microscopic hematuria    Other specified cough 09/07/2018    Productive cough    Other specified health status 01/07/2019    Acute medical illness    Other symptoms and signs involving the musculoskeletal system 07/12/2018    Weakness of right lower extremity    Other symptoms and signs involving the musculoskeletal system 03/21/2018    Polyarticular joint involvement    Other symptoms and signs involving the musculoskeletal system 07/12/2018    RUE weakness    Pain in right lower leg 01/27/2016    Right calf pain    Pain in unspecified hip 01/20/2016    Hip pain    Pelvic and perineal pain 04/17/2018    Pelvic pain    Personal history of diseases of the skin and subcutaneous tissue 08/07/2018    History of dermatitis    Personal history of other (healed) physical injury and trauma 08/07/2018    History of insect bite    Personal history of other diseases of the circulatory system     History of hypertension    Personal history of other diseases of the female genital tract 11/24/2015    History of menorrhagia    Personal history of other diseases of the musculoskeletal system and connective tissue 03/14/2018    History of tendinitis    Personal history of other diseases of the nervous system and sense organs 04/08/2014    History of Parkinson's disease    Personal history of other diseases of the respiratory system 01/07/2019    History of acute bronchitis    Personal history of other diseases of the respiratory system 10/05/2018    History of paranasal sinus pain    Personal history  of other specified conditions 04/13/2015    History of snoring    Personal history of other specified conditions 09/11/2017    History of headache    Personal history of other specified conditions 09/07/2018    History of persistent cough    Personal history of other specified conditions 04/08/2014    History of memory loss    Personal history of other specified conditions 03/26/2018    History of left flank pain    Personal history of other specified conditions     History of heartburn    Personal history of urinary calculi 03/26/2018    Personal history of urinary calculi    Personal history of urinary calculi 03/26/2018    History of renal calculi    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/2017    Plantar fasciitis, left    Primary insomnia 04/13/2015    Primary insomnia    Rash and other nonspecific skin eruption 04/16/2018    Rash    Unspecified abdominal pain 01/07/2019    Left sided abdominal pain    Urinary tract infection, site not specified 10/19/2018    UTI (urinary tract infection), bacterial    Urinary tract infection, site not specified 10/18/2018    Recurrent UTI    Vitamin D deficiency, unspecified 04/22/2016    Vitamin D deficiency    Zoster with other complications 11/25/2015    Herpes zoster dermatitis     Current Outpatient Medications:     albuterol 2.5 mg /3 mL (0.083 %) nebulizer solution, inhale the contents of one vial via nebulizer every 4 hours as needed, Disp: , Rfl:     allopurinol (Zyloprim) 100 mg tablet, Take 1 tablet (100 mg) by mouth once daily., Disp: 90 tablet, Rfl: 0    allopurinol (Zyloprim) 300 mg tablet, Take 1 tablet (300 mg) by mouth once daily., Disp: 90 tablet, Rfl: 0    ALPRAZolam (Xanax) 0.5 mg tablet, Take 1 tablet (0.5 mg) by mouth 3 times a day as needed for sleep or anxiety., Disp: 21 tablet, Rfl: 2    amLODIPine (Norvasc) 2.5 mg tablet, Take 1 tablet (2.5 mg) by mouth once daily., Disp: , Rfl:     amoxicillin (Amoxil) 500 mg capsule, Take 1 capsule (500 mg) by mouth once  daily., Disp: , Rfl:     ascorbic acid, vitamin C, 500 mg capsule, Take 1 capsule by mouth once daily., Disp: , Rfl:     benzoyl peroxide (Benzac AC) 10 % external wash, Apply topically if needed., Disp: , Rfl:     buPROPion XL (Wellbutrin XL) 150 mg 24 hr tablet, Take 1 tablet (150 mg) by mouth once daily. Do not crush, chew, or split., Disp: 90 tablet, Rfl: 3    buPROPion XL (Wellbutrin XL) 300 mg 24 hr tablet, Take 1 tablet (300 mg) by mouth once daily in the morning. Do not crush, chew, or split., Disp: 90 tablet, Rfl: 3    carbidopa-levodopa-entacapone (Stalevo) -200 mg tablet, Take 1 tablet by mouth every 4 hours., Disp: 540 tablet, Rfl: 1    carvedilol (Coreg) 12.5 mg tablet, Take 1 tablet (12.5 mg) by mouth 2 times daily (morning and late afternoon) for 7 days, THEN 0.5 tablets (6.25 mg) 2 times daily (morning and late afternoon) for 14 days., Disp: 28 tablet, Rfl: 1    cetirizine-pseudoephedrine (ZyrTEC-D) 5-120 mg 12 hr tablet, Take 1 tablet by mouth 2 times a day., Disp: 180 tablet, Rfl: 0    clindamycin (Cleocin T) 1 % lotion, 1 Application, Disp: , Rfl:     clobetasol (Temovate) 0.05 % external solution, Apply topically 2 times a day., Disp: , Rfl:     clobetasoL 0.05 % lotion, One application as needed for flaring only.not for daily use. For scalp and hands only, Disp: 118 mL, Rfl: 3    cloNIDine (Catapres) 0.1 mg tablet, Take 1 tablet (0.1 mg) by mouth if needed for high blood pressure., Disp: , Rfl:     cloNIDine (Catapres) 0.2 mg tablet, Take 1 tablet (0.2 mg) by mouth once daily at bedtime., Disp: 90 tablet, Rfl: 3    Cosentyx Pen, 2 Pens, 150 mg/mL self-injector pen, Inject 2 mL (300 mg) under the skin every 30 (thirty) days., Disp: 2 mL, Rfl: 2    cyanocobalamin, vitamin B-12, 1,000 mcg tablet, sublingual, Place 1 tablet (1,000 mcg) under the tongue once daily., Disp: 90 tablet, Rfl: 1    diclofenac sodium (Voltaren) 1 % gel gel, Apply 4.5 inches (4 g) topically 2 times a day as needed.,  Disp: , Rfl:     doxepin (SINEquan) 50 mg capsule, Take 1 capsule (50 mg) by mouth once daily at bedtime., Disp: 30 capsule, Rfl: 3    doxycycline (Vibramycin) 100 mg capsule, 1 capsule (100 mg)., Disp: , Rfl:     DULoxetine (Cymbalta) 60 mg DR capsule, Take 1 capsule (60 mg) by mouth once daily., Disp: 90 capsule, Rfl: 1    eplerenone (Inspra) 25 mg tablet, Take 1 tablet (25 mg) by mouth once daily., Disp: , Rfl:     ezetimibe (Zetia) 10 mg tablet, Take 1 tablet (10 mg) by mouth once daily., Disp: 90 tablet, Rfl: 3    fluticasone furoate-vilanteroL (Breo Ellipta) 200-25 mcg/dose inhaler, 1puff daily, Inhalation, Disp: , Rfl:     ipratropium-albuteroL (Duo-Neb) 0.5-2.5 mg/3 mL nebulizer solution, Take 3 mL by nebulization every 4 hours if needed for wheezing., Disp: , Rfl:     ketoconazole (NIZOral) 2 % cream, 1 Application, Disp: , Rfl:     lactobacillus acidophilus (Lactobacillus acidoph-L.bulgar) tablet tablet, Take 1 tablet by mouth once daily., Disp: 90 tablet, Rfl: 0    leflunomide (Arava) 10 mg tablet, Take 1 tablet (10 mg) by mouth once daily., Disp: 90 tablet, Rfl: 0    levalbuterol (Xopenex) 1.25 mg/3 mL nebulizer solution, Take 1 ampule by nebulization 3 times a day., Disp: , Rfl:     metoprolol succinate XL (Toprol-XL) 25 mg 24 hr tablet, Take 2 tablets (50 mg) by mouth once daily., Disp: , Rfl:     metroNIDAZOLE (Metrocream) 0.75 % cream, 1 Application, Disp: , Rfl:     montelukast (Singulair) 10 mg tablet, Take 1 tablet (10 mg) by mouth once daily at bedtime., Disp: , Rfl:     nebulizer and compressor device, use q4-6 hours with albuterol PRN cough/wheeze, Disp: , Rfl:     nebulizers (Devilbiss Disposable Nebulizer) misc, every 4 hours if needed., Disp: , Rfl:     omega-3 acid ethyl esters (Lovaza) 1 gram capsule, Take 2 capsules (2 grams) by mouth 2 times a day., Disp: 360 capsule, Rfl: 3    omeprazole (PriLOSEC) 40 mg DR capsule, Take 1 capsule (40 mg) by mouth once daily in the morning before  meals., Disp: 90 capsule, Rfl: 3    ondansetron ODT (Zofran-ODT) 4 mg disintegrating tablet, Take 1 tablet (4 mg) by mouth every 8 hours if needed for nausea or vomiting., Disp: 90 tablet, Rfl: 0    orphenadrine (Norflex) 100 mg 12 hr tablet, Take 1 tablet (100 mg) by mouth 2 times a day as needed for muscle spasms., Disp: 60 tablet, Rfl: 3    pioglitazone (Actos) 15 mg tablet, Take 1 tablet (15 mg) by mouth once daily., Disp: , Rfl:     pregabalin (Lyrica) 75 mg capsule, Take 1 capsule (75 mg) by mouth 3 times a day., Disp: 90 capsule, Rfl: 2    ProAir HFA 90 mcg/actuation inhaler, , Disp: , Rfl:     prochlorperazine (Compazine) 10 mg tablet, Take 1 tablet (10 mg) by mouth 3 times a day as needed for nausea., Disp: 30 tablet, Rfl: 0    Repatha SureClick 140 mg/mL injection, Inject 1 mL (140 mg) under the skin every 14 (fourteen) days., Disp: 6 mL, Rfl: 3    Spiriva Respimat 1.25 mcg/actuation inhaler, Inhale once daily., Disp: , Rfl:     SUMAtriptan (Imitrex) 25 mg tablet, Take 1 tablet (25 mg) by mouth 1 time if needed for migraine. May repeat in 2 hours if no improvement. Max 200 mg/24 hr, Disp: 9 tablet, Rfl: 2    tezepelumab-ekko (Tezspire) SubQ Pen Injector, Inject 210 mg under the skin., Disp: , Rfl:     tirzepatide, weight loss, (Zepbound) 2.5 mg/0.5 mL injection, Inject 2.5 mg under the skin every 7 days., Disp: 4 mL, Rfl: 1    Procedures/Tests:  Review of available records; Adult Neuropsychology History Form; Clinical interview with Ms. Izaguirre;  Beck Anxiety Inventory (VANESSA)  Beck Depression Inventory (BDI-2)  Natchez Naming Test-Second Edition (BNT-2)  Brief Visuospatial Memory Test-Revised (BVMT-R) - Form 1   [place] Verbal Learning Test-Second Edition (CVLT-II)  Dot Counting Test (DCT)  Finger Tapping Test  [facility] Personality Inventory-2nd Edition-Restructured Form (MMPI-2-RF)  Stroop Color and Word Test (Stroop)  Trail Making Test (TMT)  Verbal fluency tests (FAS and Animals)  Wechsler  Adult Intelligence Scale-Fourth Edition (WAIS-IV) Digit Span, Symbol Search, Coding, and Matrix Reasoning subtests  Wechsler Memory Scale-Fourth Edition (WMS-IV) Logical Memory  Wechsler Test of Adult Reading (WTAR)  [place] Card Sorting Test (WCST)    The purpose of this evaluation was reviewed, as were procedural details, issues related to confidentiality, and options for receiving feedback regarding results. All questions were answered; Ms. Izaguirre verbalized understanding and agreed to proceed with this evaluation.    All testing was administered by a psychometrist; results were interpreted in the context of the appropriate normative data.     Behavioral Observations/Neurobehavioral Status Exam: Ms. Izaguirre arrived for the appointment on time and unaccompanied. She was casually dressed and appropriately groomed. Gait was steady/unremarkable. There were no apparent involuntary motor movements. She was alert and fully oriented to self, situation, place, and time. She wore glasses; vision was reportedly adequate for testing. Hearing and receptive language appeared intact on informal observation. Conversational speech was fluent and normal in rate, prosody, and volume, with no apparent language abnormalities. Ms. Izaguirre was open and responsive to questions, with detailed recall of autobiographical and clinical information/events. Expressed thoughts were logical and goal-directed. Affect was largely restricted and dysphoric. Ms. Izaguirre appeared to give up easily on memory measures and was inconsistently responsive to encouragement. She was otherwise cooperative in completing evaluation procedures.

## 2024-11-04 ENCOUNTER — OFFICE VISIT (OUTPATIENT)
Dept: CARDIOLOGY | Facility: HOSPITAL | Age: 44
End: 2024-11-04
Payer: COMMERCIAL

## 2024-11-04 VITALS
WEIGHT: 226 LBS | DIASTOLIC BLOOD PRESSURE: 92 MMHG | BODY MASS INDEX: 37.02 KG/M2 | OXYGEN SATURATION: 99 % | HEART RATE: 96 BPM | SYSTOLIC BLOOD PRESSURE: 142 MMHG

## 2024-11-04 DIAGNOSIS — E66.01 MORBID OBESITY (MULTI): ICD-10-CM

## 2024-11-04 DIAGNOSIS — E78.49 ESSENTIAL FAMILIAL HYPERLIPIDEMIA: ICD-10-CM

## 2024-11-04 DIAGNOSIS — E78.1 HYPERTRIGLYCERIDEMIA: ICD-10-CM

## 2024-11-04 DIAGNOSIS — R73.03 PRE-DIABETES: ICD-10-CM

## 2024-11-04 DIAGNOSIS — I10 BENIGN ESSENTIAL HYPERTENSION: Primary | ICD-10-CM

## 2024-11-04 DIAGNOSIS — G47.33 OBSTRUCTIVE SLEEP APNEA, ADULT: ICD-10-CM

## 2024-11-04 DIAGNOSIS — G90.A POTS (POSTURAL ORTHOSTATIC TACHYCARDIA SYNDROME): ICD-10-CM

## 2024-11-04 PROCEDURE — 99214 OFFICE O/P EST MOD 30 MIN: CPT | Performed by: INTERNAL MEDICINE

## 2024-11-04 PROCEDURE — 3077F SYST BP >= 140 MM HG: CPT | Performed by: INTERNAL MEDICINE

## 2024-11-04 PROCEDURE — 93005 ELECTROCARDIOGRAM TRACING: CPT | Performed by: INTERNAL MEDICINE

## 2024-11-04 PROCEDURE — 99417 PROLNG OP E/M EACH 15 MIN: CPT | Performed by: INTERNAL MEDICINE

## 2024-11-04 PROCEDURE — 93010 ELECTROCARDIOGRAM REPORT: CPT | Performed by: INTERNAL MEDICINE

## 2024-11-04 PROCEDURE — 3080F DIAST BP >= 90 MM HG: CPT | Performed by: INTERNAL MEDICINE

## 2024-11-04 RX ORDER — AMLODIPINE BESYLATE 5 MG/1
5 TABLET ORAL DAILY
Qty: 90 TABLET | Refills: 3 | Status: SHIPPED | OUTPATIENT
Start: 2024-11-04 | End: 2025-11-04

## 2024-11-04 NOTE — PATIENT INSTRUCTIONS
1. Obstructive sleep apnea -unable to use  CPAP since COVID- to go back to sleep medicine to explore further treatment options . This is important because untreated sleep apnea makes it difficult to control your blood pressure  2. hyperlipidemia- At FH levels. Prior labs in 5/2024 with poorly controlled LDL. Continue repatha zetia and  vascepa. Due for recheck of lipid panel.   3. Morbid Obesity-Was working with Dr Gentile's group trying to get pharmacy coverage for meds for medical weight loss.   4. Hypertension- will review her BP meds with Dr Gentile. Her BP iis worse recently due to more pain ( pain management is decreasing her pain meds) and she is no longer using her CPAP. She will go back to sleep medicine. Also given her recent diagnosis of POTs, not likely to tolerate carvedilol well. Will likely transition form carvedilol to metoprolol. Will increase her amlodipine dose from 2.5 mg daily up to 5 mg daily. Will call patient when meds are sorted out and plan is determined.   5.  Palpitations . Also  dx of POTs. To continue use support stockings/socks( [employer]) as well as volume expanders. Also had one episode of syncope last fall without any warning unable to wear 30 day monitor due to extreme reaction to adhesives. Again had another syncopal episode in March. Will consider placing loop recorder. ( To discuss with Dr Marie) . Echo technically difficult though normal LV systolic function. Discussed possible loop recorder with Dr Marie who felt most likely due to dysautonomia rather than arrhythmia. Would benefit from seeing a new POTs neurologist to help with her care ( prior MD has left [facility]) will refer to Dr Cherry.   Return to clinic 3 months

## 2024-11-04 NOTE — PROGRESS NOTES
Primary Care Physician: NAYELI Yadav-CNP      Date of Visit: 11/04/2024 11:00 AM EST  Location of visit: [facility]   Type of Visit: Established Patient     HPI / Summary:   Patient is a 44 year old woman with HTN, hyperlipidemia ( at FH levels) with strong family history of CAD , with morbid obesity, h/o asthma, GUICHO ( using CPAP) and MBL with statin intolerance who had a syncopal episode( in November 2020, still with significant issues with joint pain and uncontrolled HTN also s/p COVID infection in late Dec 2021 who returns for followup.     Patient with significant issues with pain from MBL who has had issues controlling her BP when pain is worst. BP meds have been adjusted over time. The higher BP in the afternoon did correlate when her pain is worse. Continues to work with pain management     Cardiac work up :  ST scan for CAC: total 0  Cardiac Echo Jan 23 2020: normal LV size and systolic function with LVEF 65-70% technically difficult study. impaired relaxation, no significant valvular pathology.  Event monitor from 11/19/2020-12/19/2020: min HR 70 bpm, max 132, no SVEs VEs noted. skipped beats, lightheadedness and heart racing corresponded to sinus rhythm and sinus tachycardia.   CTA with heart flow 3/18/2020: normal chamber sizes, normal coronary anatomy without evidence for atherosclerotic changes or stenotic disease.   Renal artery ultrasound 1/23/2020- no evidence for renal artery stenosis. bilateral renal veins widely patent.  VMA and metanephrines were normal, TSH was normal.      She has had syncopal episodes over the years.  She wore a 30 day( Nov 2020) monitor and log notes occasional skipped beats, some SOB and CP and heart racing. Monitor during those episodes showed sinus rhythm and sinus tachycardia, and did not show any worrisome arrhythmias. Had not had syncopal episodes from Nov 2020 til at some point in  2022. She recently was diagnosed with POTs ( ? Testing) and was working  with a neurologist. Most recently had a syncopal episodes without any warning Oct 2023. ( Holding grandchild, fell and broke a table in child's room). More recently she does note an increase in her palpitations. Heart races for seconds then stops. Had tried to wear 30 day live monitor but had significant skin reaction to any leads( even sensitive leads) so unable to wear for any extended period of time. No recurrent syncopal episodes since that time. ( If any further events will need a loop recorder)   She previously was using  CPAP for GUICHO, but has not been able to tolerate following COVID. She still needs to return to sleep medicine.  Has hyperlipidemia at FH levels and is statin intolerant and had been on  repatha . Also has elevated triglycerides and was on over the counter fish oil, but could not tolerate due to reflux issues and vomiting. She had tolerated vascepa in the past though could not afford so d/cd.      She saw Dr Gentile for medical weight. On meds ( monjaro) she had initially lost 55 lbs, however insurance stopped paying for meds and could not afford . Off meds she has regained 35 lbs. She does no  exercise due to pain.  She intermittently has LE swelling. She uses support stockings. She denies CP or SOB at rest or with ADLs. IN the past she typically needed to use the additional dose of 0.1 mg clonidine 1-2 x per week. Additionally she  occasionally got dizzy and had low BP which she would treat with volume expanders( Gatorade etc) and uses support socks about 1-2 x per week.      She was on carvedilol but her neurologist who diagnosed her with POTs reportedly added metoprolol on top of that for POTs. That MD is no longer taking care of the pateint and she says she is in need of a new neurologist for POTs.   More recently her BP remains variable ranging from 102/50mmHg up to 180/110mmHg Typically 150/85-110mmHg. Her BP is higher than it has been since she can no longer tolrate CPAP so she is not  using it and her pain meds are being titrated down, so she has much more pain. She does have headaches when her BP is high. She is no longer taking wegovy or mounjaro and her weight has increased recently. In August prior to reductions in her pain meds, 24 hour BP monitor showed stage 1 HTN with borderline control. With abnormal nocturnal dipping. Systolic BP highest at 169mmHg and lowest 92 mmHg. Highest diatolic , and lowest 48mmH. AM average 137/82mmHg and /81mmHg. Pt feels her BP has increased since that time.              ROS: Full 10 pt review of symptoms of negative unless discussed above.     Problems:   Patient Active Problem List   Diagnosis    Allergic rhinitis    Anxiety disorder    Arthralgia of multiple sites    Bariatric surgery status    Benign essential hypertension    Bowel disease, inflammatory    Immunodeficiency syndrome (Multi)    Chronic diarrhea    Continuous LLQ abdominal pain    Depression    Essential familial hyperlipidemia    GERD (gastroesophageal reflux disease)    Gouty arthropathy    Hoarseness    Hot flashes    Hyperlipidemia    Hypertriglyceridemia    Hypothyroidism (acquired)    Inflammatory arthropathy    Rheumatoid arthritis    Hypersomnia    Labile hypertension    Low vitamin D level    Lyme disease    Mannose-binding lectin deficiency (Multi)    Methadone use    Migraine without status migrainosus, not intractable    Morbid obesity (Multi)    Mixed incontinence urge and stress    Osteoporosis    Obstructive sleep apnea, adult    DRD (DOPA-responsive dystonia)    Post herpetic neuralgia    Polyphagia    POTS (postural orthostatic tachycardia syndrome)    Prediabetes    Psoriatic arthropathy (Multi)    Steroid withdrawal syndrome with complication (Multi)    Steroid-dependent asthma (HHS-HCC)    Vitamin B12 deficiency    Vitamin D deficiency    Gait disturbance    Bilateral leg edema    Cystocele with uterine descensus    Dyspnea on effort    Elbow tendinitis     Foreign body in ear, right, initial encounter    Frequent falls    Hyperglycemia    Hyperuricemia    Incomplete bladder emptying    Insomnia, organic    Intermittent palpitations    Lower back pain    Maxillary sinusitis    Myofascial pain    Orthostatic intolerance    Other ovarian cyst, unspecified side    Pelvic floor weakness    Perioral dermatitis    Peripheral neuropathy    Postural dizziness    Psoriasis vulgaris    Recurrent urinary tract infection    Renal lesion    Right lumbar radiculopathy    Rosacea, unspecified    Sacroiliac joint dysfunction of right side    Snoring    Spasm of thoracic back muscle    Syncope    Urinary frequency    Panic attacks    Facial swelling    Iron deficiency anemia secondary to inadequate dietary iron intake    Idiopathic steatorrhea (HHS-HCC)     Medical History:   Past Medical History:   Diagnosis Date    Acute upper respiratory infection, unspecified 07/26/2016    Viral URI with cough    Acute upper respiratory infection, unspecified 11/15/2017    Viral URI with cough    Allergy status to unspecified drugs, medicaments and biological substances     History of allergy    Chronic maxillary sinusitis 08/27/2018    Maxillary sinusitis, unspecified chronicity    Dystonia, unspecified 04/08/2014    Dystonia    Encounter for other screening for malignant neoplasm of breast 01/22/2018    Breast screening    Encounter for screening for respiratory tuberculosis 11/11/2013    Screening examination for pulmonary tuberculosis    Essential (primary) hypertension 12/27/2017    Benign essential hypertension    Hypersomnia, unspecified 04/13/2015    Sleeps too much    Idiopathic sleep related nonobstructive alveolar hypoventilation     Nocturnal hypoxemia    Idiopathic sleep related nonobstructive alveolar hypoventilation 02/09/2018    Nocturnal hypoxia    Impaired fasting glucose 01/22/2018    Impaired fasting glucose    Insect bite (nonvenomous) of lower back and pelvis, initial  encounter 03/29/2018    Tick bite of back    Left lower quadrant pain 01/07/2019    Left lower quadrant pain    Local infection of the skin and subcutaneous tissue, unspecified 07/28/2017    Skin infection    Low back pain, unspecified 05/23/2019    Acute low back pain    Other conditions influencing health status 02/27/2017    Sprain of right thumb, unspecified site of finger, initial encounter    Other malaise 04/16/2018    Malaise and fatigue    Other microscopic hematuria 03/30/2018    Microscopic hematuria    Other specified cough 09/07/2018    Productive cough    Other specified health status 01/07/2019    Acute medical illness    Other symptoms and signs involving the musculoskeletal system 07/12/2018    Weakness of right lower extremity    Other symptoms and signs involving the musculoskeletal system 03/21/2018    Polyarticular joint involvement    Other symptoms and signs involving the musculoskeletal system 07/12/2018    RUE weakness    Pain in right lower leg 01/27/2016    Right calf pain    Pain in unspecified hip 01/20/2016    Hip pain    Pelvic and perineal pain 04/17/2018    Pelvic pain    Personal history of diseases of the skin and subcutaneous tissue 08/07/2018    History of dermatitis    Personal history of other (healed) physical injury and trauma 08/07/2018    History of insect bite    Personal history of other diseases of the circulatory system     History of hypertension    Personal history of other diseases of the female genital tract 11/24/2015    History of menorrhagia    Personal history of other diseases of the musculoskeletal system and connective tissue 03/14/2018    History of tendinitis    Personal history of other diseases of the nervous system and sense organs 04/08/2014    History of Parkinson's disease    Personal history of other diseases of the respiratory system 01/07/2019    History of acute bronchitis    Personal history of other diseases of the respiratory system 10/05/2018     History of paranasal sinus pain    Personal history of other specified conditions 04/13/2015    History of snoring    Personal history of other specified conditions 09/11/2017    History of headache    Personal history of other specified conditions 09/07/2018    History of persistent cough    Personal history of other specified conditions 04/08/2014    History of memory loss    Personal history of other specified conditions 03/26/2018    History of left flank pain    Personal history of other specified conditions     History of heartburn    Personal history of urinary calculi 03/26/2018    Personal history of urinary calculi    Personal history of urinary calculi 03/26/2018    History of renal calculi    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/2017    Plantar fasciitis, left    Primary insomnia 04/13/2015    Primary insomnia    Rash and other nonspecific skin eruption 04/16/2018    Rash    Unspecified abdominal pain 01/07/2019    Left sided abdominal pain    Urinary tract infection, site not specified 10/19/2018    UTI (urinary tract infection), bacterial    Urinary tract infection, site not specified 10/18/2018    Recurrent UTI    Vitamin D deficiency, unspecified 04/22/2016    Vitamin D deficiency    Zoster with other complications 11/25/2015    Herpes zoster dermatitis     Surgical Hx:   Past Surgical History:   Procedure Laterality Date    CT ANGIO CORONARY ART WITH HEARTFLOW IF SCORE >30%  3/18/2020    CT HEART CORONARY ANGIOGRAM 3/18/2020 [facility]    HAND TENDON SURGERY  11/11/2013    Hand Incision Tendon Sheath Of A Finger    HYSTERECTOMY  03/04/2016    Hysterectomy    LITHOTRIPSY  11/11/2013    Renal Lithotripsy    TONSILLECTOMY  12/19/2013    Tonsillectomy With Adenoidectomy      Social Hx:   Tobacco Use: Low Risk  (11/5/2024)    Patient History     Smoking Tobacco Use: Never     Smokeless Tobacco Use: Never     Passive Exposure: Not on file     Alcohol Use: Not At Risk (8/26/2024)    AUDIT-C      Frequency of Alcohol Consumption: Never     Average Number of Drinks: Patient does not drink     Frequency of Binge Drinking: Never     Family Hx:   Family History   Problem Relation Name Age of Onset    Asthma Mother      Hypertension Mother      Irregular heart beat Mother      Allergies Father      Heart disease Father      Hypertension Father      Sinusitis Father      Allergies Sister      Asthma Daughter      Allergies Son      Heart disease Maternal Grandmother      Breast cancer Paternal Grandmother      Heart disease Paternal Grandfather      Lung cancer Paternal Grandfather      Dementia Paternal Great-Grandfather        Exam:   Vitals:   Vitals:    11/04/24 0825   BP: (!) 142/92   BP Location: Right arm   Pulse: 96   SpO2: 99%   Weight: 103 kg (226 lb)     Wt Readings from Last 5 Encounters:   11/05/24 103 kg (226 lb)   11/04/24 103 kg (226 lb)   10/21/24 103 kg (226 lb 14.4 oz)   10/14/24 102 kg (225 lb 12 oz)   10/07/24 103 kg (226 lb 6.6 oz)      Constitutional:       Appearance: Healthy appearance. Not in distress.   Pulmonary:      Effort: Pulmonary effort is normal.      Breath sounds: Normal breath sounds.   Cardiovascular:      Normal rate. Regular rhythm. S1 with normal intensity. S2 with normal intensity.       Murmurs: There is no murmur.   Edema:     Peripheral edema absent.   Abdominal:      General: Bowel sounds are normal.      Palpations: Abdomen is soft.   Neurological:      Mental Status: Alert and oriented to person, place and time.       Labs:   Recent Labs     11/07/24  1106 09/26/24  1436 08/27/24  0533 08/26/24  0753 08/25/24  0626 08/24/24  1333 07/07/24  1818 06/19/24  1550   WBC 3.8* 7.9 10.7 11.5* 6.0 3.8* 4.2* 4.4   HGB 12.6 10.0* 10.4* 10.3* 10.7* 11.2* 11.6* 11.2*   HCT 39.0 30.9* 33.0* 32.9* 33.4* 33.8* 35.0* 34.3*    242 341 312 309 270 294 282   MCV 92 91 94 97 94 92 90 92     Recent Labs     11/05/24  1101 09/26/24  1436 08/27/24  0533 08/26/24  0753 08/25/24  0626  08/24/24  1333 07/07/24  1818 06/19/24  1550    138 136 136 136 137 139 140   K 4.2 3.8 4.3 3.9 4.4 4.2 3.8 4.4    105 104 105 104 104 103 103   BUN 9 13 19 17 13 14 11 17   CREATININE 0.47* 0.59 0.57 0.54 0.62 0.61 0.63 0.58      Recent Labs     09/26/24  1436 07/01/24  1244 02/21/23  1251 04/09/21  1204 11/19/20  1300 05/27/20  1242 06/20/19  1416   HGBA1C  --   --  4.6 5.0 5.0 5.2 5.1   FERRITIN 20 26  --   --  42  --   --    TIBC 460* 444  --   --  486*  --   --    IRONSAT 16* 16*  --   --  25  --   --      Lab Results   Component Value Date    CHOL 241 (H) 11/05/2024    HDL 46.2 11/05/2024    LDLF 121 (H) 03/10/2023    TRIG 615 (H) 11/05/2024           Notable Studies: imaging personally reviewed and summarized by me below  EKG:  Encounter Date: 11/04/24   ECG 12 lead (Clinic Performed)   Result Value    Ventricular Rate 96    Atrial Rate 96    DE Interval 170    QRS Duration 90    QT Interval 364    QTC Calculation(Bazett) 459    P Axis 37    R Axis 15    T Axis 43    QRS Count 15    Q Onset 217    P Onset 132    P Offset 193    T Offset 399    QTC Fredericia 425    Narrative    Normal sinus rhythm  Normal ECG  When compared with ECG of 04-JAN-2024 11:42,  No significant change was found  Confirmed by Vic Sam ([id number]) on 11/5/2024 9:27:43 AM     Echo 2/5/2024:  PHYSICIAN INTERPRETATION:  Left Ventricle: The left ventricular systolic function is normal, with an estimated ejection fraction of 60-65%. There are no regional wall motion abnormalities. The left ventricular cavity size is normal. Spectral Doppler shows a normal pattern of left ventricular diastolic filling.  Left Atrium: The left atrium is normal in size.  Right Ventricle: The right ventricle is normal in size. There is normal right ventricular global systolic function.  Right Atrium: The right atrium is normal in size.  Aortic Valve: The aortic valve is trileaflet. There is no evidence of aortic valve regurgitation. The peak  instantaneous gradient of the aortic valve is 3.4 mmHg. The mean gradient of the aortic valve is 2.0 mmHg.  Mitral Valve: The mitral valve is normal in structure. There is trace mitral valve regurgitation.  Tricuspid Valve: The tricuspid valve is structurally normal. There is trace tricuspid regurgitation. The right ventricular systolic pressure is unable to be estimated.  Pulmonic Valve: The pulmonic valve is structurally normal. There is physiologic pulmonic valve regurgitation.  Pericardium: There is a trivial pericardial effusion.  Aorta: The aortic root is normal.  Systemic Veins: The inferior vena cava appears to be of normal size.  In comparison to the previous echocardiogram(s): [facility] with study from 1/23/2020,. [facility], there are no significant changes though endocardial definition was suboptimal on today's exam and would have benefitted from use of echocontrast.        CONCLUSIONS:   1. Left ventricular systolic function is normal with a 60-65% estimated ejection fraction.   2. [facility] prior examfro m1/23/2020, there are no significant changes though endocardial definition was suboptimal on today's exam and would have benefitted from use of echocontrast.        Cardiac stress test MRI 3/15/2022:   1. Normal LV size (EDVi 56 ml/m2) with mildly reduced systolic   function (LVEF 47 %) .Global hypokinesis  without focal wall motion abnormality  2. Questionable myocardial inflammation/edema on limited T2-weighted   imaging (T2 mapping). Would correlate  with biomarkers and clinical status for myocarditis.   3. No evidence of myocardial fibrosis, infiltration or infarction   based on late gadolinium imaging      LEFT VENTRICLE: 1. Normal LV size (EDVi 56 ml/m2) mildly reduced   systolic function (LVEF 47 %).   2. Global hypokinesis without focal wall motion abnormality  3. Normal LV wall thickness and indexed LV mass.   4. Normal ECV 25%.   5. Limited T2 mapping due  to artifacts. Patchy elevated native T2   values (>55msec ms) , which can be seen  with myocarditis. Would correlate with biomarkers and clinical status   for myocarditis.   6. Following administration of gadolinium, in the early phase, there   is no evidence of LV thrombus or MVO.   7. In the late phase, there is no significant myocardial enhancement.  Quantitative LVEF 47 %.     VIABILITY: Hyperenhancement is normal.     RIGHT VENTRICLE: Quantitative RVEF 48 %.     LV/RV SEPTUM: The LV/RV septum is normal.     LA/RA SEPTUM: The LA/RA septum is normal.     LEFT ATRIUM: Severe left atrial     RIGHT ATRIUM: Mild right atrial enlargement     PERICARDIUM: Trivial pericardial effusion     PLEURAL EFFUSION: No pleural effusion     AORTIC VALVE: Peak aortic valve velocity 75 cm/sec. Aortic   regurgitant volume 3 ml. Aortic regurgitant fraction 8 %.     MITRAL VALVE: There is mild mitral regurgitation with regurgitant   volume 15ml, regurgitant fraction 30%.      AORTIC ROOT: The aortic root is normal.      Cardiac Monitor 3/2024:  no Afib, sinus rhythm with no significant arrhythmias ( but did not wear for a prolonged period of time)        Current Outpatient Medications   Medication Instructions    albuterol 2.5 mg /3 mL (0.083 %) nebulizer solution inhale the contents of one vial via nebulizer every 4 hours as needed    allopurinol (ZYLOPRIM) 100 mg, oral, Daily    allopurinol (ZYLOPRIM) 300 mg, oral, Daily    ALPRAZolam (XANAX) 0.5 mg, oral, 3 times daily PRN    amLODIPine (NORVASC) 5 mg, oral, Daily    amoxicillin (AMOXIL) 500 mg, Daily    ascorbic acid, vitamin C, 500 mg capsule 1 capsule, Daily    benzoyl peroxide (Benzac AC) 10 % external wash As needed    buPROPion XL (WELLBUTRIN XL) 150 mg, oral, Daily, Do not crush, chew, or split.    buPROPion XL (WELLBUTRIN XL) 300 mg, oral, Every morning, Do not crush, chew, or split.    carbidopa-levodopa-entacapone (Stalevo) -200 mg tablet 1 tablet, oral, Every 4  hours    carvedilol (Coreg) 12.5 mg tablet Take 1 tablet (12.5 mg) by mouth 2 times daily (morning and late afternoon) for 7 days, THEN 0.5 tablets (6.25 mg) 2 times daily (morning and late afternoon) for 14 days.    cetirizine-pseudoephedrine (ZyrTEC-D) 5-120 mg 12 hr tablet 1 tablet, oral, 2 times daily    clindamycin (Cleocin T) 1 % lotion 1 Application    clobetasol (Temovate) 0.05 % external solution 2 times daily    clobetasoL 0.05 % lotion One application as needed for flaring only.not for daily use. For scalp and hands only    cloNIDine (CATAPRES) 0.1 mg, As needed    cloNIDine (CATAPRES) 0.2 mg, oral, Nightly    Cosentyx Pen (2 Pens) 300 mg, subcutaneous, Every 30 days    cyanocobalamin (vitamin B-12) 1,000 mcg, sublingual, Daily    diclofenac sodium (VOLTAREN) 4 g, 2 times daily PRN    doxepin (SINEQUAN) 50 mg, oral, Nightly    doxycycline (Vibramycin) 100 mg capsule 1 capsule    DULoxetine (CYMBALTA) 60 mg, oral, Daily    eplerenone (Inspra) 25 mg tablet 1 tablet, Daily    ezetimibe (ZETIA) 10 mg, oral, Daily    fluticasone furoate-vilanteroL (Breo Ellipta) 200-25 mcg/dose inhaler 1puff daily, Inhalation    ipratropium-albuteroL (Duo-Neb) 0.5-2.5 mg/3 mL nebulizer solution 3 mL, Every 4 hours PRN    ketoconazole (NIZOral) 2 % cream 1 Application    lactobacillus acidophilus (Lactobacillus acidoph-L.bulgar) tablet tablet 1 tablet, oral, Daily    leflunomide (ARAVA) 10 mg, oral, Daily    levalbuterol (Xopenex) 1.25 mg/3 mL nebulizer solution 1 ampule, 3 times daily RT    metoprolol succinate XL (TOPROL-XL) 50 mg, Daily    metroNIDAZOLE (Metrocream) 0.75 % cream 1 Application    montelukast (SINGULAIR) 10 mg, Nightly    nebulizer and compressor device use q4-6 hours with albuterol PRN cough/wheeze    nebulizers ([employer] Disposable Nebulizer) misc Every 4 hours PRN    omega-3 acid ethyl esters (Lovaza) 1 gram capsule Take 2 capsules (2 grams) by mouth 2 times a day.    omeprazole (PriLOSEC) 40 mg DR capsule  Take 1 capsule (40 mg) by mouth once daily in the morning before meals.    ondansetron ODT (ZOFRAN-ODT) 4 mg, oral, Every 8 hours PRN    orphenadrine (NORFLEX) 100 mg, oral, 2 times daily PRN    pioglitazone (ACTOS) 15 mg, Daily    pregabalin (LYRICA) 75 mg, oral, 3 times daily    ProAir HFA 90 mcg/actuation inhaler     prochlorperazine (COMPAZINE) 10 mg, oral, 3 times daily PRN    Repatha SureClick 140 mg, subcutaneous, Every 14 days    Spiriva Respimat 1.25 mcg/actuation inhaler Daily RT    SUMAtriptan (IMITREX) 25 mg, oral, Once as needed, May repeat in 2 hours if no improvement. Max 200 mg/24 hr    [START ON 11/15/2024] tapentadol (NUCYNTA) 50 mg, oral, Every 12 hours    Tezspire 210 mg    tirzepatide (weight loss) (ZEPBOUND) 2.5 mg, subcutaneous, Every 7 days     Impressions and Plan:     Patient is 44 year old woman with complex medical history with FH, HTN which is poorly controlled, Vitamin D deficiency despite high dose Rx with prior statin intolerance with MBL, asthma with strong family history of CAD who had a CTA with heart flow( 3/2020)showing no significant CAD. Her BP is quite variable /labile appears to run low and high , especially worse with uncontrolled pain.  Uses clonidine to control BP spikes.24 hour BP monitor previously showed BP not too badly controlled, however Bps higher these day. Was diagnosed recently  with POTs (? Testing) and occasionally has low BP which she treats with volume expanders and wears support socks. Was started on metoprolol on top of carvedilol by neurologist ( who left system and she wants another neurologist for rx of POTs ) . With diagnosis of  POTs- using volume expanders yony gaotoade and wearing support stockings. If truly POTs, would not do well with carvedilol and no reason to be on 2 beta blockers, so working off carvedilol and can titrate up metoprolol as needed.  Will refer to Dr Cherry to assess her dysautonomia/POTs.   She continues her repatha and is  tolerating it well. Had been on  zetia and vascepa in the past though stopped due to cost. She is back her zetia, and just started lovaza. Will recheck lipids in about 3 months.  Of note following covid infection at end of December 2021 she had persistent significant SOB on minimal exertion as well as  more tachycardia,. Had cardiac MRI 3/2022 with mildly reduced LV function and questionable myocardial inflammation. Inflammatory markers in blood were normal at that time. She no longer is having SOB. She does note more palpitations ( episodic) and had 1 episode of syncope without warning October 2023, and possibly in March 2024. Will work on getting a loop recorder placed since unable to tolerate patches for cardiac monitors for any extended period of time.  No CP or SOB or diaphoresis before or after syncope. She had lost 55 lbs on wegovy, but once insurance stopped coverage is no longer able to afford and has regained a significant amount of weight. This weight gain may explain why her LDL is higher than before despite being on repatha.   Plan:  1. Hyperlipidemia/FH and hypertriglyceridemia- To continue repatha,   zetia and recently addedd lovaza. Triglycerides are higher than before, likely due to poor glycemic control. Should reach back out to [facility] regarding her blood sugar rx. Will check HgA1C to assess glucose control recently. Will also need to check direct Ldl since triglycerides so high can not calculate LDL.   2. Hypertension- BP control is worse now that not using CPAP and pain meds are being decreased. Continue amlodipine 2.5 mg daily, olmesartan 40 mg daily and will change clonidine from PO to patch (0.2mg/24 hour period applied once every 7 days, which may have a slower more sustained control of BP and can use additional dose of PO clonidine if BP gets too high. Patch With central action may help a little with pain control or anxiety control. In a couple of weeks after initial change, will  reduce carvedilol to 6.25 bid for 2 week then discontinue. If BP too high with HR > 70s bpm or more will increase metoprolol to metoprolol succinate 50 mg twice daily.   3. Palpitations - previously increased in frequency as well as a single episode of syncope without warning in October 2023 and possibly another one in March. Unable to wear 30 day live monitor. Discussed possibly placing loop recorder, but Dr Marie felt her syncope may have been more likely due to dysautonomia. Will refer to Dr Cherry. Cardiac echo with preserved LV systolic function 2/2024.   4. GUICHO- Unable to tolerate CPAP. To follow up with sleep medicine  to discuss alternative rx.    6. Obesity- continue working with Dr Gentile's group. Working with pharmacy clinic    return to clinic in 3 months     Patient Instructions:  If you have any questions or need cardiac medication refills, please call my office at [phone],      To reach my office please call [phone]  To schedule an appointment call [phone].          ____________________________________________________________  Akosua Richardson MD  Division of Cardiovascular Medicine  [facility]  [facility]

## 2024-11-05 ENCOUNTER — LAB (OUTPATIENT)
Dept: LAB | Facility: LAB | Age: 44
End: 2024-11-05
Payer: COMMERCIAL

## 2024-11-05 ENCOUNTER — OFFICE VISIT (OUTPATIENT)
Dept: PAIN MEDICINE | Facility: HOSPITAL | Age: 44
End: 2024-11-05
Payer: COMMERCIAL

## 2024-11-05 ENCOUNTER — APPOINTMENT (OUTPATIENT)
Dept: PHARMACY | Facility: HOSPITAL | Age: 44
End: 2024-11-05
Payer: COMMERCIAL

## 2024-11-05 VITALS
OXYGEN SATURATION: 97 % | HEART RATE: 93 BPM | HEIGHT: 65 IN | WEIGHT: 226 LBS | SYSTOLIC BLOOD PRESSURE: 159 MMHG | BODY MASS INDEX: 37.65 KG/M2 | RESPIRATION RATE: 16 BRPM | DIASTOLIC BLOOD PRESSURE: 103 MMHG

## 2024-11-05 DIAGNOSIS — M25.50 ARTHRALGIA OF MULTIPLE SITES: ICD-10-CM

## 2024-11-05 DIAGNOSIS — M54.16 RIGHT LUMBAR RADICULOPATHY: ICD-10-CM

## 2024-11-05 DIAGNOSIS — E78.49 ESSENTIAL FAMILIAL HYPERLIPIDEMIA: ICD-10-CM

## 2024-11-05 DIAGNOSIS — L40.50 PSORIATIC ARTHROPATHY (MULTI): ICD-10-CM

## 2024-11-05 DIAGNOSIS — B02.29 POST HERPETIC NEURALGIA: ICD-10-CM

## 2024-11-05 DIAGNOSIS — M10.9 GOUTY ARTHROPATHY: ICD-10-CM

## 2024-11-05 DIAGNOSIS — M62.830 SPASM OF THORACIC BACK MUSCLE: ICD-10-CM

## 2024-11-05 DIAGNOSIS — Z79.891 LONG TERM PRESCRIPTION OPIATE USE: Primary | ICD-10-CM

## 2024-11-05 DIAGNOSIS — M19.90 INFLAMMATORY ARTHROPATHY: ICD-10-CM

## 2024-11-05 DIAGNOSIS — M77.8 ELBOW TENDINITIS: ICD-10-CM

## 2024-11-05 DIAGNOSIS — E55.9 VITAMIN D DEFICIENCY: ICD-10-CM

## 2024-11-05 LAB
25(OH)D3 SERPL-MCNC: 32 NG/ML (ref 30–100)
ALBUMIN SERPL BCP-MCNC: 4.1 G/DL (ref 3.4–5)
ALP SERPL-CCNC: 64 U/L (ref 33–110)
ALT SERPL W P-5'-P-CCNC: 17 U/L (ref 7–45)
AMPHETAMINES UR QL SCN: NORMAL
ANION GAP SERPL CALC-SCNC: 14 MMOL/L (ref 10–20)
AST SERPL W P-5'-P-CCNC: 17 U/L (ref 9–39)
ATRIAL RATE: 96 BPM
BARBITURATES UR QL SCN: NORMAL
BILIRUB SERPL-MCNC: 0.3 MG/DL (ref 0–1.2)
BUN SERPL-MCNC: 9 MG/DL (ref 6–23)
BZE UR QL SCN: NORMAL
CALCIUM SERPL-MCNC: 9.2 MG/DL (ref 8.6–10.3)
CANNABINOIDS UR QL SCN: NORMAL
CHLORIDE SERPL-SCNC: 100 MMOL/L (ref 98–107)
CHOLEST SERPL-MCNC: 241 MG/DL (ref 0–199)
CHOLESTEROL/HDL RATIO: 5.2
CO2 SERPL-SCNC: 27 MMOL/L (ref 21–32)
CREAT SERPL-MCNC: 0.47 MG/DL (ref 0.5–1.05)
CREAT UR-MCNC: 88 MG/DL (ref 20–320)
CRP SERPL-MCNC: 0.15 MG/DL
EGFRCR SERPLBLD CKD-EPI 2021: >90 ML/MIN/1.73M*2
ERYTHROCYTE [SEDIMENTATION RATE] IN BLOOD BY WESTERGREN METHOD: 12 MM/H (ref 0–20)
ETHANOL ?TM UR-MCNC: <10 MG/DL
GLUCOSE SERPL-MCNC: 86 MG/DL (ref 74–99)
HDLC SERPL-MCNC: 46.2 MG/DL
LDLC SERPL CALC-MCNC: ABNORMAL MG/DL
NON HDL CHOLESTEROL: 195 MG/DL (ref 0–149)
P AXIS: 37 DEGREES
P OFFSET: 193 MS
P ONSET: 132 MS
PCP UR QL SCN: NORMAL
POTASSIUM SERPL-SCNC: 4.2 MMOL/L (ref 3.5–5.3)
PR INTERVAL: 170 MS
PROT SERPL-MCNC: 6.9 G/DL (ref 6.4–8.2)
Q ONSET: 217 MS
QRS COUNT: 15 BEATS
QRS DURATION: 90 MS
QT INTERVAL: 364 MS
QTC CALCULATION(BAZETT): 459 MS
QTC FREDERICIA: 425 MS
R AXIS: 15 DEGREES
SODIUM SERPL-SCNC: 137 MMOL/L (ref 136–145)
T AXIS: 43 DEGREES
T OFFSET: 399 MS
TRIGL SERPL-MCNC: 615 MG/DL (ref 0–149)
URATE SERPL-MCNC: 5.1 MG/DL (ref 2.3–6.7)
VENTRICULAR RATE: 96 BPM
VLDL: ABNORMAL

## 2024-11-05 PROCEDURE — 99214 OFFICE O/P EST MOD 30 MIN: CPT | Performed by: CLINICAL NURSE SPECIALIST

## 2024-11-05 PROCEDURE — 3077F SYST BP >= 140 MM HG: CPT | Performed by: CLINICAL NURSE SPECIALIST

## 2024-11-05 PROCEDURE — 80307 DRUG TEST PRSMV CHEM ANLYZR: CPT | Mod: PORLAB | Performed by: CLINICAL NURSE SPECIALIST

## 2024-11-05 PROCEDURE — 82306 VITAMIN D 25 HYDROXY: CPT

## 2024-11-05 PROCEDURE — 80348 DRUG SCREENING BUPRENORPHINE: CPT | Performed by: CLINICAL NURSE SPECIALIST

## 2024-11-05 PROCEDURE — 80365 DRUG SCREENING OXYCODONE: CPT | Mod: PORLAB,MUE | Performed by: CLINICAL NURSE SPECIALIST

## 2024-11-05 PROCEDURE — 80061 LIPID PANEL: CPT

## 2024-11-05 PROCEDURE — 80372 DRUG SCREENING TAPENTADOL: CPT | Mod: PORLAB | Performed by: CLINICAL NURSE SPECIALIST

## 2024-11-05 PROCEDURE — 3080F DIAST BP >= 90 MM HG: CPT | Performed by: CLINICAL NURSE SPECIALIST

## 2024-11-05 PROCEDURE — 80053 COMPREHEN METABOLIC PANEL: CPT

## 2024-11-05 PROCEDURE — 85652 RBC SED RATE AUTOMATED: CPT

## 2024-11-05 PROCEDURE — 84550 ASSAY OF BLOOD/URIC ACID: CPT

## 2024-11-05 PROCEDURE — 86140 C-REACTIVE PROTEIN: CPT

## 2024-11-05 PROCEDURE — 3008F BODY MASS INDEX DOCD: CPT | Performed by: CLINICAL NURSE SPECIALIST

## 2024-11-05 PROCEDURE — 1036F TOBACCO NON-USER: CPT | Performed by: CLINICAL NURSE SPECIALIST

## 2024-11-05 RX ORDER — NALOXONE HYDROCHLORIDE 4 MG/.1ML
4 SPRAY NASAL AS NEEDED
Qty: 2 EACH | Refills: 0 | Status: SHIPPED | OUTPATIENT
Start: 2024-11-05 | End: 2024-11-06

## 2024-11-05 RX ORDER — PREGABALIN 75 MG/1
75 CAPSULE ORAL 3 TIMES DAILY
Qty: 90 CAPSULE | Refills: 2 | Status: SHIPPED | OUTPATIENT
Start: 2024-11-05 | End: 2024-12-05

## 2024-11-05 RX ORDER — ORPHENADRINE CITRATE 100 MG/1
100 TABLET, EXTENDED RELEASE ORAL 2 TIMES DAILY PRN
Qty: 60 TABLET | Refills: 3 | Status: SHIPPED | OUTPATIENT
Start: 2024-11-05 | End: 2024-12-05

## 2024-11-05 ASSESSMENT — COLUMBIA-SUICIDE SEVERITY RATING SCALE - C-SSRS
6. HAVE YOU EVER DONE ANYTHING, STARTED TO DO ANYTHING, OR PREPARED TO DO ANYTHING TO END YOUR LIFE?: NO
1. IN THE PAST MONTH, HAVE YOU WISHED YOU WERE DEAD OR WISHED YOU COULD GO TO SLEEP AND NOT WAKE UP?: NO
2. HAVE YOU ACTUALLY HAD ANY THOUGHTS OF KILLING YOURSELF?: NO

## 2024-11-05 ASSESSMENT — PATIENT HEALTH QUESTIONNAIRE - PHQ9
1. LITTLE INTEREST OR PLEASURE IN DOING THINGS: NOT AT ALL
2. FEELING DOWN, DEPRESSED OR HOPELESS: NOT AT ALL
SUM OF ALL RESPONSES TO PHQ9 QUESTIONS 1 AND 2: 0

## 2024-11-05 ASSESSMENT — ENCOUNTER SYMPTOMS
OCCASIONAL FEELINGS OF UNSTEADINESS: 1
LOSS OF SENSATION IN FEET: 0
DEPRESSION: 0

## 2024-11-05 NOTE — PROGRESS NOTES
Subjective   Patient ID: Nichelle Izaguirre is a 44 y.o. female who presents for widespread polyarticular pain as well as mid to low back pain  HPI    44-year-old female with complex medical history including psoriatic arthritis, gout, hypertension, GUICHO, irritable bowel disease, asthma, parkinsonian/dystonia, immune deficiency syndrome, anxiety and mid to low back pain as well as polyarticular pain presents for follow-up.  MRI 2021 consistent with lumbar lordosis, normal disc height, small central disc bulge at L5-S1 and L4-5 with no significant foraminal stenosis and moderate lateral recess stenosis at L4-5.  We have discussed injections in the past and the patient is not interested in pursuing steroid injections for back pain secondary to immune deficiency.  She has been sent for formal course of physical therapy targeting mid to low back pain as well as polyarticular pain.  Continues to be followed closely by cardiology, rheumatology and neurology.  Patient was able to restart her Cosentyx after obtaining insurance approval and felt that it was helping her overall pain.  She is also maintained on leflunomide and allopurinol.  We were attempting to decrease her use of Nucynta as she gains relief with disease modifying treatment with rheumatology.  Hopeful to transition her to buprenorphine which may provide more consistent pain relief.  In order to transition to buprenorphine we will have to decrease her use of Nucynta.  She also utilizes Xanax sparingly for panic attacks.  Presenting at today's office visit for routine follow-up.  Continues to experience widespread chronic polyarticular pain which at today's visit is most bothersome to her right elbow.  She also experiences mid back pain which will radiate to bilateral buttocks intermittently.  She is no longer experiencing severe thoracic pain which was accompanied by significant spasms.  Continues to endorse muscle spasms noted in her lower back.  She denies  numbness tingling, weakness or changes in bowel and bladder function to upper or lower extremities.  Continues to experience significant pain in her right elbow.  Pain is aching and throbbing.  Patient states that pain is constant.  Most activity increases her pain including standing for long period of time, bending and lifting.  She also notes an increase in pain related to weather changes.  We had recently decreased her to Nucynta 50 mg which she is taking twice daily.  She did take a short prescription of oxycodone prescribed by her oral surgeon for increasing pain after she had oral surgery.  Patient states she put aside her Nucynta at that time.  Continued benefit with Lyrica, duloxetine and orphenadrine which she is tolerating without adverse effects.  She also feels that the NSAID compounding cream remains beneficial.  She does feel that heat remains helpful.  Following closely with rheumatology maintained on current disease modifying medications.  She does feel that Cosentyx is providing additional pain relief.  Did not proceed with physical therapy and felt that her pain was too intense to participate in therapy.  She states she was reevaluated for elbow pain and cannot proceed with elbow surgery secondary to her immune function.      Patient's blood pressure elevated at today's office visit.  The patient denies symptoms associated with elevated blood pressure.  Patient will continue to monitor blood pressure and if it remains elevated or the patient becomes symptomatic, he/she will notify PCP or go to the emergency department.       Location of Pain: pt is here for interval follow up and medication count. Pt states bilateral elbow pain due to psoriatic arthritis and decreased blood flow. Pt also having mid back pain along spine that radiates down to above the buttock intermittently.      October teeth removal- Percocet took medication and has 1 pill left at home- states she has not taken any recently just  "the few days after surgery.          Pain Score:  \"7/10\"     Treatment: Nucynta 50mg BID  Last dose: 24 1 dose and takes BID  Medication Count:  10 pills and 10 pills left in rx bottle  Fill date: 24 and 24     Efficacy:   \"40-50% relief\"     Side Effects: denies     Narcan: Pt brought unopened Narcan with her to today's visit.  EXP: -forgot today but states it will  in July so gave harm reduction pamphlet today 24- gave harm reduction pamphlet today 24-patient did not bring today and I educated her she has to bring it to next visit or cannot get a prescription- would like it sent to pharmacy     Other pain medication/neuromodulator: Norflex-needs refill/Lyrica-needs refill/Cymbalta/ Kingsley's compound cream     Therapeutic Goals: to be able to do more throughout the day     Therapeutic Assessment: It helps the most in the morning     Injections and/or Procedures: denies, does not want to have injections.     Other: no current PT  OARRS:  Santa Dunn, APRN-CNP, APRN-CNS on 2024 10:29 AM  I have personally reviewed the OARRS report for Nichelle Izaguirre. I have considered the risks of abuse, dependence, addiction and diversion    Is the patient prescribed a combination of a benzodiazepine and opioid?  Yes, I feel it is clincially indicated to continue the medication and have discussed with the patient risks/benefits/alternatives.    Last Urine Drug Screen / ordered today: Yes  Recent Results (from the past 8760 hours)   Screen Opiate/Opioid/Benzo Prescription Compliance    Collection Time: 24 11:30 AM   Result Value Ref Range    Creatinine, Urine Random 88.0 20.0 - 320.0 mg/dL    Amphetamine Screen, Urine Presumptive Negative Presumptive Negative    Barbiturate Screen, Urine Presumptive Negative Presumptive Negative    Cannabinoid Screen, Urine Presumptive Negative Presumptive Negative    Cocaine Metabolite Screen, Urine Presumptive Negative Presumptive Negative    PCP " Screen, Urine Presumptive Negative Presumptive Negative   Amphetamine Confirm, Urine    Collection Time: 05/14/24  4:32 PM   Result Value Ref Range    Methamphetamine Quant, Ur <200 ng/mL    MDA, Urine <200 ng/mL    MDEA, Urine <200 ng/mL    Phentermine,Urine <200 ng/mL    Amphetamines,Urine <50 ng/mL    MDMA, Urine <200 ng/mL   Confirmation Opiate/Opioid/Benzo Prescription Compliance    Collection Time: 05/14/24  4:32 PM   Result Value Ref Range    Clonazepam <25 <25 ng/mL    7-Aminoclonazepam <25 <25 ng/mL    Alprazolam 261 (H) <25 ng/mL    Alpha-Hydroxyalprazolam 291 (H) <25 ng/mL    Midazolam <25 <25 ng/mL    Alpha-Hydroxymidazolam <25 <25 ng/mL    Chlordiazepoxide <25 <25 ng/mL    Diazepam <25 <25 ng/mL    Nordiazepam <25 <25 ng/mL    Temazepam <25 <25 ng/mL    Oxazepam <25 <25 ng/mL    Lorazepam <25 <25 ng/mL    Methadone <25 <25 ng/mL    EDDP <25 <25 ng/mL    6-Acetylmorphine <25 <25 ng/mL    Codeine <50 <50 ng/mL    Hydrocodone <25 <25 ng/mL    Hydromorphone <25 <25 ng/mL    Morphine  <50 <50 ng/mL    Norhydrocodone <25 <25 ng/mL    Noroxycodone <25 <25 ng/mL    Oxycodone <25 <25 ng/mL    Oxymorphone <25 <25 ng/mL    Fentanyl <2.5 <2.5 ng/mL    Norfentanyl <2.5 <2.5 ng/mL    Tramadol <50 <50 ng/mL    O-Desmethyltramadol <50 <50 ng/mL    Zolpidem <25 <25 ng/mL    Zolpidem Metabolite (ZCA) <25 <25 ng/mL   Tapentadol Confirmation, Urine    Collection Time: 05/14/24  4:32 PM   Result Value Ref Range    Tapentadol >1,000 (H) <25 ng/mL    N-Desmethyltapentadol >1,000 (H) <25 ng/mL   Buprenorphine Confirm,Urine    Collection Time: 05/14/24  4:32 PM   Result Value Ref Range    BUPRENORPHINE GLUC, URINE <5 ng/mL    BUPRENORPHINE ,URINE <2 ng/mL    NALOXONE, URINE <100 ng/mL    NORBUPRENORPHINE GLUC,URINE <5 ng/mL    NORBUPRENORPHINE, URINE <2 ng/mL     Results are as expected.     Controlled Substance Agreement:  Date of the Last Agreement: 5/14/24  Reviewed Controlled Substance Agreement including but not limited to  the benefits, risks, and alternatives to treatment with a Controlled Substance medication(s).    Monitoring and compliance:    ORT:    PDUQ:    Office Agreement:      Review of Systems    ROS:   General: No fevers, chills, weight loss  Skin: Negative for lesions  Eyes: No acute vision changes  Ears: No vertigo  Nose, mouth, throat: No difficulty swallowing or speaking  Respiratory: No cough, shortness of breath, cyanosis  Cardiovascular: Negative for chest pain syncope or palpitation  Gastrointestinal: No constipation, nausea, vomiting  Neurological: Negative for headache,   Psychological: Negative for severe or debilitating anxiety, depression. Negative memory loss  Musculoskeletal: Positive for arthralgia, myalgia, pain and spasm  Endocrine: Negative for weight gain, appetite changes, excessive sweating  Allergy/immune: Negative    All 13 systems were reviewed and are within normal levels except as noted or in the history of present illness.  Positive or pertinent negative responses are noted or were in the history of present illness. As noted, the patient denies significant or impairing weakness in the bilateral upper and lower extremities, medication induced constipation, and bowel or bladder incontinence.     Current Outpatient Medications:     albuterol 2.5 mg /3 mL (0.083 %) nebulizer solution, inhale the contents of one vial via nebulizer every 4 hours as needed, Disp: , Rfl:     allopurinol (Zyloprim) 100 mg tablet, Take 1 tablet (100 mg) by mouth once daily., Disp: 90 tablet, Rfl: 0    allopurinol (Zyloprim) 300 mg tablet, Take 1 tablet (300 mg) by mouth once daily., Disp: 90 tablet, Rfl: 0    ALPRAZolam (Xanax) 0.5 mg tablet, Take 1 tablet (0.5 mg) by mouth 3 times a day as needed for sleep or anxiety., Disp: 21 tablet, Rfl: 2    amLODIPine (Norvasc) 5 mg tablet, Take 1 tablet (5 mg) by mouth once daily., Disp: 90 tablet, Rfl: 3    amoxicillin (Amoxil) 500 mg capsule, Take 1 capsule (500 mg) by mouth once  daily., Disp: , Rfl:     ascorbic acid, vitamin C, 500 mg capsule, Take 1 capsule by mouth once daily., Disp: , Rfl:     benzoyl peroxide (Benzac AC) 10 % external wash, Apply topically if needed., Disp: , Rfl:     buPROPion XL (Wellbutrin XL) 150 mg 24 hr tablet, Take 1 tablet (150 mg) by mouth once daily. Do not crush, chew, or split., Disp: 90 tablet, Rfl: 3    buPROPion XL (Wellbutrin XL) 300 mg 24 hr tablet, Take 1 tablet (300 mg) by mouth once daily in the morning. Do not crush, chew, or split., Disp: 90 tablet, Rfl: 3    carbidopa-levodopa-entacapone (Stalevo) -200 mg tablet, Take 1 tablet by mouth every 4 hours., Disp: 540 tablet, Rfl: 1    clindamycin (Cleocin T) 1 % lotion, 1 Application, Disp: , Rfl:     clobetasol (Temovate) 0.05 % external solution, Apply topically 2 times a day., Disp: , Rfl:     clobetasoL 0.05 % lotion, One application as needed for flaring only.not for daily use. For scalp and hands only, Disp: 118 mL, Rfl: 3    cloNIDine (Catapres) 0.1 mg tablet, Take 1 tablet (0.1 mg) by mouth if needed for high blood pressure., Disp: , Rfl:     Cosentyx Pen, 2 Pens, 150 mg/mL self-injector pen, Inject 2 mL (300 mg) under the skin every 30 (thirty) days., Disp: 2 mL, Rfl: 2    cyanocobalamin, vitamin B-12, 1,000 mcg tablet, sublingual, Place 1 tablet (1,000 mcg) under the tongue once daily., Disp: 90 tablet, Rfl: 1    diclofenac sodium (Voltaren) 1 % gel gel, Apply 4.5 inches (4 g) topically 2 times a day as needed., Disp: , Rfl:     doxepin (SINEquan) 50 mg capsule, Take 1 capsule (50 mg) by mouth once daily at bedtime., Disp: 30 capsule, Rfl: 3    doxycycline (Vibramycin) 100 mg capsule, 1 capsule (100 mg)., Disp: , Rfl:     DULoxetine (Cymbalta) 60 mg DR capsule, Take 1 capsule (60 mg) by mouth once daily., Disp: 90 capsule, Rfl: 1    eplerenone (Inspra) 25 mg tablet, Take 1 tablet (25 mg) by mouth once daily., Disp: , Rfl:     ezetimibe (Zetia) 10 mg tablet, Take 1 tablet (10 mg) by  mouth once daily., Disp: 90 tablet, Rfl: 3    fluticasone furoate-vilanteroL (Breo Ellipta) 200-25 mcg/dose inhaler, 1puff daily, Inhalation, Disp: , Rfl:     ipratropium-albuteroL (Duo-Neb) 0.5-2.5 mg/3 mL nebulizer solution, Take 3 mL by nebulization every 4 hours if needed for wheezing., Disp: , Rfl:     ketoconazole (NIZOral) 2 % cream, 1 Application, Disp: , Rfl:     lactobacillus acidophilus (Lactobacillus acidoph-L.bulgar) tablet tablet, Take 1 tablet by mouth once daily., Disp: 90 tablet, Rfl: 0    leflunomide (Arava) 10 mg tablet, Take 1 tablet (10 mg) by mouth once daily., Disp: 90 tablet, Rfl: 0    levalbuterol (Xopenex) 1.25 mg/3 mL nebulizer solution, Take 1 ampule by nebulization 3 times a day., Disp: , Rfl:     metoprolol succinate XL (Toprol-XL) 25 mg 24 hr tablet, Take 2 tablets (50 mg) by mouth once daily., Disp: , Rfl:     metroNIDAZOLE (Metrocream) 0.75 % cream, 1 Application, Disp: , Rfl:     montelukast (Singulair) 10 mg tablet, Take 1 tablet (10 mg) by mouth once daily at bedtime., Disp: , Rfl:     nebulizer and compressor device, use q4-6 hours with albuterol PRN cough/wheeze, Disp: , Rfl:     nebulizers (Devilbiss Disposable Nebulizer) misc, every 4 hours if needed., Disp: , Rfl:     omega-3 acid ethyl esters (Lovaza) 1 gram capsule, Take 2 capsules (2 grams) by mouth 2 times a day., Disp: 360 capsule, Rfl: 3    omeprazole (PriLOSEC) 40 mg DR capsule, Take 1 capsule (40 mg) by mouth once daily in the morning before meals., Disp: 90 capsule, Rfl: 3    ondansetron ODT (Zofran-ODT) 4 mg disintegrating tablet, Take 1 tablet (4 mg) by mouth every 8 hours if needed for nausea or vomiting., Disp: 90 tablet, Rfl: 0    ProAir HFA 90 mcg/actuation inhaler, , Disp: , Rfl:     prochlorperazine (Compazine) 10 mg tablet, Take 1 tablet (10 mg) by mouth 3 times a day as needed for nausea., Disp: 30 tablet, Rfl: 0    Repatha SureClick 140 mg/mL injection, Inject 1 mL (140 mg) under the skin every 14  (fourteen) days., Disp: 6 mL, Rfl: 3    Spiriva Respimat 1.25 mcg/actuation inhaler, Inhale once daily., Disp: , Rfl:     SUMAtriptan (Imitrex) 25 mg tablet, Take 1 tablet (25 mg) by mouth 1 time if needed for migraine. May repeat in 2 hours if no improvement. Max 200 mg/24 hr, Disp: 9 tablet, Rfl: 2    tezepelumab-ekko (Tezspire) SubQ Pen Injector, Inject 210 mg under the skin., Disp: , Rfl:     tirzepatide, weight loss, (Zepbound) 2.5 mg/0.5 mL injection, Inject 2.5 mg under the skin every 7 days., Disp: 4 mL, Rfl: 1    carvedilol (Coreg) 12.5 mg tablet, Take 1 tablet (12.5 mg) by mouth 2 times daily (morning and late afternoon) for 7 days, THEN 0.5 tablets (6.25 mg) 2 times daily (morning and late afternoon) for 14 days., Disp: 28 tablet, Rfl: 1    cetirizine-pseudoephedrine (ZyrTEC-D) 5-120 mg 12 hr tablet, Take 1 tablet by mouth 2 times a day., Disp: 180 tablet, Rfl: 0    cloNIDine (Catapres) 0.2 mg tablet, Take 1 tablet (0.2 mg) by mouth once daily at bedtime., Disp: 90 tablet, Rfl: 3    naloxone (Narcan) 4 mg/0.1 mL nasal spray, Administer 1 spray (4 mg) into affected nostril(s) if needed for opioid reversal for up to 1 day. May repeat every 2-3 minutes if needed, alternating nostrils, until medical assistance becomes available., Disp: 2 each, Rfl: 0    orphenadrine (Norflex) 100 mg 12 hr tablet, Take 1 tablet (100 mg) by mouth 2 times a day as needed for muscle spasms., Disp: 60 tablet, Rfl: 3    pioglitazone (Actos) 15 mg tablet, Take 1 tablet (15 mg) by mouth once daily. (Patient not taking: Reported on 11/5/2024), Disp: , Rfl:     pregabalin (Lyrica) 75 mg capsule, Take 1 capsule (75 mg) by mouth 3 times a day., Disp: 90 capsule, Rfl: 2    [START ON 11/15/2024] tapentadol (Nucynta) 50 mg tablet, Take 1 tablet (50 mg) by mouth every 12 hours. Do not fill before November 15, 2024., Disp: 60 tablet, Rfl: 0     Past Medical History:   Diagnosis Date    Acute upper respiratory infection, unspecified 07/26/2016     Viral URI with cough    Acute upper respiratory infection, unspecified 11/15/2017    Viral URI with cough    Allergy status to unspecified drugs, medicaments and biological substances     History of allergy    Chronic maxillary sinusitis 08/27/2018    Maxillary sinusitis, unspecified chronicity    Dystonia, unspecified 04/08/2014    Dystonia    Encounter for other screening for malignant neoplasm of breast 01/22/2018    Breast screening    Encounter for screening for respiratory tuberculosis 11/11/2013    Screening examination for pulmonary tuberculosis    Essential (primary) hypertension 12/27/2017    Benign essential hypertension    Hypersomnia, unspecified 04/13/2015    Sleeps too much    Idiopathic sleep related nonobstructive alveolar hypoventilation     Nocturnal hypoxemia    Idiopathic sleep related nonobstructive alveolar hypoventilation 02/09/2018    Nocturnal hypoxia    Impaired fasting glucose 01/22/2018    Impaired fasting glucose    Insect bite (nonvenomous) of lower back and pelvis, initial encounter 03/29/2018    Tick bite of back    Left lower quadrant pain 01/07/2019    Left lower quadrant pain    Local infection of the skin and subcutaneous tissue, unspecified 07/28/2017    Skin infection    Low back pain, unspecified 05/23/2019    Acute low back pain    Other conditions influencing health status 02/27/2017    Sprain of right thumb, unspecified site of finger, initial encounter    Other malaise 04/16/2018    Malaise and fatigue    Other microscopic hematuria 03/30/2018    Microscopic hematuria    Other specified cough 09/07/2018    Productive cough    Other specified health status 01/07/2019    Acute medical illness    Other symptoms and signs involving the musculoskeletal system 07/12/2018    Weakness of right lower extremity    Other symptoms and signs involving the musculoskeletal system 03/21/2018    Polyarticular joint involvement    Other symptoms and signs involving the musculoskeletal  system 07/12/2018    RUE weakness    Pain in right lower leg 01/27/2016    Right calf pain    Pain in unspecified hip 01/20/2016    Hip pain    Pelvic and perineal pain 04/17/2018    Pelvic pain    Personal history of diseases of the skin and subcutaneous tissue 08/07/2018    History of dermatitis    Personal history of other (healed) physical injury and trauma 08/07/2018    History of insect bite    Personal history of other diseases of the circulatory system     History of hypertension    Personal history of other diseases of the female genital tract 11/24/2015    History of menorrhagia    Personal history of other diseases of the musculoskeletal system and connective tissue 03/14/2018    History of tendinitis    Personal history of other diseases of the nervous system and sense organs 04/08/2014    History of Parkinson's disease    Personal history of other diseases of the respiratory system 01/07/2019    History of acute bronchitis    Personal history of other diseases of the respiratory system 10/05/2018    History of paranasal sinus pain    Personal history of other specified conditions 04/13/2015    History of snoring    Personal history of other specified conditions 09/11/2017    History of headache    Personal history of other specified conditions 09/07/2018    History of persistent cough    Personal history of other specified conditions 04/08/2014    History of memory loss    Personal history of other specified conditions 03/26/2018    History of left flank pain    Personal history of other specified conditions     History of heartburn    Personal history of urinary calculi 03/26/2018    Personal history of urinary calculi    Personal history of urinary calculi 03/26/2018    History of renal calculi    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/2017    Plantar fasciitis, left    Primary insomnia 04/13/2015    Primary insomnia    Rash and other nonspecific skin eruption 04/16/2018    Rash    Unspecified abdominal  "pain 01/07/2019    Left sided abdominal pain    Urinary tract infection, site not specified 10/19/2018    UTI (urinary tract infection), bacterial    Urinary tract infection, site not specified 10/18/2018    Recurrent UTI    Vitamin D deficiency, unspecified 04/22/2016    Vitamin D deficiency    Zoster with other complications 11/25/2015    Herpes zoster dermatitis        Past Surgical History:   Procedure Laterality Date    CT ANGIO CORONARY ART WITH HEARTFLOW IF SCORE >30%  3/18/2020    CT HEART CORONARY ANGIOGRAM 3/18/2020 [facility]    HAND TENDON SURGERY  11/11/2013    Hand Incision Tendon Sheath Of A Finger    HYSTERECTOMY  03/04/2016    Hysterectomy    LITHOTRIPSY  11/11/2013    Renal Lithotripsy    TONSILLECTOMY  12/19/2013    Tonsillectomy With Adenoidectomy        Family History   Problem Relation Name Age of Onset    Asthma Mother      Hypertension Mother      Irregular heart beat Mother      Allergies Father      Heart disease Father      Hypertension Father      Sinusitis Father      Allergies Sister      Asthma Daughter      Allergies Son      Heart disease Maternal Grandmother      Breast cancer Paternal Grandmother      Heart disease Paternal Grandfather      Lung cancer Paternal Grandfather      Dementia Paternal Great-Grandfather          Allergies   Allergen Reactions    Clindamycin Anaphylaxis    Dupilumab Anaphylaxis    Hydrochlorothiazide Anaphylaxis, Itching and Swelling    Sulfamethoxazole-Trimethoprim Anaphylaxis    Cefazolin Hives, Other and Swelling     STATES TONGUE SPLITS    Atorvastatin Hives    Cephalexin Other    Clarithromycin Swelling     hives, tongue swelling    Nitrofurantoin Monohyd/M-Cryst Hives    Sulfa (Sulfonamide Antibiotics) Hives    Sulfacetamide Hives        Objective     Visit Vitals  BP (!) 159/103   Pulse 93   Resp 16   Ht 1.651 m (5' 5\")   Wt 103 kg (226 lb)   SpO2 97%   BMI 37.61 kg/m²   OB Status Having periods   Smoking Status Never   BSA 2.17 m²    "     Physical Exam    PE:  General: Well-developed, well-nourished, no acute distress. The patient demonstrates no pain behavior, symptom magnification or overt drug-seeking behavior.  Eye: Pupils appropriate for room lighting  Neck/thyroid: No obvious goiter or enlargement of neck noted  Respiratory exam: Normal respiratory effort, unlabored respiration. No accessory muscle use noted  Cardiac exam: Bilateral radial pulses intact  Abdominal: Nondistended  Spine, lumbar: The patient is able to rise from a seated to standing position without hesitancy, push off, or delay. Gait is grossly nonantalgic. Tenderness to paraspinous musculature is noted mid to lower lumbar region.  Myofascial tenderness and muscle tightness noted throughout her lower thoracic and lumbar region.  Flexion and extension intact.  Negative straight leg raise.  Ortho: Widespread polyarticular pain which is most bothersome to her right elbow at today's visit.  Pain in her right elbow with active/passive range of motion.  Pain over right lateral epicondyle.  Neurologic exam: Muscle strength is antigravity in all 4 extremities.  Equal strength bilateral upper extremities 5/5.  Psychiatric exam: Judgment and insight normal, affect normal, speech is fluent, affect appropriate, demonstrating no signs of hypersomnolence, sedation, or confusion        Assessment/Plan   Problem List Items Addressed This Visit             ICD-10-CM    Arthralgia of multiple sites M25.50    Relevant Medications    pregabalin (Lyrica) 75 mg capsule    Inflammatory arthropathy M19.90     44-year-old female with a complicated medical history and multiple comorbid conditions presenting for follow-up of back pain radiating from mid back through lumbar spine as well as widespread polyarticular pain most bothersome to bilateral elbows. Symptoms consistent with lumbar neuritis to right lower extremity as well as polyarticular arthropathy.  Restarted her Cosentyx with rheumatology and  feels that this has been beneficial for widespread polyarticular symptoms.  She would like to continue to hold off on physical therapy until she is able to get more relief with Cosentyx.  Again discussed the physical therapy would be most beneficial for widespread polyarticular symptoms as well as back pain. She does not want to proceed with epidural steroid injections at this time due to compromised immune system.  She may revisit in the future if symptoms should worsen.  Recently decreased her Nucynta to 50 mg 2 times per day so that we may eventually transition the patient to buprenorphine.  Buprenorphine may be more beneficial for this patient as it may provide more continuous relief as opposed to short acting relief with Nucynta. In order to transition the patient to buprenorphine will need to decrease her use of Nucynta.  Hopefully we will be able to decrease her Nucynta with additional relief from her Cosentyx.  At this time we will continue her Nucynta 50 mg twice daily for the 1 month.  We will reevaluate the patient in our office in 1 month and discuss a transition to buprenorphine.  Advised patient to utilize her Nucynta most often 1-2 times per day over the next 30 days.  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  We could try buprenorphine utilizing an oral steroid spray to decrease the allergic response at the site of the adhesive.  If she does not tolerate buprenorphine we could try Belbuca buccal films.  She continues to do well with Lyrica, duloxetine and orphenadrine as prescribed.  We also reviewed that she should utilize her alprazolam sparingly and only use when absolutely necessary for increased anxiety or panic attacks.  Advised patient not to take with opiates.  She continues to use sparingly.  Plan discussed with patient at today's office visit.    -We will continue the patient on Nucynta 50 mg up to 2 times per day as needed for pain for the  next 1 month.  Patient will continue to try using her Nucynta 1- 2 times per day most often.  We will discuss a transition to buprenorphine at her next office visit.    -Continue Lyrica 75 mg 3 times daily.  -Continue orphenadrine 100 mg twice daily for muscle tightness and spasm.  -Continue duloxetine 60 mg daily.  -Plan to proceed with a formal course of physical therapy when she has additional pain relief with her Cosentyx.  Recommended in the meantime that she do low impact exercises and stretches at home as able.  -Advised patient to continue to monitor her blood pressure and if it remains elevated or she notices symptoms associated with hypertension she should be evaluated in the emergency department.  -Patient will follow-up in 1 month or sooner if needed.     MEDICAL DECISION MAKING:    My impressions and treatment recommendations were discussed in detail with the patient, who verbalized understanding and had no further questions prior to discharge. Given the patient's report of reduced pain and improved functional ability without adverse effects,  it is reasonable to continue Nucynta 50 mg 2 times per day as needed for pain. The terms of the opioid agreement as well as the potential risks and adverse effects of the patient's medication regimen were discussed in detail. This includes if applicable due to dosage of medication permission to discuss and coordinate care with other treatment providers relevant to the patients condition. The patient verbalized understanding.    Treatment goals were discussed in detail with the patient.  These goals include reduction of pain levels, improved levels of functioning, avoidance of medication side effect and lowest medication dose possible to achieve  these goals.  The patient was in full agreement with these goals.  Also discussed is the understanding that pain may not be eliminated by medication but that the goal of a better sustaining life through use of medication  is appropriate.  Lifestyle modifications including weight management, stretching, diet, exercise and smoking are addressed at each office visit.  The patient provided a urine drug screen for routine monitoring and compliance.  This test may be given as frequently as every month based on the patient's individual opiate risk stratification and prescriber concerns for any aberrancies.  This test is indicated given the use of controlled substances for the patient's medical condition.  Unless otherwise noted the prior (s) urine drug testing results were consistent with prescribed medications.  There is no evidence of illicit drug use or additional medications ingested.     Risks and side effects of chronic opioid therapy including but not limited to tolerance, dependence, constipation, hyperalgesia, cognitive side effects, addiction and possible death due to overuse and or misuse were discussed. I also discussed that such medications when co-administered with other sedative agents including but not limited to alcohol, benzodiazepines, sedative hypnotics and illegal drugs could pose life threatening consequences including death.  I also explained the impact that the administration of such medication has on a patient with obstructive sleep apnea and continued recommendations for use of apnea devices if ordered are prescribed by other physicians.  In order to effectively and safely treat the pain, I also emphasized the importance of compliance with the treatment plan as well as compliance with the terms of the opioid agreement which was reviewed in detail. I explained the importance of being responsible with the medications and to take these only as prescribed, never in excess and never for reasons other than pain reduction. The patient was counseled on keeping the medications safe and locked away from children and other adults as well as disposal methods and options. The patient understood the risks and instructions.       I also discussed with the patient in detail that based on the clinical response to the opioid medications and improvements of activities of daily living, sleep, and work performance in light of compliance with the treatment plan we can continue this form of therapy for the above chronic  pain.  The goal and rationale used for current treatment with chronic opioid medication is to control the pain and alleviate disability induced by the chronic pain condition noted above after failures of other non-opioid and nonpharmacological modalities to treat the chronic pain and the symptoms associated with have failed.  The patient understood the goals in terms of the above treatment plan and had no further questions prior to leaving the office today.    Given the patient's total MED, general use of daily opiates, or other coadministered medications in various classes the patient was offered a prescription for Narcan.  I instructed the patient that Narcan is for emergency use only and is worse suspected or known opiate overdose.  Call 911 prior to administration of this medication to activate the emergency response team.  It is imperative to fill this medication in order to demonstrate understanding of the gravity of possible side effects including respiratory depression and risk of overdose of this opiate load or medication combination.  As such patients will be required to bring Narcan prescriptions to follow-up appointments as part of the compliance with continued opiate care.    Disclaimer: This note was transcribed using an audio transcription device.  As such, minor errors may be present with regard to spelling, punctuation, and inadvertent word insertion.  Please disregard such errors.                Relevant Medications    tapentadol (Nucynta) 50 mg tablet (Start on 11/15/2024)    Post herpetic neuralgia B02.29    Relevant Medications    tapentadol (Nucynta) 50 mg tablet (Start on 11/15/2024)    Psoriatic arthropathy  (Multi) L40.50    Relevant Medications    tapentadol (Nucynta) 50 mg tablet (Start on 11/15/2024)    Elbow tendinitis M77.8    Relevant Medications    pregabalin (Lyrica) 75 mg capsule    Right lumbar radiculopathy M54.16    Relevant Medications    pregabalin (Lyrica) 75 mg capsule    Spasm of thoracic back muscle M62.830    Relevant Medications    orphenadrine (Norflex) 100 mg 12 hr tablet     Other Visit Diagnoses         Codes    Long term prescription opiate use    -  Primary Z79.891    Relevant Medications    naloxone (Narcan) 4 mg/0.1 mL nasal spray    Other Relevant Orders    Buprenorphine Confirm,Urine    Tapentadol Confirmation, Urine    Alcohol, Urine (Completed)    Opiate/Opioid/Benzo Prescription Compliance    OOB Internal Tracking

## 2024-11-05 NOTE — ASSESSMENT & PLAN NOTE
44-year-old female with a complicated medical history and multiple comorbid conditions presenting for follow-up of back pain radiating from mid back through lumbar spine as well as widespread polyarticular pain most bothersome to bilateral elbows. Symptoms consistent with lumbar neuritis to right lower extremity as well as polyarticular arthropathy.  Restarted her Cosentyx with rheumatology and feels that this has been beneficial for widespread polyarticular symptoms.  She would like to continue to hold off on physical therapy until she is able to get more relief with Cosentyx.  Again discussed the physical therapy would be most beneficial for widespread polyarticular symptoms as well as back pain. She does not want to proceed with epidural steroid injections at this time due to compromised immune system.  She may revisit in the future if symptoms should worsen.  Recently decreased her Nucynta to 50 mg 2 times per day so that we may eventually transition the patient to buprenorphine.  Buprenorphine may be more beneficial for this patient as it may provide more continuous relief as opposed to short acting relief with Nucynta. In order to transition the patient to buprenorphine will need to decrease her use of Nucynta.  Hopefully we will be able to decrease her Nucynta with additional relief from her Cosentyx.  At this time we will continue her Nucynta 50 mg twice daily for the 1 month.  We will reevaluate the patient in our office in 1 month and discuss a transition to buprenorphine.  Advised patient to utilize her Nucynta most often 1-2 times per day over the next 30 days.  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  We could try buprenorphine utilizing an oral steroid spray to decrease the allergic response at the site of the adhesive.  If she does not tolerate buprenorphine we could try Belbuca buccal films.  She continues to do well with Lyrica, duloxetine and  orphenadrine as prescribed.  We also reviewed that she should utilize her alprazolam sparingly and only use when absolutely necessary for increased anxiety or panic attacks.  Advised patient not to take with opiates.  She continues to use sparingly.  Plan discussed with patient at today's office visit.    -We will continue the patient on Nucynta 50 mg up to 2 times per day as needed for pain for the next 1 month.  Patient will continue to try using her Nucynta 1- 2 times per day most often.  We will discuss a transition to buprenorphine at her next office visit.    -Continue Lyrica 75 mg 3 times daily.  -Continue orphenadrine 100 mg twice daily for muscle tightness and spasm.  -Continue duloxetine 60 mg daily.  -Plan to proceed with a formal course of physical therapy when she has additional pain relief with her Cosentyx.  Recommended in the meantime that she do low impact exercises and stretches at home as able.  -Advised patient to continue to monitor her blood pressure and if it remains elevated or she notices symptoms associated with hypertension she should be evaluated in the emergency department.  -Patient will follow-up in 1 month or sooner if needed.     MEDICAL DECISION MAKING:    My impressions and treatment recommendations were discussed in detail with the patient, who verbalized understanding and had no further questions prior to discharge. Given the patient's report of reduced pain and improved functional ability without adverse effects,  it is reasonable to continue Nucynta 50 mg 2 times per day as needed for pain. The terms of the opioid agreement as well as the potential risks and adverse effects of the patient's medication regimen were discussed in detail. This includes if applicable due to dosage of medication permission to discuss and coordinate care with other treatment providers relevant to the patients condition. The patient verbalized understanding.    Treatment goals were discussed in detail  with the patient.  These goals include reduction of pain levels, improved levels of functioning, avoidance of medication side effect and lowest medication dose possible to achieve  these goals.  The patient was in full agreement with these goals.  Also discussed is the understanding that pain may not be eliminated by medication but that the goal of a better sustaining life through use of medication is appropriate.  Lifestyle modifications including weight management, stretching, diet, exercise and smoking are addressed at each office visit.  The patient provided a urine drug screen for routine monitoring and compliance.  This test may be given as frequently as every month based on the patient's individual opiate risk stratification and prescriber concerns for any aberrancies.  This test is indicated given the use of controlled substances for the patient's medical condition.  Unless otherwise noted the prior (s) urine drug testing results were consistent with prescribed medications.  There is no evidence of illicit drug use or additional medications ingested.     Risks and side effects of chronic opioid therapy including but not limited to tolerance, dependence, constipation, hyperalgesia, cognitive side effects, addiction and possible death due to overuse and or misuse were discussed. I also discussed that such medications when co-administered with other sedative agents including but not limited to alcohol, benzodiazepines, sedative hypnotics and illegal drugs could pose life threatening consequences including death.  I also explained the impact that the administration of such medication has on a patient with obstructive sleep apnea and continued recommendations for use of apnea devices if ordered are prescribed by other physicians.  In order to effectively and safely treat the pain, I also emphasized the importance of compliance with the treatment plan as well as compliance with the terms of the opioid agreement  which was reviewed in detail. I explained the importance of being responsible with the medications and to take these only as prescribed, never in excess and never for reasons other than pain reduction. The patient was counseled on keeping the medications safe and locked away from children and other adults as well as disposal methods and options. The patient understood the risks and instructions.      I also discussed with the patient in detail that based on the clinical response to the opioid medications and improvements of activities of daily living, sleep, and work performance in light of compliance with the treatment plan we can continue this form of therapy for the above chronic  pain.  The goal and rationale used for current treatment with chronic opioid medication is to control the pain and alleviate disability induced by the chronic pain condition noted above after failures of other non-opioid and nonpharmacological modalities to treat the chronic pain and the symptoms associated with have failed.  The patient understood the goals in terms of the above treatment plan and had no further questions prior to leaving the office today.    Given the patient's total MED, general use of daily opiates, or other coadministered medications in various classes the patient was offered a prescription for Narcan.  I instructed the patient that Narcan is for emergency use only and is worse suspected or known opiate overdose.  Call 911 prior to administration of this medication to activate the emergency response team.  It is imperative to fill this medication in order to demonstrate understanding of the gravity of possible side effects including respiratory depression and risk of overdose of this opiate load or medication combination.  As such patients will be required to bring Narcan prescriptions to follow-up appointments as part of the compliance with continued opiate care.    Disclaimer: This note was transcribed using an  audio transcription device.  As such, minor errors may be present with regard to spelling, punctuation, and inadvertent word insertion.  Please disregard such errors.

## 2024-11-07 ENCOUNTER — LAB (OUTPATIENT)
Dept: LAB | Facility: LAB | Age: 44
End: 2024-11-07
Payer: COMMERCIAL

## 2024-11-07 DIAGNOSIS — D89.89 MANNOSE-BINDING LECTIN DEFICIENCY (MULTI): ICD-10-CM

## 2024-11-07 DIAGNOSIS — B99.9 RECURRENT INFECTIONS: ICD-10-CM

## 2024-11-07 DIAGNOSIS — B99.9 RECURRENT INFECTIONS: Primary | ICD-10-CM

## 2024-11-07 LAB
APO B100 SERPL-MCNC: 151 MG/DL (ref 60–117)
BASOPHILS # BLD AUTO: 0.02 X10*3/UL (ref 0–0.1)
BASOPHILS NFR BLD AUTO: 0.5 %
EOSINOPHIL # BLD AUTO: 0.01 X10*3/UL (ref 0–0.7)
EOSINOPHIL NFR BLD AUTO: 0.3 %
ERYTHROCYTE [DISTWIDTH] IN BLOOD BY AUTOMATED COUNT: 15 % (ref 11.5–14.5)
HCT VFR BLD AUTO: 39 % (ref 36–46)
HGB BLD-MCNC: 12.6 G/DL (ref 12–16)
IGA SERPL-MCNC: 209 MG/DL (ref 70–400)
IGG SERPL-MCNC: 1090 MG/DL (ref 700–1600)
IGM SERPL-MCNC: 77 MG/DL (ref 40–230)
IMM GRANULOCYTES # BLD AUTO: 0.01 X10*3/UL (ref 0–0.7)
IMM GRANULOCYTES NFR BLD AUTO: 0.3 % (ref 0–0.9)
LPA SERPL-MCNC: <6 MG/DL
LYMPHOCYTES # BLD AUTO: 1.75 X10*3/UL (ref 1.2–4.8)
LYMPHOCYTES NFR BLD AUTO: 45.8 %
MCH RBC QN AUTO: 29.6 PG (ref 26–34)
MCHC RBC AUTO-ENTMCNC: 32.3 G/DL (ref 32–36)
MCV RBC AUTO: 92 FL (ref 80–100)
MONOCYTES # BLD AUTO: 0.6 X10*3/UL (ref 0.1–1)
MONOCYTES NFR BLD AUTO: 15.7 %
NEUTROPHILS # BLD AUTO: 1.43 X10*3/UL (ref 1.2–7.7)
NEUTROPHILS NFR BLD AUTO: 37.4 %
NRBC BLD-RTO: 0 /100 WBCS (ref 0–0)
PLATELET # BLD AUTO: 308 X10*3/UL (ref 150–450)
RBC # BLD AUTO: 4.25 X10*6/UL (ref 4–5.2)
WBC # BLD AUTO: 3.8 X10*3/UL (ref 4.4–11.3)

## 2024-11-08 DIAGNOSIS — E78.5 HYPERLIPIDEMIA, UNSPECIFIED HYPERLIPIDEMIA TYPE: Primary | ICD-10-CM

## 2024-11-08 LAB
1OH-MIDAZOLAM UR CFM-MCNC: <25 NG/ML
6MAM UR CFM-MCNC: <25 NG/ML
7AMINOCLONAZEPAM UR CFM-MCNC: <25 NG/ML
A-OH ALPRAZ UR CFM-MCNC: <25 NG/ML
ALPRAZ UR CFM-MCNC: <25 NG/ML
CHLORDIAZEP UR CFM-MCNC: <25 NG/ML
CLONAZEPAM UR CFM-MCNC: <25 NG/ML
CODEINE UR CFM-MCNC: <50 NG/ML
DIAZEPAM UR CFM-MCNC: <25 NG/ML
EDDP UR CFM-MCNC: <25 NG/ML
FENTANYL UR CFM-MCNC: <2.5 NG/ML
HYDROCODONE CTO UR CFM-MCNC: <25 NG/ML
HYDROMORPHONE UR CFM-MCNC: <25 NG/ML
LORAZEPAM UR CFM-MCNC: <25 NG/ML
METHADONE UR CFM-MCNC: <25 NG/ML
MIDAZOLAM UR CFM-MCNC: <25 NG/ML
MORPHINE UR CFM-MCNC: <50 NG/ML
NORDIAZEPAM UR CFM-MCNC: <25 NG/ML
NORFENTANYL UR CFM-MCNC: <2.5 NG/ML
NORHYDROCODONE UR CFM-MCNC: <25 NG/ML
NOROXYCODONE UR CFM-MCNC: <25 NG/ML
NORTAPENTADOL UR CFM-MCNC: 200 NG/ML
NORTRAMADOL UR-MCNC: <50 NG/ML
OXAZEPAM UR CFM-MCNC: <25 NG/ML
OXYCODONE UR CFM-MCNC: <25 NG/ML
OXYMORPHONE UR CFM-MCNC: <25 NG/ML
TAPENTADOL UR CFM-MCNC: >1000 NG/ML
TEMAZEPAM UR CFM-MCNC: <25 NG/ML
TRAMADOL UR CFM-MCNC: <50 NG/ML
ZOLPIDEM UR CFM-MCNC: <25 NG/ML
ZOLPIDEM UR-MCNC: <25 NG/ML

## 2024-11-11 LAB — C TETANI TOXOID IGG SERPL IA-ACNC: 0.9 IU/ML

## 2024-11-11 PROCEDURE — RXMED WILLOW AMBULATORY MEDICATION CHARGE

## 2024-11-12 ENCOUNTER — APPOINTMENT (OUTPATIENT)
Dept: PHARMACY | Facility: HOSPITAL | Age: 44
End: 2024-11-12
Payer: COMMERCIAL

## 2024-11-12 LAB
S PN DA SERO 19F IGG SER-MCNC: 3.8 UG/ML
S PNEUM DA 1 IGG SER-MCNC: 0.42 UG/ML
S PNEUM DA 10A IGG SER-MCNC: 3.07 UG/ML
S PNEUM DA 11A IGG SER-MCNC: 2.87 UG/ML
S PNEUM DA 12F IGG SER-MCNC: 0.6 UG/ML
S PNEUM DA 14 IGG SER-MCNC: 5.19 UG/ML
S PNEUM DA 15B IGG SER-MCNC: 0.88 UG/ML
S PNEUM DA 17F IGG SER-MCNC: 4.03 UG/ML
S PNEUM DA 18C IGG SER-MCNC: 5.64 UG/ML
S PNEUM DA 19A IGG SER-MCNC: 9.86 UG/ML
S PNEUM DA 2 IGG SER-MCNC: 2.08 UG/ML
S PNEUM DA 20A IGG SER-MCNC: 3 UG/ML
S PNEUM DA 22F IGG SER-MCNC: 1.06 UG/ML
S PNEUM DA 23F IGG SER-MCNC: 0.44 UG/ML
S PNEUM DA 3 IGG SER-MCNC: 1.32 UG/ML
S PNEUM DA 33F IGG SER-MCNC: 6.51 UG/ML
S PNEUM DA 4 IGG SER-MCNC: 1.91 UG/ML
S PNEUM DA 5 IGG SER-MCNC: 1.34 UG/ML
S PNEUM DA 6B IGG SER-MCNC: 0.71 UG/ML
S PNEUM DA 7F IGG SER-MCNC: 0.24 UG/ML
S PNEUM DA 8 IGG SER-MCNC: 10.6 UG/ML
S PNEUM DA 9N IGG SER-MCNC: 0.18 UG/ML
S PNEUM DA 9V IGG SER-MCNC: 1.05 UG/ML
S PNEUM SEROTYPE IGG SER-IMP: NORMAL

## 2024-11-13 ENCOUNTER — APPOINTMENT (OUTPATIENT)
Dept: PRIMARY CARE | Facility: CLINIC | Age: 44
End: 2024-11-13
Payer: COMMERCIAL

## 2024-11-14 RX ORDER — CLONIDINE 0.2 MG/24H
PATCH, EXTENDED RELEASE TRANSDERMAL
Qty: 4 PATCH | Refills: 11 | Status: SHIPPED | OUTPATIENT
Start: 2024-11-14 | End: 2025-11-14

## 2024-11-19 PROCEDURE — RXMED WILLOW AMBULATORY MEDICATION CHARGE

## 2024-11-20 ENCOUNTER — APPOINTMENT (OUTPATIENT)
Dept: PRIMARY CARE | Facility: CLINIC | Age: 44
End: 2024-11-20
Payer: COMMERCIAL

## 2024-11-21 ENCOUNTER — APPOINTMENT (OUTPATIENT)
Dept: PRIMARY CARE | Facility: CLINIC | Age: 44
End: 2024-11-21
Payer: COMMERCIAL

## 2024-11-21 VITALS
DIASTOLIC BLOOD PRESSURE: 88 MMHG | TEMPERATURE: 98.1 F | HEIGHT: 67 IN | HEART RATE: 92 BPM | BODY MASS INDEX: 30.57 KG/M2 | WEIGHT: 194.8 LBS | OXYGEN SATURATION: 96 % | SYSTOLIC BLOOD PRESSURE: 138 MMHG

## 2024-11-21 DIAGNOSIS — Z79.899 HIGH RISK MEDICATION USE: Primary | ICD-10-CM

## 2024-11-21 DIAGNOSIS — F32.A DEPRESSION, UNSPECIFIED DEPRESSION TYPE: ICD-10-CM

## 2024-11-21 DIAGNOSIS — F41.8 OTHER SPECIFIED ANXIETY DISORDERS: ICD-10-CM

## 2024-11-21 DIAGNOSIS — F33.41 RECURRENT MAJOR DEPRESSIVE DISORDER, IN PARTIAL REMISSION (CMS-HCC): ICD-10-CM

## 2024-11-21 PROCEDURE — 3079F DIAST BP 80-89 MM HG: CPT | Performed by: NURSE PRACTITIONER

## 2024-11-21 PROCEDURE — 1036F TOBACCO NON-USER: CPT | Performed by: NURSE PRACTITIONER

## 2024-11-21 PROCEDURE — 99214 OFFICE O/P EST MOD 30 MIN: CPT | Performed by: NURSE PRACTITIONER

## 2024-11-21 PROCEDURE — 3075F SYST BP GE 130 - 139MM HG: CPT | Performed by: NURSE PRACTITIONER

## 2024-11-21 PROCEDURE — 3008F BODY MASS INDEX DOCD: CPT | Performed by: NURSE PRACTITIONER

## 2024-11-21 RX ORDER — DULOXETIN HYDROCHLORIDE 30 MG/1
CAPSULE, DELAYED RELEASE ORAL
Qty: 90 CAPSULE | Refills: 0 | Status: SHIPPED | OUTPATIENT
Start: 2024-11-21

## 2024-11-21 RX ORDER — DULOXETIN HYDROCHLORIDE 60 MG/1
CAPSULE, DELAYED RELEASE ORAL
Qty: 90 CAPSULE | Refills: 1 | Status: SHIPPED | OUTPATIENT
Start: 2024-11-21

## 2024-11-21 ASSESSMENT — ENCOUNTER SYMPTOMS
HYPERTENSION: 1
DEPRESSION: 0
LOSS OF SENSATION IN FEET: 0
OCCASIONAL FEELINGS OF UNSTEADINESS: 0

## 2024-11-21 ASSESSMENT — PATIENT HEALTH QUESTIONNAIRE - PHQ9
SUM OF ALL RESPONSES TO PHQ9 QUESTIONS 1 AND 2: 0
2. FEELING DOWN, DEPRESSED OR HOPELESS: NOT AT ALL
10. IF YOU CHECKED OFF ANY PROBLEMS, HOW DIFFICULT HAVE THESE PROBLEMS MADE IT FOR YOU TO DO YOUR WORK, TAKE CARE OF THINGS AT HOME, OR GET ALONG WITH OTHER PEOPLE: NOT DIFFICULT AT ALL
1. LITTLE INTEREST OR PLEASURE IN DOING THINGS: NOT AT ALL

## 2024-11-21 NOTE — PROGRESS NOTES
Subjective   Patient ID: Nichelle Izaguirre is a 44 y.o. female who presents for Med Refill.    HPI     Patient does not feel that her depression and anxiety are not well controlled. She relates this to the prosecution of her brother who is in group home for charges of sexual abuse of her daughter. Her son Shaun is dealing with a lot of anxiety and PTSD because he is worried about retaliation of his uncle. She has been having trouble sleeping, is more irritable, and is always worrying about her son. She was previously following with Dr. Harrison Vila but he unfortunately passed away recently and will need a new psychiatrist. She would like to increase her dose of duloxetine if possible. She is taking alprazolam a few times per week, sometimes half a dose. This was also managed by Dr. Vila. She is also on bupropion and doxepin. She would like me to take over the alprazolam prescription until she can establish with another psychiatrist.     OARRS:  Sandra Lilly, NAYELI-CNP on 11/21/2024 10:37 AM  I have personally reviewed the OARRS report for Nichelle Izaguirre. I have considered the risks of abuse, dependence, addiction and diversion    Is the patient prescribed a combination of a benzodiazepine and opioid?  Yes, I feel it is clincially indicated to continue the medication and have discussed with the patient risks/benefits/alternatives.    Last Urine Drug Screen / ordered today: Yes  Recent Results (from the past 8760 hours)   Confirmation Opiate/Opioid/Benzo Prescription Compliance    Collection Time: 11/05/24 11:30 AM   Result Value Ref Range    Clonazepam <25 <25 ng/mL    7-Aminoclonazepam <25 <25 ng/mL    Alprazolam <25 <25 ng/mL    Alpha-Hydroxyalprazolam <25 <25 ng/mL    Midazolam <25 <25 ng/mL    Alpha-Hydroxymidazolam <25 <25 ng/mL    Chlordiazepoxide <25 <25 ng/mL    Diazepam <25 <25 ng/mL    Nordiazepam <25 <25 ng/mL    Temazepam <25 <25 ng/mL    Oxazepam <25 <25 ng/mL    Lorazepam <25 <25 ng/mL     Methadone <25 <25 ng/mL    EDDP <25 <25 ng/mL    6-Acetylmorphine <25 <25 ng/mL    Codeine <50 <50 ng/mL    Hydrocodone <25 <25 ng/mL    Hydromorphone <25 <25 ng/mL    Morphine  <50 <50 ng/mL    Norhydrocodone <25 <25 ng/mL    Noroxycodone <25 <25 ng/mL    Oxycodone <25 <25 ng/mL    Oxymorphone <25 <25 ng/mL    Fentanyl <2.5 <2.5 ng/mL    Norfentanyl <2.5 <2.5 ng/mL    Tramadol <50 <50 ng/mL    O-Desmethyltramadol <50 <50 ng/mL    Zolpidem <25 <25 ng/mL    Zolpidem Metabolite (ZCA) <25 <25 ng/mL   Screen Opiate/Opioid/Benzo Prescription Compliance    Collection Time: 11/05/24 11:30 AM   Result Value Ref Range    Creatinine, Urine Random 88.0 20.0 - 320.0 mg/dL    Amphetamine Screen, Urine Presumptive Negative Presumptive Negative    Barbiturate Screen, Urine Presumptive Negative Presumptive Negative    Cannabinoid Screen, Urine Presumptive Negative Presumptive Negative    Cocaine Metabolite Screen, Urine Presumptive Negative Presumptive Negative    PCP Screen, Urine Presumptive Negative Presumptive Negative   Tapentadol Confirmation, Urine    Collection Time: 11/05/24 11:30 AM   Result Value Ref Range    Tapentadol >1,000 (H) <25 ng/mL    N-Desmethyltapentadol 200 (H) <25 ng/mL   Buprenorphine Confirm,Urine    Collection Time: 11/05/24 11:30 AM   Result Value Ref Range    BUPRENORPHINE GLUC, URINE <5 ng/mL    BUPRENORPHINE ,URINE <2 ng/mL    NALOXONE, URINE <100 ng/mL    NORBUPRENORPHINE GLUC,URINE <5 ng/mL    NORBUPRENORPHINE, URINE <2 ng/mL   Amphetamine Confirm, Urine    Collection Time: 05/14/24  4:32 PM   Result Value Ref Range    Methamphetamine Quant, Ur <200 ng/mL    MDA, Urine <200 ng/mL    MDEA, Urine <200 ng/mL    Phentermine,Urine <200 ng/mL    Amphetamines,Urine <50 ng/mL    MDMA, Urine <200 ng/mL     Results are as expected.         Controlled Substance Agreement:  Date of the Last Agreement: 11/21/2024  Reviewed Controlled Substance Agreement including but not limited to the benefits, risks, and  "alternatives to treatment with a Controlled Substance medication(s).    Benzodiazepines:  What is the patient's goal of therapy? Treat anxiety  Is this being achieved with current treatment? Yes    KILO-7:  No data recorded    Activities of Daily Living:   Is your overall impression that this patient is benefiting (symptom reduction outweighs side effects) from benzodiazepine therapy? Yes     1. Physical Functioning: Same  2. Family Relationship: Same  3. Social Relationship: Same  4. Mood: Same  5. Sleep Patterns: Same  6. Overall Function: Same    Review of Systems  ROS negative except as noted above in HPI.     Objective   /88   Pulse 92   Temp 36.7 °C (98.1 °F)   Ht 1.702 m (5' 7\")   Wt 88.4 kg (194 lb 12.8 oz)   SpO2 96%   BMI 30.51 kg/m²     Physical Exam  General: Alert and oriented, in no acute distress. Appears stated age, well-nourished, and well hydrated  HEENT:  - Head: Normocephalic and atraumatic   - Eyes: EOMI, PERRLA  - ENT: Hearing grossly intact  Heart: RRR. No murmurs, clicks, or rubs  Lungs: Unlabored breathing. CTAB with no crackles, wheezes, or rhonchi  Abdomen: Normal BS in all 4 quadrants. Soft, non-tender, non-distended, with no masses  Extremities: Warm and well perfused. No edema. Normal peripheral pulses  Musculoskeletal: Normal gait and station  Neurological: Alert and oriented. No gross neurological deficits  Psychological: Appropriate mood and affect  Skin: No rash, abnormal lesions, cyanosis, or erythema    Assessment/Plan   Anxiety and depression  - Not well controlled recently  - Will increase duloxetine to 90 mg every day   - Continue bupropion 450 mg every day, doxepin 50 mg at bedtime   - Will take over alprazolam rx until she is able to get in with new psychiatrist  -- UDS collected, CSA signed  - Patient to call previous psychiatrist's office to see if there is another provider she can see in the office    RTC in 1 month    NAYELI Starr-CNP  [facility] " Group

## 2024-11-22 DIAGNOSIS — F41.0 PANIC ATTACKS: ICD-10-CM

## 2024-11-22 LAB
AMPHETAMINES UR QL SCN: NORMAL
BARBITURATES UR QL SCN: NORMAL
BENZODIAZ UR QL SCN: NORMAL
BZE UR QL SCN: NORMAL
CANNABINOIDS UR QL SCN: NORMAL
FENTANYL+NORFENTANYL UR QL SCN: NORMAL
METHADONE UR QL SCN: NORMAL
OPIATES UR QL SCN: NORMAL
OXYCODONE+OXYMORPHONE UR QL SCN: NORMAL
PCP UR QL SCN: NORMAL

## 2024-11-22 RX ORDER — ALPRAZOLAM 0.5 MG/1
0.5 TABLET ORAL 3 TIMES DAILY PRN
Qty: 15 TABLET | Refills: 0 | Status: SHIPPED | OUTPATIENT
Start: 2024-11-22 | End: 2024-12-22

## 2024-11-25 ENCOUNTER — PHARMACY VISIT (OUTPATIENT)
Dept: PHARMACY | Facility: CLINIC | Age: 44
End: 2024-11-25
Payer: MEDICAID

## 2024-11-30 ENCOUNTER — LAB (OUTPATIENT)
Dept: LAB | Facility: LAB | Age: 44
End: 2024-11-30
Payer: COMMERCIAL

## 2024-11-30 DIAGNOSIS — E78.49 ESSENTIAL FAMILIAL HYPERLIPIDEMIA: ICD-10-CM

## 2024-11-30 DIAGNOSIS — E78.5 HYPERLIPIDEMIA, UNSPECIFIED HYPERLIPIDEMIA TYPE: ICD-10-CM

## 2024-11-30 DIAGNOSIS — E66.01 MORBID OBESITY (MULTI): ICD-10-CM

## 2024-11-30 DIAGNOSIS — R73.03 PRE-DIABETES: ICD-10-CM

## 2024-11-30 DIAGNOSIS — E78.1 HYPERTRIGLYCERIDEMIA: ICD-10-CM

## 2024-11-30 DIAGNOSIS — K90.9 IDIOPATHIC STEATORRHEA (HHS-HCC): ICD-10-CM

## 2024-11-30 DIAGNOSIS — D50.8 IRON DEFICIENCY ANEMIA SECONDARY TO INADEQUATE DIETARY IRON INTAKE: ICD-10-CM

## 2024-11-30 LAB
CHOLEST SERPL-MCNC: 196 MG/DL (ref 0–199)
CHOLESTEROL/HDL RATIO: 4.2
ERYTHROCYTE [DISTWIDTH] IN BLOOD BY AUTOMATED COUNT: 15.2 % (ref 11.5–14.5)
EST. AVERAGE GLUCOSE BLD GHB EST-MCNC: 91 MG/DL
FERRITIN SERPL-MCNC: 116 NG/ML (ref 8–150)
HBA1C MFR BLD: 4.8 %
HCT VFR BLD AUTO: 38.6 % (ref 36–46)
HDLC SERPL-MCNC: 46.8 MG/DL
HGB BLD-MCNC: 12.9 G/DL (ref 12–16)
IRON SATN MFR SERPL: 32 % (ref 25–45)
IRON SERPL-MCNC: 130 UG/DL (ref 35–150)
LDLC SERPL CALC-MCNC: 73 MG/DL
LDLC SERPL DIRECT ASSAY-MCNC: 109 MG/DL (ref 0–129)
MCH RBC QN AUTO: 29.9 PG (ref 26–34)
MCHC RBC AUTO-ENTMCNC: 33.4 G/DL (ref 32–36)
MCV RBC AUTO: 89 FL (ref 80–100)
NON HDL CHOLESTEROL: 149 MG/DL (ref 0–149)
NRBC BLD-RTO: 0 /100 WBCS (ref 0–0)
PLATELET # BLD AUTO: 265 X10*3/UL (ref 150–450)
RBC # BLD AUTO: 4.32 X10*6/UL (ref 4–5.2)
TIBC SERPL-MCNC: 403 UG/DL (ref 240–445)
TRIGL SERPL-MCNC: 383 MG/DL (ref 0–149)
UIBC SERPL-MCNC: 273 UG/DL (ref 110–370)
VIT B12 SERPL-MCNC: 173 PG/ML (ref 211–911)
VLDL: 77 MG/DL (ref 0–40)
WBC # BLD AUTO: 4.5 X10*3/UL (ref 4.4–11.3)

## 2024-11-30 PROCEDURE — 83540 ASSAY OF IRON: CPT

## 2024-11-30 PROCEDURE — 82728 ASSAY OF FERRITIN: CPT

## 2024-11-30 PROCEDURE — 85027 COMPLETE CBC AUTOMATED: CPT

## 2024-11-30 PROCEDURE — 80061 LIPID PANEL: CPT

## 2024-11-30 PROCEDURE — 82607 VITAMIN B-12: CPT

## 2024-11-30 PROCEDURE — 83036 HEMOGLOBIN GLYCOSYLATED A1C: CPT

## 2024-11-30 PROCEDURE — 83550 IRON BINDING TEST: CPT

## 2024-11-30 PROCEDURE — 83721 ASSAY OF BLOOD LIPOPROTEIN: CPT

## 2024-12-04 ENCOUNTER — OFFICE VISIT (OUTPATIENT)
Dept: HEMATOLOGY/ONCOLOGY | Facility: CLINIC | Age: 44
End: 2024-12-04
Payer: COMMERCIAL

## 2024-12-04 VITALS
TEMPERATURE: 97.5 F | BODY MASS INDEX: 35.74 KG/M2 | DIASTOLIC BLOOD PRESSURE: 97 MMHG | OXYGEN SATURATION: 96 % | HEART RATE: 100 BPM | RESPIRATION RATE: 16 BRPM | WEIGHT: 228.18 LBS | SYSTOLIC BLOOD PRESSURE: 150 MMHG

## 2024-12-04 DIAGNOSIS — D50.8 IRON DEFICIENCY ANEMIA SECONDARY TO INADEQUATE DIETARY IRON INTAKE: ICD-10-CM

## 2024-12-04 DIAGNOSIS — N83.9 LESION OF RIGHT OVARY: ICD-10-CM

## 2024-12-04 DIAGNOSIS — D73.89 SPLENIC LESION: Primary | ICD-10-CM

## 2024-12-04 DIAGNOSIS — K90.9 IDIOPATHIC STEATORRHEA (HHS-HCC): Primary | ICD-10-CM

## 2024-12-04 PROCEDURE — 99214 OFFICE O/P EST MOD 30 MIN: CPT | Performed by: PHYSICIAN ASSISTANT

## 2024-12-04 PROCEDURE — 3077F SYST BP >= 140 MM HG: CPT | Performed by: PHYSICIAN ASSISTANT

## 2024-12-04 PROCEDURE — 3080F DIAST BP >= 90 MM HG: CPT | Performed by: PHYSICIAN ASSISTANT

## 2024-12-04 RX ORDER — FAMOTIDINE 10 MG/ML
20 INJECTION INTRAVENOUS ONCE AS NEEDED
OUTPATIENT
Start: 2025-05-04

## 2024-12-04 RX ORDER — CYANOCOBALAMIN 1000 UG/ML
1000 INJECTION, SOLUTION INTRAMUSCULAR; SUBCUTANEOUS SEE ADMIN INSTRUCTIONS
Qty: 30 ML | Refills: 2 | Status: SHIPPED | OUTPATIENT
Start: 2024-12-04 | End: 2024-12-04 | Stop reason: SDUPTHER

## 2024-12-04 RX ORDER — EPINEPHRINE 0.3 MG/.3ML
0.3 INJECTION SUBCUTANEOUS EVERY 5 MIN PRN
OUTPATIENT
Start: 2025-05-04

## 2024-12-04 RX ORDER — CYANOCOBALAMIN 1000 UG/ML
1000 INJECTION, SOLUTION INTRAMUSCULAR; SUBCUTANEOUS SEE ADMIN INSTRUCTIONS
Qty: 30 ML | Refills: 2 | Status: SHIPPED | OUTPATIENT
Start: 2024-12-04

## 2024-12-04 RX ORDER — DIPHENHYDRAMINE HYDROCHLORIDE 50 MG/ML
50 INJECTION INTRAMUSCULAR; INTRAVENOUS AS NEEDED
OUTPATIENT
Start: 2025-05-04

## 2024-12-04 RX ORDER — FAMOTIDINE 10 MG/ML
20 INJECTION INTRAVENOUS ONCE
OUTPATIENT
Start: 2025-05-04 | End: 2025-05-04

## 2024-12-04 RX ORDER — ALBUTEROL SULFATE 0.83 MG/ML
3 SOLUTION RESPIRATORY (INHALATION) AS NEEDED
OUTPATIENT
Start: 2025-05-04

## 2024-12-04 RX ORDER — DIPHENHYDRAMINE HYDROCHLORIDE 50 MG/ML
25 INJECTION INTRAMUSCULAR; INTRAVENOUS ONCE
OUTPATIENT
Start: 2025-05-04 | End: 2025-05-04

## 2024-12-04 ASSESSMENT — PAIN SCALES - GENERAL: PAINLEVEL_OUTOF10: 6

## 2024-12-04 NOTE — PROGRESS NOTES
" C/w Reason for Visit  Nichelle Izaguirre is a 44 y.o. female with multiple medical problems including esophagitis and gastritis on PPI referred by Sandra Lilly NP for normocytic anemia form CHALINO likely secondary to malabsorption.    Patient with long standing anemia since teenage years due to menses. Patient has been on oral iron for \"many years\" with constipation and no response. Patient had c-scope and EGD in 2/2019 with internal and external hemorrhoids otherwise normal with normal biopsy and esophagitis and gastritis on PPI.    On assessment, reports extreme fatigue. Patient had oral surgery yesterday. Notes that appetite is good but diet not well balanced due to poor dentition. Otherwise doing well.       PMH/PSH: HTN, HL, psoriatic arthritis, gout, asthma, Parkinsonism/Dystonia, Immunodeficiency disorder (MBL) on prophylaxis antibiotics with amoxicillin alternating with Doxycycline. Oral surgery, kidney stones, s/p lithotripsy, GUICHO on CPAP, h/o sepsis.   FH: PGM-breast cancer, PGF-stomach cancer, MGM-bone cancer, MGF-stomach/esophagus, maternal aunt-cervical cancer.  Soc Hx: Denies smoking. ETOH, illicit drugs; SAHM, , 3 kids.    History of Present Illness:  S/P 5 doses of 3 doses of Venofer, had reaction with first one so requires pre-meds prior to infusions. Continues to have fatigue despite correction in CHALINO. States she is taking sublingual vitamin B12. Recently in ed with flank pain r/o as a pulled muscle otherwise doing well.    Review of Systems: All of the systems have been reviewed and are negative for complaints except what is stated in the HPI and/or past medical history.    Allergies and Intolerances:  Allergies   Allergen Reactions    Clindamycin Anaphylaxis    Dupilumab Anaphylaxis    Hydrochlorothiazide Anaphylaxis, Itching and Swelling    Sulfamethoxazole-Trimethoprim Anaphylaxis    Cefazolin Hives, Other and Swelling     STATES TONGUE SPLITS    Atorvastatin Hives    Cephalexin Other    " Clarithromycin Swelling     hives, tongue swelling    Nitrofurantoin Monohyd/M-Cryst Hives    Sulfa (Sulfonamide Antibiotics) Hives    Sulfacetamide Hives     Outpatient Medication Profile:  Current Outpatient Medications   Medication Sig Dispense Refill    albuterol 2.5 mg /3 mL (0.083 %) nebulizer solution inhale the contents of one vial via nebulizer every 4 hours as needed      allopurinol (Zyloprim) 100 mg tablet Take 1 tablet (100 mg) by mouth once daily. 90 tablet 0    allopurinol (Zyloprim) 300 mg tablet Take 1 tablet (300 mg) by mouth once daily. 90 tablet 0    ALPRAZolam (Xanax) 0.5 mg tablet Take 1 tablet (0.5 mg) by mouth 3 times a day as needed for sleep or anxiety. 15 tablet 0    amLODIPine (Norvasc) 5 mg tablet Take 1 tablet (5 mg) by mouth once daily. 90 tablet 3    amoxicillin (Amoxil) 500 mg capsule Take 1 capsule (500 mg) by mouth once daily.      ascorbic acid, vitamin C, 500 mg capsule Take 1 capsule by mouth once daily.      benzoyl peroxide (Benzac AC) 10 % external wash Apply topically if needed.      buPROPion XL (Wellbutrin XL) 150 mg 24 hr tablet Take 1 tablet (150 mg) by mouth once daily. Do not crush, chew, or split. 90 tablet 3    buPROPion XL (Wellbutrin XL) 300 mg 24 hr tablet Take 1 tablet (300 mg) by mouth once daily in the morning. Do not crush, chew, or split. 90 tablet 3    carbidopa-levodopa-entacapone (Stalevo) -200 mg tablet Take 1 tablet by mouth every 4 hours. 540 tablet 1    clindamycin (Cleocin T) 1 % lotion 1 Application      clobetasol (Temovate) 0.05 % external solution Apply topically 2 times a day.      clobetasoL 0.05 % lotion One application as needed for flaring only.not for daily use. For scalp and hands only 118 mL 3    cloNIDine (Catapres) 0.1 mg tablet Take 1 tablet (0.1 mg) by mouth if needed for high blood pressure.      cloNIDine (Catapres-TTS) 0.2 mg/24 hr patch Apply one patch on the skin and replace every 7 days, as directed 4 patch 11    Cosentyx  Pen, 2 Pens, 150 mg/mL self-injector pen Inject 2 mL (300 mg) under the skin every 30 (thirty) days. 2 mL 2    cyanocobalamin, vitamin B-12, 1,000 mcg tablet, sublingual Place 1 tablet (1,000 mcg) under the tongue once daily. 90 tablet 1    diclofenac sodium (Voltaren) 1 % gel gel Apply 4.5 inches (4 g) topically 2 times a day as needed.      doxycycline (Vibramycin) 100 mg capsule 1 capsule (100 mg).      DULoxetine (Cymbalta) 30 mg DR capsule Take 1 tablet daily with 60 mg dose. Do not crush or chew. 90 capsule 0    DULoxetine (Cymbalta) 60 mg DR capsule Take 1 tablet once daily with 30 mg dose 90 capsule 1    eplerenone (Inspra) 25 mg tablet Take 1 tablet (25 mg) by mouth once daily.      ezetimibe (Zetia) 10 mg tablet Take 1 tablet (10 mg) by mouth once daily. 90 tablet 3    fluticasone furoate-vilanteroL (Breo Ellipta) 200-25 mcg/dose inhaler 1puff daily, Inhalation      ipratropium-albuteroL (Duo-Neb) 0.5-2.5 mg/3 mL nebulizer solution Take 3 mL by nebulization every 4 hours if needed for wheezing.      ketoconazole (NIZOral) 2 % cream 1 Application      lactobacillus acidophilus (Lactobacillus acidoph-L.bulgar) tablet tablet Take 1 tablet by mouth once daily. 90 tablet 0    leflunomide (Arava) 10 mg tablet Take 1 tablet (10 mg) by mouth once daily. 90 tablet 0    levalbuterol (Xopenex) 1.25 mg/3 mL nebulizer solution Take 1 ampule by nebulization 3 times a day.      metoprolol succinate XL (Toprol-XL) 25 mg 24 hr tablet Take 2 tablets (50 mg) by mouth once daily.      metroNIDAZOLE (Metrocream) 0.75 % cream 1 Application      montelukast (Singulair) 10 mg tablet Take 1 tablet (10 mg) by mouth once daily at bedtime.      nebulizer and compressor device use q4-6 hours with albuterol PRN cough/wheeze      nebulizers (Devilbiss Disposable Nebulizer) misc every 4 hours if needed.      omega-3 acid ethyl esters (Lovaza) 1 gram capsule Take 2 capsules (2 grams) by mouth 2 times a day. 360 capsule 3    omeprazole  (PriLOSEC) 40 mg DR capsule Take 1 capsule (40 mg) by mouth once daily in the morning before meals. 90 capsule 3    ondansetron ODT (Zofran-ODT) 4 mg disintegrating tablet Take 1 tablet (4 mg) by mouth every 8 hours if needed for nausea or vomiting. 90 tablet 0    orphenadrine (Norflex) 100 mg 12 hr tablet Take 1 tablet (100 mg) by mouth 2 times a day as needed for muscle spasms. 60 tablet 3    pioglitazone (Actos) 15 mg tablet Take 1 tablet (15 mg) by mouth once daily.      pregabalin (Lyrica) 75 mg capsule Take 1 capsule (75 mg) by mouth 3 times a day. 90 capsule 2    ProAir HFA 90 mcg/actuation inhaler       prochlorperazine (Compazine) 10 mg tablet Take 1 tablet (10 mg) by mouth 3 times a day as needed for nausea. 30 tablet 0    Repatha SureClick 140 mg/mL injection Inject 1 mL (140 mg) under the skin every 14 (fourteen) days. 6 mL 3    Spiriva Respimat 1.25 mcg/actuation inhaler Inhale once daily.      SUMAtriptan (Imitrex) 25 mg tablet Take 1 tablet (25 mg) by mouth 1 time if needed for migraine. May repeat in 2 hours if no improvement. Max 200 mg/24 hr 9 tablet 2    tapentadol (Nucynta) 50 mg tablet Take 1 tablet (50 mg) by mouth every 12 hours. Do not fill before November 15, 2024. 60 tablet 0    tezepelumab-ekko (Tezspire) SubQ Pen Injector Inject 210 mg under the skin.      tirzepatide, weight loss, (Zepbound) 2.5 mg/0.5 mL injection Inject 2.5 mg under the skin every 7 days. 4 mL 1    carvedilol (Coreg) 12.5 mg tablet Take 1 tablet (12.5 mg) by mouth 2 times daily (morning and late afternoon) for 7 days, THEN 0.5 tablets (6.25 mg) 2 times daily (morning and late afternoon) for 14 days. 28 tablet 1    cetirizine-pseudoephedrine (ZyrTEC-D) 5-120 mg 12 hr tablet Take 1 tablet by mouth 2 times a day. 180 tablet 0    doxepin (SINEquan) 50 mg capsule Take 1 capsule (50 mg) by mouth once daily at bedtime. 30 capsule 3     No current facility-administered medications for this visit.        Vitals and  "Measurements:       10/14/2024     5:17 PM 10/21/2024    11:57 AM 10/21/2024     2:05 PM 11/4/2024     8:25 AM 11/5/2024    10:16 AM 11/21/2024     9:47 AM 12/4/2024     2:37 PM   Vitals   Systolic 144 147 159 142 159 138 150   Diastolic 89 96 113 92 103 88 97   BP Location  Right arm Left arm Right arm   Left arm   Heart Rate 84 86  96 93 92 100   Temp  36.6 °C (97.9 °F)    36.7 °C (98.1 °F) 36.4 °C (97.5 °F)   Resp  17 16  16  16   Height     1.651 m (5' 5\") 1.702 m (5' 7\")    Weight (lb)  226.9  226 226 194.8 228.18   BMI  37.17 kg/m2  37.02 kg/m2 37.61 kg/m2 30.51 kg/m2 35.74 kg/m2   BSA (m2)  2.18 m2  2.18 m2 2.17 m2 2.04 m2 2.22 m2   Visit Report    Report Report Report Report     physical Exam:   Constitutional: alert, awake and oriented, not in acute distress   HEENT: moist mucous membranes, normal nose   Neck: supple, no lymphadenopathy   EYES: PERRL, EOM intact, conjunctiva normal  Skin: no jaundice, rash or erythema  Neurological: AAOx3, no gross focal deficit   Psychiatric: normal mood and behavior     Lab Results:  Lab Results   Component Value Date    WBC 4.5 11/30/2024    NEUTROABS 1.43 11/07/2024    IGABSOL 0.01 11/07/2024    LYMPHSABS 1.75 11/07/2024    MONOSABS 0.60 11/07/2024    EOSABS 0.01 11/07/2024    BASOSABS 0.02 11/07/2024    RBC 4.32 11/30/2024    MCV 89 11/30/2024    MCHC 33.4 11/30/2024    HGB 12.9 11/30/2024    HCT 38.6 11/30/2024     11/30/2024     Lab Results   Component Value Date    RETICCTPCT 1.7 09/26/2024      Lab Results   Component Value Date    CREATININE 0.47 (L) 11/05/2024    BUN 9 11/05/2024    EGFR >90 11/05/2024     11/05/2024    K 4.2 11/05/2024     11/05/2024    CO2 27 11/05/2024      Lab Results   Component Value Date    ALT 17 11/05/2024    AST 17 11/05/2024    ALKPHOS 64 11/05/2024    BILITOT 0.3 11/05/2024      Lab Results   Component Value Date    TSH 0.61 07/01/2024     Lab Results   Component Value Date    TSH 0.61 07/01/2024    Y5ZORWW 7.3 " "11/19/2020     Lab Results   Component Value Date    IRON 130 11/30/2024    TIBC 403 11/30/2024    FERRITIN 116 11/30/2024      Lab Results   Component Value Date    NHXAFJZX19 173 (L) 11/30/2024      Lab Results   Component Value Date    FOLATE 10.8 09/26/2024     Lab Results   Component Value Date    WILBER NEGATIVE 03/19/2020    RF <10 03/19/2020    SEDRATE 12 11/05/2024      Lab Results   Component Value Date    CRP 0.15 11/05/2024      No results found for: \"MUSTAPHA\"  Lab Results   Component Value Date    LDH 92 09/26/2024     Lab Results   Component Value Date    HAPTOGLOBIN 124 09/26/2024     Lab Results   Component Value Date    SPEP Normal. 09/26/2024     Lab Results   Component Value Date    IGG 1,090 11/07/2024    IGM 77 11/07/2024     11/07/2024     Assessment:    44 y.o. female with multiple medical problems including esophagitis and gastritis on PPI referred for normocytic anemia form CHALINO likely secondary to malabsorption.    c-scope and EGD in 2/2019 with internal and external hemorrhoids otherwise normal with normal biopsy and esophagitis and gastritis on PPI    Plan:    Reviewed and discussed lab, imaging, and pathology results with patient in detail as well as diagnosis, prognosis, and treatment options.    Responded to Venofer well; will continue to monitor     Sublingual Vitamin B12 didn't work so will send Rx for Vitamin B12 injections    F/U w/GI-next scoped in 2029    F/U w/PCP    RTC in 5 months     Patient verbalized understanding, and all her questions were answered to her satisfaction    30 min spent with patient greater than 50 % of which was spent in consultation and coordination of care.    "

## 2024-12-10 ENCOUNTER — TELEMEDICINE (OUTPATIENT)
Facility: CLINIC | Age: 44
End: 2024-12-10
Payer: COMMERCIAL

## 2024-12-10 ENCOUNTER — APPOINTMENT (OUTPATIENT)
Dept: PAIN MEDICINE | Facility: HOSPITAL | Age: 44
End: 2024-12-10
Payer: COMMERCIAL

## 2024-12-10 ENCOUNTER — SPECIALTY PHARMACY (OUTPATIENT)
Dept: PHARMACY | Facility: CLINIC | Age: 44
End: 2024-12-10

## 2024-12-10 DIAGNOSIS — R41.3 MEMORY DIFFICULTY: Primary | ICD-10-CM

## 2024-12-10 DIAGNOSIS — R41.9 COGNITIVE COMPLAINTS: ICD-10-CM

## 2024-12-10 DIAGNOSIS — F41.9 ANXIETY: ICD-10-CM

## 2024-12-10 DIAGNOSIS — F54 PSYCHOLOGICAL FACTORS AFFECTING MEDICAL CONDITION: ICD-10-CM

## 2024-12-10 DIAGNOSIS — F32.A DEPRESSION, UNSPECIFIED DEPRESSION TYPE: ICD-10-CM

## 2024-12-10 PROCEDURE — 96116 NUBHVL XM PHYS/QHP 1ST HR: CPT | Mod: AH,95 | Performed by: PSYCHOLOGIST

## 2024-12-10 PROCEDURE — 96138 PSYCL/NRPSYC TECH 1ST: CPT | Mod: AH,95 | Performed by: PSYCHOLOGIST

## 2024-12-10 PROCEDURE — 96132 NRPSYC TST EVAL PHYS/QHP 1ST: CPT | Performed by: PSYCHOLOGIST

## 2024-12-10 PROCEDURE — 96116 NUBHVL XM PHYS/QHP 1ST HR: CPT | Performed by: PSYCHOLOGIST

## 2024-12-10 PROCEDURE — 96133 NRPSYC TST EVAL PHYS/QHP EA: CPT | Mod: AH,GT,95 | Performed by: PSYCHOLOGIST

## 2024-12-10 PROCEDURE — 96133 NRPSYC TST EVAL PHYS/QHP EA: CPT | Performed by: PSYCHOLOGIST

## 2024-12-10 PROCEDURE — 96132 NRPSYC TST EVAL PHYS/QHP 1ST: CPT | Mod: AH,95 | Performed by: PSYCHOLOGIST

## 2024-12-10 PROCEDURE — 96139 PSYCL/NRPSYC TST TECH EA: CPT | Performed by: PSYCHOLOGIST

## 2024-12-10 PROCEDURE — 96138 PSYCL/NRPSYC TECH 1ST: CPT | Performed by: PSYCHOLOGIST

## 2024-12-10 PROCEDURE — 96139 PSYCL/NRPSYC TST TECH EA: CPT | Mod: AH,95 | Performed by: PSYCHOLOGIST

## 2024-12-10 NOTE — PROGRESS NOTES
Neuropsychology Note    Name: Nichelle Izaguirre  : 1980  MRN: [id number]      Date of Service: 12/10/2024     Reason For Visit: Ms. Izaguirre underwent neuropsychological evaluation on 10/31/2024, due to memory difficulty and concern regarding cognitive decline. The full report can be found in the note for that visit.     This was a telehealth appointment for feedback regarding evaluation results, held via an interactive audio and video telecommunication system that permits real-time communications between patient (at home) and provider (in office).     Summary: Ms. Izaguirre was seen today to discuss findings from her neuropsychological evaluation on 10/31/2024; she was unaccompanied.    Results were discussed in the context of variable performance validity, indicating that poor performances on testing were related, at least in part, to non-neurological (e.g., emotional, behavioral, motivational) factors. Though this was not necessarily intentional, it appeared to affect performances on actual ability measures, as the overall profile lacked internal consistency, with variability within and across cognitive domains. As such, test results are not presumed to accurately represent Ms. Izaguirre's true capabilities. Nevertheless, there was no clear/consistent pattern of impairment in this context.    There was a thorough discussion of diagnostic impressions: the overall clinical picture is not convincing for artur cognitive impairment; rather, the neuropsychological profile is most consistent with intermittent interference from psychological/constitutional factors (e.g., stress, depression, anxiety, fatigue) on task engagement (vs. organic neurological dysfunction).     Psychoeducation was provided regarding the critical role of attention in learning/memory and other daily functions, as well as the effect of stress, anxiety, depression, and fatigue/insufficient restorative sleep on cognitive effectiveness.  Recommendations were discussed, and questions were addressed.    Ms. Izaguirre was appropriately engaged in the appointment; affect was somewhat restricted and generally dysphoric. She verbalized understanding of findings, impressions, and recommendations.          Time-based service:    - Evaluation: 80746 (48 minutes); 38542 (60 minutes); 86593 (140 minutes); 58575 (30 minutes); 20505 (222 minutes)   - Feedback via telehealth: 46662 (67 minutes)

## 2024-12-16 ENCOUNTER — APPOINTMENT (OUTPATIENT)
Dept: CARDIOLOGY | Facility: HOSPITAL | Age: 44
End: 2024-12-16
Payer: COMMERCIAL

## 2024-12-16 ENCOUNTER — SPECIALTY PHARMACY (OUTPATIENT)
Dept: PHARMACY | Facility: CLINIC | Age: 44
End: 2024-12-16

## 2024-12-16 DIAGNOSIS — L40.50 PSORIATIC ARTHROPATHY (MULTI): ICD-10-CM

## 2024-12-16 PROCEDURE — RXMED WILLOW AMBULATORY MEDICATION CHARGE

## 2024-12-16 RX ORDER — SECUKINUMAB 150 MG/ML
300 INJECTION SUBCUTANEOUS
Qty: 2 ML | Refills: 2 | Status: SHIPPED | OUTPATIENT
Start: 2024-12-16

## 2024-12-17 PROCEDURE — RXMED WILLOW AMBULATORY MEDICATION CHARGE

## 2024-12-19 ENCOUNTER — PHARMACY VISIT (OUTPATIENT)
Dept: PHARMACY | Facility: CLINIC | Age: 44
End: 2024-12-19
Payer: MEDICAID

## 2024-12-23 ENCOUNTER — APPOINTMENT (OUTPATIENT)
Dept: PRIMARY CARE | Facility: CLINIC | Age: 44
End: 2024-12-23
Payer: COMMERCIAL

## 2024-12-23 DIAGNOSIS — F32.A DEPRESSION, UNSPECIFIED DEPRESSION TYPE: Primary | ICD-10-CM

## 2024-12-23 DIAGNOSIS — F41.8 OTHER SPECIFIED ANXIETY DISORDERS: ICD-10-CM

## 2024-12-23 PROCEDURE — 99213 OFFICE O/P EST LOW 20 MIN: CPT | Performed by: NURSE PRACTITIONER

## 2024-12-23 NOTE — PROGRESS NOTES
Subjective   Patient ID: Nichelle Izaguirre is a 44 y.o. female who presents for Depression and Anxiety. I performed this visit using real-time telehealth tools, including an audio/video connection between Nichelle Izaguirre and myself, Sandra Lilly CNP, within the [facility].  Consent has been obtained for this visit. I have verbally confirmed with Nichelle Izaguirre (or parent if under 18) that they are physically located in the [facility] during this virtual visit. Telemedicine appropriate evaluation completed.     HPI     She does not feel any improvement on her mood with the increased dose of duloxetine. She still feels beltrán, irritable, angry, enraged, and flying off the handle. She would like to go back down to duloxetine 60 mg daily. The alprazolam has been helping her to sleep. She is scheduled to see psychiatry on 1/9 and will discuss medication adjustment at that appointment.     Review of Systems  ROS negative except as noted above in HPI.     Objective   There were no vitals taken for this visit.    Physical Exam  A full physical exam was unable to be completed due to the limitations of the telehealth visit, but those observed are noted below.     Constitutional: Alert and in no acute distress  Pulmonary: No respiratory distress  Neurologic: Normal cortical function  Psychiatric: Normal judgment and insight. Normal mood and affect    Assessment/Plan   Diagnoses and all orders for this visit:  Depression, unspecified depression type  Other specified anxiety disorders  - Decrease duloxetine back down to 60 mg every day because she has had no improvement with 90 mg  - Continue bupropion, alprazolam  - Follow up with psychiatry as scheduled on 1/9    RTC in 3-4 months for chronic care or sooner NAYELI Varghese-CNP  [facility]

## 2024-12-31 ENCOUNTER — TELEPHONE (OUTPATIENT)
Dept: PAIN MEDICINE | Facility: HOSPITAL | Age: 44
End: 2024-12-31
Payer: COMMERCIAL

## 2024-12-31 DIAGNOSIS — G89.29 CHRONIC RIGHT-SIDED LOW BACK PAIN WITH RIGHT-SIDED SCIATICA: Primary | ICD-10-CM

## 2024-12-31 DIAGNOSIS — M19.90 INFLAMMATORY ARTHROPATHY: ICD-10-CM

## 2024-12-31 DIAGNOSIS — M54.41 CHRONIC RIGHT-SIDED LOW BACK PAIN WITH RIGHT-SIDED SCIATICA: Primary | ICD-10-CM

## 2024-12-31 DIAGNOSIS — M10.9 GOUTY ARTHROPATHY: ICD-10-CM

## 2024-12-31 DIAGNOSIS — G43.909 MIGRAINE WITHOUT STATUS MIGRAINOSUS, NOT INTRACTABLE, UNSPECIFIED MIGRAINE TYPE: Primary | ICD-10-CM

## 2024-12-31 RX ORDER — UBROGEPANT 50 MG/1
TABLET ORAL
Qty: 9 TABLET | Refills: 0 | Status: SHIPPED | OUTPATIENT
Start: 2024-12-31

## 2024-12-31 NOTE — TELEPHONE ENCOUNTER
Pt is requesting refill of   Nucynta 50 mg 1 tablet po BID                                                         LV:  11/5/24                  NV:  1/20/25               OARRS reviewed with [facility]:   11/15/24 #60/30 days                         Pended 20 day RX to JEMIMA Rosenbaum for transmission to pharmacy.

## 2024-12-31 NOTE — PROGRESS NOTES
Patient requesting rx for abortive migraine medication. Sumatriptan not working. Rx sent for Ubrelvy

## 2024-12-31 NOTE — TELEPHONE ENCOUNTER
Called [facility] and gave a verbal refill for pt's Compound Cream with 5 refills.  Spoke with pharmacist.  Madiha Cabrera RN

## 2024-12-31 NOTE — TELEPHONE ENCOUNTER
Pt called requesting refill of Nucynta and requests to increase dosage from BID dosing back up to TID. Spoke with Santa Aragon regarding this and pt needs an appointment and we cannot go back up on Nucynta at this time.  Called and left VM for pt to call back.  Madiha Cabrera RN

## 2025-01-09 ENCOUNTER — APPOINTMENT (OUTPATIENT)
Dept: BEHAVIORAL HEALTH | Facility: CLINIC | Age: 45
End: 2025-01-09
Payer: COMMERCIAL

## 2025-01-09 DIAGNOSIS — F33.41 RECURRENT MAJOR DEPRESSIVE DISORDER, IN PARTIAL REMISSION (CMS-HCC): ICD-10-CM

## 2025-01-09 DIAGNOSIS — F41.0 PANIC ATTACKS: ICD-10-CM

## 2025-01-09 PROCEDURE — 90792 PSYCH DIAG EVAL W/MED SRVCS: CPT

## 2025-01-09 PROCEDURE — 1036F TOBACCO NON-USER: CPT

## 2025-01-09 RX ORDER — DULOXETIN HYDROCHLORIDE 30 MG/1
30 CAPSULE, DELAYED RELEASE ORAL DAILY
Qty: 30 CAPSULE | Refills: 0 | Status: SHIPPED | OUTPATIENT
Start: 2025-01-09 | End: 2025-02-08

## 2025-01-09 RX ORDER — ALPRAZOLAM 0.5 MG/1
0.5 TABLET ORAL AS NEEDED
Qty: 16 TABLET | Refills: 0 | Status: SHIPPED | OUTPATIENT
Start: 2025-01-09

## 2025-01-09 ASSESSMENT — PATIENT HEALTH QUESTIONNAIRE - PHQ9
5. POOR APPETITE OR OVEREATING: NEARLY EVERY DAY
4. FEELING TIRED OR HAVING LITTLE ENERGY: NEARLY EVERY DAY
6. FEELING BAD ABOUT YOURSELF - OR THAT YOU ARE A FAILURE OR HAVE LET YOURSELF OR YOUR FAMILY DOWN: NEARLY EVERY DAY
7. TROUBLE CONCENTRATING ON THINGS, SUCH AS READING THE NEWSPAPER OR WATCHING TELEVISION: NEARLY EVERY DAY
SUM OF ALL RESPONSES TO PHQ9 QUESTIONS 1 & 2: 6
9. THOUGHTS THAT YOU WOULD BE BETTER OFF DEAD, OR OF HURTING YOURSELF: NOT AT ALL
10. IF YOU CHECKED OFF ANY PROBLEMS, HOW DIFFICULT HAVE THESE PROBLEMS MADE IT FOR YOU TO DO YOUR WORK, TAKE CARE OF THINGS AT HOME, OR GET ALONG WITH OTHER PEOPLE: VERY DIFFICULT
SUM OF ALL RESPONSES TO PHQ QUESTIONS 1-9: 21
3. TROUBLE FALLING OR STAYING ASLEEP: NEARLY EVERY DAY
2. FEELING DOWN, DEPRESSED OR HOPELESS: NEARLY EVERY DAY
8. MOVING OR SPEAKING SO SLOWLY THAT OTHER PEOPLE COULD HAVE NOTICED. OR THE OPPOSITE, BEING SO FIGETY OR RESTLESS THAT YOU HAVE BEEN MOVING AROUND A LOT MORE THAN USUAL: NOT AT ALL
1. LITTLE INTEREST OR PLEASURE IN DOING THINGS: NEARLY EVERY DAY

## 2025-01-09 ASSESSMENT — ANXIETY QUESTIONNAIRES
3. WORRYING TOO MUCH ABOUT DIFFERENT THINGS: SEVERAL DAYS
4. TROUBLE RELAXING: NEARLY EVERY DAY
1. FEELING NERVOUS, ANXIOUS, OR ON EDGE: NEARLY EVERY DAY
IF YOU CHECKED OFF ANY PROBLEMS ON THIS QUESTIONNAIRE, HOW DIFFICULT HAVE THESE PROBLEMS MADE IT FOR YOU TO DO YOUR WORK, TAKE CARE OF THINGS AT HOME, OR GET ALONG WITH OTHER PEOPLE: VERY DIFFICULT
2. NOT BEING ABLE TO STOP OR CONTROL WORRYING: SEVERAL DAYS
7. FEELING AFRAID AS IF SOMETHING AWFUL MIGHT HAPPEN: NOT AT ALL
6. BECOMING EASILY ANNOYED OR IRRITABLE: NEARLY EVERY DAY
5. BEING SO RESTLESS THAT IT IS HARD TO SIT STILL: NEARLY EVERY DAY
GAD7 TOTAL SCORE: 14

## 2025-01-09 ASSESSMENT — ENCOUNTER SYMPTOMS
CONSTITUTIONAL NEGATIVE: 1
NERVOUS/ANXIOUS: 1

## 2025-01-09 NOTE — PROGRESS NOTES
"I performed this visit using real-time telehealth tools, including an AUDIO/VIDEO connection between Nichelle Izaguirre who is at Home and DEBBY Moreno who is at At home office working via Telehealth.    Virtual or Telephone Consent    An interactive audio and video telecommunication system which permits real time communications between the patient (at the originating site) and provider (at the distant site) was utilized to provide this telehealth service.   Verbal consent was requested and obtained from Nichelle Izaguirre on this date, 01/09/25 for a telehealth visit.     Patient is reminded that if there is SI to call [phone] and get themselves to the closest ED for evaluation, otherwise contact me for other questions/concerns.    Nichelle Izaguirre is a 44 y.o. female patient with a chief complaint of \" I am not doing the best, the Cymbalta is not working.; I was on Effexor and it kind of platoted. I am very irritable and my patience is zero. \"     Problem List Items Addressed This Visit             ICD-10-CM    Depression F32.A    Relevant Medications    DULoxetine (Cymbalta) 30 mg DR capsule    Panic attacks F41.0    Relevant Medications    DULoxetine (Cymbalta) 30 mg DR capsule    ALPRAZolam (Xanax) 0.5 mg tablet    Other Relevant Orders    Follow Up In Psychiatry       Patient presenting to outpatient treatment for a scheduled psych outpatient psychiatric evaluation.    HPI  Background:    Patient is a transfer of care from Dr. Vila. Patient is currently at home for this visit.  Patient explains that symptoms of anxiety, depression and panic attacks began a year and a half  ago.   Patient explains that during the time symptoms exacerbated it was when they almost lost their house.   The duration of symptoms occurred for about a year and a half. Reports she stopped taking Seroquel due to gaining a lot of weight on it. Doxepin was also started and it did not work. Reports she has sleep apnea and had to " "cancel the appointment for the sleep study. Reports she will have another sleep study done in a couple weeks.    Characteristics/Recent psychiatric symptoms (pertinent positives and negatives):      Depression is characterized by experiencing low mood, anhedonia, low motivation, poor sleep, decreased energy. Reports panic when she going out or in the crowd at the Store. Explains that she takes Xanax to help.  Denies wishes for death or consideration for suicide.  CSSRS negative. Denies ever experiencing pawel of psychosis. Denies hx hospitalization.  Reports getting 2 hours of sleep at night. Appetite \" is not great\"   Patient ranks the severity of anxiety using the KILO 7 scale with a rating of 14 for  anxiety symptoms. Patient ranks the severity of depression using the PHQ 9 scale with a rating of 21 for  depressive symptoms. Patient does explain that current dose of Cymbalta, Wellbutrin XL has NOT been successful in the management of anxiety and depressive symptoms.     NOTE: Symptom scale is rated where 0 = no symptoms at all, and 10 = symptoms so severe that pt is an imminent danger to themselves or others and requires hospitalization.    Anxiety, Depression, and Sleep disturbances remain(s) present more days than not, which has worsened  over the past few weeks. Nichelle Izaguirre rates the severity of psych symptoms as a 4/10 for anxiety and 6/10 for depression, noting symptom improvement with \" My grand babies, they are so innocents and deana \" and worsening of symptoms with talking with her youngest son.        Psychiatric Review Of Systems:    -Depressive Symptoms: concentration, energy, interest, and sleep decreased   -Manic Symptoms: negative  -Anxiety Symptoms: General Anxiety Disorder (KILO)KILO Behaviors: irritability and restlessness  -Psychotic Symptoms: negative  -Other Symptoms:  -Sleep/Energy/Motivation: 2 hours of sleep at night  -Appetite/Weight Changes:  \" Appetite is not great \"  -Psychosis: "   -SI/HI: Denies        REVIEW OF SYSTEMS  Review of Systems   Constitutional: Negative.    Psychiatric/Behavioral:  The patient is nervous/anxious.      All other ROS negative    [x]  Firearms at home  HISTORY  PSYCH HISTORY  -Psych Hx: KILO, MDD, Panic disorder  -Psych Hospitalization Hx: Denies  -Suicide Attempt Hx: Denies  -Self-Harm/Violence Hx: Denies  -Current psych meds: Wellbutrin XL, Cymbalta, Xanax  -Psych Med Hx: Seroquel, doxepin, Effexor, Trazodone, Ambien, Clonidine for sleep ( they did not work )    SUBSTANCE USE HISTORY  -Substance Use Hx: Denies  -Tobacco: Denies  -Use of alcohol: occasional, social use  -Use of caffeine: tea 6-7 /day  -Substance Abuse Treatment Hx: Denies    FAMILY HISTORY  -Family Psych Hx:  Mother has MDD  -Family Suicide Hx: Denies  -Family Substance Abuse Hx:  Reports Mother AUD    SOCIAL HISTORY  -Supports:    -Housing:  Lives with  , 2 dogs  -Income: Not working  -Education: HS Diploma  -Legal: Denies  -Abuse Hx: Denies    Current Medications:    Current Outpatient Medications:     albuterol 2.5 mg /3 mL (0.083 %) nebulizer solution, inhale the contents of one vial via nebulizer every 4 hours as needed, Disp: , Rfl:     allopurinol (Zyloprim) 100 mg tablet, Take 1 tablet (100 mg) by mouth once daily., Disp: 90 tablet, Rfl: 0    allopurinol (Zyloprim) 300 mg tablet, Take 1 tablet (300 mg) by mouth once daily., Disp: 90 tablet, Rfl: 0    ALPRAZolam (Xanax) 0.5 mg tablet, Take 1 tablet (0.5 mg) by mouth 3 times a day as needed for sleep or anxiety., Disp: 15 tablet, Rfl: 0    amLODIPine (Norvasc) 5 mg tablet, Take 1 tablet (5 mg) by mouth once daily., Disp: 90 tablet, Rfl: 3    amoxicillin (Amoxil) 500 mg capsule, Take 1 capsule (500 mg) by mouth once daily., Disp: , Rfl:     ascorbic acid, vitamin C, 500 mg capsule, Take 1 capsule by mouth once daily., Disp: , Rfl:     benzoyl peroxide (Benzac AC) 10 % external wash, Apply topically if needed., Disp: , Rfl:      buPROPion XL (Wellbutrin XL) 150 mg 24 hr tablet, Take 1 tablet (150 mg) by mouth once daily. Do not crush, chew, or split., Disp: 90 tablet, Rfl: 3    buPROPion XL (Wellbutrin XL) 300 mg 24 hr tablet, Take 1 tablet (300 mg) by mouth once daily in the morning. Do not crush, chew, or split., Disp: 90 tablet, Rfl: 3    carbidopa-levodopa-entacapone (Stalevo) -200 mg tablet, Take 1 tablet by mouth every 4 hours., Disp: 540 tablet, Rfl: 1    carvedilol (Coreg) 12.5 mg tablet, Take 1 tablet (12.5 mg) by mouth 2 times daily (morning and late afternoon) for 7 days, THEN 0.5 tablets (6.25 mg) 2 times daily (morning and late afternoon) for 14 days., Disp: 28 tablet, Rfl: 1    cetirizine-pseudoephedrine (ZyrTEC-D) 5-120 mg 12 hr tablet, Take 1 tablet by mouth 2 times a day., Disp: 180 tablet, Rfl: 0    clindamycin (Cleocin T) 1 % lotion, 1 Application, Disp: , Rfl:     clobetasol (Temovate) 0.05 % external solution, Apply topically 2 times a day., Disp: , Rfl:     clobetasoL 0.05 % lotion, One application as needed for flaring only.not for daily use. For scalp and hands only, Disp: 118 mL, Rfl: 3    cloNIDine (Catapres) 0.1 mg tablet, Take 1 tablet (0.1 mg) by mouth if needed for high blood pressure., Disp: , Rfl:     cloNIDine (Catapres-TTS) 0.2 mg/24 hr patch, Apply one patch on the skin and replace every 7 days, as directed, Disp: 4 patch, Rfl: 11    cyanocobalamin (Vitamin B-12) 1,000 mcg/mL injection, Inject 1 mL (1,000 mcg) into the muscle see administration instructions., Disp: 30 mL, Rfl: 2    diclofenac sodium (Voltaren) 1 % gel gel, Apply 4.5 inches (4 g) topically 2 times a day as needed., Disp: , Rfl:     doxepin (SINEquan) 50 mg capsule, Take 1 capsule (50 mg) by mouth once daily at bedtime., Disp: 30 capsule, Rfl: 3    doxycycline (Vibramycin) 100 mg capsule, 1 capsule (100 mg)., Disp: , Rfl:     DULoxetine (Cymbalta) 60 mg DR capsule, Take 1 tablet once daily with 30 mg dose, Disp: 90 capsule, Rfl: 1     eplerenone (Inspra) 25 mg tablet, Take 1 tablet (25 mg) by mouth once daily., Disp: , Rfl:     ezetimibe (Zetia) 10 mg tablet, Take 1 tablet (10 mg) by mouth once daily., Disp: 90 tablet, Rfl: 3    fluticasone furoate-vilanteroL (Breo Ellipta) 200-25 mcg/dose inhaler, 1puff daily, Inhalation, Disp: , Rfl:     ipratropium-albuteroL (Duo-Neb) 0.5-2.5 mg/3 mL nebulizer solution, Take 3 mL by nebulization every 4 hours if needed for wheezing., Disp: , Rfl:     ketoconazole (NIZOral) 2 % cream, 1 Application, Disp: , Rfl:     lactobacillus acidophilus (Lactobacillus acidoph-L.bulgar) tablet tablet, Take 1 tablet by mouth once daily., Disp: 90 tablet, Rfl: 0    leflunomide (Arava) 10 mg tablet, Take 1 tablet (10 mg) by mouth once daily., Disp: 90 tablet, Rfl: 0    levalbuterol (Xopenex) 1.25 mg/3 mL nebulizer solution, Take 1 ampule by nebulization 3 times a day., Disp: , Rfl:     metoprolol succinate XL (Toprol-XL) 25 mg 24 hr tablet, Take 2 tablets (50 mg) by mouth once daily., Disp: , Rfl:     montelukast (Singulair) 10 mg tablet, Take 1 tablet (10 mg) by mouth once daily at bedtime., Disp: , Rfl:     nebulizer and compressor device, use q4-6 hours with albuterol PRN cough/wheeze, Disp: , Rfl:     nebulizers (Devilbiss Disposable Nebulizer) misc, every 4 hours if needed., Disp: , Rfl:     omega-3 acid ethyl esters (Lovaza) 1 gram capsule, Take 2 capsules (2 grams) by mouth 2 times a day., Disp: 360 capsule, Rfl: 3    omeprazole (PriLOSEC) 40 mg DR capsule, Take 1 capsule (40 mg) by mouth once daily in the morning before meals., Disp: 90 capsule, Rfl: 3    ondansetron ODT (Zofran-ODT) 4 mg disintegrating tablet, Take 1 tablet (4 mg) by mouth every 8 hours if needed for nausea or vomiting., Disp: 90 tablet, Rfl: 0    orphenadrine (Norflex) 100 mg 12 hr tablet, Take 1 tablet (100 mg) by mouth 2 times a day as needed for muscle spasms., Disp: 60 tablet, Rfl: 3    pioglitazone (Actos) 15 mg tablet, Take 1 tablet (15 mg) by  "mouth once daily., Disp: , Rfl:     pregabalin (Lyrica) 75 mg capsule, Take 1 capsule (75 mg) by mouth 3 times a day., Disp: 90 capsule, Rfl: 2    ProAir HFA 90 mcg/actuation inhaler, , Disp: , Rfl:     Repatha SureClick 140 mg/mL injection, Inject 1 mL (140 mg) under the skin every 14 (fourteen) days., Disp: 6 mL, Rfl: 3    Spiriva Respimat 1.25 mcg/actuation inhaler, Inhale once daily., Disp: , Rfl:     SUMAtriptan (Imitrex) 25 mg tablet, Take 1 tablet (25 mg) by mouth 1 time if needed for migraine. May repeat in 2 hours if no improvement. Max 200 mg/24 hr, Disp: 9 tablet, Rfl: 2    syringe with needle, safety 3 mL 23 gauge x 1\" syringe, 1 Box see administration instructions., Disp: 30 each, Rfl: 3    tapentadol (Nucynta) 50 mg tablet, Take 1 tablet (50 mg) by mouth every 12 hours for 20 days., Disp: 40 tablet, Rfl: 0    tezepelumab-ekko (Tezspire) SubQ Pen Injector, Inject 210 mg under the skin., Disp: , Rfl:     ubrogepant (Ubrelvy) 50 mg tablet, Take 1 tablet at onset of migraine. May repeat in 2 hours as needed. Max 200 mg/24 hour, Disp: 9 tablet, Rfl: 0    ALPRAZolam (Xanax) 0.5 mg tablet, Take 1 tablet (0.5 mg) by mouth if needed for sleep or anxiety (For up to 16 doses a month) for up to 16 doses., Disp: 16 tablet, Rfl: 0    Cosentyx Pen, 2 Pens, 150 mg/mL self-injector pen, Inject 2 mL (300 mg) under the skin every 30 (thirty) days., Disp: 2 mL, Rfl: 2    DULoxetine (Cymbalta) 30 mg DR capsule, Take 1 capsule (30 mg) by mouth once daily. Do not crush or chew., Disp: 30 capsule, Rfl: 0    metroNIDAZOLE (Metrocream) 0.75 % cream, 1 Application, Disp: , Rfl:      Medical History:  Past Medical History:   Diagnosis Date    Acute upper respiratory infection, unspecified 07/26/2016    Viral URI with cough    Acute upper respiratory infection, unspecified 11/15/2017    Viral URI with cough    Allergy status to unspecified drugs, medicaments and biological substances     History of allergy    Chronic maxillary " sinusitis 08/27/2018    Maxillary sinusitis, unspecified chronicity    Dystonia, unspecified 04/08/2014    Dystonia    Encounter for other screening for malignant neoplasm of breast 01/22/2018    Breast screening    Encounter for screening for respiratory tuberculosis 11/11/2013    Screening examination for pulmonary tuberculosis    Essential (primary) hypertension 12/27/2017    Benign essential hypertension    Hypersomnia, unspecified 04/13/2015    Sleeps too much    Idiopathic sleep related nonobstructive alveolar hypoventilation     Nocturnal hypoxemia    Idiopathic sleep related nonobstructive alveolar hypoventilation 02/09/2018    Nocturnal hypoxia    Impaired fasting glucose 01/22/2018    Impaired fasting glucose    Insect bite (nonvenomous) of lower back and pelvis, initial encounter 03/29/2018    Tick bite of back    Left lower quadrant pain 01/07/2019    Left lower quadrant pain    Local infection of the skin and subcutaneous tissue, unspecified 07/28/2017    Skin infection    Low back pain, unspecified 05/23/2019    Acute low back pain    Other conditions influencing health status 02/27/2017    Sprain of right thumb, unspecified site of finger, initial encounter    Other malaise 04/16/2018    Malaise and fatigue    Other microscopic hematuria 03/30/2018    Microscopic hematuria    Other specified cough 09/07/2018    Productive cough    Other specified health status 01/07/2019    Acute medical illness    Other symptoms and signs involving the musculoskeletal system 07/12/2018    Weakness of right lower extremity    Other symptoms and signs involving the musculoskeletal system 03/21/2018    Polyarticular joint involvement    Other symptoms and signs involving the musculoskeletal system 07/12/2018    RUE weakness    Pain in right lower leg 01/27/2016    Right calf pain    Pain in unspecified hip 01/20/2016    Hip pain    Pelvic and perineal pain 04/17/2018    Pelvic pain    Personal history of diseases of the  skin and subcutaneous tissue 08/07/2018    History of dermatitis    Personal history of other (healed) physical injury and trauma 08/07/2018    History of insect bite    Personal history of other diseases of the circulatory system     History of hypertension    Personal history of other diseases of the female genital tract 11/24/2015    History of menorrhagia    Personal history of other diseases of the musculoskeletal system and connective tissue 03/14/2018    History of tendinitis    Personal history of other diseases of the nervous system and sense organs 04/08/2014    History of Parkinson's disease    Personal history of other diseases of the respiratory system 01/07/2019    History of acute bronchitis    Personal history of other diseases of the respiratory system 10/05/2018    History of paranasal sinus pain    Personal history of other specified conditions 04/13/2015    History of snoring    Personal history of other specified conditions 09/11/2017    History of headache    Personal history of other specified conditions 09/07/2018    History of persistent cough    Personal history of other specified conditions 04/08/2014    History of memory loss    Personal history of other specified conditions 03/26/2018    History of left flank pain    Personal history of other specified conditions     History of heartburn    Personal history of urinary calculi 03/26/2018    Personal history of urinary calculi    Personal history of urinary calculi 03/26/2018    History of renal calculi    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/2017    Plantar fasciitis, left    Primary insomnia 04/13/2015    Primary insomnia    Rash and other nonspecific skin eruption 04/16/2018    Rash    Unspecified abdominal pain 01/07/2019    Left sided abdominal pain    Urinary tract infection, site not specified 10/19/2018    UTI (urinary tract infection), bacterial    Urinary tract infection, site not specified 10/18/2018    Recurrent UTI    Vitamin D  deficiency, unspecified 04/22/2016    Vitamin D deficiency    Zoster with other complications 11/25/2015    Herpes zoster dermatitis     Surgical History:  Past Surgical History:   Procedure Laterality Date    CT ANGIO CORONARY ART WITH HEARTFLOW IF SCORE >30%  3/18/2020    CT HEART CORONARY ANGIOGRAM 3/18/2020 [facility]    HAND TENDON SURGERY  11/11/2013    Hand Incision Tendon Sheath Of A Finger    HYSTERECTOMY  03/04/2016    Hysterectomy    LITHOTRIPSY  11/11/2013    Renal Lithotripsy    TONSILLECTOMY  12/19/2013    Tonsillectomy With Adenoidectomy     Family History:  Family History   Problem Relation Name Age of Onset    Asthma Mother      Hypertension Mother      Irregular heart beat Mother      Allergies Father      Heart disease Father      Hypertension Father      Sinusitis Father      Allergies Sister      Asthma Daughter      Allergies Son      Heart disease Maternal Grandmother      Breast cancer Paternal Grandmother      Heart disease Paternal Grandfather      Lung cancer Paternal Grandfather      Dementia Paternal Great-Grandfather       Social History:  Social History     Socioeconomic History    Marital status:      Spouse name: Not on file    Number of children: Not on file    Years of education: Not on file    Highest education level: Not on file   Occupational History    Not on file   Tobacco Use    Smoking status: Never    Smokeless tobacco: Never   Vaping Use    Vaping status: Not on file   Substance and Sexual Activity    Alcohol use: Never    Drug use: Never    Sexual activity: Yes   Other Topics Concern    Not on file   Social History Narrative    Not on file     Social Drivers of Health     Financial Resource Strain: Low Risk  (8/26/2024)    Overall Financial Resource Strain (CARDIA)     Difficulty of Paying Living Expenses: Not very hard   Recent Concern: Financial Resource Strain - Medium Risk (8/24/2024)    Overall Financial Resource Strain (CARDIA)     Difficulty of Paying  Living Expenses: Somewhat hard   Food Insecurity: No Food Insecurity (8/26/2024)    Hunger Vital Sign     Worried About Running Out of Food in the Last Year: Never true     Ran Out of Food in the Last Year: Never true   Transportation Needs: No Transportation Needs (8/26/2024)    PRAPARE - Transportation     Lack of Transportation (Medical): No     Lack of Transportation (Non-Medical): No   Physical Activity: Sufficiently Active (8/26/2024)    Exercise Vital Sign     Days of Exercise per Week: 5 days     Minutes of Exercise per Session: 150+ min   Stress: No Stress Concern Present (8/26/2024)    [place] [employer] - Occupational Stress Questionnaire     Feeling of Stress : Only a little   Social Connections: Moderately Integrated (8/26/2024)    Social Connection and Isolation Panel [NHANES]     Frequency of Communication with Friends and Family: More than three times a week     Frequency of Social Gatherings with Friends and Family: More than three times a week     Attends Jehov[employer] witness Services: More than 4 times per year     Active Member of Clubs or Organizations: No     Attends Club or Organization Meetings: Never     Marital Status:    Intimate Partner Violence: Not At Risk (8/26/2024)    Humiliation, Afraid, Rape, and Kick questionnaire     Fear of Current or Ex-Partner: No     Emotionally Abused: No     Physically Abused: No     Sexually Abused: No   Housing Stability: Low Risk  (8/26/2024)    Housing Stability Vital Sign     Unable to Pay for Housing in the Last Year: No     Number of Times Moved in the Last Year: 0     Homeless in the Last Year: No   Recent Concern: Housing Stability - High Risk (8/24/2024)    Housing Stability Vital Sign     Unable to Pay for Housing in the Last Year: Yes     Number of Times Moved in the Last Year: 0     Homeless in the Last Year: No       Vitals:  There were no vitals filed for this visit.    Allergies:  Allergies   Allergen Reactions     "Clindamycin Anaphylaxis    Dupilumab Anaphylaxis    Hydrochlorothiazide Anaphylaxis, Itching and Swelling    Sulfamethoxazole-Trimethoprim Anaphylaxis    Cefazolin Hives, Other and Swelling     STATES TONGUE SPLITS    Atorvastatin Hives    Cephalexin Other    Clarithromycin Swelling     hives, tongue swelling    Lactose Constipation    Nitrofurantoin Monohyd/M-Cryst Hives    Sulfa (Sulfonamide Antibiotics) Hives    Sulfacetamide Hives     -PCP: Sandra Lilly, APRN-CNP  -Pt reports currently is not pregnant, and currently is sexually active, does not use birth control. LMP: \"every now and then I get a period\"  -TBI/head trauma/LOC/seizure hx: Denies seizures but reports head trauma from a seizure      Objective   Mental Status Exam    Appearance: Well groomed , appropriate eye contact    Attitude: Cooperative, and engaged in conversation.    Behavior: Appropriate eye contact.     Motor Activity: No psychomotor agitation or retardation. No abnormal movements, tremors or tics. No evidence of extrapyramidal symptoms or tardive dyskinesia.    Speech: Regular rate, rhythm, volume. Spontaneous, no pressured speech.    Mood:  \" Good and having to talk about it brought out my mind how I could not protect my kids. \"    Affect: Full range, mood congruent.    Thought Process: Linear, logical, and goal-directed. No loose associations or gross thought disorganization.    Thought Content: Denied current suicidal ideation or thoughts of harm to self, denied homicidal ideation or thoughts of harm to others. No delusional thinking elicited. No perseverations or obsessions identified.     Perception: Did not endorse auditory or visual hallucinations, did not appear to be responding to hallucinatory stimuli.     Cognition: Alert, oriented x3. Preserved attention span and concentration, recent and remote memory. Adequate fund of knowledge. No deficits in language.     Insight: Fair, in regards to understanding mental health " condition    Judgement: Fair        Physical Exam    IMPRESSION  -PARTIAL Benefit  for Depression, anxiety symptoms with Cymbalta and Wellbutrin, recommend to Increase the dose of Cymbalta.    -Diagnoses and all orders for this visit:  Recurrent major depressive disorder, in partial remission (CMS-HCC)  -     DULoxetine (Cymbalta) 30 mg DR capsule; Take 1 capsule (30 mg) by mouth once daily. Do not crush or chew.  Panic attacks  -     DULoxetine (Cymbalta) 30 mg DR capsule; Take 1 capsule (30 mg) by mouth once daily. Do not crush or chew.  -     ALPRAZolam (Xanax) 0.5 mg tablet; Take 1 tablet (0.5 mg) by mouth if needed for sleep or anxiety (For up to 16 doses a month) for up to 16 doses.  -     Follow Up In Psychiatry; Future         MEDICAL-DECISION MAKING  -  -Patient educated and verbalized understanding on benefits, risks, and side effects of Cymbalta, xanax, Wellbutrin XL. START  psychotherapy . Follow-up with psychiatry in 4 weeks for medication evaluation and effectiveness.        Psych med regimen as follows:   -INCREASE Cymbalta to 90 mg daily for depression and anxiety   -START Psychotherapy   -START exercising and eating a healthy diet   -READ attached information about the prescribed new medication   - CALL OFFICE IN CASE OF SIDE EFFECT TO SCHEDULE A VISIT FOR ASSESSMENT -[phone]  SI/HI ASSESSMENT  -Risk Assessment: The pt is currently a low acute risk of suicide and self-harm due to no past suicide attempt(s) and not currently endorsing thoughts of suicide. The pt is currently a low acute risk of violence and harm to others due to no past history of violence and not currently threatening others.  -Suicidal Risk Factors: current psychiatric illness  -Violence Risk Factors:  current psychiatric illness  -Protective Factors: strong coping skills  -Plan to Reduce Risk: Establish medication regimen and outpatient follow-up care  Denied current suicidal ideation or thoughts of harm to self, denied  homicidal ideation or thoughts of harm to others. No delusional thinking elicited. No perseverations or obsessions identified.       PLAN  -Continue Medications as directed.  -Follow-up with this provider in 4 weeks.  -Risks/benefits/assessment of medication interventions discussed with pt; pt agreeable to plan. Will continue to monitor for symptoms mgmt and SEs and adjust plan as needed.  -MI to increase coping skills/behavior regulation.  -Safety plan reviewed.  -Call  Psychiatry at [phone] with issues.  -For [facility], [facility] is a 24/7 hotline you can call for assistance at [phone]. Please call 911 or go to your closest Emergency Room if you feel worse. This includes thoughts of hurting yourself or anyone else, or having other troubles such as hearing voices, seeing visions, or having new and scary thoughts about the people around you.    OARRS:  Kristy Smith, APRN-CNP on 1/9/2025  3:07 PM  I have personally reviewed the OARRS report for Nichelle Izaguirre. I have considered the risks of abuse, dependence, addiction and diversion  Medication is felt to be clinically appropriate based on documented diagnosis.    Recent Results (from the past 8760 hours)   Drug Screen, Urine With Reflex to Confirmation    Collection Time: 11/21/24  5:20 PM   Result Value Ref Range    Amphetamine Screen, Urine Presumptive Negative Presumptive Negative    Barbiturate Screen, Urine Presumptive Negative Presumptive Negative    Benzodiazepines Screen, Urine Presumptive Negative Presumptive Negative    Cannabinoid Screen, Urine Presumptive Negative Presumptive Negative    Cocaine Metabolite Screen, Urine Presumptive Negative Presumptive Negative    Fentanyl Screen, Urine Presumptive Negative Presumptive Negative    Opiate Screen, Urine Presumptive Negative Presumptive Negative    Oxycodone Screen, Urine Presumptive Negative Presumptive Negative    PCP Screen, Urine Presumptive Negative Presumptive  Negative    Methadone Screen, Urine Presumptive Negative Presumptive Negative   Confirmation Opiate/Opioid/Benzo Prescription Compliance    Collection Time: 11/05/24 11:30 AM   Result Value Ref Range    Clonazepam <25 <25 ng/mL    7-Aminoclonazepam <25 <25 ng/mL    Alprazolam <25 <25 ng/mL    Alpha-Hydroxyalprazolam <25 <25 ng/mL    Midazolam <25 <25 ng/mL    Alpha-Hydroxymidazolam <25 <25 ng/mL    Chlordiazepoxide <25 <25 ng/mL    Diazepam <25 <25 ng/mL    Nordiazepam <25 <25 ng/mL    Temazepam <25 <25 ng/mL    Oxazepam <25 <25 ng/mL    Lorazepam <25 <25 ng/mL    Methadone <25 <25 ng/mL    EDDP <25 <25 ng/mL    6-Acetylmorphine <25 <25 ng/mL    Codeine <50 <50 ng/mL    Hydrocodone <25 <25 ng/mL    Hydromorphone <25 <25 ng/mL    Morphine  <50 <50 ng/mL    Norhydrocodone <25 <25 ng/mL    Noroxycodone <25 <25 ng/mL    Oxycodone <25 <25 ng/mL    Oxymorphone <25 <25 ng/mL    Fentanyl <2.5 <2.5 ng/mL    Norfentanyl <2.5 <2.5 ng/mL    Tramadol <50 <50 ng/mL    O-Desmethyltramadol <50 <50 ng/mL    Zolpidem <25 <25 ng/mL    Zolpidem Metabolite (ZCA) <25 <25 ng/mL   Screen Opiate/Opioid/Benzo Prescription Compliance    Collection Time: 11/05/24 11:30 AM   Result Value Ref Range    Creatinine, Urine Random 88.0 20.0 - 320.0 mg/dL    Amphetamine Screen, Urine Presumptive Negative Presumptive Negative    Barbiturate Screen, Urine Presumptive Negative Presumptive Negative    Cannabinoid Screen, Urine Presumptive Negative Presumptive Negative    Cocaine Metabolite Screen, Urine Presumptive Negative Presumptive Negative    PCP Screen, Urine Presumptive Negative Presumptive Negative   Tapentadol Confirmation, Urine    Collection Time: 11/05/24 11:30 AM   Result Value Ref Range    Tapentadol >1,000 (H) <25 ng/mL    N-Desmethyltapentadol 200 (H) <25 ng/mL   Buprenorphine Confirm,Urine    Collection Time: 11/05/24 11:30 AM   Result Value Ref Range    BUPRENORPHINE GLUC, URINE <5 ng/mL    BUPRENORPHINE ,URINE <2 ng/mL    NALOXONE,  URINE <100 ng/mL    NORBUPRENORPHINE GLUC,URINE <5 ng/mL    NORBUPRENORPHINE, URINE <2 ng/mL   Amphetamine Confirm, Urine    Collection Time: 05/14/24  4:32 PM   Result Value Ref Range    Methamphetamine Quant, Ur <200 ng/mL    MDA, Urine <200 ng/mL    MDEA, Urine <200 ng/mL    Phentermine,Urine <200 ng/mL    Amphetamines,Urine <50 ng/mL    MDMA, Urine <200 ng/mL         Kristy Smith, APRN-CNP  Record Review: extensive   CALL OFFICE IN CASE OF SIDE EFFECT TO SCHEDULE A VISIT FOR ASSESSMENT -[phone]  Follow up:   February 4th at 930 virtually  Time Spent:  Prep:   Direct time: 55 minutes  Documentation: 5 minutes  Total: 60 minutes

## 2025-01-10 ENCOUNTER — HOSPITAL ENCOUNTER (OUTPATIENT)
Dept: RADIOLOGY | Facility: CLINIC | Age: 45
Discharge: HOME | End: 2025-01-10
Payer: COMMERCIAL

## 2025-01-10 DIAGNOSIS — D73.89 SPLENIC LESION: ICD-10-CM

## 2025-01-10 DIAGNOSIS — N83.9 LESION OF RIGHT OVARY: ICD-10-CM

## 2025-01-10 PROCEDURE — 76856 US EXAM PELVIC COMPLETE: CPT

## 2025-01-10 PROCEDURE — 76705 ECHO EXAM OF ABDOMEN: CPT

## 2025-01-13 ENCOUNTER — SPECIALTY PHARMACY (OUTPATIENT)
Dept: PHARMACY | Facility: CLINIC | Age: 45
End: 2025-01-13

## 2025-01-13 DIAGNOSIS — G43.909 MIGRAINE WITHOUT STATUS MIGRAINOSUS, NOT INTRACTABLE, UNSPECIFIED MIGRAINE TYPE: Primary | ICD-10-CM

## 2025-01-13 PROCEDURE — RXMED WILLOW AMBULATORY MEDICATION CHARGE

## 2025-01-13 RX ORDER — RIZATRIPTAN BENZOATE 10 MG/1
10 TABLET ORAL ONCE AS NEEDED
Qty: 9 TABLET | Refills: 0 | Status: SHIPPED | OUTPATIENT
Start: 2025-01-13 | End: 2025-02-12

## 2025-01-16 ENCOUNTER — PHARMACY VISIT (OUTPATIENT)
Dept: PHARMACY | Facility: CLINIC | Age: 45
End: 2025-01-16
Payer: MEDICAID

## 2025-01-21 DIAGNOSIS — M19.90 INFLAMMATORY ARTHROPATHY: ICD-10-CM

## 2025-01-21 DIAGNOSIS — G89.29 CHRONIC RIGHT-SIDED LOW BACK PAIN WITH RIGHT-SIDED SCIATICA: ICD-10-CM

## 2025-01-21 DIAGNOSIS — M10.9 GOUTY ARTHROPATHY: ICD-10-CM

## 2025-01-21 DIAGNOSIS — M54.41 CHRONIC RIGHT-SIDED LOW BACK PAIN WITH RIGHT-SIDED SCIATICA: ICD-10-CM

## 2025-01-21 NOTE — TELEPHONE ENCOUNTER
Called pt and left a message on her VM advising her we will not be able to send in another RX for Nucynta after the 21 day RX we sent this time.  Pt has cancelled and had a no show yesterday. Pt advised we do need to see her at her 2/10/25 visit as  we are not able to send another RX as we have now sent 3 RX's since she was last seen per direction from [place] NP.    Madiha Cabrera RN

## 2025-01-21 NOTE — TELEPHONE ENCOUNTER
Pt is requesting refill of  Nucynta 50 mg 1 tablet po BID [facility]                                                         LV:   11/5/24                  NV:   2/10/25               OARRS reviewed with [facility]:    1/1/25 #14/7 days & 11/15/24 #60/30 days                        Pended RX to JEMIMA Rosenbaum for transmission to pharmacy.

## 2025-01-22 NOTE — TELEPHONE ENCOUNTER
It is okay to refill Nucynta 50 mg up to 2 times per day as needed for pain until the patient's next appointment on 2/10/2025.  Patient advised that she cannot continue to cancel and reschedule appointments as it is disruptive to her office schedule and makes it very difficult to appropriately manage her pain.

## 2025-01-27 ENCOUNTER — APPOINTMENT (OUTPATIENT)
Dept: PRIMARY CARE | Facility: CLINIC | Age: 45
End: 2025-01-27
Payer: COMMERCIAL

## 2025-01-28 LAB — LDLC SERPL DIRECT ASSAY-MCNC: 125 MG/DL

## 2025-01-29 DIAGNOSIS — E78.5 HYPERLIPIDEMIA, UNSPECIFIED HYPERLIPIDEMIA TYPE: Primary | ICD-10-CM

## 2025-01-31 DIAGNOSIS — R05.9 COUGH WITH FEVER: Primary | ICD-10-CM

## 2025-01-31 DIAGNOSIS — R50.9 COUGH WITH FEVER: Primary | ICD-10-CM

## 2025-02-01 ENCOUNTER — HOSPITAL ENCOUNTER (OUTPATIENT)
Dept: RADIOLOGY | Facility: CLINIC | Age: 45
Discharge: HOME | End: 2025-02-01
Payer: COMMERCIAL

## 2025-02-01 DIAGNOSIS — R05.9 COUGH WITH FEVER: ICD-10-CM

## 2025-02-01 DIAGNOSIS — R50.9 COUGH WITH FEVER: ICD-10-CM

## 2025-02-01 PROCEDURE — 70220 X-RAY EXAM OF SINUSES: CPT

## 2025-02-01 PROCEDURE — 71046 X-RAY EXAM CHEST 2 VIEWS: CPT | Performed by: RADIOLOGY

## 2025-02-01 PROCEDURE — 71046 X-RAY EXAM CHEST 2 VIEWS: CPT

## 2025-02-04 ENCOUNTER — APPOINTMENT (OUTPATIENT)
Dept: BEHAVIORAL HEALTH | Facility: CLINIC | Age: 45
End: 2025-02-04
Payer: COMMERCIAL

## 2025-02-04 DIAGNOSIS — F41.0 PANIC ATTACKS: ICD-10-CM

## 2025-02-04 DIAGNOSIS — F33.41 RECURRENT MAJOR DEPRESSIVE DISORDER, IN PARTIAL REMISSION (CMS-HCC): ICD-10-CM

## 2025-02-04 PROCEDURE — 99213 OFFICE O/P EST LOW 20 MIN: CPT

## 2025-02-04 PROCEDURE — 1036F TOBACCO NON-USER: CPT

## 2025-02-04 RX ORDER — ARIPIPRAZOLE 2 MG/1
2 TABLET ORAL DAILY
Qty: 90 TABLET | Refills: 0 | Status: SHIPPED | OUTPATIENT
Start: 2025-02-04 | End: 2025-05-05

## 2025-02-04 RX ORDER — PREDNISONE 10 MG/1
40 TABLET ORAL DAILY
COMMUNITY

## 2025-02-04 RX ORDER — ARIPIPRAZOLE 2 MG/1
2 TABLET ORAL DAILY
Qty: 30 TABLET | Refills: 0 | Status: SHIPPED | OUTPATIENT
Start: 2025-02-04 | End: 2025-02-04 | Stop reason: SDUPTHER

## 2025-02-04 ASSESSMENT — ENCOUNTER SYMPTOMS
CONSTITUTIONAL NEGATIVE: 1
PSYCHIATRIC NEGATIVE: 1

## 2025-02-04 NOTE — PROGRESS NOTES
"I performed this visit using real-time telehealth tools, including an AUDIO/VIDEO connection between Nichelle Izaguirre who is at Home and DEBBY Moreno who is at At home office working via Telehealth.  Virtual or Telephone Consent    An interactive audio and video telecommunication system which permits real time communications between the patient (at the originating site) and provider (at the distant site) was utilized to provide this telehealth service.   Verbal consent was requested and obtained from Nichelle Izaguirre on this date, 02/04/25 for a telehealth visit.       Assessment/Plan     Impression:  Nichelle Izaguirre is a 44 y.o. female who presents via telehealth visit for a follow up visit with CC of  \" I did not notice the difference with the dose increase. I am still edgy, ready to snap. \"    Patient consents to treatment.    Problem List Items Addressed This Visit             ICD-10-CM    Depression F32.A    Relevant Medications    ARIPiprazole (Abilify) 2 mg tablet    Other Relevant Orders    Follow Up In Psychiatry    Panic attacks F41.0       Patient is reminded that if there is SI to call [phone] and get themselves to the closest ED for evaluation, otherwise contact me for other questions/concerns.    Patient presenting to outpatient treatment for a scheduled psych follow up visit.      HPI   Background:   Patient presenting to outpatient treatment for a scheduled psych follow up visit.  Patient is at home for the appointment.  Patient was seen a couple of weeks ago for management of irritability and depression.   Last visit the dose of Cymbalta was increased  to 90 mg daily.  Patient reports the medication has not helped with irritability and feeling on edge.  Patient hopes to feel less on edge. Reports she has a sleep study coming up soon.    Characteristics/Recent psychiatric symptoms (pertinent positives and negatives):    Patient reports still having symptoms of irritability.  Reports " getting 4 hours of sleep at night. Reports last week she took Xanax to help with sleep but she only got 5 h 30 min total of sleep. Appetite is decreased.  Denies wishes for death or consideration for suicide.  CSSRS negative. Denies pawel of psychosis. Denies hx hospitalization.   Patient does explain that current dose of Cymbalta has NOT been successful in the management of anxiety and depressive symptoms.     -SI/HI : Denies        Psychiatric Review Of Systems:  Depressive Symptoms: Low motivation, energy, interest, and withdrawn  Manic Symptoms: negative    Anxiety Symptoms: General Anxiety Disorder (KILO)KILO Behaviors: excessive anxiety/worry and irritability  Psychotic Symptoms: negative      REVIEW OF SYSTEMS  Review of Systems   Constitutional: Negative.    Psychiatric/Behavioral: Negative.       All other ROS negative    Current Medications:    Current Outpatient Medications:     albuterol 2.5 mg /3 mL (0.083 %) nebulizer solution, inhale the contents of one vial via nebulizer every 4 hours as needed, Disp: , Rfl:     allopurinol (Zyloprim) 100 mg tablet, Take 1 tablet (100 mg) by mouth once daily., Disp: 90 tablet, Rfl: 0    allopurinol (Zyloprim) 300 mg tablet, Take 1 tablet (300 mg) by mouth once daily., Disp: 90 tablet, Rfl: 0    ALPRAZolam (Xanax) 0.5 mg tablet, Take 1 tablet (0.5 mg) by mouth if needed for sleep or anxiety (For up to 16 doses a month) for up to 16 doses., Disp: 16 tablet, Rfl: 0    amLODIPine (Norvasc) 5 mg tablet, Take 1 tablet (5 mg) by mouth once daily., Disp: 90 tablet, Rfl: 3    amoxicillin (Amoxil) 500 mg capsule, Take 1 capsule (500 mg) by mouth once daily., Disp: , Rfl:     ascorbic acid, vitamin C, 500 mg capsule, Take 1 capsule by mouth once daily., Disp: , Rfl:     benzoyl peroxide (Benzac AC) 10 % external wash, Apply topically if needed., Disp: , Rfl:     buPROPion XL (Wellbutrin XL) 150 mg 24 hr tablet, Take 1 tablet (150 mg) by mouth once daily. Do not crush, chew, or  split., Disp: 90 tablet, Rfl: 3    buPROPion XL (Wellbutrin XL) 300 mg 24 hr tablet, Take 1 tablet (300 mg) by mouth once daily in the morning. Do not crush, chew, or split., Disp: 90 tablet, Rfl: 3    carbidopa-levodopa-entacapone (Stalevo) -200 mg tablet, Take 1 tablet by mouth every 4 hours., Disp: 540 tablet, Rfl: 1    carvedilol (Coreg) 12.5 mg tablet, Take 1 tablet (12.5 mg) by mouth 2 times daily (morning and late afternoon) for 7 days, THEN 0.5 tablets (6.25 mg) 2 times daily (morning and late afternoon) for 14 days., Disp: 28 tablet, Rfl: 1    cetirizine-pseudoephedrine (ZyrTEC-D) 5-120 mg 12 hr tablet, Take 1 tablet by mouth 2 times a day., Disp: 180 tablet, Rfl: 0    clindamycin (Cleocin T) 1 % lotion, 1 Application, Disp: , Rfl:     clobetasol (Temovate) 0.05 % external solution, Apply topically 2 times a day., Disp: , Rfl:     clobetasoL 0.05 % lotion, One application as needed for flaring only.not for daily use. For scalp and hands only, Disp: 118 mL, Rfl: 3    cloNIDine (Catapres) 0.1 mg tablet, Take 1 tablet (0.1 mg) by mouth if needed for high blood pressure., Disp: , Rfl:     cloNIDine (Catapres-TTS) 0.2 mg/24 hr patch, Apply one patch on the skin and replace every 7 days, as directed, Disp: 4 patch, Rfl: 11    Cosentyx Pen, 2 Pens, 150 mg/mL self-injector pen, Inject 2 mL (300 mg) under the skin every 30 (thirty) days., Disp: 2 mL, Rfl: 2    cyanocobalamin (Vitamin B-12) 1,000 mcg/mL injection, Inject 1 mL (1,000 mcg) into the muscle see administration instructions., Disp: 30 mL, Rfl: 2    diclofenac sodium (Voltaren) 1 % gel gel, Apply 4.5 inches (4 g) topically 2 times a day as needed., Disp: , Rfl:     doxepin (SINEquan) 50 mg capsule, Take 1 capsule (50 mg) by mouth once daily at bedtime., Disp: 30 capsule, Rfl: 3    doxycycline (Vibramycin) 100 mg capsule, 1 capsule (100 mg)., Disp: , Rfl:     DULoxetine (Cymbalta) 30 mg DR capsule, Take 1 capsule (30 mg) by mouth once daily. Do not  crush or chew., Disp: 30 capsule, Rfl: 0    DULoxetine (Cymbalta) 60 mg DR capsule, Take 1 tablet once daily with 30 mg dose, Disp: 90 capsule, Rfl: 1    eplerenone (Inspra) 25 mg tablet, Take 1 tablet (25 mg) by mouth once daily., Disp: , Rfl:     ezetimibe (Zetia) 10 mg tablet, Take 1 tablet (10 mg) by mouth once daily., Disp: 90 tablet, Rfl: 3    fluticasone furoate-vilanteroL (Breo Ellipta) 200-25 mcg/dose inhaler, 1puff daily, Inhalation, Disp: , Rfl:     ipratropium-albuteroL (Duo-Neb) 0.5-2.5 mg/3 mL nebulizer solution, Take 3 mL by nebulization every 4 hours if needed for wheezing., Disp: , Rfl:     ketoconazole (NIZOral) 2 % cream, 1 Application, Disp: , Rfl:     lactobacillus acidophilus (Lactobacillus acidoph-L.bulgar) tablet tablet, Take 1 tablet by mouth once daily., Disp: 90 tablet, Rfl: 0    leflunomide (Arava) 10 mg tablet, Take 1 tablet (10 mg) by mouth once daily., Disp: 90 tablet, Rfl: 0    levalbuterol (Xopenex) 1.25 mg/3 mL nebulizer solution, Take 1 ampule by nebulization 3 times a day., Disp: , Rfl:     metoprolol succinate XL (Toprol-XL) 25 mg 24 hr tablet, Take 2 tablets (50 mg) by mouth once daily., Disp: , Rfl:     metroNIDAZOLE (Metrocream) 0.75 % cream, 1 Application, Disp: , Rfl:     montelukast (Singulair) 10 mg tablet, Take 1 tablet (10 mg) by mouth once daily at bedtime., Disp: , Rfl:     nebulizer and compressor device, use q4-6 hours with albuterol PRN cough/wheeze, Disp: , Rfl:     nebulizers (Devilbiss Disposable Nebulizer) misc, every 4 hours if needed., Disp: , Rfl:     omega-3 acid ethyl esters (Lovaza) 1 gram capsule, Take 2 capsules (2 grams) by mouth 2 times a day., Disp: 360 capsule, Rfl: 3    omeprazole (PriLOSEC) 40 mg DR capsule, Take 1 capsule (40 mg) by mouth once daily in the morning before meals., Disp: 90 capsule, Rfl: 3    ondansetron ODT (Zofran-ODT) 4 mg disintegrating tablet, Take 1 tablet (4 mg) by mouth every 8 hours if needed for nausea or vomiting., Disp:  "90 tablet, Rfl: 0    orphenadrine (Norflex) 100 mg 12 hr tablet, Take 1 tablet (100 mg) by mouth 2 times a day as needed for muscle spasms., Disp: 60 tablet, Rfl: 3    pioglitazone (Actos) 15 mg tablet, Take 1 tablet (15 mg) by mouth once daily., Disp: , Rfl:     predniSONE (Deltasone) 10 mg tablet, Take 4 tablets (40 mg) by mouth once daily., Disp: , Rfl:     pregabalin (Lyrica) 75 mg capsule, Take 1 capsule (75 mg) by mouth 3 times a day., Disp: 90 capsule, Rfl: 2    Repatha SureClick 140 mg/mL injection, Inject 1 mL (140 mg) under the skin every 14 (fourteen) days., Disp: 6 mL, Rfl: 3    rizatriptan (Maxalt) 10 mg tablet, Take 1 tablet (10 mg) by mouth 1 time if needed for migraine. May repeat in 2 hours if unresolved. Do not exceed 30 mg in 24 hours., Disp: 9 tablet, Rfl: 0    Spiriva Respimat 1.25 mcg/actuation inhaler, Inhale once daily., Disp: , Rfl:     SUMAtriptan (Imitrex) 25 mg tablet, Take 1 tablet (25 mg) by mouth 1 time if needed for migraine. May repeat in 2 hours if no improvement. Max 200 mg/24 hr, Disp: 9 tablet, Rfl: 2    syringe with needle, safety 3 mL 23 gauge x 1\" syringe, 1 Box see administration instructions., Disp: 30 each, Rfl: 3    tapentadol (Nucynta) 50 mg tablet, Take 1 tablet (50 mg) by mouth every 12 hours for 21 days., Disp: 42 tablet, Rfl: 0    tezepelumab-ekko (Tezspire) SubQ Pen Injector, Inject 210 mg under the skin., Disp: , Rfl:     ubrogepant (Ubrelvy) 50 mg tablet, Take 1 tablet at onset of migraine. May repeat in 2 hours as needed. Max 200 mg/24 hour, Disp: 9 tablet, Rfl: 0    ARIPiprazole (Abilify) 2 mg tablet, Take 1 tablet (2 mg) by mouth once daily., Disp: 90 tablet, Rfl: 0    ProAir HFA 90 mcg/actuation inhaler, , Disp: , Rfl:      Medical History:  Past Medical History:   Diagnosis Date    Acute upper respiratory infection, unspecified 07/26/2016    Viral URI with cough    Acute upper respiratory infection, unspecified 11/15/2017    Viral URI with cough    Allergy " status to unspecified drugs, medicaments and biological substances     History of allergy    Chronic maxillary sinusitis 08/27/2018    Maxillary sinusitis, unspecified chronicity    Dystonia, unspecified 04/08/2014    Dystonia    Encounter for other screening for malignant neoplasm of breast 01/22/2018    Breast screening    Encounter for screening for respiratory tuberculosis 11/11/2013    Screening examination for pulmonary tuberculosis    Essential (primary) hypertension 12/27/2017    Benign essential hypertension    Hypersomnia, unspecified 04/13/2015    Sleeps too much    Idiopathic sleep related nonobstructive alveolar hypoventilation     Nocturnal hypoxemia    Idiopathic sleep related nonobstructive alveolar hypoventilation 02/09/2018    Nocturnal hypoxia    Impaired fasting glucose 01/22/2018    Impaired fasting glucose    Insect bite (nonvenomous) of lower back and pelvis, initial encounter 03/29/2018    Tick bite of back    Left lower quadrant pain 01/07/2019    Left lower quadrant pain    Local infection of the skin and subcutaneous tissue, unspecified 07/28/2017    Skin infection    Low back pain, unspecified 05/23/2019    Acute low back pain    Other conditions influencing health status 02/27/2017    Sprain of right thumb, unspecified site of finger, initial encounter    Other malaise 04/16/2018    Malaise and fatigue    Other microscopic hematuria 03/30/2018    Microscopic hematuria    Other specified cough 09/07/2018    Productive cough    Other specified health status 01/07/2019    Acute medical illness    Other symptoms and signs involving the musculoskeletal system 07/12/2018    Weakness of right lower extremity    Other symptoms and signs involving the musculoskeletal system 03/21/2018    Polyarticular joint involvement    Other symptoms and signs involving the musculoskeletal system 07/12/2018    RUE weakness    Pain in right lower leg 01/27/2016    Right calf pain    Pain in unspecified hip  01/20/2016    Hip pain    Pelvic and perineal pain 04/17/2018    Pelvic pain    Personal history of diseases of the skin and subcutaneous tissue 08/07/2018    History of dermatitis    Personal history of other (healed) physical injury and trauma 08/07/2018    History of insect bite    Personal history of other diseases of the circulatory system     History of hypertension    Personal history of other diseases of the female genital tract 11/24/2015    History of menorrhagia    Personal history of other diseases of the musculoskeletal system and connective tissue 03/14/2018    History of tendinitis    Personal history of other diseases of the nervous system and sense organs 04/08/2014    History of Parkinson's disease    Personal history of other diseases of the respiratory system 01/07/2019    History of acute bronchitis    Personal history of other diseases of the respiratory system 10/05/2018    History of paranasal sinus pain    Personal history of other specified conditions 04/13/2015    History of snoring    Personal history of other specified conditions 09/11/2017    History of headache    Personal history of other specified conditions 09/07/2018    History of persistent cough    Personal history of other specified conditions 04/08/2014    History of memory loss    Personal history of other specified conditions 03/26/2018    History of left flank pain    Personal history of other specified conditions     History of heartburn    Personal history of urinary calculi 03/26/2018    Personal history of urinary calculi    Personal history of urinary calculi 03/26/2018    History of renal calculi    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/2017    Plantar fasciitis, left    Primary insomnia 04/13/2015    Primary insomnia    Rash and other nonspecific skin eruption 04/16/2018    Rash    Unspecified abdominal pain 01/07/2019    Left sided abdominal pain    Urinary tract infection, site not specified 10/19/2018    UTI (urinary  tract infection), bacterial    Urinary tract infection, site not specified 10/18/2018    Recurrent UTI    Vitamin D deficiency, unspecified 04/22/2016    Vitamin D deficiency    Zoster with other complications 11/25/2015    Herpes zoster dermatitis     Surgical History:  Past Surgical History:   Procedure Laterality Date    CT ANGIO CORONARY ART WITH HEARTFLOW IF SCORE >30%  3/18/2020    CT HEART CORONARY ANGIOGRAM 3/18/2020 [facility]    HAND TENDON SURGERY  11/11/2013    Hand Incision Tendon Sheath Of A Finger    HYSTERECTOMY  03/04/2016    Hysterectomy    LITHOTRIPSY  11/11/2013    Renal Lithotripsy    TONSILLECTOMY  12/19/2013    Tonsillectomy With Adenoidectomy     Family History:  Family History   Problem Relation Name Age of Onset    Asthma Mother      Hypertension Mother      Irregular heart beat Mother      Allergies Father      Heart disease Father      Hypertension Father      Sinusitis Father      Allergies Sister      Asthma Daughter      Allergies Son      Heart disease Maternal Grandmother      Breast cancer Paternal Grandmother      Heart disease Paternal Grandfather      Lung cancer Paternal Grandfather      Dementia Paternal Great-Grandfather       Social History:  Social History     Socioeconomic History    Marital status:      Spouse name: Not on file    Number of children: Not on file    Years of education: Not on file    Highest education level: Not on file   Occupational History    Not on file   Tobacco Use    Smoking status: Never    Smokeless tobacco: Never   Vaping Use    Vaping status: Not on file   Substance and Sexual Activity    Alcohol use: Never    Drug use: Never    Sexual activity: Yes   Other Topics Concern    Not on file   Social History Narrative    Not on file     Social Drivers of Health     Financial Resource Strain: Low Risk  (8/26/2024)    Overall Financial Resource Strain (CARDIA)     Difficulty of Paying Living Expenses: Not very hard   Recent Concern: Financial  Resource Strain - Medium Risk (8/24/2024)    Overall Financial Resource Strain (CARDIA)     Difficulty of Paying Living Expenses: Somewhat hard   Food Insecurity: No Food Insecurity (8/26/2024)    Hunger Vital Sign     Worried About Running Out of Food in the Last Year: Never true     Ran Out of Food in the Last Year: Never true   Transportation Needs: No Transportation Needs (8/26/2024)    PRAPARE - Transportation     Lack of Transportation (Medical): No     Lack of Transportation (Non-Medical): No   Physical Activity: Sufficiently Active (8/26/2024)    Exercise Vital Sign     Days of Exercise per Week: 5 days     Minutes of Exercise per Session: 150+ min   Stress: No Stress Concern Present (8/26/2024)    [employer] - Occupational Stress Questionnaire     Feeling of Stress : Only a little   Social Connections: Moderately Integrated (8/26/2024)    Social Connection and Isolation Panel [NHANES]     Frequency of Communication with Friends and Family: More than three times a week     Frequency of Social Gatherings with Friends and Family: More than three times a week     Attends Sikhism Services: More than 4 times per year     Active Member of Clubs or Organizations: No     Attends Club or Organization Meetings: Never     Marital Status:    Intimate Partner Violence: Not At Risk (8/26/2024)    Humiliation, Afraid, Rape, and Kick questionnaire     Fear of Current or Ex-Partner: No     Emotionally Abused: No     Physically Abused: No     Sexually Abused: No   Housing Stability: Low Risk  (8/26/2024)    Housing Stability Vital Sign     Unable to Pay for Housing in the Last Year: No     Number of Times Moved in the Last Year: 0     Homeless in the Last Year: No   Recent Concern: Housing Stability - High Risk (8/24/2024)    Housing Stability Vital Sign     Unable to Pay for Housing in the Last Year: Yes     Number of Times Moved in the Last Year: 0     Homeless in the Last Year: No  "    Vitals:  There were no vitals filed for this visit.  Allergies:  Allergies   Allergen Reactions    Clindamycin Anaphylaxis    Dupilumab Anaphylaxis    Hydrochlorothiazide Anaphylaxis, Itching and Swelling    Sulfamethoxazole-Trimethoprim Anaphylaxis    Cefazolin Hives, Other and Swelling     STATES TONGUE SPLITS    Atorvastatin Hives    Cephalexin Other    Clarithromycin Swelling     hives, tongue swelling    Lactose Constipation    Nitrofurantoin Monohyd/M-Cryst Hives    Sulfa (Sulfonamide Antibiotics) Hives    Sulfacetamide Hives       -PCP: Sandra Lilly, APRN-CNP  -Pt reports currently is not pregnant, and currently is sexually active, does not use birth control. LMP: once every couple of months gets a period  -TBI/head trauma/LOC/seizure hx: Denies    Objective   Appearance: Well groomed    Attitude: Calm, cooperative, and engaged in conversation.    Behavior: Appropriate eye contact.     Motor Activity: No psychomotor agitation or retardation. No abnormal movements, tremors or tics. No evidence of extrapyramidal symptoms or tardive dyskinesia.    Speech: Regular rate, rhythm, volume. Spontaneous, no pressured speech.    Mood:  \" I am okay right now because it is early. \"    Affect: Full range, mood congruent.    Thought Process: Linear, logical, and goal-directed. No loose associations or gross thought disorganization.    Thought Content: Denied current suicidal ideation or thoughts of harm to self, denied homicidal ideation or thoughts of harm to others. No delusional thinking elicited. No perseverations or obsessions identified.     Perception: Did not endorse auditory or visual hallucinations, did not appear to be responding to hallucinatory stimuli.     Cognition: Alert, oriented x3. Preserved attention span and concentration, recent and remote memory. Adequate fund of knowledge. No deficits in language.     Insight: Fair, in regards to understanding mental health condition    Judgement: " Fair        Physical Exam      -Nichelle was seen today for anxiety, depression, follow-up and med management.  Diagnoses and all orders for this visit:  Panic attacks  -     Follow Up In Psychiatry  Recurrent major depressive disorder, in partial remission (CMS-HCC)  -     Discontinue: ARIPiprazole (Abilify) 2 mg tablet; Take 1 tablet (2 mg) by mouth once daily.  -     ARIPiprazole (Abilify) 2 mg tablet; Take 1 tablet (2 mg) by mouth once daily.  -     Follow Up In Psychiatry; Future         MEDICAL-DECISION MAKING    -Patient educated and verbalized understanding on benefits, risks, and side effects of Abilify.     Psych med regimen as follows:  - START Abilify 2 mg daily to help with irritability and resistant depression  -CONTINUE current psychiatric medications as prescribed  -START Psychotherapy  -START exercising and eating a healthy diet  -Safety plan reviewed  -READ attached information about the prescribed new medication   -CALL OFFICE IN CASE OF SIDE EFFECT TO SCHEDULE A VISIT FOR ASSESSMENT -[phone]    /HI ASSESSMENT  -Patient denied current suicidal ideation or thoughts of harm to self, denied homicidal ideation or thoughts of harm to others. No delusional thinking elicited. No perseverations or obsessions identified.     PLAN  -Continue Medications as directed.  -Follow-up with this provider in 0 weeks.  -Risks/benefits/assessment of medication interventions discussed with pt; pt agreeable to plan. Will continue to monitor for symptoms mgmt and SEs and adjust plan as needed.  -MI to increase coping skills/behavior regulation.  -Safety plan reviewed.  -Call  Psychiatry at [phone] with issues.  -For [facility] residents, [place] is a 24/7 hotline you can call for assistance at [phone]. Please call 911 or go to your closest Emergency Room if you feel worse. This includes thoughts of hurting yourself or anyone else, or having other troubles such as hearing voices, seeing  visions, or having new and scary thoughts about the people around you.    OARRS:  DEBBY Moreno on 2/4/2025  9:46 AM  I have personally reviewed the OARRS report for Nichelle Izaguirre. I have considered the risks of abuse, dependence, addiction and diversion  Medication is felt to be clinically appropriate based on documented diagnosis.      DEBBY Moreno  Record Review: extensive   CALL OFFICE IN CASE OF SIDE EFFECT TO SCHEDULE A VISIT FOR ASSESSMENT -[phone]  Follow up:   April 16th at 10 am virtually  Time Spent:  Prep:   Direct time: 15 minutes  Documentation: 5  minutes  Total: 20 minutes

## 2025-02-07 ENCOUNTER — SPECIALTY PHARMACY (OUTPATIENT)
Dept: PHARMACY | Facility: CLINIC | Age: 45
End: 2025-02-07

## 2025-02-07 PROCEDURE — RXMED WILLOW AMBULATORY MEDICATION CHARGE

## 2025-02-10 ENCOUNTER — OFFICE VISIT (OUTPATIENT)
Dept: PAIN MEDICINE | Facility: HOSPITAL | Age: 45
End: 2025-02-10
Payer: COMMERCIAL

## 2025-02-10 ENCOUNTER — PHARMACY VISIT (OUTPATIENT)
Dept: PHARMACY | Facility: CLINIC | Age: 45
End: 2025-02-10
Payer: MEDICAID

## 2025-02-10 VITALS
OXYGEN SATURATION: 98 % | HEIGHT: 66 IN | BODY MASS INDEX: 36.1 KG/M2 | DIASTOLIC BLOOD PRESSURE: 99 MMHG | SYSTOLIC BLOOD PRESSURE: 148 MMHG | HEART RATE: 110 BPM | RESPIRATION RATE: 16 BRPM | WEIGHT: 224.6 LBS

## 2025-02-10 DIAGNOSIS — G89.29 CHRONIC RIGHT-SIDED LOW BACK PAIN WITH RIGHT-SIDED SCIATICA: ICD-10-CM

## 2025-02-10 DIAGNOSIS — M19.90 INFLAMMATORY ARTHROPATHY: ICD-10-CM

## 2025-02-10 DIAGNOSIS — M10.9 GOUTY ARTHROPATHY: ICD-10-CM

## 2025-02-10 DIAGNOSIS — M25.50 ARTHRALGIA OF MULTIPLE SITES: Primary | ICD-10-CM

## 2025-02-10 DIAGNOSIS — M54.41 CHRONIC RIGHT-SIDED LOW BACK PAIN WITH RIGHT-SIDED SCIATICA: ICD-10-CM

## 2025-02-10 PROCEDURE — G2211 COMPLEX E/M VISIT ADD ON: HCPCS | Performed by: CLINICAL NURSE SPECIALIST

## 2025-02-10 PROCEDURE — 99214 OFFICE O/P EST MOD 30 MIN: CPT | Performed by: CLINICAL NURSE SPECIALIST

## 2025-02-10 PROCEDURE — 3080F DIAST BP >= 90 MM HG: CPT | Performed by: CLINICAL NURSE SPECIALIST

## 2025-02-10 PROCEDURE — 3008F BODY MASS INDEX DOCD: CPT | Performed by: CLINICAL NURSE SPECIALIST

## 2025-02-10 PROCEDURE — 1036F TOBACCO NON-USER: CPT | Performed by: CLINICAL NURSE SPECIALIST

## 2025-02-10 PROCEDURE — 3077F SYST BP >= 140 MM HG: CPT | Performed by: CLINICAL NURSE SPECIALIST

## 2025-02-10 ASSESSMENT — PAIN SCALES - GENERAL: PAINLEVEL_OUTOF10: 3

## 2025-02-10 NOTE — PROGRESS NOTES
Subjective   Patient ID: Nichelle Izaguirre is a 44 y.o. female who presents for Pain.  HPI  44-year-old female with complex medical history including psoriatic arthritis, gout, hypertension, GUICHO, irritable bowel disease, asthma, parkinsonian/dystonia, immune deficiency syndrome, anxiety and mid to low back pain as well as polyarticular pain presents for follow-up. MRI 2021 consistent with lumbar lordosis, normal disc height, small central disc bulge at L5-S1 and L4-5 with no significant foraminal stenosis and moderate lateral recess stenosis at L4-5. We have discussed injections in the past and the patient is not interested in pursuing steroid injections for back pain secondary to immune deficiency. She has been sent for formal course of physical therapy targeting mid to low back pain as well as polyarticular pain.  Reevaluated for elbow pain and could not proceed with elbow surgery secondary to compromised immune function.  Continues to be followed closely by cardiology, rheumatology and neurology. Patient was able to restart her Cosentyx after obtaining insurance approval and felt that it was helping her overall pain. She is also maintained on leflunomide and allopurinol. We were attempting to decrease her use of Nucynta as she gains relief with disease modifying treatment with rheumatology to prevent symptoms of tolerance/hyperalgesia. Hopeful to transition her to buprenorphine which may provide more consistent pain relief. In order to transition to buprenorphine we will have to decrease her Nucynta dose. Continues to utilizes Xanax sparingly for panic attacks.  Presenting at today's office visit for routine follow-up.  Patient had a recent flare of polyarticular pain most noted in her right elbow accompanied by widespread pain.  She was also experiencing respiratory issues related to recent URI.  Patient was placed on oral steroids which she states helped her widespread pain/elbow pain as well as her respiratory  "symptoms.  Continues to experience low back pain which intermittently will radiate to lower extremities.  Thoracic pain and dystonic pain remains stable.  Pain accompanied by intermittent muscle tightness and spasms.  She denies any numbness/tingling or weakness in upper or lower extremities at this time.  Denies any changes in bowel/bladder function or balance issues.  Describes her pain as aching and throbbing.  She states that with the addition of steroids and continuing her Nucynta; her pain is tolerable at today's visit.  Currently taking her Nucynta 50 mg 1-2 times per day as needed.  Supplements with Lyrica, duloxetine, orphenadrine.  NSAID compounding cream remains beneficial.  She continues to utilize a small dose of Ativan which she states she uses sparingly.  Continues close follow-up with rheumatology maintained on Cosentyx which she also feels is beneficial.  She has not proceeded with physical therapy secondary to recent acute increase in right elbow and widespread pain as well as respiratory illness.  Continues close follow-up with psychiatry.      Patient's blood pressure elevated at today's office visit.  The patient denies symptoms associated with elevated blood pressure.  Patient will continue to monitor blood pressure and if it remains elevated or the patient becomes symptomatic, he/she will notify PCP or go to the emergency department.     pt is here for interval follow up and medication count. Pt states right elbow pain due to psoriatic arthritis and decreased blood flow and it wraps around and \"squeezes\" from the dystonia.            Pain Score:  \"3/10\"     Treatment: Nucynta 50mg BID  Last dose:  \"2/10\" 1 dose and takes BID  Medication Count:  7 pills and 1 pills left in medication counter at home  Fill date1/22/25.     Efficacy:   \"60-80relief\"     Side Effects: denies     Narcan: Pt brought unopened Narcan with her to today's visit.  EXP:: 2/2026     Other pain medication/neuromodulator: " Norflex-needs refill/Lyrica-needs refill/Cymbalta/ [facility]ley's compound cream     Therapeutic Goals: to be able to do more throughout the day     Therapeutic Assessment: It helps the most in the morning     Injections and/or Procedures: denies, does not want to have injections.     Other: no current PT  OARRS:  No data recorded  I have personally reviewed the OARRS report for Nichelle Izaguirre. I have considered the risks of abuse, dependence, addiction and diversion    Is the patient prescribed a combination of a benzodiazepine and opioid?  No    Last Urine Drug Screen / ordered today: No  Recent Results (from the past 8760 hours)   Drug Screen, Urine With Reflex to Confirmation    Collection Time: 11/21/24  5:20 PM   Result Value Ref Range    Amphetamine Screen, Urine Presumptive Negative Presumptive Negative    Barbiturate Screen, Urine Presumptive Negative Presumptive Negative    Benzodiazepines Screen, Urine Presumptive Negative Presumptive Negative    Cannabinoid Screen, Urine Presumptive Negative Presumptive Negative    Cocaine Metabolite Screen, Urine Presumptive Negative Presumptive Negative    Fentanyl Screen, Urine Presumptive Negative Presumptive Negative    Opiate Screen, Urine Presumptive Negative Presumptive Negative    Oxycodone Screen, Urine Presumptive Negative Presumptive Negative    PCP Screen, Urine Presumptive Negative Presumptive Negative    Methadone Screen, Urine Presumptive Negative Presumptive Negative   Confirmation Opiate/Opioid/Benzo Prescription Compliance    Collection Time: 11/05/24 11:30 AM   Result Value Ref Range    Clonazepam <25 <25 ng/mL    7-Aminoclonazepam <25 <25 ng/mL    Alprazolam <25 <25 ng/mL    Alpha-Hydroxyalprazolam <25 <25 ng/mL    Midazolam <25 <25 ng/mL    Alpha-Hydroxymidazolam <25 <25 ng/mL    Chlordiazepoxide <25 <25 ng/mL    Diazepam <25 <25 ng/mL    Nordiazepam <25 <25 ng/mL    Temazepam <25 <25 ng/mL    Oxazepam <25 <25 ng/mL    Lorazepam <25 <25 ng/mL     Methadone <25 <25 ng/mL    EDDP <25 <25 ng/mL    6-Acetylmorphine <25 <25 ng/mL    Codeine <50 <50 ng/mL    Hydrocodone <25 <25 ng/mL    Hydromorphone <25 <25 ng/mL    Morphine  <50 <50 ng/mL    Norhydrocodone <25 <25 ng/mL    Noroxycodone <25 <25 ng/mL    Oxycodone <25 <25 ng/mL    Oxymorphone <25 <25 ng/mL    Fentanyl <2.5 <2.5 ng/mL    Norfentanyl <2.5 <2.5 ng/mL    Tramadol <50 <50 ng/mL    O-Desmethyltramadol <50 <50 ng/mL    Zolpidem <25 <25 ng/mL    Zolpidem Metabolite (ZCA) <25 <25 ng/mL   Screen Opiate/Opioid/Benzo Prescription Compliance    Collection Time: 11/05/24 11:30 AM   Result Value Ref Range    Creatinine, Urine Random 88.0 20.0 - 320.0 mg/dL    Amphetamine Screen, Urine Presumptive Negative Presumptive Negative    Barbiturate Screen, Urine Presumptive Negative Presumptive Negative    Cannabinoid Screen, Urine Presumptive Negative Presumptive Negative    Cocaine Metabolite Screen, Urine Presumptive Negative Presumptive Negative    PCP Screen, Urine Presumptive Negative Presumptive Negative   Tapentadol Confirmation, Urine    Collection Time: 11/05/24 11:30 AM   Result Value Ref Range    Tapentadol >1,000 (H) <25 ng/mL    N-Desmethyltapentadol 200 (H) <25 ng/mL   Buprenorphine Confirm,Urine    Collection Time: 11/05/24 11:30 AM   Result Value Ref Range    BUPRENORPHINE GLUC, URINE <5 ng/mL    BUPRENORPHINE ,URINE <2 ng/mL    NALOXONE, URINE <100 ng/mL    NORBUPRENORPHINE GLUC,URINE <5 ng/mL    NORBUPRENORPHINE, URINE <2 ng/mL   Amphetamine Confirm, Urine    Collection Time: 05/14/24  4:32 PM   Result Value Ref Range    Methamphetamine Quant, Ur <200 ng/mL    MDA, Urine <200 ng/mL    MDEA, Urine <200 ng/mL    Phentermine,Urine <200 ng/mL    Amphetamines,Urine <50 ng/mL    MDMA, Urine <200 ng/mL     Results are as expected.     Controlled Substance Agreement:  Date of the Last Agreement: 5/14/24  Reviewed Controlled Substance Agreement including but not limited to the benefits, risks, and alternatives  to treatment with a Controlled Substance medication(s).    Monitoring and compliance:    ORT:    PDUQ:    Office Agreement:      Review of Systems    ROS:   General: No fevers, chills, weight loss  Skin: Negative for lesions  Eyes: No acute vision changes  Ears: No vertigo  Nose, mouth, throat: No difficulty swallowing or speaking  Respiratory: No cough, shortness of breath, cyanosis  Cardiovascular: Negative for chest pain syncope or palpitation  Gastrointestinal: No constipation, nausea, vomiting  Neurological: Negative for headache,   Psychological: Negative for severe or debilitating anxiety, depression. Negative memory loss  Musculoskeletal: Positive for arthralgia, myalgia, pain, spasms and joint stiffness  Endocrine: Negative for weight gain, appetite changes, excessive sweating  Allergy/immune: Negative    All 13 systems were reviewed and are within normal levels except as noted or in the history of present illness.  Positive or pertinent negative responses are noted or were in the history of present illness. As noted, the patient denies significant or impairing weakness in the bilateral upper and lower extremities, medication induced constipation, and bowel or bladder incontinence.     Current Outpatient Medications:     albuterol 2.5 mg /3 mL (0.083 %) nebulizer solution, inhale the contents of one vial via nebulizer every 4 hours as needed, Disp: , Rfl:     allopurinol (Zyloprim) 100 mg tablet, Take 1 tablet (100 mg) by mouth once daily., Disp: 90 tablet, Rfl: 0    allopurinol (Zyloprim) 300 mg tablet, Take 1 tablet (300 mg) by mouth once daily., Disp: 90 tablet, Rfl: 0    ALPRAZolam (Xanax) 0.5 mg tablet, Take 1 tablet (0.5 mg) by mouth if needed for sleep or anxiety (For up to 16 doses a month) for up to 16 doses., Disp: 16 tablet, Rfl: 0    amLODIPine (Norvasc) 5 mg tablet, Take 1 tablet (5 mg) by mouth once daily., Disp: 90 tablet, Rfl: 3    amoxicillin (Amoxil) 500 mg capsule, Take 1 capsule (500 mg)  by mouth once daily., Disp: , Rfl:     ARIPiprazole (Abilify) 2 mg tablet, Take 1 tablet (2 mg) by mouth once daily., Disp: 90 tablet, Rfl: 0    ascorbic acid, vitamin C, 500 mg capsule, Take 1 capsule by mouth once daily., Disp: , Rfl:     benzoyl peroxide (Benzac AC) 10 % external wash, Apply topically if needed., Disp: , Rfl:     buPROPion XL (Wellbutrin XL) 150 mg 24 hr tablet, Take 1 tablet (150 mg) by mouth once daily. Do not crush, chew, or split., Disp: 90 tablet, Rfl: 3    buPROPion XL (Wellbutrin XL) 300 mg 24 hr tablet, Take 1 tablet (300 mg) by mouth once daily in the morning. Do not crush, chew, or split., Disp: 90 tablet, Rfl: 3    clindamycin (Cleocin T) 1 % lotion, 1 Application, Disp: , Rfl:     clobetasol (Temovate) 0.05 % external solution, Apply topically 2 times a day., Disp: , Rfl:     clobetasoL 0.05 % lotion, One application as needed for flaring only.not for daily use. For scalp and hands only, Disp: 118 mL, Rfl: 3    cloNIDine (Catapres) 0.1 mg tablet, Take 1 tablet (0.1 mg) by mouth if needed for high blood pressure., Disp: , Rfl:     cloNIDine (Catapres-TTS) 0.2 mg/24 hr patch, Apply one patch on the skin and replace every 7 days, as directed, Disp: 4 patch, Rfl: 11    Cosentyx Pen, 2 Pens, 150 mg/mL self-injector pen, Inject 2 mL (300 mg) under the skin every 30 (thirty) days., Disp: 2 mL, Rfl: 2    cyanocobalamin (Vitamin B-12) 1,000 mcg/mL injection, Inject 1 mL (1,000 mcg) into the muscle see administration instructions., Disp: 30 mL, Rfl: 2    diclofenac sodium (Voltaren) 1 % gel gel, Apply 4.5 inches (4 g) topically 2 times a day as needed., Disp: , Rfl:     doxycycline (Vibramycin) 100 mg capsule, 1 capsule (100 mg)., Disp: , Rfl:     DULoxetine (Cymbalta) 60 mg DR capsule, Take 1 tablet once daily with 30 mg dose, Disp: 90 capsule, Rfl: 1    eplerenone (Inspra) 25 mg tablet, Take 1 tablet (25 mg) by mouth once daily., Disp: , Rfl:     ezetimibe (Zetia) 10 mg tablet, Take 1 tablet  (10 mg) by mouth once daily., Disp: 90 tablet, Rfl: 3    fluticasone furoate-vilanteroL (Breo Ellipta) 200-25 mcg/dose inhaler, 1puff daily, Inhalation, Disp: , Rfl:     ipratropium-albuteroL (Duo-Neb) 0.5-2.5 mg/3 mL nebulizer solution, Take 3 mL by nebulization every 4 hours if needed for wheezing., Disp: , Rfl:     ketoconazole (NIZOral) 2 % cream, 1 Application, Disp: , Rfl:     lactobacillus acidophilus (Lactobacillus acidoph-L.bulgar) tablet tablet, Take 1 tablet by mouth once daily., Disp: 90 tablet, Rfl: 0    leflunomide (Arava) 10 mg tablet, Take 1 tablet (10 mg) by mouth once daily., Disp: 90 tablet, Rfl: 0    levalbuterol (Xopenex) 1.25 mg/3 mL nebulizer solution, Take 1 ampule by nebulization 3 times a day., Disp: , Rfl:     metoprolol succinate XL (Toprol-XL) 25 mg 24 hr tablet, Take 2 tablets (50 mg) by mouth once daily., Disp: , Rfl:     metroNIDAZOLE (Metrocream) 0.75 % cream, 1 Application, Disp: , Rfl:     montelukast (Singulair) 10 mg tablet, Take 1 tablet (10 mg) by mouth once daily at bedtime., Disp: , Rfl:     nebulizer and compressor device, use q4-6 hours with albuterol PRN cough/wheeze, Disp: , Rfl:     nebulizers (Devilbiss Disposable Nebulizer) misc, every 4 hours if needed., Disp: , Rfl:     omega-3 acid ethyl esters (Lovaza) 1 gram capsule, Take 2 capsules (2 grams) by mouth 2 times a day., Disp: 360 capsule, Rfl: 3    omeprazole (PriLOSEC) 40 mg DR capsule, Take 1 capsule (40 mg) by mouth once daily in the morning before meals., Disp: 90 capsule, Rfl: 3    ondansetron ODT (Zofran-ODT) 4 mg disintegrating tablet, Take 1 tablet (4 mg) by mouth every 8 hours if needed for nausea or vomiting., Disp: 90 tablet, Rfl: 0    pioglitazone (Actos) 15 mg tablet, Take 1 tablet (15 mg) by mouth once daily., Disp: , Rfl:     predniSONE (Deltasone) 10 mg tablet, Take 4 tablets (40 mg) by mouth once daily., Disp: , Rfl:     ProAir HFA 90 mcg/actuation inhaler, , Disp: , Rfl:     Repatha SureClick 140  "mg/mL injection, Inject 1 mL (140 mg) under the skin every 14 (fourteen) days., Disp: 6 mL, Rfl: 3    rizatriptan (Maxalt) 10 mg tablet, Take 1 tablet (10 mg) by mouth 1 time if needed for migraine. May repeat in 2 hours if unresolved. Do not exceed 30 mg in 24 hours., Disp: 9 tablet, Rfl: 0    Spiriva Respimat 1.25 mcg/actuation inhaler, Inhale once daily., Disp: , Rfl:     syringe with needle, safety 3 mL 23 gauge x 1\" syringe, 1 Box see administration instructions., Disp: 30 each, Rfl: 3    tapentadol (Nucynta) 50 mg tablet, Take 1 tablet (50 mg) by mouth every 12 hours for 21 days., Disp: 42 tablet, Rfl: 0    tezepelumab-ekko (Tezspire) SubQ Pen Injector, Inject 210 mg under the skin., Disp: , Rfl:     carbidopa-levodopa-entacapone (Stalevo) -200 mg tablet, Take 1 tablet by mouth every 4 hours., Disp: 540 tablet, Rfl: 1    carvedilol (Coreg) 12.5 mg tablet, Take 1 tablet (12.5 mg) by mouth 2 times daily (morning and late afternoon) for 7 days, THEN 0.5 tablets (6.25 mg) 2 times daily (morning and late afternoon) for 14 days., Disp: 28 tablet, Rfl: 1    cetirizine-pseudoephedrine (ZyrTEC-D) 5-120 mg 12 hr tablet, Take 1 tablet by mouth 2 times a day., Disp: 180 tablet, Rfl: 0    doxepin (SINEquan) 50 mg capsule, Take 1 capsule (50 mg) by mouth once daily at bedtime., Disp: 30 capsule, Rfl: 3    DULoxetine (Cymbalta) 30 mg DR capsule, Take 1 capsule (30 mg) by mouth once daily. Do not crush or chew., Disp: 30 capsule, Rfl: 0    orphenadrine (Norflex) 100 mg 12 hr tablet, Take 1 tablet (100 mg) by mouth 2 times a day as needed for muscle spasms., Disp: 60 tablet, Rfl: 3    pregabalin (Lyrica) 75 mg capsule, Take 1 capsule (75 mg) by mouth 3 times a day., Disp: 90 capsule, Rfl: 2    SUMAtriptan (Imitrex) 25 mg tablet, Take 1 tablet (25 mg) by mouth 1 time if needed for migraine. May repeat in 2 hours if no improvement. Max 200 mg/24 hr (Patient not taking: Reported on 2/10/2025), Disp: 9 tablet, Rfl: 2    " ubrogepant (Ubrelvy) 50 mg tablet, Take 1 tablet at onset of migraine. May repeat in 2 hours as needed. Max 200 mg/24 hour (Patient not taking: Reported on 2/10/2025), Disp: 9 tablet, Rfl: 0     Past Medical History:   Diagnosis Date    Acute upper respiratory infection, unspecified 07/26/2016    Viral URI with cough    Acute upper respiratory infection, unspecified 11/15/2017    Viral URI with cough    Allergy status to unspecified drugs, medicaments and biological substances     History of allergy    Chronic maxillary sinusitis 08/27/2018    Maxillary sinusitis, unspecified chronicity    Dystonia, unspecified 04/08/2014    Dystonia    Encounter for other screening for malignant neoplasm of breast 01/22/2018    Breast screening    Encounter for screening for respiratory tuberculosis 11/11/2013    Screening examination for pulmonary tuberculosis    Essential (primary) hypertension 12/27/2017    Benign essential hypertension    Hypersomnia, unspecified 04/13/2015    Sleeps too much    Idiopathic sleep related nonobstructive alveolar hypoventilation     Nocturnal hypoxemia    Idiopathic sleep related nonobstructive alveolar hypoventilation 02/09/2018    Nocturnal hypoxia    Impaired fasting glucose 01/22/2018    Impaired fasting glucose    Insect bite (nonvenomous) of lower back and pelvis, initial encounter 03/29/2018    Tick bite of back    Left lower quadrant pain 01/07/2019    Left lower quadrant pain    Local infection of the skin and subcutaneous tissue, unspecified 07/28/2017    Skin infection    Low back pain, unspecified 05/23/2019    Acute low back pain    Other conditions influencing health status 02/27/2017    Sprain of right thumb, unspecified site of finger, initial encounter    Other malaise 04/16/2018    Malaise and fatigue    Other microscopic hematuria 03/30/2018    Microscopic hematuria    Other specified cough 09/07/2018    Productive cough    Other specified health status 01/07/2019    Acute  medical illness    Other symptoms and signs involving the musculoskeletal system 07/12/2018    Weakness of right lower extremity    Other symptoms and signs involving the musculoskeletal system 03/21/2018    Polyarticular joint involvement    Other symptoms and signs involving the musculoskeletal system 07/12/2018    RUE weakness    Pain in right lower leg 01/27/2016    Right calf pain    Pain in unspecified hip 01/20/2016    Hip pain    Pelvic and perineal pain 04/17/2018    Pelvic pain    Personal history of diseases of the skin and subcutaneous tissue 08/07/2018    History of dermatitis    Personal history of other (healed) physical injury and trauma 08/07/2018    History of insect bite    Personal history of other diseases of the circulatory system     History of hypertension    Personal history of other diseases of the female genital tract 11/24/2015    History of menorrhagia    Personal history of other diseases of the musculoskeletal system and connective tissue 03/14/2018    History of tendinitis    Personal history of other diseases of the nervous system and sense organs 04/08/2014    History of Parkinson's disease    Personal history of other diseases of the respiratory system 01/07/2019    History of acute bronchitis    Personal history of other diseases of the respiratory system 10/05/2018    History of paranasal sinus pain    Personal history of other specified conditions 04/13/2015    History of snoring    Personal history of other specified conditions 09/11/2017    History of headache    Personal history of other specified conditions 09/07/2018    History of persistent cough    Personal history of other specified conditions 04/08/2014    History of memory loss    Personal history of other specified conditions 03/26/2018    History of left flank pain    Personal history of other specified conditions     History of heartburn    Personal history of urinary calculi 03/26/2018    Personal history of  urinary calculi    Personal history of urinary calculi 03/26/2018    History of renal calculi    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/2017    Plantar fasciitis, left    Primary insomnia 04/13/2015    Primary insomnia    Rash and other nonspecific skin eruption 04/16/2018    Rash    Unspecified abdominal pain 01/07/2019    Left sided abdominal pain    Urinary tract infection, site not specified 10/19/2018    UTI (urinary tract infection), bacterial    Urinary tract infection, site not specified 10/18/2018    Recurrent UTI    Vitamin D deficiency, unspecified 04/22/2016    Vitamin D deficiency    Zoster with other complications 11/25/2015    Herpes zoster dermatitis        Past Surgical History:   Procedure Laterality Date    CT ANGIO CORONARY ART WITH HEARTFLOW IF SCORE >30%  3/18/2020    CT HEART CORONARY ANGIOGRAM 3/18/2020 [facility]    HAND TENDON SURGERY  11/11/2013    Hand Incision Tendon Sheath Of A Finger    HYSTERECTOMY  03/04/2016    Hysterectomy    LITHOTRIPSY  11/11/2013    Renal Lithotripsy    TONSILLECTOMY  12/19/2013    Tonsillectomy With Adenoidectomy        Family History   Problem Relation Name Age of Onset    Asthma Mother      Hypertension Mother      Irregular heart beat Mother      Allergies Father      Heart disease Father      Hypertension Father      Sinusitis Father      Allergies Sister      Asthma Daughter      Allergies Son      Heart disease Maternal Grandmother      Breast cancer Paternal Grandmother      Heart disease Paternal Grandfather      Lung cancer Paternal Grandfather      Dementia Paternal Great-Grandfather          Allergies   Allergen Reactions    Clindamycin Anaphylaxis    Dupilumab Anaphylaxis    Hydrochlorothiazide Anaphylaxis, Itching and Swelling    Sulfamethoxazole-Trimethoprim Anaphylaxis    Cefazolin Hives, Other and Swelling     STATES TONGUE SPLITS    Atorvastatin Hives    Cephalexin Other    Clarithromycin Swelling     hives, tongue swelling    Lactose  "Constipation    Nitrofurantoin Monohyd/M-Cryst Hives    Sulfa (Sulfonamide Antibiotics) Hives    Sulfacetamide Hives        Objective     Visit Vitals  Pulse 110   Resp 16   Ht 1.676 m (5' 6\")   Wt 102 kg (224 lb 9.6 oz)   SpO2 98%   BMI 36.25 kg/m²   OB Status Having periods   Smoking Status Never   BSA 2.18 m²        Physical Exam    PE:  General: Well-developed, well-nourished, no acute distress. The patient demonstrates no pain behavior, symptom magnification or overt drug-seeking behavior.  Eye: Pupils appropriate for room lighting  Neck/thyroid: No obvious goiter or enlargement of neck noted  Respiratory exam: Normal respiratory effort, unlabored respiration. No accessory muscle use noted  Cardiac exam: Bilateral radial pulses intact  Abdominal: Nondistended  Spine, lumbar: The patient is able to rise from a seated to standing position without hesitancy, push off, or delay. Gait is grossly nonantalgic. Tenderness to paraspinous musculature is noted mid back/thoracic through lumbar region.  Myofascial tenderness and muscle tightness throughout her thoracic and lumbar region bilaterally.  Flexion and extension intact.  Negative straight leg raise.  Ortho: Widespread polyarticular pain most bothersome to right elbow.  Pain with active/passive range of motion right elbow.  Pain over right lateral epicondyle.  Neurologic exam: Muscle strength is antigravity in all 4 extremities.  Equal strength upper extremities 5/5  Psychiatric exam: Judgment and insight normal, affect normal, speech is fluent, affect appropriate, demonstrating no signs of hypersomnolence, sedation, or confusion          Assessment/Plan   Problem List Items Addressed This Visit             ICD-10-CM    Arthralgia of multiple sites - Primary M25.50    Gouty arthropathy M10.9    Relevant Medications    tapentadol (Nucynta) 50 mg tablet (Start on 2/12/2025)    Inflammatory arthropathy M19.90     44-year-old female with a complicated medical history " and multiple comorbid conditions presenting for follow-up of back pain radiating from mid back through lumbar spine as well as widespread polyarticular pain most bothersome to bilateral elbows. Symptoms consistent with lumbar neuritis to right lower extremity as well as polyarticular arthropathy.  Maintained on Cosentyx per rheumatology and feels that it remains helpful for polyarticular symptoms.  Recently had an increased flare of polyarticular pain most noted in her right elbow as well as upper respiratory symptoms.  She was provided with oral steroids and has noted improvement in right elbow/polyarticular symptoms as well as respiratory symptoms.  She held off on physical therapy secondary to acute increase in polyarticular symptoms.  At this time recommended that she proceed with formal physical therapy when she feels her polyarticular symptoms are well-controlled.  Again discussed the physical therapy would be most beneficial for widespread polyarticular symptoms as well as back pain. She does not want to proceed with epidural steroid injections at this time due to compromised immune system.  She may revisit in the future if symptoms should worsen.  Recently decreased her Nucynta to 50 mg 2 times per day so that we may eventually transition the patient to buprenorphine.  At this time, we will continue her Nucynta 50 mg 1-2 times per day for the next 1 month until she finishes her oral steroids.  When she calls our office for refill at that time we transition the patient to buprenorphine.  We will allow the patient to continue Nucynta 50 mg once per day for 7 days when she starts her buprenorphine patch.  After 7 days she will discontinue Nucynta completely.  Patient to try to decrease her Nucynta use to 1 time per day over the next 30 days. 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  We could try buprenorphine utilizing an oral steroid spray to decrease the allergic  response at the site of the adhesive.  If she does not tolerate buprenorphine we could try Belbuca buccal films.  She continues to do well with Lyrica, duloxetine and orphenadrine as prescribed.  Again reviewed that she should utilize her alprazolam sparingly and only use when absolutely necessary for increased anxiety or panic attacks.  Advised patient not to take with opiates. Plan discussed with patient at today's office visit.    -We will continue the patient on Nucynta 50 mg up to 1-2 times per day as needed for pain for the next 1 month.  Patient will continue to try using her Nucynta 1 time per day most often.  When she calls for refill in 1 month we will transition the patient to buprenorphine.  At that time we will allow her to continue Nucynta 50 mg 1 time per day for 7 days when she starts buprenorphine.  After 7 days she will discontinue Nucynta completely.   -Continue Lyrica 75 mg 3 times daily.  -Continue orphenadrine 100 mg twice daily for muscle tightness and spasm.  -Continue duloxetine 90 mg daily.  -Plan to proceed with a formal course of physical therapy when she no longer is experiencing a flare of polyarticular pain.    Recommended in the meantime that she do low impact exercises and stretches at home as able.  -Advised patient to continue to monitor her blood pressure and if it remains elevated or she notices symptoms associated with hypertension she should be evaluated in the emergency department.  -Patient will follow-up in 2 month or sooner for reevaluation.      MEDICAL DECISION MAKING:    My impressions and treatment recommendations were discussed in detail with the patient, who verbalized understanding and had no further questions prior to discharge. Given the patient's report of reduced pain and improved functional ability without adverse effects,  it is reasonable to continue Nucynta 50 mg 1-2 times per day as needed for pain for the next 1 month.  When the patient calls for refill we  will plan to transition her to buprenorphine. The terms of the opioid agreement as well as the potential risks and adverse effects of the patient's medication regimen were discussed in detail. This includes if applicable due to dosage of medication permission to discuss and coordinate care with other treatment providers relevant to the patients condition. The patient verbalized understanding.    Treatment goals were discussed in detail with the patient.  These goals include reduction of pain levels, improved levels of functioning, avoidance of medication side effect and lowest medication dose possible to achieve  these goals.  The patient was in full agreement with these goals.  Also discussed is the understanding that pain may not be eliminated by medication but that the goal of a better sustaining life through use of medication is appropriate.  Lifestyle modifications including weight management, stretching, diet, exercise and smoking are addressed at each office visit.  The patient provided a urine drug screen for routine monitoring and compliance.  This test may be given as frequently as every month based on the patient's individual opiate risk stratification and prescriber concerns for any aberrancies.  This test is indicated given the use of controlled substances for the patient's medical condition.  Unless otherwise noted the prior (s) urine drug testing results were consistent with prescribed medications.  There is no evidence of illicit drug use or additional medications ingested.     Risks and side effects of chronic opioid therapy including but not limited to tolerance, dependence, constipation, hyperalgesia, cognitive side effects, addiction and possible death due to overuse and or misuse were discussed. I also discussed that such medications when co-administered with other sedative agents including but not limited to alcohol, benzodiazepines, sedative hypnotics and illegal drugs could pose life  threatening consequences including death.  I also explained the impact that the administration of such medication has on a patient with obstructive sleep apnea and continued recommendations for use of apnea devices if ordered are prescribed by other physicians.  In order to effectively and safely treat the pain, I also emphasized the importance of compliance with the treatment plan as well as compliance with the terms of the opioid agreement which was reviewed in detail. I explained the importance of being responsible with the medications and to take these only as prescribed, never in excess and never for reasons other than pain reduction. The patient was counseled on keeping the medications safe and locked away from children and other adults as well as disposal methods and options. The patient understood the risks and instructions.      I also discussed with the patient in detail that based on the clinical response to the opioid medications and improvements of activities of daily living, sleep, and work performance in light of compliance with the treatment plan we can continue this form of therapy for the above chronic  pain.  The goal and rationale used for current treatment with chronic opioid medication is to control the pain and alleviate disability induced by the chronic pain condition noted above after failures of other non-opioid and nonpharmacological modalities to treat the chronic pain and the symptoms associated with have failed.  The patient understood the goals in terms of the above treatment plan and had no further questions prior to leaving the office today.    Given the patient's total MED, general use of daily opiates, or other coadministered medications in various classes the patient was offered a prescription for Narcan.  I instructed the patient that Narcan is for emergency use only and is worse suspected or known opiate overdose.  Call 911 prior to administration of this medication to activate  the emergency response team.  It is imperative to fill this medication in order to demonstrate understanding of the gravity of possible side effects including respiratory depression and risk of overdose of this opiate load or medication combination.  As such patients will be required to bring Narcan prescriptions to follow-up appointments as part of the compliance with continued opiate care.              Relevant Medications    tapentadol (Nucynta) 50 mg tablet (Start on 2/12/2025)    Lower back pain M54.50    Relevant Medications    tapentadol (Nucynta) 50 mg tablet (Start on 2/12/2025)

## 2025-02-10 NOTE — ASSESSMENT & PLAN NOTE
44-year-old female with a complicated medical history and multiple comorbid conditions presenting for follow-up of back pain radiating from mid back through lumbar spine as well as widespread polyarticular pain most bothersome to bilateral elbows. Symptoms consistent with lumbar neuritis to right lower extremity as well as polyarticular arthropathy.  Maintained on Cosentyx per rheumatology and feels that it remains helpful for polyarticular symptoms.  Recently had an increased flare of polyarticular pain most noted in her right elbow as well as upper respiratory symptoms.  She was provided with oral steroids and has noted improvement in right elbow/polyarticular symptoms as well as respiratory symptoms.  She held off on physical therapy secondary to acute increase in polyarticular symptoms.  At this time recommended that she proceed with formal physical therapy when she feels her polyarticular symptoms are well-controlled.  Again discussed the physical therapy would be most beneficial for widespread polyarticular symptoms as well as back pain. She does not want to proceed with epidural steroid injections at this time due to compromised immune system.  She may revisit in the future if symptoms should worsen.  Recently decreased her Nucynta to 50 mg 2 times per day so that we may eventually transition the patient to buprenorphine.  At this time, we will continue her Nucynta 50 mg 1-2 times per day for the next 1 month until she finishes her oral steroids.  When she calls our office for refill at that time we transition the patient to buprenorphine.  We will allow the patient to continue Nucynta 50 mg once per day for 7 days when she starts her buprenorphine patch.  After 7 days she will discontinue Nucynta completely.  Patient to try to decrease her Nucynta use to 1 time per day over the next 30 days. 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  We could try buprenorphine  utilizing an oral steroid spray to decrease the allergic response at the site of the adhesive.  If she does not tolerate buprenorphine we could try Belbuca buccal films.  She continues to do well with Lyrica, duloxetine and orphenadrine as prescribed.  Again reviewed that she should utilize her alprazolam sparingly and only use when absolutely necessary for increased anxiety or panic attacks.  Advised patient not to take with opiates. Plan discussed with patient at today's office visit.    -We will continue the patient on Nucynta 50 mg up to 1-2 times per day as needed for pain for the next 1 month.  Patient will continue to try using her Nucynta 1 time per day most often.  When she calls for refill in 1 month we will transition the patient to buprenorphine.  At that time we will allow her to continue Nucynta 50 mg 1 time per day for 7 days when she starts buprenorphine.  After 7 days she will discontinue Nucynta completely.   -Continue Lyrica 75 mg 3 times daily.  -Continue orphenadrine 100 mg twice daily for muscle tightness and spasm.  -Continue duloxetine 90 mg daily.  -Plan to proceed with a formal course of physical therapy when she no longer is experiencing a flare of polyarticular pain.    Recommended in the meantime that she do low impact exercises and stretches at home as able.  -Advised patient to continue to monitor her blood pressure and if it remains elevated or she notices symptoms associated with hypertension she should be evaluated in the emergency department.  -Patient will follow-up in 2 month or sooner for reevaluation.      MEDICAL DECISION MAKING:    My impressions and treatment recommendations were discussed in detail with the patient, who verbalized understanding and had no further questions prior to discharge. Given the patient's report of reduced pain and improved functional ability without adverse effects,  it is reasonable to continue Nucynta 50 mg 1-2 times per day as needed for pain for  the next 1 month.  When the patient calls for refill we will plan to transition her to buprenorphine. The terms of the opioid agreement as well as the potential risks and adverse effects of the patient's medication regimen were discussed in detail. This includes if applicable due to dosage of medication permission to discuss and coordinate care with other treatment providers relevant to the patients condition. The patient verbalized understanding.    Treatment goals were discussed in detail with the patient.  These goals include reduction of pain levels, improved levels of functioning, avoidance of medication side effect and lowest medication dose possible to achieve  these goals.  The patient was in full agreement with these goals.  Also discussed is the understanding that pain may not be eliminated by medication but that the goal of a better sustaining life through use of medication is appropriate.  Lifestyle modifications including weight management, stretching, diet, exercise and smoking are addressed at each office visit.  The patient provided a urine drug screen for routine monitoring and compliance.  This test may be given as frequently as every month based on the patient's individual opiate risk stratification and prescriber concerns for any aberrancies.  This test is indicated given the use of controlled substances for the patient's medical condition.  Unless otherwise noted the prior (s) urine drug testing results were consistent with prescribed medications.  There is no evidence of illicit drug use or additional medications ingested.     Risks and side effects of chronic opioid therapy including but not limited to tolerance, dependence, constipation, hyperalgesia, cognitive side effects, addiction and possible death due to overuse and or misuse were discussed. I also discussed that such medications when co-administered with other sedative agents including but not limited to alcohol, benzodiazepines,  sedative hypnotics and illegal drugs could pose life threatening consequences including death.  I also explained the impact that the administration of such medication has on a patient with obstructive sleep apnea and continued recommendations for use of apnea devices if ordered are prescribed by other physicians.  In order to effectively and safely treat the pain, I also emphasized the importance of compliance with the treatment plan as well as compliance with the terms of the opioid agreement which was reviewed in detail. I explained the importance of being responsible with the medications and to take these only as prescribed, never in excess and never for reasons other than pain reduction. The patient was counseled on keeping the medications safe and locked away from children and other adults as well as disposal methods and options. The patient understood the risks and instructions.      I also discussed with the patient in detail that based on the clinical response to the opioid medications and improvements of activities of daily living, sleep, and work performance in light of compliance with the treatment plan we can continue this form of therapy for the above chronic  pain.  The goal and rationale used for current treatment with chronic opioid medication is to control the pain and alleviate disability induced by the chronic pain condition noted above after failures of other non-opioid and nonpharmacological modalities to treat the chronic pain and the symptoms associated with have failed.  The patient understood the goals in terms of the above treatment plan and had no further questions prior to leaving the office today.    Given the patient's total MED, general use of daily opiates, or other coadministered medications in various classes the patient was offered a prescription for Narcan.  I instructed the patient that Narcan is for emergency use only and is worse suspected or known opiate overdose.  Call 911  prior to administration of this medication to activate the emergency response team.  It is imperative to fill this medication in order to demonstrate understanding of the gravity of possible side effects including respiratory depression and risk of overdose of this opiate load or medication combination.  As such patients will be required to bring Narcan prescriptions to follow-up appointments as part of the compliance with continued opiate care.

## 2025-02-11 DIAGNOSIS — G43.909 MIGRAINE WITHOUT STATUS MIGRAINOSUS, NOT INTRACTABLE, UNSPECIFIED MIGRAINE TYPE: ICD-10-CM

## 2025-02-11 RX ORDER — UBROGEPANT 50 MG/1
TABLET ORAL
Qty: 9 TABLET | Refills: 0 | Status: SHIPPED | OUTPATIENT
Start: 2025-02-11

## 2025-02-18 DIAGNOSIS — G20.C PARKINSONISM, UNSPECIFIED PARKINSONISM TYPE (MULTI): ICD-10-CM

## 2025-02-18 DIAGNOSIS — G43.809 OTHER MIGRAINE WITHOUT STATUS MIGRAINOSUS, NOT INTRACTABLE: ICD-10-CM

## 2025-02-18 RX ORDER — ONDANSETRON 4 MG/1
4 TABLET, ORALLY DISINTEGRATING ORAL EVERY 8 HOURS PRN
Qty: 90 TABLET | Refills: 0 | Status: SHIPPED | OUTPATIENT
Start: 2025-02-18

## 2025-02-18 RX ORDER — CARBIDOPA, LEVODOPA AND ENTACAPONE 50; 200; 200 MG/1; MG/1; MG/1
1 TABLET, FILM COATED ORAL EVERY 4 HOURS
Qty: 540 TABLET | Refills: 1 | Status: SHIPPED | OUTPATIENT
Start: 2025-02-18 | End: 2025-08-17

## 2025-02-19 ENCOUNTER — APPOINTMENT (OUTPATIENT)
Dept: RHEUMATOLOGY | Facility: CLINIC | Age: 45
End: 2025-02-19
Payer: COMMERCIAL

## 2025-02-19 VITALS
DIASTOLIC BLOOD PRESSURE: 111 MMHG | OXYGEN SATURATION: 98 % | TEMPERATURE: 98.6 F | HEART RATE: 98 BPM | SYSTOLIC BLOOD PRESSURE: 178 MMHG

## 2025-02-19 DIAGNOSIS — M10.9 GOUTY ARTHROPATHY: ICD-10-CM

## 2025-02-19 DIAGNOSIS — L40.50 PSORIATIC ARTHROPATHY (MULTI): Primary | ICD-10-CM

## 2025-02-19 PROCEDURE — 3080F DIAST BP >= 90 MM HG: CPT | Performed by: INTERNAL MEDICINE

## 2025-02-19 PROCEDURE — 99214 OFFICE O/P EST MOD 30 MIN: CPT | Performed by: INTERNAL MEDICINE

## 2025-02-19 PROCEDURE — 1036F TOBACCO NON-USER: CPT | Performed by: INTERNAL MEDICINE

## 2025-02-19 PROCEDURE — 3077F SYST BP >= 140 MM HG: CPT | Performed by: INTERNAL MEDICINE

## 2025-02-19 RX ORDER — ALLOPURINOL 100 MG/1
100 TABLET ORAL DAILY
Qty: 90 TABLET | Refills: 0 | Status: SHIPPED | OUTPATIENT
Start: 2025-02-19

## 2025-02-19 RX ORDER — MELOXICAM 15 MG/1
15 TABLET ORAL DAILY PRN
Qty: 30 TABLET | Refills: 0 | Status: SHIPPED | OUTPATIENT
Start: 2025-02-19 | End: 2025-03-21

## 2025-02-19 RX ORDER — ALLOPURINOL 300 MG/1
300 TABLET ORAL DAILY
Qty: 90 TABLET | Refills: 0 | Status: SHIPPED | OUTPATIENT
Start: 2025-02-19

## 2025-02-19 ASSESSMENT — PAIN SCALES - GENERAL: PAINLEVEL_OUTOF10: 4

## 2025-02-19 NOTE — PROGRESS NOTES
Follow-up Rheumatology Patient Visit    Chief Complaint:  Nichelle Izaguirre is a 44 y.o. female presenting today for Follow-up (fuv).    History of Presenting Problem:   45 y/o female with Hx of psoriatic arthritis, gout, asthma, Parkinsonism/Dystonia, Immunodeficiency disorder (MBL) on prophylaxis antibiotics with amoxicillin presents for follow-up  She is tolerating leflunomide currently taking 10 mg daily now  She is taking allopurinol 400 mg daily  She  is finally getting  Cosentyx consistently   Today she report some joint achiness in various joints she reports she had been on a long taper of steroids for the last 2 months because of upper respiratory infection once she got out of the steroids she started feeling more achy   Previous meds ;  MTX  Otezla      Problem List:   Patient Active Problem List   Diagnosis    Allergic rhinitis    Anxiety disorder    Arthralgia of multiple sites    Bariatric surgery status    Benign essential hypertension    Bowel disease, inflammatory    Immunodeficiency syndrome (Multi)    Chronic diarrhea    Continuous LLQ abdominal pain    Depression    Essential familial hyperlipidemia    GERD (gastroesophageal reflux disease)    Gouty arthropathy    Hoarseness    Hot flashes    Hyperlipidemia    Hypertriglyceridemia    Hypothyroidism (acquired)    Inflammatory arthropathy    Rheumatoid arthritis    Hypersomnia    Labile hypertension    Low vitamin D level    Lyme disease    Mannose-binding lectin deficiency (Multi)    Methadone use    Migraine without status migrainosus, not intractable    Morbid obesity (Multi)    Mixed incontinence urge and stress    Osteoporosis    Obstructive sleep apnea, adult    DRD (DOPA-responsive dystonia)    Post herpetic neuralgia    Polyphagia    POTS (postural orthostatic tachycardia syndrome)    Prediabetes    Psoriatic arthropathy (Multi)    Steroid withdrawal syndrome with complication (Multi)    Steroid-dependent asthma ([facility])    Vitamin B12  deficiency    Vitamin D deficiency    Gait disturbance    Bilateral leg edema    Cystocele with uterine descensus    Dyspnea on effort    Elbow tendinitis    Foreign body in ear, right, initial encounter    Frequent falls    Hyperglycemia    Hyperuricemia    Incomplete bladder emptying    Insomnia, organic    Intermittent palpitations    Lower back pain    Maxillary sinusitis    Myofascial pain    Orthostatic intolerance    Other ovarian cyst, unspecified side    Pelvic floor weakness    Perioral dermatitis    Peripheral neuropathy    Postural dizziness    Psoriasis vulgaris    Recurrent urinary tract infection    Renal lesion    Right lumbar radiculopathy    Rosacea, unspecified    Sacroiliac joint dysfunction of right side    Snoring    Spasm of thoracic back muscle    Syncope    Urinary frequency    Panic attacks    Facial swelling    Iron deficiency anemia secondary to inadequate dietary iron intake    Idiopathic steatorrhea ([facility]-HCC)       Past Medical History:   Past Medical History:   Diagnosis Date    Acute upper respiratory infection, unspecified 07/26/2016    Viral URI with cough    Acute upper respiratory infection, unspecified 11/15/2017    Viral URI with cough    Allergy status to unspecified drugs, medicaments and biological substances     History of allergy    Chronic maxillary sinusitis 08/27/2018    Maxillary sinusitis, unspecified chronicity    Dystonia, unspecified 04/08/2014    Dystonia    Encounter for other screening for malignant neoplasm of breast 01/22/2018    Breast screening    Encounter for screening for respiratory tuberculosis 11/11/2013    Screening examination for pulmonary tuberculosis    Essential (primary) hypertension 12/27/2017    Benign essential hypertension    Hypersomnia, unspecified 04/13/2015    Sleeps too much    Idiopathic sleep related nonobstructive alveolar hypoventilation     Nocturnal hypoxemia    Idiopathic sleep related nonobstructive alveolar hypoventilation  02/09/2018    Nocturnal hypoxia    Impaired fasting glucose 01/22/2018    Impaired fasting glucose    Insect bite (nonvenomous) of lower back and pelvis, initial encounter 03/29/2018    Tick bite of back    Left lower quadrant pain 01/07/2019    Left lower quadrant pain    Local infection of the skin and subcutaneous tissue, unspecified 07/28/2017    Skin infection    Low back pain, unspecified 05/23/2019    Acute low back pain    Other conditions influencing health status 02/27/2017    Sprain of right thumb, unspecified site of finger, initial encounter    Other malaise 04/16/2018    Malaise and fatigue    Other microscopic hematuria 03/30/2018    Microscopic hematuria    Other specified cough 09/07/2018    Productive cough    Other specified health status 01/07/2019    Acute medical illness    Other symptoms and signs involving the musculoskeletal system 07/12/2018    Weakness of right lower extremity    Other symptoms and signs involving the musculoskeletal system 03/21/2018    Polyarticular joint involvement    Other symptoms and signs involving the musculoskeletal system 07/12/2018    RUE weakness    Pain in right lower leg 01/27/2016    Right calf pain    Pain in unspecified hip 01/20/2016    Hip pain    Pelvic and perineal pain 04/17/2018    Pelvic pain    Personal history of diseases of the skin and subcutaneous tissue 08/07/2018    History of dermatitis    Personal history of other (healed) physical injury and trauma 08/07/2018    History of insect bite    Personal history of other diseases of the circulatory system     History of hypertension    Personal history of other diseases of the female genital tract 11/24/2015    History of menorrhagia    Personal history of other diseases of the musculoskeletal system and connective tissue 03/14/2018    History of tendinitis    Personal history of other diseases of the nervous system and sense organs 04/08/2014    History of Parkinson's disease    Personal history  of other diseases of the respiratory system 01/07/2019    History of acute bronchitis    Personal history of other diseases of the respiratory system 10/05/2018    History of paranasal sinus pain    Personal history of other specified conditions 04/13/2015    History of snoring    Personal history of other specified conditions 09/11/2017    History of headache    Personal history of other specified conditions 09/07/2018    History of persistent cough    Personal history of other specified conditions 04/08/2014    History of memory loss    Personal history of other specified conditions 03/26/2018    History of left flank pain    Personal history of other specified conditions     History of heartburn    Personal history of urinary calculi 03/26/2018    Personal history of urinary calculi    Personal history of urinary calculi 03/26/2018    History of renal calculi    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/2017    Plantar fasciitis, left    Primary insomnia 04/13/2015    Primary insomnia    Rash and other nonspecific skin eruption 04/16/2018    Rash    Unspecified abdominal pain 01/07/2019    Left sided abdominal pain    Urinary tract infection, site not specified 10/19/2018    UTI (urinary tract infection), bacterial    Urinary tract infection, site not specified 10/18/2018    Recurrent UTI    Vitamin D deficiency, unspecified 04/22/2016    Vitamin D deficiency    Zoster with other complications 11/25/2015    Herpes zoster dermatitis       Surgical History:   Past Surgical History:   Procedure Laterality Date    CT ANGIO CORONARY ART WITH HEARTFLOW IF SCORE >30%  3/18/2020    CT HEART CORONARY ANGIOGRAM 3/18/2020 [facility]    HAND TENDON SURGERY  11/11/2013    Hand Incision Tendon Sheath Of A Finger    HYSTERECTOMY  03/04/2016    Hysterectomy    LITHOTRIPSY  11/11/2013    Renal Lithotripsy    TONSILLECTOMY  12/19/2013    Tonsillectomy With Adenoidectomy        Allergies:   Allergies   Allergen Reactions     Clindamycin Anaphylaxis    Dupilumab Anaphylaxis    Hydrochlorothiazide Anaphylaxis, Itching and Swelling    Sulfamethoxazole-Trimethoprim Anaphylaxis    Cefazolin Hives, Other and Swelling     STATES TONGUE SPLITS    Atorvastatin Hives    Cephalexin Other    Clarithromycin Swelling     hives, tongue swelling    Lactose Constipation    Nitrofurantoin Monohyd/M-Cryst Hives    Sulfa (Sulfonamide Antibiotics) Hives    Sulfacetamide Hives       Medications:   Current Outpatient Medications:     albuterol 2.5 mg /3 mL (0.083 %) nebulizer solution, inhale the contents of one vial via nebulizer every 4 hours as needed, Disp: , Rfl:     allopurinol (Zyloprim) 100 mg tablet, Take 1 tablet (100 mg) by mouth once daily., Disp: 90 tablet, Rfl: 0    allopurinol (Zyloprim) 300 mg tablet, Take 1 tablet (300 mg) by mouth once daily., Disp: 90 tablet, Rfl: 0    ALPRAZolam (Xanax) 0.5 mg tablet, Take 1 tablet (0.5 mg) by mouth if needed for sleep or anxiety (For up to 16 doses a month) for up to 16 doses., Disp: 16 tablet, Rfl: 0    amLODIPine (Norvasc) 5 mg tablet, Take 1 tablet (5 mg) by mouth once daily., Disp: 90 tablet, Rfl: 3    amoxicillin (Amoxil) 500 mg capsule, Take 1 capsule (500 mg) by mouth once daily., Disp: , Rfl:     ARIPiprazole (Abilify) 2 mg tablet, Take 1 tablet (2 mg) by mouth once daily., Disp: 90 tablet, Rfl: 0    ascorbic acid, vitamin C, 500 mg capsule, Take 1 capsule by mouth once daily., Disp: , Rfl:     benzoyl peroxide (Benzac AC) 10 % external wash, Apply topically if needed., Disp: , Rfl:     buPROPion XL (Wellbutrin XL) 150 mg 24 hr tablet, Take 1 tablet (150 mg) by mouth once daily. Do not crush, chew, or split., Disp: 90 tablet, Rfl: 3    buPROPion XL (Wellbutrin XL) 300 mg 24 hr tablet, Take 1 tablet (300 mg) by mouth once daily in the morning. Do not crush, chew, or split., Disp: 90 tablet, Rfl: 3    carbidopa-levodopa-entacapone (Stalevo) -200 mg tablet, Take 1 tablet by mouth every 4  hours., Disp: 540 tablet, Rfl: 1    carvedilol (Coreg) 12.5 mg tablet, Take 1 tablet (12.5 mg) by mouth 2 times daily (morning and late afternoon) for 7 days, THEN 0.5 tablets (6.25 mg) 2 times daily (morning and late afternoon) for 14 days., Disp: 28 tablet, Rfl: 1    cetirizine-pseudoephedrine (ZyrTEC-D) 5-120 mg 12 hr tablet, Take 1 tablet by mouth 2 times a day., Disp: 180 tablet, Rfl: 0    clindamycin (Cleocin T) 1 % lotion, 1 Application, Disp: , Rfl:     clobetasol (Temovate) 0.05 % external solution, Apply topically 2 times a day., Disp: , Rfl:     clobetasoL 0.05 % lotion, One application as needed for flaring only.not for daily use. For scalp and hands only, Disp: 118 mL, Rfl: 3    cloNIDine (Catapres) 0.1 mg tablet, Take 1 tablet (0.1 mg) by mouth if needed for high blood pressure., Disp: , Rfl:     cloNIDine (Catapres-TTS) 0.2 mg/24 hr patch, Apply one patch on the skin and replace every 7 days, as directed, Disp: 4 patch, Rfl: 11    Cosentyx Pen, 2 Pens, 150 mg/mL self-injector pen, Inject 2 mL (300 mg) under the skin every 30 (thirty) days., Disp: 2 mL, Rfl: 2    cyanocobalamin (Vitamin B-12) 1,000 mcg/mL injection, Inject 1 mL (1,000 mcg) into the muscle see administration instructions., Disp: 30 mL, Rfl: 2    diclofenac sodium (Voltaren) 1 % gel gel, Apply 4.5 inches (4 g) topically 2 times a day as needed., Disp: , Rfl:     doxepin (SINEquan) 50 mg capsule, Take 1 capsule (50 mg) by mouth once daily at bedtime., Disp: 30 capsule, Rfl: 3    doxycycline (Vibramycin) 100 mg capsule, 1 capsule (100 mg)., Disp: , Rfl:     DULoxetine (Cymbalta) 30 mg DR capsule, Take 1 capsule (30 mg) by mouth once daily. Do not crush or chew., Disp: 30 capsule, Rfl: 0    DULoxetine (Cymbalta) 60 mg DR capsule, Take 1 tablet once daily with 30 mg dose, Disp: 90 capsule, Rfl: 1    eplerenone (Inspra) 25 mg tablet, Take 1 tablet (25 mg) by mouth once daily., Disp: , Rfl:     ezetimibe (Zetia) 10 mg tablet, Take 1 tablet (10  mg) by mouth once daily., Disp: 90 tablet, Rfl: 3    fluticasone furoate-vilanteroL (Breo Ellipta) 200-25 mcg/dose inhaler, 1puff daily, Inhalation, Disp: , Rfl:     ipratropium-albuteroL (Duo-Neb) 0.5-2.5 mg/3 mL nebulizer solution, Take 3 mL by nebulization every 4 hours if needed for wheezing., Disp: , Rfl:     ketoconazole (NIZOral) 2 % cream, 1 Application, Disp: , Rfl:     lactobacillus acidophilus (Lactobacillus acidoph-L.bulgar) tablet tablet, Take 1 tablet by mouth once daily., Disp: 90 tablet, Rfl: 0    leflunomide (Arava) 10 mg tablet, Take 1 tablet (10 mg) by mouth once daily., Disp: 90 tablet, Rfl: 0    levalbuterol (Xopenex) 1.25 mg/3 mL nebulizer solution, Take 1 ampule by nebulization 3 times a day., Disp: , Rfl:     meloxicam (Mobic) 15 mg tablet, Take 1 tablet (15 mg) by mouth once daily as needed for moderate pain (4 - 6)., Disp: 30 tablet, Rfl: 0    metoprolol succinate XL (Toprol-XL) 25 mg 24 hr tablet, Take 2 tablets (50 mg) by mouth once daily., Disp: , Rfl:     metroNIDAZOLE (Metrocream) 0.75 % cream, 1 Application, Disp: , Rfl:     montelukast (Singulair) 10 mg tablet, Take 1 tablet (10 mg) by mouth once daily at bedtime., Disp: , Rfl:     nebulizer and compressor device, use q4-6 hours with albuterol PRN cough/wheeze, Disp: , Rfl:     nebulizers (Devilbiss Disposable Nebulizer) misc, every 4 hours if needed., Disp: , Rfl:     omega-3 acid ethyl esters (Lovaza) 1 gram capsule, Take 2 capsules (2 grams) by mouth 2 times a day., Disp: 360 capsule, Rfl: 3    omeprazole (PriLOSEC) 40 mg DR capsule, Take 1 capsule (40 mg) by mouth once daily in the morning before meals., Disp: 90 capsule, Rfl: 3    ondansetron ODT (Zofran-ODT) 4 mg disintegrating tablet, Dissolve 1 tablet (4 mg) in the mouth every 8 hours if needed for nausea or vomiting., Disp: 90 tablet, Rfl: 0    orphenadrine (Norflex) 100 mg 12 hr tablet, Take 1 tablet (100 mg) by mouth 2 times a day as needed for muscle spasms., Disp: 60  "tablet, Rfl: 3    pioglitazone (Actos) 15 mg tablet, Take 1 tablet (15 mg) by mouth once daily., Disp: , Rfl:     predniSONE (Deltasone) 10 mg tablet, Take 4 tablets (40 mg) by mouth once daily., Disp: , Rfl:     pregabalin (Lyrica) 75 mg capsule, Take 1 capsule (75 mg) by mouth 3 times a day., Disp: 90 capsule, Rfl: 2    ProAir HFA 90 mcg/actuation inhaler, , Disp: , Rfl:     Repatha SureClick 140 mg/mL injection, Inject 1 mL (140 mg) under the skin every 14 (fourteen) days., Disp: 6 mL, Rfl: 3    rizatriptan (Maxalt) 10 mg tablet, Take 1 tablet (10 mg) by mouth 1 time if needed for migraine. May repeat in 2 hours if unresolved. Do not exceed 30 mg in 24 hours., Disp: 9 tablet, Rfl: 0    Spiriva Respimat 1.25 mcg/actuation inhaler, Inhale once daily., Disp: , Rfl:     SUMAtriptan (Imitrex) 25 mg tablet, Take 1 tablet (25 mg) by mouth 1 time if needed for migraine. May repeat in 2 hours if no improvement. Max 200 mg/24 hr (Patient not taking: Reported on 2/10/2025), Disp: 9 tablet, Rfl: 2    syringe with needle, safety 3 mL 23 gauge x 1\" syringe, 1 Box see administration instructions., Disp: 30 each, Rfl: 3    tapentadol (Nucynta) 50 mg tablet, Take 1 tablet (50 mg) by mouth every 12 hours. Do not fill before February 12, 2025., Disp: 60 tablet, Rfl: 0    tezepelumab-ekko (Tezspire) SubQ Pen Injector, Inject 210 mg under the skin., Disp: , Rfl:     ubrogepant (Ubrelvy) 50 mg tablet, Take 1 tablet at onset of migraine. May repeat in 2 hours as needed. Max 200 mg/24 hour, Disp: 9 tablet, Rfl: 0      Objective   Physical Examination:    Visit Vitals  BP (!) 178/111 (BP Location: Left arm, Patient Position: Sitting, BP Cuff Size: Adult)   Pulse 98   Temp 37 °C (98.6 °F) (Temporal)   SpO2 98%   OB Status Having periods   Smoking Status Never       Procedures :None    Orders:  Orders Placed This Encounter   Procedures    CBC and Auto Differential    Sedimentation Rate    C-Reactive Protein    Uric Acid    Vitamin D " 25-Hydroxy,Total (for eval of Vitamin D levels)        Provider Impression:   Assessment/Plan   Encounter Diagnoses   Name Primary?    Psoriatic arthropathy (Multi) Yes    Gouty arthropathy        45 y/o female with Hx of asthma, Parkinsonism/Dystonia, Immunodeficiency disorder (MBL) on prophylaxis antibiotics with amoxicillin present for pain in the right elbow and bilateral knee. Workup shows elevated uric acid levels and positive Family History on the maternal side. underlying gout and possible underlying PSA. MRI show severe lateral epicondylitis.  Diagnosis was made of underlying PSA and gout  -Continue Cosentyx 300 mg monthly  -Continue leflunomide 10 mg  -Continue Allopurinol 400 mg daily  -Start Meloxicam 15 mg daily as needed   -Reviewed recent labs will check uric acid vitamin D and inflammatory markers  -F/u in 3-4 months

## 2025-02-20 ENCOUNTER — TELEPHONE (OUTPATIENT)
Dept: RHEUMATOLOGY | Facility: CLINIC | Age: 45
End: 2025-02-20
Payer: COMMERCIAL

## 2025-02-20 DIAGNOSIS — E55.9 VITAMIN D DEFICIENCY: ICD-10-CM

## 2025-02-20 LAB
25(OH)D3+25(OH)D2 SERPL-MCNC: 24 NG/ML (ref 30–100)
ALBUMIN SERPL-MCNC: 4.3 G/DL (ref 3.6–5.1)
ALP SERPL-CCNC: 58 U/L (ref 31–125)
ALT SERPL-CCNC: 24 U/L (ref 6–29)
ANION GAP SERPL CALCULATED.4IONS-SCNC: 11 MMOL/L (CALC) (ref 7–17)
AST SERPL-CCNC: 20 U/L (ref 10–35)
BASOPHILS # BLD AUTO: 31 CELLS/UL (ref 0–200)
BASOPHILS NFR BLD AUTO: 0.7 %
BILIRUB SERPL-MCNC: 0.4 MG/DL (ref 0.2–1.2)
BUN SERPL-MCNC: 9 MG/DL (ref 7–25)
CALCIUM SERPL-MCNC: 9.4 MG/DL (ref 8.6–10.2)
CHLORIDE SERPL-SCNC: 101 MMOL/L (ref 98–110)
CO2 SERPL-SCNC: 28 MMOL/L (ref 20–32)
CREAT SERPL-MCNC: 0.56 MG/DL (ref 0.5–0.99)
CRP SERPL-MCNC: <3 MG/L
EGFRCR SERPLBLD CKD-EPI 2021: 114 ML/MIN/1.73M2
EOSINOPHIL # BLD AUTO: 22 CELLS/UL (ref 15–500)
EOSINOPHIL NFR BLD AUTO: 0.5 %
ERYTHROCYTE [DISTWIDTH] IN BLOOD BY AUTOMATED COUNT: 13.6 % (ref 11–15)
ERYTHROCYTE [SEDIMENTATION RATE] IN BLOOD BY WESTERGREN METHOD: 14 MM/H
GLUCOSE SERPL-MCNC: 95 MG/DL (ref 65–99)
HCT VFR BLD AUTO: 38 % (ref 35–45)
HGB BLD-MCNC: 12.7 G/DL (ref 11.7–15.5)
LYMPHOCYTES # BLD AUTO: 1725 CELLS/UL (ref 850–3900)
LYMPHOCYTES NFR BLD AUTO: 39.2 %
MCH RBC QN AUTO: 31.4 PG (ref 27–33)
MCHC RBC AUTO-ENTMCNC: 33.4 G/DL (ref 32–36)
MCV RBC AUTO: 94.1 FL (ref 80–100)
MONOCYTES # BLD AUTO: 471 CELLS/UL (ref 200–950)
MONOCYTES NFR BLD AUTO: 10.7 %
NEUTROPHILS # BLD AUTO: 2152 CELLS/UL (ref 1500–7800)
NEUTROPHILS NFR BLD AUTO: 48.9 %
PLATELET # BLD AUTO: 280 THOUSAND/UL (ref 140–400)
PMV BLD REES-ECKER: 9 FL (ref 7.5–12.5)
POTASSIUM SERPL-SCNC: 3.8 MMOL/L (ref 3.5–5.3)
PROT SERPL-MCNC: 7 G/DL (ref 6.1–8.1)
RBC # BLD AUTO: 4.04 MILLION/UL (ref 3.8–5.1)
SODIUM SERPL-SCNC: 140 MMOL/L (ref 135–146)
URATE SERPL-MCNC: 5 MG/DL (ref 2.5–7)
WBC # BLD AUTO: 4.4 THOUSAND/UL (ref 3.8–10.8)

## 2025-02-20 RX ORDER — ERGOCALCIFEROL 1.25 MG/1
50000 CAPSULE ORAL WEEKLY
Qty: 4 CAPSULE | Refills: 0 | Status: SHIPPED | OUTPATIENT
Start: 2025-02-20 | End: 2025-04-21

## 2025-02-22 DIAGNOSIS — F41.0 PANIC ATTACKS: ICD-10-CM

## 2025-02-22 RX ORDER — ALPRAZOLAM 0.5 MG/1
0.5 TABLET ORAL AS NEEDED
Qty: 16 TABLET | Refills: 2 | Status: SHIPPED | OUTPATIENT
Start: 2025-02-22

## 2025-02-27 ENCOUNTER — OFFICE VISIT (OUTPATIENT)
Dept: CARDIOLOGY | Facility: HOSPITAL | Age: 45
End: 2025-02-27
Payer: COMMERCIAL

## 2025-02-27 VITALS
OXYGEN SATURATION: 99 % | BODY MASS INDEX: 36 KG/M2 | HEART RATE: 93 BPM | WEIGHT: 224 LBS | HEIGHT: 66 IN | SYSTOLIC BLOOD PRESSURE: 152 MMHG | DIASTOLIC BLOOD PRESSURE: 99 MMHG

## 2025-02-27 DIAGNOSIS — E78.5 HYPERLIPIDEMIA, UNSPECIFIED HYPERLIPIDEMIA TYPE: ICD-10-CM

## 2025-02-27 DIAGNOSIS — R06.09 DYSPNEA ON EFFORT: ICD-10-CM

## 2025-02-27 DIAGNOSIS — R55 SYNCOPE, UNSPECIFIED SYNCOPE TYPE: ICD-10-CM

## 2025-02-27 DIAGNOSIS — I10 BENIGN ESSENTIAL HYPERTENSION: Primary | ICD-10-CM

## 2025-02-27 DIAGNOSIS — E78.1 HYPERTRIGLYCERIDEMIA: ICD-10-CM

## 2025-02-27 DIAGNOSIS — G47.33 OSA (OBSTRUCTIVE SLEEP APNEA): ICD-10-CM

## 2025-02-27 PROCEDURE — 1036F TOBACCO NON-USER: CPT | Performed by: INTERNAL MEDICINE

## 2025-02-27 PROCEDURE — 3008F BODY MASS INDEX DOCD: CPT | Performed by: INTERNAL MEDICINE

## 2025-02-27 PROCEDURE — 99215 OFFICE O/P EST HI 40 MIN: CPT | Performed by: INTERNAL MEDICINE

## 2025-02-27 PROCEDURE — 3080F DIAST BP >= 90 MM HG: CPT | Performed by: INTERNAL MEDICINE

## 2025-02-27 PROCEDURE — G2211 COMPLEX E/M VISIT ADD ON: HCPCS | Performed by: INTERNAL MEDICINE

## 2025-02-27 PROCEDURE — 3077F SYST BP >= 140 MM HG: CPT | Performed by: INTERNAL MEDICINE

## 2025-02-27 RX ORDER — METOPROLOL SUCCINATE 50 MG/1
50 TABLET, EXTENDED RELEASE ORAL DAILY
Qty: 90 TABLET | Refills: 3 | Status: SHIPPED | OUTPATIENT
Start: 2025-02-27 | End: 2025-02-27 | Stop reason: SDUPTHER

## 2025-02-27 RX ORDER — METOPROLOL SUCCINATE 25 MG/1
25 TABLET, EXTENDED RELEASE ORAL DAILY
COMMUNITY
End: 2025-02-27 | Stop reason: SDUPTHER

## 2025-02-27 RX ORDER — METOPROLOL SUCCINATE 50 MG/1
50 TABLET, EXTENDED RELEASE ORAL DAILY
Qty: 90 TABLET | Refills: 3 | Status: SHIPPED | OUTPATIENT
Start: 2025-02-27 | End: 2025-02-28 | Stop reason: SDUPTHER

## 2025-02-27 RX ORDER — CLONIDINE 0.3 MG/24H
PATCH, EXTENDED RELEASE TRANSDERMAL
Qty: 4 PATCH | Refills: 11 | Status: SHIPPED | OUTPATIENT
Start: 2025-02-27 | End: 2026-02-27

## 2025-02-27 ASSESSMENT — ENCOUNTER SYMPTOMS
OCCASIONAL FEELINGS OF UNSTEADINESS: 0
DEPRESSION: 0
LOSS OF SENSATION IN FEET: 0

## 2025-02-27 NOTE — PATIENT INSTRUCTIONS
1. Obstructive sleep apnea - just had repeated sleep study and has follow up visit with sleep medicine. Important to treat GUICHO due to difficulties controlling BP with untreated GUICHO  2. hyperlipidemia- At FH levels. Prior labs in 5/2024 with poorly controlled LDL. Continue repatha zetia and  vascepa.  Most recent labs improved somewhat. Due for recheck  3. Morbid Obesity-Previously had good response with GLP1 agonist with wt loss and marked improvement in BP and resolution of SOB. Once not covered by insurance she had significant weight gain with uncontrolled BP and GUICHO. Will refer to clinical pharmacy to see if insurance may now cover meds and have them start and titrate.   4. Hypertension- Poorly controlled BP . Will increase metoprolol succinate to 50 mg daily and increase clonidine patch to 0.3/24 hours patch. mg daily. Also continues on eplerenone 25 mg daily and amlodipine 5 mg daily. To follow Bp and HR at home and call the office in 2-3 weeks to review BP readings. Would benefit from wt loss.  5.  Palpitations . Also  dx of POTs. To continue use support stockings/socks( [employer]) as well as volume expanders. Would benefit from seeing a new POTs neurologist to help with her care ( prior MD has left [facility]) Referred  to Dr Cherry.   Return to clinic 3 months

## 2025-02-27 NOTE — PROGRESS NOTES
Primary Care Physician: NAYELI Yadav-CNP      Date of Visit: 02/27/2025 10:40 AM EST  Location of visit: [facility]   Type of Visit: Established Patient     HPI / Summary:   Patient is a 44 year old woman with HTN, hyperlipidemia ( at FH levels) with strong family history of CAD , with morbid obesity, h/o asthma, GUICHO ( using CPAP) and MBL with statin intolerance who had a syncopal episode( in November 2020, still with significant issues with joint pain and uncontrolled HTN also s/p COVID infection in late Dec 2021 who returns for followup.     Patient with significant issues with pain from MBL who has had issues controlling her BP when pain is worst. BP meds have been adjusted over time. The higher BP in the afternoon did correlate when her pain is worse. Continues to work with pain management     Cardiac work up :  ST scan for CAC: total 0  Cardiac Echo Jan 23 2020: normal LV size and systolic function with LVEF 65-70% technically difficult study. impaired relaxation, no significant valvular pathology.  Event monitor from 11/19/2020-12/19/2020: min HR 70 bpm, max 132, no SVEs VEs noted. skipped beats, lightheadedness and heart racing corresponded to sinus rhythm and sinus tachycardia.   CTA with heart flow 3/18/2020: normal chamber sizes, normal coronary anatomy without evidence for atherosclerotic changes or stenotic disease.   Renal artery ultrasound 1/23/2020- no evidence for renal artery stenosis. bilateral renal veins widely patent.  VMA and metanephrines were normal, TSH was normal.      She has had syncopal episodes over the years.  She wore a 30 day( Nov 2020) monitor and log notes occasional skipped beats, some SOB and CP and heart racing. Monitor during those episodes showed sinus rhythm and sinus tachycardia, and did not show any worrisome arrhythmias. Had not had syncopal episodes from Nov 2020 til at some point in  2022. She was diagnosed with POTs ( ? Testing) and was working with a  neurologist. Most recently had a syncopal episodes without any warning Oct 2023. ( Does note occasional palpitations. Heart races for seconds then stops. Had tried to wear 30 day live monitor but had significant skin reaction to any leads( even sensitive leads) so unable to wear for any extended period of time. No recurrent syncopal episodes since that time. ( If any further events will need a loop recorder)   She previously was using  CPAP for GUICHO, but has not been able to tolerate following COVID. She recently had repeat sleep study and will return to sleep medicine for follow up and further rx.   Has hyperlipidemia at FH levels and is statin intolerant She is currently on repatha zetia and lovaza with improved lipids.      She saw Dr Gentile for medical weight. On meds ( monjaro) she had initially lost 55 lbs, however insurance stopped paying for meds and could not afford . Off meds she has regained 35 lbs. She does no  exercise due to pain.  She intermittently has LE swelling. She uses support stockings. With weight gain she has no CP but does complain of SOB on exertion.      She was on carvedilol but her neurologist who diagnosed her with POTs reportedly added metoprolol on top of that for POTs. That MD is no longer taking care of the pateint and she will be seeing Dr Cherry ( though appointment is in the summer).     With respect to BP she has been having significant issues with pain which increases her BP and she is not currently using her CPAP for GUICHO. Denny t together with weight loss has markedly increased her Bp. In addition trying to get her on only 1 beta blocker. At prior visit , discontinued carvedilol and added clonidine patch.   Since that time her bP has been running 150-170/100-110mmHg wit HR 90s-100 bpm. Pt with increased frequency of HA        ROS: Full 10 pt review of symptoms of negative unless discussed above.     Problems:   Patient Active Problem List   Diagnosis    Allergic rhinitis     Anxiety disorder    Arthralgia of multiple sites    Bariatric surgery status    Benign essential hypertension    Bowel disease, inflammatory    Immunodeficiency syndrome (Multi)    Chronic diarrhea    Continuous LLQ abdominal pain    Depression    Essential familial hyperlipidemia    GERD (gastroesophageal reflux disease)    Gouty arthropathy    Hoarseness    Hot flashes    Hyperlipidemia    Hypertriglyceridemia    Hypothyroidism (acquired)    Inflammatory arthropathy    Rheumatoid arthritis    Hypersomnia    Labile hypertension    Low vitamin D level    Lyme disease    Mannose-binding lectin deficiency (Multi)    Methadone use    Migraine without status migrainosus, not intractable    Morbid obesity (Multi)    Mixed incontinence urge and stress    Osteoporosis    Obstructive sleep apnea, adult    DRD (DOPA-responsive dystonia)    Post herpetic neuralgia    Polyphagia    POTS (postural orthostatic tachycardia syndrome)    Prediabetes    Psoriatic arthropathy (Multi)    Steroid withdrawal syndrome with complication (Multi)    Steroid-dependent asthma ([facility])    Vitamin B12 deficiency    Vitamin D deficiency    Gait disturbance    Bilateral leg edema    Cystocele with uterine descensus    Dyspnea on effort    Elbow tendinitis    Foreign body in ear, right, initial encounter    Frequent falls    Hyperglycemia    Hyperuricemia    Incomplete bladder emptying    Insomnia, organic    Intermittent palpitations    Lower back pain    Maxillary sinusitis    Myofascial pain    Orthostatic intolerance    Other ovarian cyst, unspecified side    Pelvic floor weakness    Perioral dermatitis    Peripheral neuropathy    Postural dizziness    Psoriasis vulgaris    Recurrent urinary tract infection    Renal lesion    Right lumbar radiculopathy    Rosacea, unspecified    Sacroiliac joint dysfunction of right side    Snoring    Spasm of thoracic back muscle    Syncope    Urinary frequency    Panic attacks    Facial swelling    Iron  deficiency anemia secondary to inadequate dietary iron intake    Idiopathic steatorrhea ([facility])     Medical History:   Past Medical History:   Diagnosis Date    Acute upper respiratory infection, unspecified 07/26/2016    Viral URI with cough    Acute upper respiratory infection, unspecified 11/15/2017    Viral URI with cough    Allergy status to unspecified drugs, medicaments and biological substances     History of allergy    Chronic maxillary sinusitis 08/27/2018    Maxillary sinusitis, unspecified chronicity    Dystonia, unspecified 04/08/2014    Dystonia    Encounter for other screening for malignant neoplasm of breast 01/22/2018    Breast screening    Encounter for screening for respiratory tuberculosis 11/11/2013    Screening examination for pulmonary tuberculosis    Essential (primary) hypertension 12/27/2017    Benign essential hypertension    Hypersomnia, unspecified 04/13/2015    Sleeps too much    Idiopathic sleep related nonobstructive alveolar hypoventilation     Nocturnal hypoxemia    Idiopathic sleep related nonobstructive alveolar hypoventilation 02/09/2018    Nocturnal hypoxia    Impaired fasting glucose 01/22/2018    Impaired fasting glucose    Insect bite (nonvenomous) of lower back and pelvis, initial encounter 03/29/2018    Tick bite of back    Left lower quadrant pain 01/07/2019    Left lower quadrant pain    Local infection of the skin and subcutaneous tissue, unspecified 07/28/2017    Skin infection    Low back pain, unspecified 05/23/2019    Acute low back pain    Other conditions influencing health status 02/27/2017    Sprain of right thumb, unspecified site of finger, initial encounter    Other malaise 04/16/2018    Malaise and fatigue    Other microscopic hematuria 03/30/2018    Microscopic hematuria    Other specified cough 09/07/2018    Productive cough    Other specified health status 01/07/2019    Acute medical illness    Other symptoms and signs involving the musculoskeletal  system 07/12/2018    Weakness of right lower extremity    Other symptoms and signs involving the musculoskeletal system 03/21/2018    Polyarticular joint involvement    Other symptoms and signs involving the musculoskeletal system 07/12/2018    RUE weakness    Pain in right lower leg 01/27/2016    Right calf pain    Pain in unspecified hip 01/20/2016    Hip pain    Pelvic and perineal pain 04/17/2018    Pelvic pain    Personal history of diseases of the skin and subcutaneous tissue 08/07/2018    History of dermatitis    Personal history of other (healed) physical injury and trauma 08/07/2018    History of insect bite    Personal history of other diseases of the circulatory system     History of hypertension    Personal history of other diseases of the female genital tract 11/24/2015    History of menorrhagia    Personal history of other diseases of the musculoskeletal system and connective tissue 03/14/2018    History of tendinitis    Personal history of other diseases of the nervous system and sense organs 04/08/2014    History of Parkinson's disease    Personal history of other diseases of the respiratory system 01/07/2019    History of acute bronchitis    Personal history of other diseases of the respiratory system 10/05/2018    History of paranasal sinus pain    Personal history of other specified conditions 04/13/2015    History of snoring    Personal history of other specified conditions 09/11/2017    History of headache    Personal history of other specified conditions 09/07/2018    History of persistent cough    Personal history of other specified conditions 04/08/2014    History of memory loss    Personal history of other specified conditions 03/26/2018    History of left flank pain    Personal history of other specified conditions     History of heartburn    Personal history of urinary calculi 03/26/2018    Personal history of urinary calculi    Personal history of urinary calculi 03/26/2018    History of  renal calculi    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/2017    Plantar fasciitis, left    Primary insomnia 04/13/2015    Primary insomnia    Rash and other nonspecific skin eruption 04/16/2018    Rash    Unspecified abdominal pain 01/07/2019    Left sided abdominal pain    Urinary tract infection, site not specified 10/19/2018    UTI (urinary tract infection), bacterial    Urinary tract infection, site not specified 10/18/2018    Recurrent UTI    Vitamin D deficiency, unspecified 04/22/2016    Vitamin D deficiency    Zoster with other complications 11/25/2015    Herpes zoster dermatitis     Surgical Hx:   Past Surgical History:   Procedure Laterality Date    CT ANGIO CORONARY ART WITH HEARTFLOW IF SCORE >30%  3/18/2020    CT HEART CORONARY ANGIOGRAM 3/18/2020 [facility]    HAND TENDON SURGERY  11/11/2013    Hand Incision Tendon Sheath Of A Finger    HYSTERECTOMY  03/04/2016    Hysterectomy    LITHOTRIPSY  11/11/2013    Renal Lithotripsy    TONSILLECTOMY  12/19/2013    Tonsillectomy With Adenoidectomy      Social Hx:   Tobacco Use: Low Risk  (2/27/2025)    Patient History     Smoking Tobacco Use: Never     Smokeless Tobacco Use: Never     Passive Exposure: Not on file     Alcohol Use: Not At Risk (8/26/2024)    AUDIT-C     Frequency of Alcohol Consumption: Never     Average Number of Drinks: Patient does not drink     Frequency of Binge Drinking: Never     Family Hx:   Family History   Problem Relation Name Age of Onset    Asthma Mother      Hypertension Mother      Irregular heart beat Mother      Allergies Father      Heart disease Father      Hypertension Father      Sinusitis Father      Allergies Sister      Asthma Daughter      Allergies Son      Heart disease Maternal Grandmother      Breast cancer Paternal Grandmother      Heart disease Paternal Grandfather      Lung cancer Paternal Grandfather      Dementia Paternal Great-Grandfather        Exam:   Vitals:   Vitals:    02/27/25 1101   BP: (!) 152/99  "  Pulse: 93   SpO2: 99%   Weight: 102 kg (224 lb)   Height: 1.676 m (5' 6\")     Wt Readings from Last 5 Encounters:   02/27/25 102 kg (224 lb)   02/10/25 102 kg (224 lb 9.6 oz)   12/04/24 104 kg (228 lb 2.8 oz)   11/21/24 88.4 kg (194 lb 12.8 oz)   11/05/24 103 kg (226 lb)      Constitutional:       Appearance: Healthy appearance. Not in distress.   Pulmonary:      Effort: Pulmonary effort is normal.      Breath sounds: Normal breath sounds.   Cardiovascular:      Normal rate. Regular rhythm. S1 with normal intensity. S2 with normal intensity.       Murmurs: There is no murmur.   Pulses:     Intact distal pulses.   Edema:     Peripheral edema absent.   Abdominal:      General: Bowel sounds are normal.      Palpations: Abdomen is soft.   Neurological:      Mental Status: Alert and oriented to person, place and time.       Labs:   Recent Labs     02/19/25  1250 11/30/24  1008 11/07/24  1106 09/26/24  1436 08/27/24  0533 08/26/24  0753 08/25/24  0626 08/24/24  1333   WBC 4.4 4.5 3.8* 7.9 10.7 11.5* 6.0 3.8*   HGB 12.7 12.9 12.6 10.0* 10.4* 10.3* 10.7* 11.2*   HCT 38.0 38.6 39.0 30.9* 33.0* 32.9* 33.4* 33.8*    265 308 242 341 312 309 270   MCV 94.1 89 92 91 94 97 94 92     Recent Labs     02/19/25  1251 11/05/24  1101 09/26/24  1436 08/27/24  0533 08/26/24  0753 08/25/24  0626 08/24/24  1333 07/07/24  1818    137 138 136 136 136 137 139   K 3.8 4.2 3.8 4.3 3.9 4.4 4.2 3.8    100 105 104 105 104 104 103   BUN 9 9 13 19 17 13 14 11   CREATININE 0.56 0.47* 0.59 0.57 0.54 0.62 0.61 0.63      Recent Labs     11/30/24  1008 09/26/24  1436 07/01/24  1244 02/21/23  1251 04/09/21  1204 11/19/20  1300 05/27/20  1242 06/20/19  1416   HGBA1C 4.8  --   --  4.6 5.0 5.0 5.2 5.1   FERRITIN 116 20 26  --   --  42  --   --    TIBC 403 460* 444  --   --  486*  --   --    IRONSAT 32 16* 16*  --   --  25  --   --      Lab Results   Component Value Date    CHOL 196 11/30/2024    HDL 46.8 11/30/2024    LDLF 121 (H) " 03/10/2023    TRIG 383 (H) 11/30/2024     Lab Results   Component Value Date    LDLCALC 73 11/30/2024      Notable Studies: imaging personally reviewed and summarized by me below  EKG:  Encounter Date: 11/04/24   ECG 12 lead (Clinic Performed)   Result Value    Ventricular Rate 96    Atrial Rate 96    RI Interval 170    QRS Duration 90    QT Interval 364    QTC Calculation(Bazett) 459    P Axis 37    R Axis 15    T Axis 43    QRS Count 15    Q Onset 217    P Onset 132    P Offset 193    T Offset 399    QTC Fredericia 425    Narrative    Normal sinus rhythm  Normal ECG  When compared with ECG of 04-JAN-2024 11:42,  No significant change was found  Confirmed by Vic Sam ([id number]) on 11/5/2024 9:27:43 AM     Echo 2/5/2024:  PHYSICIAN INTERPRETATION:  Left Ventricle: The left ventricular systolic function is normal, with an estimated ejection fraction of 60-65%. There are no regional wall motion abnormalities. The left ventricular cavity size is normal. Spectral Doppler shows a normal pattern of left ventricular diastolic filling.  Left Atrium: The left atrium is normal in size.  Right Ventricle: The right ventricle is normal in size. There is normal right ventricular global systolic function.  Right Atrium: The right atrium is normal in size.  Aortic Valve: The aortic valve is trileaflet. There is no evidence of aortic valve regurgitation. The peak instantaneous gradient of the aortic valve is 3.4 mmHg. The mean gradient of the aortic valve is 2.0 mmHg.  Mitral Valve: The mitral valve is normal in structure. There is trace mitral valve regurgitation.  Tricuspid Valve: The tricuspid valve is structurally normal. There is trace tricuspid regurgitation. The right ventricular systolic pressure is unable to be estimated.  Pulmonic Valve: The pulmonic valve is structurally normal. There is physiologic pulmonic valve regurgitation.  Pericardium: There is a trivial pericardial effusion.  Aorta: The aortic root is  normal.  Systemic Veins: The inferior vena cava appears to be of normal size.  In comparison to the previous echocardiogram(s): Compared with study from 1/23/2020,. Nompared wtihthe prior examfro m1/23/2020, there are no significant changes though endocardial definition was suboptimal on today's exam and would have benefitted from use of echocontrast.        CONCLUSIONS:   1. Left ventricular systolic function is normal with a 60-65% estimated ejection fraction.   2. Nompared wtihthe prior examfro m1/23/2020, there are no significant changes though endocardial definition was suboptimal on today's exam and would have benefitted from use of echocontrast.        Cardiac stress test MRI 3/15/2022:   1. Normal LV size (EDVi 56 ml/m2) with mildly reduced systolic   function (LVEF 47 %) .Global hypokinesis  without focal wall motion abnormality  2. Questionable myocardial inflammation/edema on limited T2-weighted   imaging (T2 mapping). Would correlate  with biomarkers and clinical status for myocarditis.   3. No evidence of myocardial fibrosis, infiltration or infarction   based on late gadolinium imaging      LEFT VENTRICLE: 1. Normal LV size (EDVi 56 ml/m2) mildly reduced   systolic function (LVEF 47 %).   2. Global hypokinesis without focal wall motion abnormality  3. Normal LV wall thickness and indexed LV mass.   4. Normal ECV 25%.   5. Limited T2 mapping due to artifacts. Patchy elevated native T2   values (>55msec ms) , which can be seen  with myocarditis. Would correlate with biomarkers and clinical status   for myocarditis.   6. Following administration of gadolinium, in the early phase, there   is no evidence of LV thrombus or MVO.   7. In the late phase, there is no significant myocardial enhancement.  Quantitative LVEF 47 %.     VIABILITY: Hyperenhancement is normal.     RIGHT VENTRICLE: Quantitative RVEF 48 %.     LV/RV SEPTUM: The LV/RV septum is normal.     LA/RA SEPTUM: The LA/RA septum is normal.     LEFT  ATRIUM: Severe left atrial     RIGHT ATRIUM: Mild right atrial enlargement     PERICARDIUM: Trivial pericardial effusion     PLEURAL EFFUSION: No pleural effusion     AORTIC VALVE: Peak aortic valve velocity 75 cm/sec. Aortic   regurgitant volume 3 ml. Aortic regurgitant fraction 8 %.     MITRAL VALVE: There is mild mitral regurgitation with regurgitant   volume 15ml, regurgitant fraction 30%.      AORTIC ROOT: The aortic root is normal.      Cardiac Monitor 3/2024:  no Afib, sinus rhythm with no significant arrhythmias ( but did not wear for a prolonged period of time)            Current Outpatient Medications   Medication Instructions    albuterol 2.5 mg /3 mL (0.083 %) nebulizer solution inhale the contents of one vial via nebulizer every 4 hours as needed    allopurinol (ZYLOPRIM) 300 mg, oral, Daily    allopurinol (ZYLOPRIM) 100 mg, oral, Daily    ALPRAZolam (XANAX) 0.5 mg, oral, As needed    amLODIPine (NORVASC) 5 mg, oral, Daily    amoxicillin (AMOXIL) 500 mg, Daily    ARIPiprazole (ABILIFY) 2 mg, oral, Daily    ascorbic acid, vitamin C, 500 mg capsule 1 capsule, Daily    benzoyl peroxide (Benzac AC) 10 % external wash As needed    buPROPion XL (WELLBUTRIN XL) 150 mg, oral, Daily, Do not crush, chew, or split.    buPROPion XL (WELLBUTRIN XL) 300 mg, oral, Every morning, Do not crush, chew, or split.    carbidopa-levodopa-entacapone (Stalevo) -200 mg tablet 1 tablet, oral, Every 4 hours    cetirizine-pseudoephedrine (ZyrTEC-D) 5-120 mg 12 hr tablet 1 tablet, oral, 2 times daily    clindamycin (Cleocin T) 1 % lotion 1 Application    clobetasol (Temovate) 0.05 % external solution 2 times daily    clobetasoL 0.05 % lotion One application as needed for flaring only.not for daily use. For scalp and hands only    cloNIDine (CATAPRES) 0.1 mg, As needed    cloNIDine (Catapres-TTS) 0.3 mg/24 hr patch Apply one patach on the skin and replace every 7 days, as directed    Cosentyx Pen (2 Pens) 300 mg, subcutaneous,  Every 30 days    cyanocobalamin (VITAMIN B-12) 1,000 mcg, intramuscular, See admin instructions    diclofenac sodium (VOLTAREN) 4 g, 2 times daily PRN    doxepin (SINEQUAN) 50 mg, oral, Nightly    doxycycline (Vibramycin) 100 mg capsule 1 capsule    DULoxetine (Cymbalta) 60 mg DR capsule Take 1 tablet once daily with 30 mg dose    DULoxetine (CYMBALTA) 30 mg, oral, Daily, Do not crush or chew.    eplerenone (Inspra) 25 mg tablet 1 tablet, Daily    ergocalciferol (VITAMIN D-2) 50,000 Units, oral, Weekly    ezetimibe (ZETIA) 10 mg, oral, Daily    fluticasone furoate-vilanteroL (Breo Ellipta) 200-25 mcg/dose inhaler 1puff daily, Inhalation    ipratropium-albuteroL (Duo-Neb) 0.5-2.5 mg/3 mL nebulizer solution 3 mL, Every 4 hours PRN    ketoconazole (NIZOral) 2 % cream 1 Application    lactobacillus acidophilus (Lactobacillus acidoph-L.bulgar) tablet tablet 1 tablet, oral, Daily    leflunomide (ARAVA) 10 mg, oral, Daily    levalbuterol (Xopenex) 1.25 mg/3 mL nebulizer solution 1 ampule, 3 times daily RT    meloxicam (MOBIC) 15 mg, oral, Daily PRN    metoprolol succinate XL (TOPROL-XL) 50 mg, oral, Daily, Do not crush or chew.  Two tablets 50 mg twice daily    metroNIDAZOLE (Metrocream) 0.75 % cream 1 Application    montelukast (SINGULAIR) 10 mg, Nightly    nebulizer and compressor device use q4-6 hours with albuterol PRN cough/wheeze    nebulizers (Devilbiss Disposable Nebulizer) misc Every 4 hours PRN    omega-3 acid ethyl esters (Lovaza) 1 gram capsule Take 2 capsules (2 grams) by mouth 2 times a day.    omeprazole (PriLOSEC) 40 mg DR capsule Take 1 capsule (40 mg) by mouth once daily in the morning before meals.    ondansetron ODT (ZOFRAN-ODT) 4 mg, oral, Every 8 hours PRN    orphenadrine (NORFLEX) 100 mg, oral, 2 times daily PRN    pregabalin (LYRICA) 75 mg, oral, 3 times daily    ProAir HFA 90 mcg/actuation inhaler     Repatha SureClick 140 mg, subcutaneous, Every 14 days    rizatriptan (MAXALT) 10 mg, oral, Once as  "needed, May repeat in 2 hours if unresolved. Do not exceed 30 mg in 24 hours.    Spiriva Respimat 1.25 mcg/actuation inhaler Daily RT    syringe with needle, safety 3 mL 23 gauge x 1\" syringe 1 Box, miscellaneous, See admin instructions    tapentadol (NUCYNTA) 50 mg, oral, Every 12 hours    Tezspire 210 mg     Impressions and Plan:    Patient is 44 year old woman with complex medical history with FH, HTN which is poorly controlled, Vitamin D deficiency despite high dose Rx with prior statin intolerance with MBL, asthma with strong family history of CAD who had a CTA with heart flow( 3/2020)showing no significant CAD. Her BP is quite variable /labile appears though more recently is running high consistently.   Was diagnosed recently  with POTs (? Testing) and occasionally has low BP which she treats with volume expanders and wears support socks. Was started on metoprolol on top of carvedilol by neurologist ( who left system and she wants another neurologist for rx of POTs ) . . If truly POTs, would not do well with carvedilol and no reason to be on 2 beta blockers, so at prior visit stopped carvedilol and will   titrate up metoprolol as needed.  Will increase metoprolol succinate to 50 mg daily. Referred  to Dr Cherry to assess her dysautonomia/POTs. She continues her repatha and is tolerating it well. Continues on repatha    zetia and lovaza. Will recheck lipids in about 3 months.  Had 1 episode of syncope without warning October 2023, and possibly in March 2024.  unable to tolerate patches for cardiac monitors for any extended period of time. If any further episodes of syncope will place loop recorder.  No CP or SOB or diaphoresis before or after syncope. She had lost 55 lbs on wegovy, but once insurance stopped coverage was no longer able to afford and has regained a significant amount of weight. With weight gain as well as not using CPAP BP has been poorly controlled. Will increase dose of  clonidine patch to " 0.3mg/24 hours in addition to increasing metoprolol succinate.   Plan:  1. Hyperlipidemia/FH and hypertriglyceridemia- To continue repatha,   zetia and  lovaza. Due for recheck.  2. Hypertension- BP control is worse now that not using CPAP and pain meds are being decreased. Continue amlodipine 5 mg daily, olmesartan 40 mg daily and will increase clonidine patch (0.3mg/24 hour period applied once every 7 days, which may have a slower more sustained control of BP and can use additional dose of PO clonidine if BP gets too high. Patch With central action may help a little with pain control or anxiety control. Will increase metoprolol to 50 mg daily.   Pt to let office know BP and HR over next few weeks to see if further titration is needed.    3. Palpitations - Stable at this point. No further syncope. . Unable to wear 30 day live monitor. Discussed possibly placing loop recorder, but Dr Marie felt her syncope may have been more likely due to dysautonomia. Referred  to Dr Cherry. Cardiac echo with preserved LV systolic function 2/2024.   4. GUICHO- Unable to tolerate CPAP. To follow up with sleep medicine  to discuss alternative rx. Now that she has had repeat sleep study    6. Obesity- will refer to clinical pharmacology to see if insurance will cover GLP!agonist due to obesity with GUICHO, and uncontrolled HTN. Also wt loww likely to improve her [place] which returned with weight gain.   7. POTs- support socks, try upright exercise, and when starts to feel light headed uses salt/volume expanders. Upcoming appointment with Dr Cherry    Return to clinic in 3 months.     Patient Instructions:  If you have any questions or need cardiac medication refills, please call my office at [phone],      To reach my office please call [phone]  To schedule an appointment call [phone].          ____________________________________________________________  Akosua Richardson MD  Division of Cardiovascular  Medicine  [facility]  [facility]

## 2025-02-28 DIAGNOSIS — I10 BENIGN ESSENTIAL HYPERTENSION: ICD-10-CM

## 2025-02-28 LAB
CHOLEST SERPL-MCNC: 250 MG/DL
CHOLEST/HDLC SERPL: 5.8 (CALC)
HDLC SERPL-MCNC: 43 MG/DL
LDLC SERPL CALC-MCNC: ABNORMAL MG/DL
NONHDLC SERPL-MCNC: 207 MG/DL (CALC)
TRIGL SERPL-MCNC: 466 MG/DL

## 2025-02-28 RX ORDER — METOPROLOL SUCCINATE 50 MG/1
50 TABLET, EXTENDED RELEASE ORAL DAILY
Qty: 90 TABLET | Refills: 3 | Status: SHIPPED | OUTPATIENT
Start: 2025-02-28 | End: 2025-02-28 | Stop reason: SDUPTHER

## 2025-02-28 RX ORDER — METOPROLOL SUCCINATE 50 MG/1
50 TABLET, EXTENDED RELEASE ORAL DAILY
Start: 2025-02-28 | End: 2026-02-28

## 2025-03-04 ENCOUNTER — SPECIALTY PHARMACY (OUTPATIENT)
Dept: PHARMACY | Facility: CLINIC | Age: 45
End: 2025-03-04

## 2025-03-04 DIAGNOSIS — L40.50 PSORIATIC ARTHROPATHY (MULTI): ICD-10-CM

## 2025-03-04 PROCEDURE — RXMED WILLOW AMBULATORY MEDICATION CHARGE

## 2025-03-04 RX ORDER — SECUKINUMAB 150 MG/ML
300 INJECTION SUBCUTANEOUS
Qty: 2 ML | Refills: 2 | Status: SHIPPED | OUTPATIENT
Start: 2025-03-04

## 2025-03-04 NOTE — TELEPHONE ENCOUNTER
Prescription Request        Has The Patient Been Identified By Name And Date Of Birth: Yes    RX Requestor: Pharmacy    Date of Last Refill: 12/16/2024    Date Of Last Office Visit:  02/19/2025    Date Of Future Office Visit: 06/23/2025

## 2025-03-06 DIAGNOSIS — E78.49 ESSENTIAL FAMILIAL HYPERLIPIDEMIA: Primary | ICD-10-CM

## 2025-03-06 DIAGNOSIS — E78.1 HYPERTRIGLYCERIDEMIA: ICD-10-CM

## 2025-03-07 ENCOUNTER — TELEPHONE (OUTPATIENT)
Dept: BEHAVIORAL HEALTH | Facility: CLINIC | Age: 45
End: 2025-03-07
Payer: COMMERCIAL

## 2025-03-07 DIAGNOSIS — F41.1 GAD (GENERALIZED ANXIETY DISORDER): ICD-10-CM

## 2025-03-07 DIAGNOSIS — Z79.899 MEDICATION MANAGEMENT: ICD-10-CM

## 2025-03-07 DIAGNOSIS — F33.41 RECURRENT MAJOR DEPRESSIVE DISORDER, IN PARTIAL REMISSION (CMS-HCC): ICD-10-CM

## 2025-03-07 RX ORDER — DULOXETIN HYDROCHLORIDE 60 MG/1
120 CAPSULE, DELAYED RELEASE ORAL DAILY
Qty: 180 CAPSULE | Refills: 1 | Status: SHIPPED | OUTPATIENT
Start: 2025-03-07 | End: 2025-09-03

## 2025-03-07 RX ORDER — NALOXONE HYDROCHLORIDE 4 MG/.1ML
1 SPRAY NASAL AS NEEDED
Qty: 2 EACH | Refills: 0 | Status: SHIPPED | OUTPATIENT
Start: 2025-03-07

## 2025-03-07 RX ORDER — ALPRAZOLAM 1 MG/1
1 TABLET ORAL NIGHTLY PRN
Qty: 30 TABLET | Refills: 2 | Status: SHIPPED | OUTPATIENT
Start: 2025-03-07 | End: 2025-06-05

## 2025-03-07 NOTE — PROGRESS NOTES
NYDIA contacted the patient on a phone call after receiving a message regarding difficulty sleeping due to stress. During the call, the patient reported experiencing increased anxiety and difficulty sleeping since her 's cancer diagnosis.    To help manage her anxiety, NYDIA suggested Gabapentin, but the patient stated it had been ineffective in the past. As an alternative, NYDIA increased Cymbalta to 120 mg daily and adjusted Xanax to 1 mg PRN for anxiety right now while she is having stress about her 's health, but the goal is to taper her back down to  Xanax 0.5 mg, and introduce Buspar if needed.

## 2025-03-10 ENCOUNTER — SPECIALTY PHARMACY (OUTPATIENT)
Dept: PHARMACY | Facility: CLINIC | Age: 45
End: 2025-03-10

## 2025-03-11 ENCOUNTER — PHARMACY VISIT (OUTPATIENT)
Dept: PHARMACY | Facility: CLINIC | Age: 45
End: 2025-03-11
Payer: MEDICAID

## 2025-03-11 DIAGNOSIS — E78.1 HYPERTRIGLYCERIDEMIA: ICD-10-CM

## 2025-03-11 PROCEDURE — RXMED WILLOW AMBULATORY MEDICATION CHARGE

## 2025-03-11 RX ORDER — EVOLOCUMAB 140 MG/ML
140 INJECTION, SOLUTION SUBCUTANEOUS
Qty: 6 ML | Refills: 3 | Status: SHIPPED | OUTPATIENT
Start: 2025-03-11 | End: 2026-03-11

## 2025-03-12 ENCOUNTER — PHARMACY VISIT (OUTPATIENT)
Dept: PHARMACY | Facility: CLINIC | Age: 45
End: 2025-03-12
Payer: MEDICAID

## 2025-03-25 DIAGNOSIS — G89.29 CHRONIC RIGHT-SIDED LOW BACK PAIN WITH RIGHT-SIDED SCIATICA: ICD-10-CM

## 2025-03-25 DIAGNOSIS — M54.41 CHRONIC RIGHT-SIDED LOW BACK PAIN WITH RIGHT-SIDED SCIATICA: ICD-10-CM

## 2025-03-25 DIAGNOSIS — M79.18 MYOFASCIAL PAIN: ICD-10-CM

## 2025-03-25 DIAGNOSIS — M54.16 RIGHT LUMBAR RADICULOPATHY: ICD-10-CM

## 2025-03-25 NOTE — TELEPHONE ENCOUNTER
Patient called in to request refill of MEDICATION AND DOSE/RATE from THIS PHARMACY Per last note, this refill to be continued tor Nucynta 50mg once daily x7 days and then to be followed with buprenorphine 10mcg weekly.     LV: 02/10/2025  NV: 04/17/2025  [facility] Last Fill Date:        Pended to NAYELI Soto-TAMIA-CNP

## 2025-03-26 RX ORDER — BUPRENORPHINE 10 UG/H
1 PATCH TRANSDERMAL
Qty: 4 PATCH | Refills: 0 | Status: SHIPPED | OUTPATIENT
Start: 2025-03-30 | End: 2025-04-27

## 2025-03-26 NOTE — TELEPHONE ENCOUNTER
Okay to fill a prescription for Nucynta 50 mg 1 time per day for 7 days while patient begins buprenorphine 10 mcg/h patch weekly.

## 2025-03-31 NOTE — PROGRESS NOTES
Pharmacist Clinic: Cardiology Management    Nichelle Izaguirre is a 44 y.o. female was referred to Clinical Pharmacy Team for GLP-1 management.     Referring Provider: Akosua Richardson MD    THIS IS A NEW PATIENT APPOINTMENT. PATIENT WILL BE ESTABLISHING CARE WITH CLINICAL PHARMACY.    Appointment was completed by the patient who was reached at .    REVIEW OF PAST APPNT (IF APPLICABLE):   N/A    Allergies Reviewed? Yes    Allergies   Allergen Reactions    Clindamycin Anaphylaxis    Dupilumab Anaphylaxis    Hydrochlorothiazide Anaphylaxis, Itching and Swelling    Sulfamethoxazole-Trimethoprim Anaphylaxis    Cefazolin Hives, Other and Swelling     STATES TONGUE SPLITS    Atorvastatin Hives    Cephalexin Hives and Swelling     Joint swelling per patient    Clarithromycin Swelling     hives, tongue swelling    Lactose Constipation    Nitrofurantoin Monohyd/M-Cryst Hives    Sulfa (Sulfonamide Antibiotics) Hives    Sulfacetamide Hives       Past Medical History:   Diagnosis Date    Acute upper respiratory infection, unspecified 07/26/2016    Viral URI with cough    Acute upper respiratory infection, unspecified 11/15/2017    Viral URI with cough    Allergy status to unspecified drugs, medicaments and biological substances     History of allergy    Chronic maxillary sinusitis 08/27/2018    Maxillary sinusitis, unspecified chronicity    Dystonia, unspecified 04/08/2014    Dystonia    Encounter for other screening for malignant neoplasm of breast 01/22/2018    Breast screening    Encounter for screening for respiratory tuberculosis 11/11/2013    Screening examination for pulmonary tuberculosis    Essential (primary) hypertension 12/27/2017    Benign essential hypertension    Hypersomnia, unspecified 04/13/2015    Sleeps too much    Idiopathic sleep related nonobstructive alveolar hypoventilation     Nocturnal hypoxemia    Idiopathic sleep related nonobstructive alveolar hypoventilation 02/09/2018    Nocturnal  hypoxia    Impaired fasting glucose 01/22/2018    Impaired fasting glucose    Insect bite (nonvenomous) of lower back and pelvis, initial encounter 03/29/2018    Tick bite of back    Left lower quadrant pain 01/07/2019    Left lower quadrant pain    Local infection of the skin and subcutaneous tissue, unspecified 07/28/2017    Skin infection    Low back pain, unspecified 05/23/2019    Acute low back pain    Other conditions influencing health status 02/27/2017    Sprain of right thumb, unspecified site of finger, initial encounter    Other malaise 04/16/2018    Malaise and fatigue    Other microscopic hematuria 03/30/2018    Microscopic hematuria    Other specified cough 09/07/2018    Productive cough    Other specified health status 01/07/2019    Acute medical illness    Other symptoms and signs involving the musculoskeletal system 07/12/2018    Weakness of right lower extremity    Other symptoms and signs involving the musculoskeletal system 03/21/2018    Polyarticular joint involvement    Other symptoms and signs involving the musculoskeletal system 07/12/2018    RUE weakness    Pain in right lower leg 01/27/2016    Right calf pain    Pain in unspecified hip 01/20/2016    Hip pain    Pelvic and perineal pain 04/17/2018    Pelvic pain    Personal history of diseases of the skin and subcutaneous tissue 08/07/2018    History of dermatitis    Personal history of other (healed) physical injury and trauma 08/07/2018    History of insect bite    Personal history of other diseases of the circulatory system     History of hypertension    Personal history of other diseases of the female genital tract 11/24/2015    History of menorrhagia    Personal history of other diseases of the musculoskeletal system and connective tissue 03/14/2018    History of tendinitis    Personal history of other diseases of the nervous system and sense organs 04/08/2014    History of Parkinson's disease    Personal history of other diseases of  the respiratory system 01/07/2019    History of acute bronchitis    Personal history of other diseases of the respiratory system 10/05/2018    History of paranasal sinus pain    Personal history of other specified conditions 04/13/2015    History of snoring    Personal history of other specified conditions 09/11/2017    History of headache    Personal history of other specified conditions 09/07/2018    History of persistent cough    Personal history of other specified conditions 04/08/2014    History of memory loss    Personal history of other specified conditions 03/26/2018    History of left flank pain    Personal history of other specified conditions     History of heartburn    Personal history of urinary calculi 03/26/2018    Personal history of urinary calculi    Personal history of urinary calculi 03/26/2018    History of renal calculi    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/2017    Plantar fasciitis, left    Primary insomnia 04/13/2015    Primary insomnia    Rash and other nonspecific skin eruption 04/16/2018    Rash    Unspecified abdominal pain 01/07/2019    Left sided abdominal pain    Urinary tract infection, site not specified 10/19/2018    UTI (urinary tract infection), bacterial    Urinary tract infection, site not specified 10/18/2018    Recurrent UTI    Vitamin D deficiency, unspecified 04/22/2016    Vitamin D deficiency    Zoster with other complications 11/25/2015    Herpes zoster dermatitis       Current Outpatient Medications on File Prior to Visit   Medication Sig Dispense Refill    albuterol 2.5 mg /3 mL (0.083 %) nebulizer solution inhale the contents of one vial via nebulizer every 4 hours as needed      allopurinol (Zyloprim) 100 mg tablet Take 1 tablet (100 mg) by mouth once daily. 90 tablet 0    allopurinol (Zyloprim) 300 mg tablet Take 1 tablet (300 mg) by mouth once daily. 90 tablet 0    ALPRAZolam (Xanax) 1 mg tablet Take 1 tablet (1 mg) by mouth as needed at bedtime for anxiety. 30  tablet 2    amLODIPine (Norvasc) 5 mg tablet Take 1 tablet (5 mg) by mouth once daily. 90 tablet 3    amoxicillin (Amoxil) 500 mg capsule Take 1 capsule (500 mg) by mouth once daily.      ARIPiprazole (Abilify) 2 mg tablet Take 1 tablet (2 mg) by mouth once daily. 90 tablet 0    ascorbic acid, vitamin C, 500 mg capsule Take 1 capsule by mouth once daily.      benzoyl peroxide (Benzac AC) 10 % external wash Apply topically if needed.      buprenorphine (Butrans) 10 mcg/hour patch Place 1 patch on the skin 1 (one) time per week for 28 days. Patient will continue Nucynta 50 mg 1 time per day for 7 days while beginning buprenorphine 10 mcg/h patch. 4 patch 0    buPROPion XL (Wellbutrin XL) 150 mg 24 hr tablet Take 1 tablet (150 mg) by mouth once daily. Do not crush, chew, or split. 90 tablet 3    buPROPion XL (Wellbutrin XL) 300 mg 24 hr tablet Take 1 tablet (300 mg) by mouth once daily in the morning. Do not crush, chew, or split. 90 tablet 3    cetirizine-pseudoephedrine (ZyrTEC-D) 5-120 mg 12 hr tablet Take 1 tablet by mouth 2 times a day. (Patient taking differently: Take 1 tablet by mouth 2 times a day. As needed) 180 tablet 0    clobetasol (Temovate) 0.05 % external solution Apply topically 2 times a day. As needed      clobetasoL 0.05 % lotion One application as needed for flaring only.not for daily use. For scalp and hands only 118 mL 3    cloNIDine (Catapres) 0.1 mg tablet Take 1 tablet (0.1 mg) by mouth if needed for high blood pressure.      cloNIDine (Catapres-TTS) 0.3 mg/24 hr patch Apply one patach on the skin and replace every 7 days, as directed 4 patch 11    Cosentyx Pen, 2 Pens, 150 mg/mL self-injector pen Inject 2 pens (300 mg) under the skin every 30 (thirty) days. 2 mL 2    cyanocobalamin (Vitamin B-12) 1,000 mcg/mL injection Inject 1 mL (1,000 mcg) into the muscle see administration instructions. 30 mL 2    diclofenac sodium (Voltaren) 1 % gel gel Apply 4.5 inches (4 g) topically 2 times a day as  needed.      DULoxetine (Cymbalta) 60 mg DR capsule Take 2 capsules (120 mg) by mouth once daily. Do not crush or chew. 180 capsule 1    ergocalciferol (Vitamin D-2) 1250 mcg (50,000 units) capsule Take 1 capsule (50,000 Units) by mouth 1 (one) time per week. 4 capsule 0    evolocumab (Repatha SureClick) 140 mg/mL injection Inject 1 mL (140 mg) under the skin every 14 (fourteen) days. 6 mL 3    ezetimibe (Zetia) 10 mg tablet Take 1 tablet (10 mg) by mouth once daily. 90 tablet 3    fluticasone furoate-vilanteroL (Breo Ellipta) 200-25 mcg/dose inhaler 1puff daily, Inhalation      ipratropium-albuteroL (Duo-Neb) 0.5-2.5 mg/3 mL nebulizer solution Take 3 mL by nebulization every 4 hours if needed for wheezing.      lactobacillus acidophilus (Lactobacillus acidoph-L.bulgar) tablet tablet Take 1 tablet by mouth once daily. 90 tablet 0    leflunomide (Arava) 10 mg tablet Take 1 tablet (10 mg) by mouth once daily. 90 tablet 0    levalbuterol (Xopenex) 1.25 mg/3 mL nebulizer solution Take 1 ampule by nebulization 3 times a day. As needed      metoprolol succinate XL (Toprol-XL) 50 mg 24 hr tablet Take 1 tablet (50 mg) by mouth once daily. Do not crush or chew. Two tablets 50 mg twice daily      metroNIDAZOLE (Metrocream) 0.75 % cream Apply topically once daily as needed.      montelukast (Singulair) 10 mg tablet Take 1 tablet (10 mg) by mouth once daily at bedtime.      naloxone (Narcan) 4 mg/0.1 mL nasal spray Administer 1 spray (4 mg) into affected nostril(s) if needed for opioid reversal. May repeat every 2-3 minutes if needed, alternating nostrils, until medical assistance becomes available. 2 each 0    omega-3 acid ethyl esters (Lovaza) 1 gram capsule Take 2 capsules (2 grams) by mouth 2 times a day. 360 capsule 3    orphenadrine (Norflex) 100 mg 12 hr tablet Take 1 tablet (100 mg) by mouth 2 times a day as needed for muscle spasms. 60 tablet 3    pregabalin (Lyrica) 75 mg capsule Take 1 capsule (75 mg) by mouth 3  "times a day. 90 capsule 2    ProAir HFA 90 mcg/actuation inhaler Inhale 2 puffs every 4 hours if needed.      rizatriptan (Maxalt) 10 mg tablet Take 1 tablet (10 mg) by mouth 1 time if needed for migraine. May repeat in 2 hours if unresolved. Do not exceed 30 mg in 24 hours. 9 tablet 0    Spiriva Respimat 1.25 mcg/actuation inhaler Inhale 2 puffs once daily.      tapentadol (Nucynta) 50 mg tablet Take 1 tablet (50 mg) by mouth once daily for 7 days. 7 tablet 0    tezepelumab-ekko (Tezspire) SubQ Pen Injector Inject 210 mg under the skin. Every 4 weeks      clindamycin (Cleocin T) 1 % lotion Apply topically once daily as needed.      doxepin (SINEquan) 50 mg capsule Take 1 capsule (50 mg) by mouth once daily at bedtime. (Patient not taking: Reported on 4/1/2025) 30 capsule 3    doxycycline (Vibramycin) 100 mg capsule 1 capsule (100 mg). (Patient not taking: Reported on 4/1/2025)      eplerenone (Inspra) 25 mg tablet Take 1 tablet (25 mg) by mouth once daily. (Patient not taking: Reported on 4/1/2025)      nebulizer and compressor device use q4-6 hours with albuterol PRN cough/wheeze      nebulizers ([employer] [place] Disposable Nebulizer) misc every 4 hours if needed.      syringe with needle, safety 3 mL 23 gauge x 1\" syringe 1 Box see administration instructions. 30 each 3    [DISCONTINUED] carbidopa-levodopa-entacapone (Stalevo) -200 mg tablet Take 1 tablet by mouth every 4 hours. 540 tablet 1    [DISCONTINUED] ketoconazole (NIZOral) 2 % cream 1 Application      [DISCONTINUED] omeprazole (PriLOSEC) 40 mg DR capsule Take 1 capsule (40 mg) by mouth once daily in the morning before meals. 90 capsule 3    [DISCONTINUED] ondansetron ODT (Zofran-ODT) 4 mg disintegrating tablet Dissolve 1 tablet (4 mg) in the mouth every 8 hours if needed for nausea or vomiting. 90 tablet 0     No current facility-administered medications on file prior to visit.         RELEVANT LAB RESULTS:  Lab Results   Component Value Date    BILITOT " "0.4 02/19/2025    CALCIUM 9.4 02/19/2025    CO2 28 02/19/2025     02/19/2025    CREATININE 0.56 02/19/2025    GLUCOSE 95 02/19/2025    ALKPHOS 58 02/19/2025    K 3.8 02/19/2025    PROT 7.0 02/19/2025     02/19/2025    AST 20 02/19/2025    ALT 24 02/19/2025    BUN 9 02/19/2025    ANIONGAP 11 02/19/2025    MG 1.90 11/19/2020    PHOS 3.7 09/18/2023    LDH 92 09/26/2024    ALBUMIN 4.3 02/19/2025    AMYLASE 18 (L) 11/12/2021    LIPASE 14 11/12/2021    GFRF >90 09/18/2023     Lab Results   Component Value Date    TRIG 466 (H) 02/27/2025    CHOL 250 (H) 02/27/2025    LDLCALC  02/27/2025      Comment:         LDL cholesterol not calculated. Triglyceride levels  greater than 400 mg/dL invalidate calculated LDL results.           Reference range: <100     Desirable range <100 mg/dL for primary prevention;    <70 mg/dL for patients with CHD or diabetic patients   with > or = 2 CHD risk factors.     LDL-C is now calculated using the Kandi   calculation, which is a validated novel method providing   better accuracy than the Friedewald equation in the   estimation of LDL-C.   Henrique MANDEL et al. BON. 2013;310(19): 3615-5844   (http://education.Neterion.com/faq/TBW585)      HDL 43 (L) 02/27/2025     No results found for: \"BMCBC\", \"CBCDIF\"     PHARMACEUTICAL ASSESSMENT:    MEDICATION RECONCILIATION    Was a medication reconciliation completed at this visit? Yes  Home Pharmacy Reviewed? Yes, describe: [facility]    Added:  - None    Changed:  - Zyrtec D: patient taking as needed  - Clindamycin 1% lotion: once daily as needed  - Clobetasol 0.05% solution: using as needed  - Cyanocobalamin 1,000 mcg injection: once monthly   - Doxepin 50 mg: patient not taking  - Doxycycline 100 mg: patient not currently taking, will replace amoxicillin every 6 months per patient  - Eplerenone 25 mg: patient not taking  - Levalbuterol: using as needed  - Metronidazole 0.75% cream: using as needed  - " Proair inhaler: using as needed  - Spiriva 1.25 mcg/act inhaler: 2 puffs daily  - Tezspire: 210 mg under the skin every 4 weeks     Removed:  - Ketoconazole 2% cream: patient not using    Drug Interactions? Yes, describe:    Use of orphenadrine with other CNS depressants (alprazolam, Butrans, clonidine, doxepin, pregabalin, tapentadol) is not recommended due to additive/synergistic CNS depression. Reviewed with patient, provider is aware she is taking these medications together. Denies drowsiness, confusion. If she taxes alprazolam she does not take Lyrica or Nucynta.    Medication Documentation Review Audit       Reviewed by Pily Darby PharmD (Pharmacist) on 04/01/25 at 1116      Medication Order Taking? Sig Documenting Provider Last Dose Status   albuterol 2.5 mg /3 mL (0.083 %) nebulizer solution [id number] Yes inhale the contents of one vial via nebulizer every 4 hours as needed Historical Provider, MD  Active   allopurinol (Zyloprim) 100 mg tablet [id number] Yes Take 1 tablet (100 mg) by mouth once daily. Mirtha Hilton, DO  Active   allopurinol (Zyloprim) 300 mg tablet [id number] Yes Take 1 tablet (300 mg) by mouth once daily. Mirtha Hilton, DO  Active   ALPRAZolam (Xanax) 1 mg tablet [id number] Yes Take 1 tablet (1 mg) by mouth as needed at bedtime for anxiety. DEBBY Moreno  Active   amLODIPine (Norvasc) 5 mg tablet [id number] Yes Take 1 tablet (5 mg) by mouth once daily. Akosua Richardson MD  Active   amoxicillin (Amoxil) 500 mg capsule [id number] Yes Take 1 capsule (500 mg) by mouth once daily. Historical Provider, MD  Active   ARIPiprazole (Abilify) 2 mg tablet [id number] Yes Take 1 tablet (2 mg) by mouth once daily. DEBBY Moreno  Active   ascorbic acid, vitamin C, 500 mg capsule [id number] Yes Take 1 capsule by mouth once daily. Historical Provider, MD  Active   benzoyl peroxide (Benzac AC) 10 % external wash [id number] Yes Apply topically if needed. Historical Provider, MD  Active    buprenorphine (Butrans) 10 mcg/hour patch [id number] Yes Place 1 patch on the skin 1 (one) time per week for 28 days. Patient will continue Nucynta 50 mg 1 time per day for 7 days while beginning buprenorphine 10 mcg/h patch. Santa Dunn, DANIACNP, APRN-CNS  Active   buPROPion XL (Wellbutrin XL) 150 mg 24 hr tablet [id number] Yes Take 1 tablet (150 mg) by mouth once daily. Do not crush, chew, or split. DEBBY Yadav  Active   buPROPion XL (Wellbutrin XL) 300 mg 24 hr tablet [id number] Yes Take 1 tablet (300 mg) by mouth once daily in the morning. Do not crush, chew, or split. DEBBY Yadav  Active     Discontinued 04/01/25 0948   carbidopa-levodopa-entacapone (Stalevo) -200 mg tablet [id number] Yes Take 1 tablet by mouth every 4 hours. DEBBY Yadav  Active   cetirizine-pseudoephedrine (ZyrTEC-D) 5-120 mg 12 hr tablet [id number] Yes Take 1 tablet by mouth 2 times a day.   Patient taking differently: Take 1 tablet by mouth 2 times a day. As needed    DEBBY Yadav  Active   clindamycin (Cleocin T) 1 % lotion [id number]  Apply topically once daily as needed. Historical Provider, MD  Active   clobetasol (Temovate) 0.05 % external solution [id number] Yes Apply topically 2 times a day. As needed Historical Provider, MD  Active   clobetasoL 0.05 % lotion [id number] Yes One application as needed for flaring only.not for daily use. For scalp and hands only Ulises Morataya MD  Active   cloNIDine (Catapres) 0.1 mg tablet [id number] Yes Take 1 tablet (0.1 mg) by mouth if needed for high blood pressure. Historical Provider, MD  Active   cloNIDine (Catapres-TTS) 0.3 mg/24 hr patch [id number] Yes Apply one patach on the skin and replace every 7 days, as directed Akosua Richardson MD  Active   Cosentyx Pen, 2 Pens, 150 mg/mL self-injector pen [id number] Yes Inject 2 pens (300 mg) under the skin every 30 (thirty) days. Mirtha Garnettoh, DO  Active   cyanocobalamin (Vitamin B-12) 1,000 mcg/mL  injection [id number] Yes Inject 1 mL (1,000 mcg) into the muscle see administration instructions. Jana Sigala PA-C  Active   diclofenac sodium (Voltaren) 1 % gel gel [id number] Yes Apply 4.5 inches (4 g) topically 2 times a day as needed. Historical Provider, MD  Active   doxepin (SINEquan) 50 mg capsule [id number]  Take 1 capsule (50 mg) by mouth once daily at bedtime.   Patient not taking: Reported on 2025    Harrison Vila MD   25 4529   doxycycline (Vibramycin) 100 mg capsule [id number]  1 capsule (100 mg).   Patient not taking: Reported on 2025    Historical Provider, MD  Active   DULoxetine (Cymbalta) 60 mg DR capsule [id number] Yes Take 2 capsules (120 mg) by mouth once daily. Do not crush or chew. Kristy Smith, APRN-CNP  Active   eplerenone (Inspra) 25 mg tablet [id number]  Take 1 tablet (25 mg) by mouth once daily.   Patient not taking: Reported on 2025    Historical Provider, MD  Active   ergocalciferol (Vitamin D-2) 1250 mcg (50,000 units) capsule [id number] Yes Take 1 capsule (50,000 Units) by mouth 1 (one) time per week. Mirtha Hilton DO  Active   evolocumab (Repatha SureClick) 140 mg/mL injection [id number] Yes Inject 1 mL (140 mg) under the skin every 14 (fourteen) days. Akosua Richardson MD  Active   ezetimibe (Zetia) 10 mg tablet [id number] Yes Take 1 tablet (10 mg) by mouth once daily. Akosua Richardson MD  Active   fluticasone furoate-vilanteroL (Breo Ellipta) 200-25 mcg/dose inhaler [id number] Yes 1puff daily, Inhalation Historical Provider, MD  Active   ipratropium-albuteroL (Duo-Neb) 0.5-2.5 mg/3 mL nebulizer solution [id number] Yes Take 3 mL by nebulization every 4 hours if needed for wheezing. Historical Provider, MD  Active     Discontinued 25 1112   lactobacillus acidophilus (Lactobacillus acidoph-L.bulgar) tablet tablet [id number] Yes Take 1 tablet by mouth once daily. Sandra Lilly, APRN-CNP  Active   leflunomide (Arava) 10 mg tablet [id number] Yes Take 1 tablet (10 mg)  by mouth once daily. Mirtha Hilton, DO  Active   levalbuterol (Xopenex) 1.25 mg/3 mL nebulizer solution [id number] Yes Take 1 ampule by nebulization 3 times a day. As needed Historical Provider, MD  Active   metoprolol succinate XL (Toprol-XL) 50 mg 24 hr tablet [id number] Yes Take 1 tablet (50 mg) by mouth once daily. Do not crush or chew. Two tablets 50 mg twice daily Akosua Richardson MD  Active   metroNIDAZOLE (Metrocream) 0.75 % cream [id number] Yes Apply topically once daily as needed. Historical Provider, MD  Active   montelukast (Singulair) 10 mg tablet [id number] Yes Take 1 tablet (10 mg) by mouth once daily at bedtime. Historical Provider, MD  Active   naloxone (Narcan) 4 mg/0.1 mL nasal spray [id number] Yes Administer 1 spray (4 mg) into affected nostril(s) if needed for opioid reversal. May repeat every 2-3 minutes if needed, alternating nostrils, until medical assistance becomes available. NAYELI Moreno-CNP  Active   nebulizer and compressor device [id number]  use q4-6 hours with albuterol PRN cough/wheeze Historical Provider, MD  Active   nebulizers ([employer] Disposable Nebulizer) misc [id number]  every 4 hours if needed. Historical Provider, MD  Active   omega-3 acid ethyl esters (Lovaza) 1 gram capsule [id number] Yes Take 2 capsules (2 grams) by mouth 2 times a day. Akosua Richardson MD  Active     Discontinued 04/01/25 0948   omeprazole (PriLOSEC) 40 mg DR capsule [id number] Yes Take 1 capsule (40 mg) by mouth once daily in the morning. Take before meals. Do not crush or chew. DEBBY Yadav  Active     Discontinued 04/01/25 0948   ondansetron ODT (Zofran-ODT) 4 mg disintegrating tablet [id number] Yes Dissolve 1 tablet (4 mg) in the mouth every 8 hours if needed for nausea or vomiting. NAYELI Yadav-CNP  Active   orphenadrine (Norflex) 100 mg 12 hr tablet [id number] Yes Take 1 tablet (100 mg) by mouth 2 times a day as needed for muscle spasms. Santa Dunn, APRN-CNP, APRN-CNS  Active  "  pregabalin (Lyrica) 75 mg capsule [id number] Yes Take 1 capsule (75 mg) by mouth 3 times a day. DEBBY Mcdermott APRN-CNS  Active   ProAir HFA 90 mcg/actuation inhaler [id number] Yes Inhale 2 puffs every 4 hours if needed. Historical Provider, MD  Active   rizatriptan (Maxalt) 10 mg tablet [id number] Yes Take 1 tablet (10 mg) by mouth 1 time if needed for migraine. May repeat in 2 hours if unresolved. Do not exceed 30 mg in 24 hours. DEBBY Yadav  Active   Spiriva Respimat 1.25 mcg/actuation inhaler [id number] Yes Inhale 2 puffs once daily. Historical Provider, MD  Active   syringe with needle, safety 3 mL 23 gauge x 1\" syringe [id number]  1 Box see administration instructions. Jana Sigala PA-C  Active   tapentadol (Nucynta) 50 mg tablet [id number] Yes Take 1 tablet (50 mg) by mouth once daily for 7 days. DEBBY Mcdermott APRN-CNS  Active   tezepelumab-ekko (Tezspire) SubQ Pen Injector [id number] Yes Inject 210 mg under the skin. Every 4 weeks Historical Provider, MD  Active                    DISEASE MANAGEMENT ASSESSMENT:     WEIGHT LOSS ASSESSMENT     Wt Readings from Last 3 Encounters:   02/27/25 102 kg (224 lb)   02/10/25 102 kg (224 lb 9.6 oz)   12/04/24 104 kg (228 lb 2.8 oz)     BMI Readings from Last 3 Encounters:   02/27/25 36.15 kg/m²   02/10/25 36.25 kg/m²   12/04/24 35.74 kg/m²       Recorded Home Weight(s):   41/1/2025: 219 lbs   Diet:   Breakfast: does not eat breakfast  Lunch: often skips due to work, will have various fruit at lunchtime  Dinner: lasagna, salad, pizza on weekends, spaghetti, steaks  Will go out to eat on weekends, occasional [employer] roast beef sandwich   Exercise Routine: none, limited due to work but wears a smart watch and meets her movement goal every day (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 Now has commercial insurance.   Pharmacy Test Claims:  Wegovy: PA Required  Zepbound: Plan Exclusion  Ozempic: PA required  Mounjaro: PA required   Adherence/Organization: N/A  Adverse Reactions: no adverse effects when previously taking GLP-1 therapy (Wegovy, Zepbound)     Relevant PMH:  PMH of Pancreatitis? None  PMH of Retinopathy? None  PMH of MTC?  No, family history of papillary thyroid cancer (grandfather)  PMH of MEN2? None    PATIENT EDUCATION/GOALS  Nichelle would like to weigh ~150 lbs with addition of GLP1 therapy. Previously lost ~55 lbs when on Zepbound.     COUNSELING:   - Counseled patient on MOA, expectations, side effects, duration of therapy, contraindications, administration, and monitoring parameters  - Answered all patient questions and concerns; provided [facility] phone number if issues/questions arise       DISCUSSION/NOTES:   Today's appointment was initial visit regarding GLP-1 therapy. Nichelle was previously taking Wegovy, then Zepbound, with a 55 lb weight loss benefit. However, she is no longer on medication due to insurance coverage.   Will plan to initiate Wegovy pending prior authorization approval, for weight loss and CV risk reduction due to HTN, family history of MI, and hyperlipidemia.   Will follow up with patient pending approval and additionally in 4 weeks to evaluate tolerability if Wegovy has been started.    ASSESSMENT:    Assessment/Plan   Problem List Items Addressed This Visit       Benign essential hypertension    Relevant Orders    Referral to Clinical Pharmacy    Morbid obesity (Multi) - Primary     Nichelle is no longer taking GLP-1 therapy due to insurance coverage. Will plan to initiate Wegovy for weight loss and cardiovascular benefit pending PA approval.         Relevant Orders    Referral to Clinical Pharmacy     Other Visit Diagnoses       GUICHO (obstructive sleep apnea)                 RECOMMENDATIONS/PLAN:    Start: Wegovy 0.25 mg under the skin once weekly pending PA approval  Follow up: 4 weeks    Last Appnt with Referring Provider: 2/27/2025  Next Appnt with Referring Provider: 6/2/2025  Clinical Pharmacist follow up: 4/28/2025  VAF/Application Expiration: No  Type of Encounter: Coty Darby PharmD    Verbal consent to manage patient's drug therapy was obtained from the patient . They were informed they may decline to participate or withdraw from participation in pharmacy services at any time.    Continue all meds under the continuation of care with the referring provider and clinical pharmacy team.

## 2025-04-01 ENCOUNTER — APPOINTMENT (OUTPATIENT)
Dept: PHARMACY | Facility: HOSPITAL | Age: 45
End: 2025-04-01
Payer: COMMERCIAL

## 2025-04-01 ENCOUNTER — TELEPHONE (OUTPATIENT)
Dept: PRIMARY CARE | Facility: CLINIC | Age: 45
End: 2025-04-01

## 2025-04-01 ENCOUNTER — TELEPHONE (OUTPATIENT)
Dept: SCHEDULING | Age: 45
End: 2025-04-01

## 2025-04-01 DIAGNOSIS — E66.01 MORBID OBESITY (MULTI): Primary | ICD-10-CM

## 2025-04-01 DIAGNOSIS — I10 BENIGN ESSENTIAL HYPERTENSION: ICD-10-CM

## 2025-04-01 DIAGNOSIS — G20.C PARKINSONISM, UNSPECIFIED PARKINSONISM TYPE (MULTI): ICD-10-CM

## 2025-04-01 DIAGNOSIS — K21.9 GASTROESOPHAGEAL REFLUX DISEASE WITHOUT ESOPHAGITIS: ICD-10-CM

## 2025-04-01 DIAGNOSIS — G47.33 OSA (OBSTRUCTIVE SLEEP APNEA): ICD-10-CM

## 2025-04-01 DIAGNOSIS — L40.50 PSORIATIC ARTHROPATHY (MULTI): ICD-10-CM

## 2025-04-01 DIAGNOSIS — E78.5 HYPERLIPIDEMIA, UNSPECIFIED HYPERLIPIDEMIA TYPE: Primary | ICD-10-CM

## 2025-04-01 DIAGNOSIS — G43.809 OTHER MIGRAINE WITHOUT STATUS MIGRAINOSUS, NOT INTRACTABLE: ICD-10-CM

## 2025-04-01 DIAGNOSIS — M10.9 GOUTY ARTHROPATHY: ICD-10-CM

## 2025-04-01 RX ORDER — CARBIDOPA, LEVODOPA AND ENTACAPONE 50; 200; 200 MG/1; MG/1; MG/1
1 TABLET, FILM COATED ORAL EVERY 4 HOURS
Qty: 540 TABLET | Refills: 1 | Status: SHIPPED | OUTPATIENT
Start: 2025-04-01 | End: 2025-09-28

## 2025-04-01 RX ORDER — EVOLOCUMAB 140 MG/ML
140 INJECTION, SOLUTION SUBCUTANEOUS
Qty: 6 ML | Refills: 3 | Status: SHIPPED | OUTPATIENT
Start: 2025-04-01 | End: 2025-04-02 | Stop reason: SDUPTHER

## 2025-04-01 RX ORDER — ALLOPURINOL 100 MG/1
100 TABLET ORAL DAILY
Qty: 60 TABLET | Refills: 0 | Status: SHIPPED | OUTPATIENT
Start: 2025-04-01 | End: 2025-05-31

## 2025-04-01 RX ORDER — METOPROLOL SUCCINATE 50 MG/1
50 TABLET, EXTENDED RELEASE ORAL DAILY
Qty: 90 TABLET | Refills: 3 | Status: SHIPPED | OUTPATIENT
Start: 2025-04-01 | End: 2025-04-02 | Stop reason: SDUPTHER

## 2025-04-01 RX ORDER — MELOXICAM 15 MG/1
15 TABLET ORAL DAILY
Qty: 30 TABLET | Refills: 1 | Status: SHIPPED | OUTPATIENT
Start: 2025-04-01 | End: 2025-05-31

## 2025-04-01 RX ORDER — LEFLUNOMIDE 10 MG/1
10 TABLET ORAL DAILY
Qty: 60 TABLET | Refills: 0 | Status: SHIPPED | OUTPATIENT
Start: 2025-04-01 | End: 2025-05-31

## 2025-04-01 RX ORDER — OMEPRAZOLE 40 MG/1
40 CAPSULE, DELAYED RELEASE ORAL
Qty: 90 CAPSULE | Refills: 3 | Status: SHIPPED | OUTPATIENT
Start: 2025-04-01

## 2025-04-01 RX ORDER — CLONIDINE 0.3 MG/24H
PATCH, EXTENDED RELEASE TRANSDERMAL
Qty: 4 PATCH | Refills: 11 | Status: SHIPPED | OUTPATIENT
Start: 2025-04-01 | End: 2026-04-01

## 2025-04-01 RX ORDER — ONDANSETRON 4 MG/1
4 TABLET, ORALLY DISINTEGRATING ORAL EVERY 8 HOURS PRN
Qty: 90 TABLET | Refills: 0 | Status: SHIPPED | OUTPATIENT
Start: 2025-04-01

## 2025-04-01 RX ORDER — SEMAGLUTIDE 0.25 MG/.5ML
0.25 INJECTION, SOLUTION SUBCUTANEOUS WEEKLY
Qty: 2 ML | Refills: 0 | Status: SHIPPED | OUTPATIENT
Start: 2025-04-01 | End: 2025-04-23

## 2025-04-01 RX ORDER — ALLOPURINOL 300 MG/1
300 TABLET ORAL DAILY
Qty: 60 TABLET | Refills: 0 | Status: SHIPPED | OUTPATIENT
Start: 2025-04-01 | End: 2025-05-31

## 2025-04-01 NOTE — Clinical Note
Alyson Richardson, I met with Nichelle today to discuss restarting GLP1 therapy for weight loss and CV benefit. It appears Wegovy may be covered after a prior authorization is completed. [facility], and plan to initiate pending approval. Will keep you updated on the status of this, and follow up in 1 month.

## 2025-04-01 NOTE — ASSESSMENT & PLAN NOTE
Nichelle is no longer taking GLP-1 therapy due to insurance coverage. Will plan to initiate Wegovy for weight loss and cardiovascular benefit pending PA approval.

## 2025-04-01 NOTE — TELEPHONE ENCOUNTER
[facility] [phone]    Ondansetron ODT 4mg Tablet  Dissolve 1 tablet in the mouth every 8 hours if needed for nausea or vomiting    Clonidine 0.3mg/24 hr patch   Apply one patch on the skin and replace every 7 days as directed     Omeprazole 40mg DR Capsule  Take 1 capsule PO MAYO in the morning before meals #90    Metoprolol succ XL 50mg 24 hr tablet  Take 1 tablet PO MAYO. Do not crush or chew.  Instructions on previous fill also says 2 tablets BID so I'm not sure of actual instructions.    Repatha SureClick  Inject 1mL (140mg) under the skin ever 14 days #6    Carbidopa-levodopa-entacapone -702fc tablet  Take 1 tablet PO q4hrs #540

## 2025-04-02 ENCOUNTER — SPECIALTY PHARMACY (OUTPATIENT)
Dept: PHARMACY | Facility: CLINIC | Age: 45
End: 2025-04-02

## 2025-04-02 DIAGNOSIS — I10 BENIGN ESSENTIAL HYPERTENSION: ICD-10-CM

## 2025-04-02 DIAGNOSIS — E78.5 HYPERLIPIDEMIA, UNSPECIFIED HYPERLIPIDEMIA TYPE: ICD-10-CM

## 2025-04-02 PROCEDURE — RXMED WILLOW AMBULATORY MEDICATION CHARGE

## 2025-04-02 RX ORDER — METOPROLOL SUCCINATE 50 MG/1
50 TABLET, EXTENDED RELEASE ORAL DAILY
Qty: 90 TABLET | Refills: 3 | Status: SHIPPED | OUTPATIENT
Start: 2025-04-02 | End: 2026-04-02

## 2025-04-02 RX ORDER — EVOLOCUMAB 140 MG/ML
140 INJECTION, SOLUTION SUBCUTANEOUS
Qty: 6 ML | Refills: 3 | Status: SHIPPED | OUTPATIENT
Start: 2025-04-02 | End: 2026-04-02

## 2025-04-03 ENCOUNTER — TELEPHONE (OUTPATIENT)
Dept: PRIMARY CARE | Facility: CLINIC | Age: 45
End: 2025-04-03
Payer: COMMERCIAL

## 2025-04-03 DIAGNOSIS — E03.9 HYPOTHYROIDISM (ACQUIRED): Primary | ICD-10-CM

## 2025-04-03 DIAGNOSIS — I10 BENIGN ESSENTIAL HYPERTENSION: ICD-10-CM

## 2025-04-03 RX ORDER — CLONIDINE 0.3 MG/24H
PATCH, EXTENDED RELEASE TRANSDERMAL
Qty: 4 PATCH | Refills: 11 | OUTPATIENT
Start: 2025-04-03 | End: 2026-04-03

## 2025-04-03 NOTE — TELEPHONE ENCOUNTER
Fax request from [facility]    cloNIDine (Catapres-TTS) 0.3 mg/24 hr patch    Apply one patach on the skin and replace every 7 days, as directed   Quantity: 4 patch  Refills: 11     budesonide (Pulmicort) 0.5 mg/2 mL nebulizer solution 0.5 mg   Rinse mouth with water after use to reduce aftertaste and incidence of candidiasis. Do not swallow.            [facility] - [facility] [address] [address]  Phone: [phone]  Fax: [phone]

## 2025-04-07 ENCOUNTER — PATIENT MESSAGE (OUTPATIENT)
Dept: CARDIOLOGY | Facility: HOSPITAL | Age: 45
End: 2025-04-07
Payer: COMMERCIAL

## 2025-04-07 ENCOUNTER — TELEPHONE (OUTPATIENT)
Dept: PHARMACY | Facility: HOSPITAL | Age: 45
End: 2025-04-07
Payer: COMMERCIAL

## 2025-04-07 NOTE — TELEPHONE ENCOUNTER
Nichelle Izaguirre has been denied for prior authorization for Wegovy. Patient has been contacted regarding the status of their prior authorization. Requirements for approval include history of MI, ischemic or hemorrhagic stroke, or   Symptomatic peripheral arterial disease evidenced by one of the following: (a) Intermittent claudication with ankle-brachial index less than 0.85 (at rest). (b) Peripheral arterial revascularization procedure. (c) Amputation due to atherosclerotic disease.    Will attempt appeal for Wegovy.     Please contact clinical pharmacy with any further questions. Thank you.    Pily Darby, PharmD  Clinical Pharmacist

## 2025-04-09 DIAGNOSIS — I10 BENIGN ESSENTIAL HYPERTENSION: ICD-10-CM

## 2025-04-09 RX ORDER — AMLODIPINE BESYLATE 10 MG/1
10 TABLET ORAL DAILY
Qty: 90 TABLET | Refills: 3 | Status: SHIPPED | OUTPATIENT
Start: 2025-04-09 | End: 2026-04-09

## 2025-04-11 ENCOUNTER — PHARMACY VISIT (OUTPATIENT)
Dept: PHARMACY | Facility: CLINIC | Age: 45
End: 2025-04-11
Payer: MEDICAID

## 2025-04-11 NOTE — TELEPHONE ENCOUNTER
Left voicemail for patient regarding appeal status for Wegovy.    Unfortunately, the appeal for Wegovy was denied through the patient's insurance. Insurance requires patient to meet the 3 previously mentioned criteria. Requested patient to call back with any questions, may consider attempting PA again in 2026.     Pily Darby, MerlyD

## 2025-04-15 ENCOUNTER — PATIENT MESSAGE (OUTPATIENT)
Dept: HEMATOLOGY/ONCOLOGY | Facility: CLINIC | Age: 45
End: 2025-04-15
Payer: COMMERCIAL

## 2025-04-15 DIAGNOSIS — K90.9 IDIOPATHIC STEATORRHEA (HHS-HCC): ICD-10-CM

## 2025-04-15 DIAGNOSIS — D50.8 IRON DEFICIENCY ANEMIA SECONDARY TO INADEQUATE DIETARY IRON INTAKE: ICD-10-CM

## 2025-04-16 ENCOUNTER — APPOINTMENT (OUTPATIENT)
Dept: BEHAVIORAL HEALTH | Facility: CLINIC | Age: 45
End: 2025-04-16
Payer: COMMERCIAL

## 2025-04-16 DIAGNOSIS — F41.0 PANIC DISORDER: ICD-10-CM

## 2025-04-16 DIAGNOSIS — F33.41 RECURRENT MAJOR DEPRESSIVE DISORDER, IN PARTIAL REMISSION (CMS-HCC): ICD-10-CM

## 2025-04-16 DIAGNOSIS — F33.2 SEVERE EPISODE OF RECURRENT MAJOR DEPRESSIVE DISORDER, WITHOUT PSYCHOTIC FEATURES (MULTI): ICD-10-CM

## 2025-04-16 PROCEDURE — 99214 OFFICE O/P EST MOD 30 MIN: CPT

## 2025-04-16 PROCEDURE — 1036F TOBACCO NON-USER: CPT

## 2025-04-16 RX ORDER — ARIPIPRAZOLE 5 MG/1
5 TABLET ORAL DAILY
Qty: 30 TABLET | Refills: 2 | Status: SHIPPED | OUTPATIENT
Start: 2025-04-16 | End: 2025-07-15

## 2025-04-16 RX ORDER — ALPRAZOLAM 1 MG/1
1 TABLET ORAL NIGHTLY PRN
Qty: 30 TABLET | Refills: 3 | Status: SHIPPED | OUTPATIENT
Start: 2025-06-02 | End: 2025-09-30

## 2025-04-16 ASSESSMENT — ENCOUNTER SYMPTOMS
NERVOUS/ANXIOUS: 1
CONSTITUTIONAL NEGATIVE: 1

## 2025-04-16 NOTE — PROGRESS NOTES
"Impression : Patient reports being overwhelmed and depressed with a lot of health problems happening in her family. NYDIA increased Abilify to 5 mg to help with depressive symptoms and instructed patient to start talk therapy to help with copying skills and adaptive skills. Patient educated to take Xanax as needed for panic attacks and anxiety, not as a sleep aid at night time.    I performed this visit using real-time telehealth tools, including an AUDIO/VIDEO connection between Nichelle Richterley who is at Home and DEBBY Moreno who is at At [facility] Office.  Virtual or Telephone Consent    An interactive audio and video telecommunication system which permits real time communications between the patient (at the originating site) and provider (at the distant site) was utilized to provide this telehealth service.   Verbal consent was requested and obtained from Nichelle Izaguirre on this date, 04/16/25 for a telehealth visit and the patient's location was confirmed at the time of the visit.      Assessment/Plan     Impression:  Nichelle Izaguirre is a 44 y.o. female who presents via telehealth visit for a follow up visit with CC of  \" It have been on and off. I am not where I want to be. Half the day I am back to the depression. My dad was sent to the hospital last night. \"    Patient consents to treatment.    Problem List Items Addressed This Visit           ICD-10-CM    Depression F32.A    Relevant Medications    ARIPiprazole (Abilify) 5 mg tablet    Other Relevant Orders    Follow Up In Psychiatry     Other Visit Diagnoses         Codes      Panic disorder     F41.0    Relevant Medications    ALPRAZolam (Xanax) 1 mg tablet (Start on 6/2/2025)    Other Relevant Orders    Follow Up In Psychiatry            Patient is reminded that if there is SI to call [phone] and get themselves to the closest ED for evaluation, otherwise contact me for other questions/concerns.    Patient presenting to outpatient " treatment for a scheduled psych follow up visit.      HPI   Background:   Patient presenting to outpatient treatment for a scheduled psych follow up visit.  Patient is at home for the appointment.  Patient was seen a couple of weeks ago for management of KILO, and MDD.  Last visit the dose of Xanax was increased  to 1 mg for panic attacks. Cymbalta was increased to 120 mg to help with anxiety and depression.  Patient reports the medication has NOT helped with anxiety and depression. Explains they work for one week, then they do not work the next.  Patient hopes to feel less depressed and less anxious.    Characteristics/Recent psychiatric symptoms (pertinent positives and negatives):    Patient reports still having symptoms of anxiety and panic attacks. Reports having about 5-6 panic attacks a week. Reports low motivation, low energy.  Reports getting 4 hours of sleep at night with taking the Xanax. Appetite is decreased due to everything happening in her family with her 's health.  Denies wishes for death or consideration for suicide.  CSSRS negative. Denies pawel of psychosis. Denies hx hospitalization.   Patient does explain that current dose of Abilify, Cymbalta, Xanax, Wellbutrin  have NOT  been successful in the management of anxiety and depressive symptoms.     -SI/HI : Denies        Psychiatric Review Of Systems:  Depressive Symptoms:  Low energy, low motivation  Manic Symptoms: negative    Anxiety Symptoms: General Anxiety Disorder (KILO)KILO Behaviors: difficult to control worry, excessive anxiety/worry, irritability, and restlessness  Psychotic Symptoms: negative      REVIEW OF SYSTEMS  Review of Systems   Constitutional: Negative.    Psychiatric/Behavioral:  The patient is nervous/anxious.      All other ROS negative    Current Medications:  Current Medications[1]     Medical History:  Medical History[2]  Surgical History:  Surgical History[3]  Family History:  Family History[4]  Social  History:  Social History     Socioeconomic History    Marital status:      Spouse name: Not on file    Number of children: Not on file    Years of education: Not on file    Highest education level: Not on file   Occupational History    Not on file   Tobacco Use    Smoking status: Never    Smokeless tobacco: Never   Vaping Use    Vaping status: Not on file   Substance and Sexual Activity    Alcohol use: Never    Drug use: Never    Sexual activity: Yes   Other Topics Concern    Not on file   Social History Narrative    Not on file     Social Drivers of Health     Financial Resource Strain: Low Risk  (8/26/2024)    Overall Financial Resource Strain (CARDIA)     Difficulty of Paying Living Expenses: Not very hard   Recent Concern: Financial Resource Strain - Medium Risk (8/24/2024)    Overall Financial Resource Strain (CARDIA)     Difficulty of Paying Living Expenses: Somewhat hard   Food Insecurity: No Food Insecurity (8/26/2024)    Hunger Vital Sign     Worried About Running Out of Food in the Last Year: Never true     Ran Out of Food in the Last Year: Never true   Transportation Needs: No Transportation Needs (8/26/2024)    PRAPARE - Transportation     Lack of Transportation (Medical): No     Lack of Transportation (Non-Medical): No   Physical Activity: Sufficiently Active (8/26/2024)    Exercise Vital Sign     Days of Exercise per Week: 5 days     Minutes of Exercise per Session: 150+ min   Stress: No Stress Concern Present (8/26/2024)    [employer] [place] [employer] - Occupational Stress Questionnaire     Feeling of Stress : Only a little   Social Connections: Moderately Integrated (8/26/2024)    Social Connection and Isolation Panel [NHANES]     Frequency of Communication with Friends and Family: More than three times a week     Frequency of Social Gatherings with Friends and Family: More than three times a week     Attends Sikhism Services: More than 4 times per year     Active Member of  "Clubs or Organizations: No     Attends Club or Organization Meetings: Never     Marital Status:    Intimate Partner Violence: Not At Risk (8/26/2024)    Humiliation, Afraid, Rape, and Kick questionnaire     Fear of Current or Ex-Partner: No     Emotionally Abused: No     Physically Abused: No     Sexually Abused: No   Housing Stability: Low Risk  (8/26/2024)    Housing Stability Vital Sign     Unable to Pay for Housing in the Last Year: No     Number of Times Moved in the Last Year: 0     Homeless in the Last Year: No   Recent Concern: Housing Stability - High Risk (8/24/2024)    Housing Stability Vital Sign     Unable to Pay for Housing in the Last Year: Yes     Number of Times Moved in the Last Year: 0     Homeless in the Last Year: No     Vitals:  There were no vitals filed for this visit.  Allergies:  RX Allergies[5]    -PCP: NAYELI Yadav-CNP  -Pt reports currently is not pregnant  -TBI/head trauma/LOC/seizure hx: Denies    Objective   Appearance: Well groomed    Attitude: Cooperative, and engaged in conversation.    Behavior: Appropriate eye contact.     Motor Activity: No psychomotor agitation or retardation. No abnormal movements, tremors or tics. No evidence of extrapyramidal symptoms or tardive dyskinesia.    Speech: Regular rate, rhythm, volume. Spontaneous, no pressured speech.    Mood:  \" I am a blah, no energy.\"    Affect: Full range, mood congruent.    Thought Process: Linear, logical, and goal-directed. No loose associations or gross thought disorganization.    Thought Content: Denied current suicidal ideation or thoughts of harm to self, denied homicidal ideation or thoughts of harm to others. No delusional thinking elicited. No perseverations or obsessions identified.     Perception: Did not endorse auditory or visual hallucinations, did not appear to be responding to hallucinatory stimuli.     Cognition: Alert, oriented x3. Preserved attention span and concentration, recent and " remote memory. Adequate fund of knowledge. No deficits in language.     Insight: Fair, in regards to understanding mental health condition    Judgement: Fair        Physical Exam      -Nichelle was seen today for anxiety, depression, follow-up, panic attack and med management.  Diagnoses and all orders for this visit:  Recurrent major depressive disorder, in partial remission (CMS-HCC)  -     Follow Up In Psychiatry  Panic disorder  -     ALPRAZolam (Xanax) 1 mg tablet; Take 1 tablet (1 mg) by mouth as needed at bedtime for anxiety. Do not fill before June 2, 2025.  -     Follow Up In Psychiatry; Future  Severe episode of recurrent major depressive disorder, without psychotic features (Multi)  -     ARIPiprazole (Abilify) 5 mg tablet; Take 1 tablet (5 mg) by mouth once daily.  -     Follow Up In Psychiatry; Future         MEDICAL-DECISION MAKING    -Patient educated and verbalized understanding on benefits, risks, and side effects of current psychiatric medications.     Psych med regimen as follows:  -INCREASE Abilify to 5 mg daily for depression and irritability  -CONTINUE current psychiatric medications as prescribed : Wellbutrin  mg daily for depression, Cymbalta 120 mg for MDD, Xanax 1 mg as need for anxiety and panic attacks.  -START Psychotherapy  -START exercising and eating a healthy diet  -EDUCATED Patient to take Xanax as needed for panic attacks and anxiety, not as a sleep aid at night time.  -Safety plan reviewed  -READ attached information about the prescribed new medication   -CALL OFFICE IN CASE OF SIDE EFFECT TO SCHEDULE A VISIT FOR ASSESSMENT -[phone]    SI/HI ASSESSMENT  -Patient denied current suicidal ideation or thoughts of harm to self, denied homicidal ideation or thoughts of harm to others. No delusional thinking elicited. No perseverations or obsessions identified.     PLAN  -Continue Medications as directed.  -Follow-up with this provider in 12 weeks.  -Risks/benefits/assessment  of medication interventions discussed with pt; pt agreeable to plan. Will continue to monitor for symptoms mgmt and SEs and adjust plan as needed.  -MI to increase coping skills/behavior regulation.  -Safety plan reviewed.  -Call  Psychiatry at [phone] with issues.  -For [facility] residents, Mine is a 24/7 hotline you can call for assistance at [phone]. Please call 911 or go to your closest Emergency Room if you feel worse. This includes thoughts of hurting yourself or anyone else, or having other troubles such as hearing voices, seeing visions, or having new and scary thoughts about the people around you.    OARRS:  DEBBY Moreno on 4/16/2025 10:58 AM  I have personally reviewed the OARRS report for Nichelle Izaguirre. I have considered the risks of abuse, dependence, addiction and diversion  Medication is felt to be clinically appropriate based on documented diagnosis.      DEBBY Moreno  Record Review: extensive  CALL OFFICE IN CASE OF SIDE EFFECT TO SCHEDULE A VISIT FOR ASSESSMENT -[phone]  Follow up:   July 15 th  2 pm virtually  Time Spent:  Prep:   Direct time: 26 minutes  Documentation: 3  minutes  Total: 29 minutes       [1]   Current Outpatient Medications:     albuterol 2.5 mg /3 mL (0.083 %) nebulizer solution, inhale the contents of one vial via nebulizer every 4 hours as needed, Disp: , Rfl:     allopurinol (Zyloprim) 100 mg tablet, Take 1 tablet (100 mg) by mouth once daily., Disp: 60 tablet, Rfl: 0    allopurinol (Zyloprim) 300 mg tablet, Take 1 tablet (300 mg) by mouth once daily., Disp: 60 tablet, Rfl: 0    ALPRAZolam (Xanax) 1 mg tablet, Take 1 tablet (1 mg) by mouth as needed at bedtime for anxiety., Disp: 30 tablet, Rfl: 2    amLODIPine (Norvasc) 10 mg tablet, Take 1 tablet (10 mg) by mouth once daily., Disp: 90 tablet, Rfl: 3    amoxicillin (Amoxil) 500 mg capsule, Take 1 capsule (500 mg) by mouth once daily., Disp: , Rfl:     ascorbic  acid, vitamin C, 500 mg capsule, Take 1 capsule by mouth once daily., Disp: , Rfl:     benzoyl peroxide (Benzac AC) 10 % external wash, Apply topically if needed., Disp: , Rfl:     buprenorphine (Butrans) 10 mcg/hour patch, Place 1 patch on the skin 1 (one) time per week for 28 days. Patient will continue Nucynta 50 mg 1 time per day for 7 days while beginning buprenorphine 10 mcg/h patch., Disp: 4 patch, Rfl: 0    buPROPion XL (Wellbutrin XL) 150 mg 24 hr tablet, Take 1 tablet (150 mg) by mouth once daily. Do not crush, chew, or split., Disp: 90 tablet, Rfl: 3    buPROPion XL (Wellbutrin XL) 300 mg 24 hr tablet, Take 1 tablet (300 mg) by mouth once daily in the morning. Do not crush, chew, or split., Disp: 90 tablet, Rfl: 3    carbidopa-levodopa-entacapone (Stalevo) -200 mg tablet, Take 1 tablet by mouth every 4 hours., Disp: 540 tablet, Rfl: 1    cetirizine-pseudoephedrine (ZyrTEC-D) 5-120 mg 12 hr tablet, Take 1 tablet by mouth 2 times a day., Disp: 180 tablet, Rfl: 0    clindamycin (Cleocin T) 1 % lotion, Apply topically once daily as needed., Disp: , Rfl:     clobetasol (Temovate) 0.05 % external solution, Apply topically 2 times a day. As needed, Disp: , Rfl:     clobetasoL 0.05 % lotion, One application as needed for flaring only.not for daily use. For scalp and hands only, Disp: 118 mL, Rfl: 3    cloNIDine (Catapres) 0.1 mg tablet, Take 1 tablet (0.1 mg) by mouth if needed for high blood pressure., Disp: , Rfl:     cloNIDine (Catapres-TTS) 0.3 mg/24 hr patch, Apply one patach on the skin and replace every 7 days, as directed, Disp: 4 patch, Rfl: 11    Cosentyx Pen, 2 Pens, 150 mg/mL self-injector pen, Inject 2 pens (300 mg) under the skin every 30 (thirty) days., Disp: 2 mL, Rfl: 2    cyanocobalamin (Vitamin B-12) 1,000 mcg/mL injection, Inject 1 mL (1,000 mcg) into the muscle see administration instructions., Disp: 30 mL, Rfl: 2    diclofenac sodium (Voltaren) 1 % gel gel, Apply 4.5 inches (4 g)  topically 2 times a day as needed., Disp: , Rfl:     DULoxetine (Cymbalta) 60 mg DR capsule, Take 2 capsules (120 mg) by mouth once daily. Do not crush or chew., Disp: 180 capsule, Rfl: 1    ergocalciferol (Vitamin D-2) 1250 mcg (50,000 units) capsule, Take 1 capsule (50,000 Units) by mouth 1 (one) time per week., Disp: 4 capsule, Rfl: 0    evolocumab (Repatha SureClick) 140 mg/mL injection, Inject 1 mL (140 mg) under the skin every 14 (fourteen) days., Disp: 6 mL, Rfl: 3    ezetimibe (Zetia) 10 mg tablet, Take 1 tablet (10 mg) by mouth once daily., Disp: 90 tablet, Rfl: 3    fluticasone furoate-vilanteroL (Breo Ellipta) 200-25 mcg/dose inhaler, 1puff daily, Inhalation, Disp: , Rfl:     ipratropium-albuteroL (Duo-Neb) 0.5-2.5 mg/3 mL nebulizer solution, Take 3 mL by nebulization every 4 hours if needed for wheezing., Disp: , Rfl:     lactobacillus acidophilus (Lactobacillus acidoph-L.bulgar) tablet tablet, Take 1 tablet by mouth once daily., Disp: 90 tablet, Rfl: 0    leflunomide (Arava) 10 mg tablet, Take 1 tablet (10 mg) by mouth once daily., Disp: 60 tablet, Rfl: 0    levalbuterol (Xopenex) 1.25 mg/3 mL nebulizer solution, Take 1 ampule by nebulization 3 times a day. As needed, Disp: , Rfl:     meloxicam (Mobic) 15 mg tablet, Take 1 tablet (15 mg) by mouth once daily., Disp: 30 tablet, Rfl: 1    metoprolol succinate XL (Toprol-XL) 50 mg 24 hr tablet, Take 1 tablet (50 mg) by mouth once daily. Do not crush or chew. Two tablets 50 mg twice daily, Disp: 90 tablet, Rfl: 3    metroNIDAZOLE (Metrocream) 0.75 % cream, Apply topically once daily as needed., Disp: , Rfl:     montelukast (Singulair) 10 mg tablet, Take 1 tablet (10 mg) by mouth once daily at bedtime., Disp: , Rfl:     naloxone (Narcan) 4 mg/0.1 mL nasal spray, Administer 1 spray (4 mg) into affected nostril(s) if needed for opioid reversal. May repeat every 2-3 minutes if needed, alternating nostrils, until medical assistance becomes available., Disp: 2  "each, Rfl: 0    nebulizer and compressor device, use q4-6 hours with albuterol PRN cough/wheeze, Disp: , Rfl:     nebulizers (Devilbiss Disposable Nebulizer) misc, every 4 hours if needed., Disp: , Rfl:     omega-3 acid ethyl esters (Lovaza) 1 gram capsule, Take 2 capsules (2 grams) by mouth 2 times a day., Disp: 360 capsule, Rfl: 3    omeprazole (PriLOSEC) 40 mg DR capsule, Take 1 capsule (40 mg) by mouth once daily in the morning. Take before meals. Do not crush or chew., Disp: 90 capsule, Rfl: 3    ondansetron ODT (Zofran-ODT) 4 mg disintegrating tablet, Dissolve 1 tablet (4 mg) in the mouth every 8 hours if needed for nausea or vomiting., Disp: 90 tablet, Rfl: 0    orphenadrine (Norflex) 100 mg 12 hr tablet, Take 1 tablet (100 mg) by mouth 2 times a day as needed for muscle spasms., Disp: 60 tablet, Rfl: 3    pregabalin (Lyrica) 75 mg capsule, Take 1 capsule (75 mg) by mouth 3 times a day., Disp: 90 capsule, Rfl: 2    ProAir HFA 90 mcg/actuation inhaler, Inhale 2 puffs every 4 hours if needed., Disp: , Rfl:     rizatriptan (Maxalt) 10 mg tablet, Take 1 tablet (10 mg) by mouth 1 time if needed for migraine. May repeat in 2 hours if unresolved. Do not exceed 30 mg in 24 hours., Disp: 9 tablet, Rfl: 0    semaglutide, weight loss, (Wegovy) 0.25 mg/0.5 mL pen injector, Inject 0.25 mg under the skin 1 (one) time per week for 4 doses., Disp: 2 mL, Rfl: 0    Spiriva Respimat 1.25 mcg/actuation inhaler, Inhale 2 puffs once daily., Disp: , Rfl:     syringe with needle, safety 3 mL 23 gauge x 1\" syringe, 1 Box see administration instructions., Disp: 30 each, Rfl: 3    [START ON 6/2/2025] ALPRAZolam (Xanax) 1 mg tablet, Take 1 tablet (1 mg) by mouth as needed at bedtime for anxiety. Do not fill before June 2, 2025., Disp: 30 tablet, Rfl: 3    ARIPiprazole (Abilify) 5 mg tablet, Take 1 tablet (5 mg) by mouth once daily., Disp: 30 tablet, Rfl: 2    eplerenone (Inspra) 25 mg tablet, Take 1 tablet (25 mg) by mouth once daily. " (Patient not taking: Reported on 4/16/2025), Disp: , Rfl:     tezepelumab-ekko (Tezspire) SubQ Pen Injector, Inject 210 mg under the skin. Every 4 weeks (Patient not taking: Reported on 4/16/2025), Disp: , Rfl:   [2]   Past Medical History:  Diagnosis Date    Acute upper respiratory infection, unspecified 07/26/2016    Viral URI with cough    Acute upper respiratory infection, unspecified 11/15/2017    Viral URI with cough    Allergy status to unspecified drugs, medicaments and biological substances     History of allergy    Chronic maxillary sinusitis 08/27/2018    Maxillary sinusitis, unspecified chronicity    Dystonia, unspecified 04/08/2014    Dystonia    Encounter for other screening for malignant neoplasm of breast 01/22/2018    Breast screening    Encounter for screening for respiratory tuberculosis 11/11/2013    Screening examination for pulmonary tuberculosis    Essential (primary) hypertension 12/27/2017    Benign essential hypertension    Hypersomnia, unspecified 04/13/2015    Sleeps too much    Idiopathic sleep related nonobstructive alveolar hypoventilation     Nocturnal hypoxemia    Idiopathic sleep related nonobstructive alveolar hypoventilation 02/09/2018    Nocturnal hypoxia    Impaired fasting glucose 01/22/2018    Impaired fasting glucose    Insect bite (nonvenomous) of lower back and pelvis, initial encounter 03/29/2018    Tick bite of back    Left lower quadrant pain 01/07/2019    Left lower quadrant pain    Local infection of the skin and subcutaneous tissue, unspecified 07/28/2017    Skin infection    Low back pain, unspecified 05/23/2019    Acute low back pain    Other conditions influencing health status 02/27/2017    Sprain of right thumb, unspecified site of finger, initial encounter    Other malaise 04/16/2018    Malaise and fatigue    Other microscopic hematuria 03/30/2018    Microscopic hematuria    Other specified cough 09/07/2018    Productive cough    Other specified health status  01/07/2019    Acute medical illness    Other symptoms and signs involving the musculoskeletal system 07/12/2018    Weakness of right lower extremity    Other symptoms and signs involving the musculoskeletal system 03/21/2018    Polyarticular joint involvement    Other symptoms and signs involving the musculoskeletal system 07/12/2018    RUE weakness    Pain in right lower leg 01/27/2016    Right calf pain    Pain in unspecified hip 01/20/2016    Hip pain    Pelvic and perineal pain 04/17/2018    Pelvic pain    Personal history of diseases of the skin and subcutaneous tissue 08/07/2018    History of dermatitis    Personal history of other (healed) physical injury and trauma 08/07/2018    History of insect bite    Personal history of other diseases of the circulatory system     History of hypertension    Personal history of other diseases of the female genital tract 11/24/2015    History of menorrhagia    Personal history of other diseases of the musculoskeletal system and connective tissue 03/14/2018    History of tendinitis    Personal history of other diseases of the nervous system and sense organs 04/08/2014    History of Parkinson's disease    Personal history of other diseases of the respiratory system 01/07/2019    History of acute bronchitis    Personal history of other diseases of the respiratory system 10/05/2018    History of paranasal sinus pain    Personal history of other specified conditions 04/13/2015    History of snoring    Personal history of other specified conditions 09/11/2017    History of headache    Personal history of other specified conditions 09/07/2018    History of persistent cough    Personal history of other specified conditions 04/08/2014    History of memory loss    Personal history of other specified conditions 03/26/2018    History of left flank pain    Personal history of other specified conditions     History of heartburn    Personal history of urinary calculi 03/26/2018     Personal history of urinary calculi    Personal history of urinary calculi 03/26/2018    History of renal calculi    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/2017    Plantar fasciitis, left    Primary insomnia 04/13/2015    Primary insomnia    Rash and other nonspecific skin eruption 04/16/2018    Rash    Unspecified abdominal pain 01/07/2019    Left sided abdominal pain    Urinary tract infection, site not specified 10/19/2018    UTI (urinary tract infection), bacterial    Urinary tract infection, site not specified 10/18/2018    Recurrent UTI    Vitamin D deficiency, unspecified 04/22/2016    Vitamin D deficiency    Zoster with other complications 11/25/2015    Herpes zoster dermatitis   [3]   Past Surgical History:  Procedure Laterality Date    CT ANGIO CORONARY ART WITH HEARTFLOW IF SCORE >30%  3/18/2020    CT HEART CORONARY ANGIOGRAM 3/18/2020 [facility]    HAND TENDON SURGERY  11/11/2013    Hand Incision Tendon Sheath Of A Finger    HYSTERECTOMY  03/04/2016    Hysterectomy    LITHOTRIPSY  11/11/2013    Renal Lithotripsy    TONSILLECTOMY  12/19/2013    Tonsillectomy With Adenoidectomy   [4]   Family History  Problem Relation Name Age of Onset    Asthma Mother      Hypertension Mother      Irregular heart beat Mother      Allergies Father      Heart disease Father      Hypertension Father      Sinusitis Father      Allergies Sister      Asthma Daughter      Allergies Son      Heart disease Maternal Grandmother      Breast cancer Paternal Grandmother      Heart disease Paternal Grandfather      Lung cancer Paternal Grandfather      Dementia Paternal Great-Grandfather     [5]   Allergies  Allergen Reactions    Clindamycin Anaphylaxis    Dupilumab Anaphylaxis    Hydrochlorothiazide Anaphylaxis, Itching and Swelling    Sulfamethoxazole-Trimethoprim Anaphylaxis    Cefazolin Hives, Other and Swelling     STATES TONGUE SPLITS    Atorvastatin Hives    Cephalexin Hives and Swelling     Joint swelling per patient     Clarithromycin Swelling     hives, tongue swelling    Lactose Constipation    Nitrofurantoin Monohyd/M-Cryst Hives    Sulfa (Sulfonamide Antibiotics) Hives    Sulfacetamide Hives

## 2025-04-17 ENCOUNTER — OFFICE VISIT (OUTPATIENT)
Dept: PAIN MEDICINE | Facility: HOSPITAL | Age: 45
End: 2025-04-17
Payer: COMMERCIAL

## 2025-04-17 VITALS
OXYGEN SATURATION: 95 % | HEART RATE: 92 BPM | WEIGHT: 224 LBS | BODY MASS INDEX: 36.15 KG/M2 | DIASTOLIC BLOOD PRESSURE: 87 MMHG | RESPIRATION RATE: 16 BRPM | SYSTOLIC BLOOD PRESSURE: 131 MMHG

## 2025-04-17 DIAGNOSIS — M25.50 ARTHRALGIA OF MULTIPLE SITES: ICD-10-CM

## 2025-04-17 DIAGNOSIS — M54.41 CHRONIC RIGHT-SIDED LOW BACK PAIN WITH RIGHT-SIDED SCIATICA: ICD-10-CM

## 2025-04-17 DIAGNOSIS — M62.830 SPASM OF THORACIC BACK MUSCLE: ICD-10-CM

## 2025-04-17 DIAGNOSIS — M79.18 MYOFASCIAL PAIN: ICD-10-CM

## 2025-04-17 DIAGNOSIS — D50.9 IRON DEFICIENCY ANEMIA, UNSPECIFIED IRON DEFICIENCY ANEMIA TYPE: Primary | ICD-10-CM

## 2025-04-17 DIAGNOSIS — M54.16 RIGHT LUMBAR RADICULOPATHY: ICD-10-CM

## 2025-04-17 DIAGNOSIS — M77.8 ELBOW TENDINITIS: ICD-10-CM

## 2025-04-17 DIAGNOSIS — Z79.891 LONG TERM PRESCRIPTION OPIATE USE: Primary | ICD-10-CM

## 2025-04-17 DIAGNOSIS — G89.29 CHRONIC RIGHT-SIDED LOW BACK PAIN WITH RIGHT-SIDED SCIATICA: ICD-10-CM

## 2025-04-17 PROCEDURE — 99214 OFFICE O/P EST MOD 30 MIN: CPT | Performed by: CLINICAL NURSE SPECIALIST

## 2025-04-17 PROCEDURE — 3075F SYST BP GE 130 - 139MM HG: CPT | Performed by: CLINICAL NURSE SPECIALIST

## 2025-04-17 PROCEDURE — 1036F TOBACCO NON-USER: CPT | Performed by: CLINICAL NURSE SPECIALIST

## 2025-04-17 PROCEDURE — 3079F DIAST BP 80-89 MM HG: CPT | Performed by: CLINICAL NURSE SPECIALIST

## 2025-04-17 RX ORDER — BUPRENORPHINE 10 UG/H
1 PATCH TRANSDERMAL
Qty: 4 PATCH | Refills: 2 | Status: SHIPPED | OUTPATIENT
Start: 2025-04-25 | End: 2025-05-23

## 2025-04-17 RX ORDER — PREGABALIN 75 MG/1
75 CAPSULE ORAL 3 TIMES DAILY
Qty: 90 CAPSULE | Refills: 2 | Status: SHIPPED | OUTPATIENT
Start: 2025-04-17 | End: 2025-05-17

## 2025-04-17 RX ORDER — ORPHENADRINE CITRATE 100 MG/1
100 TABLET, EXTENDED RELEASE ORAL 2 TIMES DAILY PRN
Qty: 60 TABLET | Refills: 3 | Status: SHIPPED | OUTPATIENT
Start: 2025-04-17 | End: 2025-05-17

## 2025-04-17 ASSESSMENT — LIFESTYLE VARIABLES: TOTAL SCORE: 2

## 2025-04-17 ASSESSMENT — PATIENT HEALTH QUESTIONNAIRE - PHQ9
SUM OF ALL RESPONSES TO PHQ9 QUESTIONS 1 AND 2: 0
2. FEELING DOWN, DEPRESSED OR HOPELESS: NOT AT ALL
1. LITTLE INTEREST OR PLEASURE IN DOING THINGS: NOT AT ALL

## 2025-04-17 ASSESSMENT — ENCOUNTER SYMPTOMS
LOSS OF SENSATION IN FEET: 0
DEPRESSION: 0
OCCASIONAL FEELINGS OF UNSTEADINESS: 0

## 2025-04-17 NOTE — PROGRESS NOTES
Subjective   Patient ID: Nichelle Izaguirre is a 44 y.o. female who presents for widespread polyarticular pain as well as mid back/thoracic pain/dystonia.  HPI    44-year-old female with a complicated medical history including psoriatic arthritis, gout, hypertension, GUICHO, irritable bowel disease, asthma, parkinsonian/dystonia, immune deficiency syndrome, anxiety and mid to low back pain as well as polyarticular pain presents for follow-up. MRI 2021 consistent with lumbar lordosis, normal disc height, small central disc bulge at L5-S1 and L4-5 with no significant foraminal stenosis and moderate lateral recess stenosis at L4-5. We have discussed injections in the past and the patient is not interested in pursuing steroid injections for back pain secondary to immune deficiency. She has been sent for formal course of physical therapy targeting mid to low back pain as well as polyarticular pain.  Reevaluated for elbow pain and could not proceed with elbow surgery secondary to compromised immune function.  Continues to be followed closely by cardiology, rheumatology and neurology. Patient was able to restart her Cosentyx after obtaining insurance approval and felt that it was helping her overall pain. She is also maintained on leflunomide and allopurinol.  As far as medication, we have been attempting to wean the patient off Nucynta and transition to buprenorphine.  Presenting at today's office visit for routine follow-up.  Continues to experience widespread polyarticular pain most bothersome to her right elbow at today's visit.  She also continues to experience mid back thoracic/dystonia pain which wraps around anteriorly to her mid abdomen.  This pain is a squeezing sensation accompanied by spasms.  She denies any numbness/tingling or weakness in her upper or lower extremities at this time.  Denies any changes in bowel/bladder function or balance issues.  Overall pain described as aching and throbbing.  States that most  "activity aggravates her pain.  She is right-handed and use of her right upper extremity increases her right elbow pain.  She was able to transition from Nucynta to buprenorphine 10 mcg/h transdermal patch weekly.  She currently is wearing her second patch and has not noted significant relief with this medication so far.  Continued benefit with Lyrica, orphenadrine, duloxetine and NSAID compounding cream.  Patient added meloxicam and feels this has been beneficial.  She continues to use a small dose of Xanax prescribed for panic attacks.  Patient states she uses sparingly.  She has not proceeded with physical therapy secondary to persistent right elbow pain and swelling.  Continues to follow closely with psychiatry.    pt is here for interval follow up and medication count. Pt states right elbow pain due to psoriatic arthritis and decreased blood flow and it wraps around and \"squeezes\" from the dystonia.            Pain Score:  \"7/10\"     Treatment: Buprenorphine 10 mcg patch weekly   Last dose: 4/13/25  Medication Count: 2?-patient forgot box   Fill date: ?     Efficacy:   \"0% relief\"     Side Effects: denies     Narcan: Pt brought unopened Narcan with her to today's visit.  EXP: 2/2026     Other pain medication/neuromodulator: Norflex-needs refill/Lyrica-needs refill/Cymbalta/ Kingsley's compound cream     Therapeutic Goals: to be able to do more throughout the day     Therapeutic Assessment: It helps the most in the morning     Injections and/or Procedures: denies, does not want to have injections.     Other: no current PT  OARRS:  No data recorded  I have personally reviewed the OARRS report for Nichelle Izaguirre. I have considered the risks of abuse, dependence, addiction and diversion    Is the patient prescribed a combination of a benzodiazepine and opioid?  Yes, I feel it is clincially indicated to continue the medication and have discussed with the patient risks/benefits/alternatives.    Last Urine Drug Screen " / ordered today: Yes  Recent Results (from the past 8760 hours)   Drug Screen, Urine With Reflex to Confirmation    Collection Time: 11/21/24  5:20 PM   Result Value Ref Range    Amphetamine Screen, Urine Presumptive Negative Presumptive Negative    Barbiturate Screen, Urine Presumptive Negative Presumptive Negative    Benzodiazepines Screen, Urine Presumptive Negative Presumptive Negative    Cannabinoid Screen, Urine Presumptive Negative Presumptive Negative    Cocaine Metabolite Screen, Urine Presumptive Negative Presumptive Negative    Fentanyl Screen, Urine Presumptive Negative Presumptive Negative    Opiate Screen, Urine Presumptive Negative Presumptive Negative    Oxycodone Screen, Urine Presumptive Negative Presumptive Negative    PCP Screen, Urine Presumptive Negative Presumptive Negative    Methadone Screen, Urine Presumptive Negative Presumptive Negative   Confirmation Opiate/Opioid/Benzo Prescription Compliance    Collection Time: 11/05/24 11:30 AM   Result Value Ref Range    Clonazepam <25 <25 ng/mL    7-Aminoclonazepam <25 <25 ng/mL    Alprazolam <25 <25 ng/mL    Alpha-Hydroxyalprazolam <25 <25 ng/mL    Midazolam <25 <25 ng/mL    Alpha-Hydroxymidazolam <25 <25 ng/mL    Chlordiazepoxide <25 <25 ng/mL    Diazepam <25 <25 ng/mL    Nordiazepam <25 <25 ng/mL    Temazepam <25 <25 ng/mL    Oxazepam <25 <25 ng/mL    Lorazepam <25 <25 ng/mL    Methadone <25 <25 ng/mL    EDDP <25 <25 ng/mL    6-Acetylmorphine <25 <25 ng/mL    Codeine <50 <50 ng/mL    Hydrocodone <25 <25 ng/mL    Hydromorphone <25 <25 ng/mL    Morphine  <50 <50 ng/mL    Norhydrocodone <25 <25 ng/mL    Noroxycodone <25 <25 ng/mL    Oxycodone <25 <25 ng/mL    Oxymorphone <25 <25 ng/mL    Fentanyl <2.5 <2.5 ng/mL    Norfentanyl <2.5 <2.5 ng/mL    Tramadol <50 <50 ng/mL    O-Desmethyltramadol <50 <50 ng/mL    Zolpidem <25 <25 ng/mL    Zolpidem Metabolite (ZCA) <25 <25 ng/mL   Screen Opiate/Opioid/Benzo Prescription Compliance    Collection Time:  11/05/24 11:30 AM   Result Value Ref Range    Creatinine, Urine Random 88.0 20.0 - 320.0 mg/dL    Amphetamine Screen, Urine Presumptive Negative Presumptive Negative    Barbiturate Screen, Urine Presumptive Negative Presumptive Negative    Cannabinoid Screen, Urine Presumptive Negative Presumptive Negative    Cocaine Metabolite Screen, Urine Presumptive Negative Presumptive Negative    PCP Screen, Urine Presumptive Negative Presumptive Negative   Tapentadol Confirmation, Urine    Collection Time: 11/05/24 11:30 AM   Result Value Ref Range    Tapentadol >1,000 (H) <25 ng/mL    N-Desmethyltapentadol 200 (H) <25 ng/mL   Buprenorphine Confirm,Urine    Collection Time: 11/05/24 11:30 AM   Result Value Ref Range    BUPRENORPHINE GLUC, URINE <5 ng/mL    BUPRENORPHINE ,URINE <2 ng/mL    NALOXONE, URINE <100 ng/mL    NORBUPRENORPHINE GLUC,URINE <5 ng/mL    NORBUPRENORPHINE, URINE <2 ng/mL   Amphetamine Confirm, Urine    Collection Time: 05/14/24  4:32 PM   Result Value Ref Range    Methamphetamine Quant, Ur <200 ng/mL    MDA, Urine <200 ng/mL    MDEA, Urine <200 ng/mL    Phentermine,Urine <200 ng/mL    Amphetamines,Urine <50 ng/mL    MDMA, Urine <200 ng/mL     Results are as expected.     Controlled Substance Agreement:  Date of the Last Agreement: 4/17/25  Reviewed Controlled Substance Agreement including but not limited to the benefits, risks, and alternatives to treatment with a Controlled Substance medication(s).    Monitoring and compliance:    ORT:    PDUQ:    Office Agreement:      Review of Systems    ROS:   General: No fevers, chills, weight loss  Skin: Negative for lesions  Eyes: No acute vision changes  Ears: No vertigo  Nose, mouth, throat: No difficulty swallowing or speaking  Respiratory: No cough, shortness of breath, cyanosis  Cardiovascular: Negative for chest pain syncope or palpitation  Gastrointestinal: No constipation, nausea, vomiting  Neurological: Negative for headache,  Psychological: Negative for  severe or debilitating anxiety, depression. Negative memory loss  Musculoskeletal: Positive for arthralgia, myalgia, pain, spasms and joint stiffness  Endocrine: Negative for weight gain, appetite changes, excessive sweating  Allergy/immune: Negative    All 13 systems were reviewed and are within normal levels except as noted or in the history of present illness.  Positive or pertinent negative responses are noted or were in the history of present illness. As noted, the patient denies significant or impairing weakness in the bilateral upper and lower extremities, medication induced constipation, and bowel or bladder incontinence.   Current Medications[1]     Medical History[2]     Surgical History[3]     Family History[4]     RX Allergies[5]     Objective     Visit Vitals  OB Status Having periods   Smoking Status Never        Physical Exam    PE:  General: Well-developed, well-nourished, no acute distress. The patient demonstrates no pain behavior, symptom magnification or overt drug-seeking behavior.  Eye: Pupils appropriate for room lighting  Neck/thyroid: No obvious goiter or enlargement of neck noted  Respiratory exam: Normal respiratory effort, unlabored respiration. No accessory muscle use noted  Cardiac exam: Bilateral radial pulses intact  Abdominal: Nondistended  Spine, lumbar: The patient is able to rise from a seated to standing position without hesitancy, push off, or delay. Gait is grossly nonantalgic. Tenderness to paraspinous musculature is noted mid back/thoracic through lower lumbar region.  Myofascial tenderness and muscle tightness thoracic and lumbar region bilaterally.  Flexion intact with extension more limited.  Negative straight leg raise.  Ortho: Widespread polyarticular pain most bothersome to right elbow.  Pain with active/passive range of motion of right elbow.  Pain over right lateral epicondyle.  Negative for edema.  Neurologic exam: Muscle strength is antigravity in all 4  extremities.  Psychiatric exam: Judgment and insight normal, affect normal, speech is fluent, affect appropriate, demonstrating no signs of hypersomnolence, sedation, or confusion            Assessment/Plan   Problem List Items Addressed This Visit           ICD-10-CM    Arthralgia of multiple sites M25.50    44-year-old female with a complicated medical history and multiple comorbid conditions presenting for follow-up of back pain radiating from mid back through lumbar spine as well as widespread polyarticular pain most bothersome to bilateral elbows.  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  Continues to experience thoracic pain and spasms consistent with dystonia.  Currently her right elbow pain is most bothersome at today's visit.  Continues to follow closely with rheumatology and is maintained on Cosentyx, leflunomide and allopurinol.  Recently added meloxicam which she feels has been beneficial.  Patient transition from Nucynta to buprenorphine 10 mcg/h transdermal patch weekly.  Currently tolerating buprenorphine without adverse effects.  She is unsure how much relief the medication is providing.  Reminded patient that it may take 4 to 6 weeks for optimal relief from the buprenorphine patch.  She verbalized understanding.  Continue benefit with Lyrica, orphenadrine and duloxetine.  She also feels that the NSAID compounding cream remains beneficial.  Adding the TENS unit to treat thoracic and lumbar symptoms.  I do think the TENS unit may help her dystonia symptoms.  Previously provided the patient with a referral to physical therapy to help with polyarticular pain as well as back pain.  Is better controlled.  Encouraged the patient to do home therapy stretches and exercises in the meantime. Reviewed recent lumbar and thoracic MRI essentially similar to previous imaging.  Discussed that she may benefit from injections depending on results of physical therapy.  She would  like to avoid steroid injections at this time. She also will continue to follow closely with rheumatology and dermatology.  At this time we will continue her current medication regimen.  We may adjust her buprenorphine dose in the future if she fails to receive relief after approximately 4 to 6 weeks.  Plan discussed with patient at today's office visit.    -Continue buprenorphine 10 mcg/h transdermal patch weekly.  We may consider increasing her dose in the future if she fails to receive significant relief with this dose of buprenorphine.  -Patient is also willing to start a formal course of physical therapy when she feels that her pain is better controlled and she is able to tolerate physical therapy.     -Patient may benefit from epidural steroid injections targeting lumbar radicular symptoms.  She would like to hold off on injections at this time.    -Patient will continue Lyrica, duloxetine and orphenadrine as ordered.  She continues to tolerate these medications without adverse effects.   -Continue NSAID compounding cream.  -Adding a Xynex next wave electrotherapy unit targeting lumbar/thoracic symptoms as well as symptoms of dystonia.  -Patient will follow-up in 3 months or sooner if needed.    MEDICAL DECISION MAKING:    My impressions and treatment recommendations were discussed in detail with the patient, who verbalized understanding and had no further questions prior to discharge. Given the patient's report of tolerable pain without adverse effects with her current dose of buprenorphine, it is reasonable to continue buprenorphine 10 mcg/h transdermal patch weekly. The terms of the opioid agreement as well as the potential risks and adverse effects of the patient's medication regimen were discussed in detail. This includes if applicable due to dosage of medication permission to discuss and coordinate care with other treatment providers relevant to the patients condition. The patient verbalized  understanding.    Treatment goals were discussed in detail with the patient.  These goals include reduction of pain levels, improved levels of functioning, avoidance of medication side effect and lowest medication dose possible to achieve  these goals.  The patient was in full agreement with these goals.  Also discussed is the understanding that pain may not be eliminated by medication but that the goal of a better sustaining life through use of medication is appropriate.  Lifestyle modifications including weight management, stretching, diet, exercise and smoking are addressed at each office visit.  The patient provided a urine drug screen for routine monitoring and compliance.  This test may be given as frequently as every month based on the patient's individual opiate risk stratification and prescriber concerns for any aberrancies.  This test is indicated given the use of controlled substances for the patient's medical condition.  Unless otherwise noted the prior (s) urine drug testing results were consistent with prescribed medications.  There is no evidence of illicit drug use or additional medications ingested.     Risks and side effects of chronic opioid therapy including but not limited to tolerance, dependence, constipation, hyperalgesia, cognitive side effects, addiction and possible death due to overuse and or misuse were discussed. I also discussed that such medications when co-administered with other sedative agents including but not limited to alcohol, benzodiazepines, sedative hypnotics and illegal drugs could pose life threatening consequences including death.  I also explained the impact that the administration of such medication has on a patient with obstructive sleep apnea and continued recommendations for use of apnea devices if ordered are prescribed by other physicians.  In order to effectively and safely treat the pain, I also emphasized the importance of compliance with the treatment plan as  well as compliance with the terms of the opioid agreement which was reviewed in detail. I explained the importance of being responsible with the medications and to take these only as prescribed, never in excess and never for reasons other than pain reduction. The patient was counseled on keeping the medications safe and locked away from children and other adults as well as disposal methods and options. The patient understood the risks and instructions.      I also discussed with the patient in detail that based on the clinical response to the opioid medications and improvements of activities of daily living, sleep, and work performance in light of compliance with the treatment plan we can continue this form of therapy for the above chronic  pain.  The goal and rationale used for current treatment with chronic opioid medication is to control the pain and alleviate disability induced by the chronic pain condition noted above after failures of other non-opioid and nonpharmacological modalities to treat the chronic pain and the symptoms associated with have failed.  The patient understood the goals in terms of the above treatment plan and had no further questions prior to leaving the office today.    Given the patient's total MED, general use of daily opiates, or other coadministered medications in various classes the patient was offered a prescription for Narcan.  I instructed the patient that Narcan is for emergency use only and is worse suspected or known opiate overdose.  Call 911 prior to administration of this medication to activate the emergency response team.  It is imperative to fill this medication in order to demonstrate understanding of the gravity of possible side effects including respiratory depression and risk of overdose of this opiate load or medication combination.  As such patients will be required to bring Narcan prescriptions to follow-up appointments as part of the compliance with continued opiate  care.    Disclaimer: This note was transcribed using an audio transcription device.  As such, minor errors may be present with regard to spelling, punctuation, and inadvertent word insertion.  Please disregard such errors.             Relevant Medications    pregabalin (Lyrica) 75 mg capsule    Elbow tendinitis M77.8    Relevant Medications    pregabalin (Lyrica) 75 mg capsule    Lower back pain M54.50    Relevant Medications    buprenorphine (Butrans) 10 mcg/hour patch (Start on 4/25/2025)    pregabalin (Lyrica) 75 mg capsule    Myofascial pain M79.18    Relevant Medications    buprenorphine (Butrans) 10 mcg/hour patch (Start on 4/25/2025)    Right lumbar radiculopathy M54.16    Relevant Medications    buprenorphine (Butrans) 10 mcg/hour patch (Start on 4/25/2025)    pregabalin (Lyrica) 75 mg capsule    Spasm of thoracic back muscle M62.830    Relevant Medications    orphenadrine (Norflex) 100 mg 12 hr tablet     Other Visit Diagnoses         Codes      Long term prescription opiate use    -  Primary Z79.891    Relevant Orders    Buprenorphine Confirm,Urine    Tapentadol Confirmation, Urine    Opiate/Opioid/Benzo Prescription Compliance    Ethyl Glucuronide Screen To Confirm,U                 [1]   Current Outpatient Medications:     albuterol 2.5 mg /3 mL (0.083 %) nebulizer solution, inhale the contents of one vial via nebulizer every 4 hours as needed, Disp: , Rfl:     allopurinol (Zyloprim) 100 mg tablet, Take 1 tablet (100 mg) by mouth once daily., Disp: 60 tablet, Rfl: 0    allopurinol (Zyloprim) 300 mg tablet, Take 1 tablet (300 mg) by mouth once daily., Disp: 60 tablet, Rfl: 0    ALPRAZolam (Xanax) 1 mg tablet, Take 1 tablet (1 mg) by mouth as needed at bedtime for anxiety., Disp: 30 tablet, Rfl: 2    [START ON 6/2/2025] ALPRAZolam (Xanax) 1 mg tablet, Take 1 tablet (1 mg) by mouth as needed at bedtime for anxiety. Do not fill before June 2, 2025., Disp: 30 tablet, Rfl: 3    amLODIPine (Norvasc) 10 mg  tablet, Take 1 tablet (10 mg) by mouth once daily., Disp: 90 tablet, Rfl: 3    amoxicillin (Amoxil) 500 mg capsule, Take 1 capsule (500 mg) by mouth once daily., Disp: , Rfl:     ARIPiprazole (Abilify) 5 mg tablet, Take 1 tablet (5 mg) by mouth once daily., Disp: 30 tablet, Rfl: 2    ascorbic acid, vitamin C, 500 mg capsule, Take 1 capsule by mouth once daily., Disp: , Rfl:     benzoyl peroxide (Benzac AC) 10 % external wash, Apply topically if needed., Disp: , Rfl:     buprenorphine (Butrans) 10 mcg/hour patch, Place 1 patch on the skin 1 (one) time per week for 28 days. Patient will continue Nucynta 50 mg 1 time per day for 7 days while beginning buprenorphine 10 mcg/h patch., Disp: 4 patch, Rfl: 0    buPROPion XL (Wellbutrin XL) 150 mg 24 hr tablet, Take 1 tablet (150 mg) by mouth once daily. Do not crush, chew, or split., Disp: 90 tablet, Rfl: 3    buPROPion XL (Wellbutrin XL) 300 mg 24 hr tablet, Take 1 tablet (300 mg) by mouth once daily in the morning. Do not crush, chew, or split., Disp: 90 tablet, Rfl: 3    carbidopa-levodopa-entacapone (Stalevo) -200 mg tablet, Take 1 tablet by mouth every 4 hours., Disp: 540 tablet, Rfl: 1    cetirizine-pseudoephedrine (ZyrTEC-D) 5-120 mg 12 hr tablet, Take 1 tablet by mouth 2 times a day., Disp: 180 tablet, Rfl: 0    clindamycin (Cleocin T) 1 % lotion, Apply topically once daily as needed., Disp: , Rfl:     clobetasol (Temovate) 0.05 % external solution, Apply topically 2 times a day. As needed, Disp: , Rfl:     clobetasoL 0.05 % lotion, One application as needed for flaring only.not for daily use. For scalp and hands only, Disp: 118 mL, Rfl: 3    cloNIDine (Catapres) 0.1 mg tablet, Take 1 tablet (0.1 mg) by mouth if needed for high blood pressure., Disp: , Rfl:     cloNIDine (Catapres-TTS) 0.3 mg/24 hr patch, Apply one patach on the skin and replace every 7 days, as directed, Disp: 4 patch, Rfl: 11    Cosentyx Pen, 2 Pens, 150 mg/mL self-injector pen, Inject 2  pens (300 mg) under the skin every 30 (thirty) days., Disp: 2 mL, Rfl: 2    cyanocobalamin (Vitamin B-12) 1,000 mcg/mL injection, Inject 1 mL (1,000 mcg) into the muscle see administration instructions., Disp: 30 mL, Rfl: 2    diclofenac sodium (Voltaren) 1 % gel gel, Apply 4.5 inches (4 g) topically 2 times a day as needed., Disp: , Rfl:     DULoxetine (Cymbalta) 60 mg DR capsule, Take 2 capsules (120 mg) by mouth once daily. Do not crush or chew., Disp: 180 capsule, Rfl: 1    eplerenone (Inspra) 25 mg tablet, Take 1 tablet (25 mg) by mouth once daily. (Patient not taking: Reported on 4/16/2025), Disp: , Rfl:     ergocalciferol (Vitamin D-2) 1250 mcg (50,000 units) capsule, Take 1 capsule (50,000 Units) by mouth 1 (one) time per week., Disp: 4 capsule, Rfl: 0    evolocumab (Repatha SureClick) 140 mg/mL injection, Inject 1 mL (140 mg) under the skin every 14 (fourteen) days., Disp: 6 mL, Rfl: 3    ezetimibe (Zetia) 10 mg tablet, Take 1 tablet (10 mg) by mouth once daily., Disp: 90 tablet, Rfl: 3    fluticasone furoate-vilanteroL (Breo Ellipta) 200-25 mcg/dose inhaler, 1puff daily, Inhalation, Disp: , Rfl:     ipratropium-albuteroL (Duo-Neb) 0.5-2.5 mg/3 mL nebulizer solution, Take 3 mL by nebulization every 4 hours if needed for wheezing., Disp: , Rfl:     lactobacillus acidophilus (Lactobacillus acidoph-L.bulgar) tablet tablet, Take 1 tablet by mouth once daily., Disp: 90 tablet, Rfl: 0    leflunomide (Arava) 10 mg tablet, Take 1 tablet (10 mg) by mouth once daily., Disp: 60 tablet, Rfl: 0    levalbuterol (Xopenex) 1.25 mg/3 mL nebulizer solution, Take 1 ampule by nebulization 3 times a day. As needed, Disp: , Rfl:     meloxicam (Mobic) 15 mg tablet, Take 1 tablet (15 mg) by mouth once daily., Disp: 30 tablet, Rfl: 1    metoprolol succinate XL (Toprol-XL) 50 mg 24 hr tablet, Take 1 tablet (50 mg) by mouth once daily. Do not crush or chew. Two tablets 50 mg twice daily, Disp: 90 tablet, Rfl: 3    metroNIDAZOLE  "(Metrocream) 0.75 % cream, Apply topically once daily as needed., Disp: , Rfl:     montelukast (Singulair) 10 mg tablet, Take 1 tablet (10 mg) by mouth once daily at bedtime., Disp: , Rfl:     naloxone (Narcan) 4 mg/0.1 mL nasal spray, Administer 1 spray (4 mg) into affected nostril(s) if needed for opioid reversal. May repeat every 2-3 minutes if needed, alternating nostrils, until medical assistance becomes available., Disp: 2 each, Rfl: 0    nebulizer and compressor device, use q4-6 hours with albuterol PRN cough/wheeze, Disp: , Rfl:     nebulizers ([employer] Nebulizer) misc, every 4 hours if needed., Disp: , Rfl:     omega-3 acid ethyl esters (Lovaza) 1 gram capsule, Take 2 capsules (2 grams) by mouth 2 times a day., Disp: 360 capsule, Rfl: 3    omeprazole (PriLOSEC) 40 mg DR capsule, Take 1 capsule (40 mg) by mouth once daily in the morning. Take before meals. Do not crush or chew., Disp: 90 capsule, Rfl: 3    ondansetron ODT (Zofran-ODT) 4 mg disintegrating tablet, Dissolve 1 tablet (4 mg) in the mouth every 8 hours if needed for nausea or vomiting., Disp: 90 tablet, Rfl: 0    orphenadrine (Norflex) 100 mg 12 hr tablet, Take 1 tablet (100 mg) by mouth 2 times a day as needed for muscle spasms., Disp: 60 tablet, Rfl: 3    pregabalin (Lyrica) 75 mg capsule, Take 1 capsule (75 mg) by mouth 3 times a day., Disp: 90 capsule, Rfl: 2    ProAir HFA 90 mcg/actuation inhaler, Inhale 2 puffs every 4 hours if needed., Disp: , Rfl:     rizatriptan (Maxalt) 10 mg tablet, Take 1 tablet (10 mg) by mouth 1 time if needed for migraine. May repeat in 2 hours if unresolved. Do not exceed 30 mg in 24 hours., Disp: 9 tablet, Rfl: 0    semaglutide, weight loss, (Wegovy) 0.25 mg/0.5 mL pen injector, Inject 0.25 mg under the skin 1 (one) time per week for 4 doses., Disp: 2 mL, Rfl: 0    Spiriva Respimat 1.25 mcg/actuation inhaler, Inhale 2 puffs once daily., Disp: , Rfl:     syringe with needle, safety 3 mL 23 gauge x 1\" " syringe, 1 Box see administration instructions., Disp: 30 each, Rfl: 3    tezepelumab-ekko (Tezspire) SubQ Pen Injector, Inject 210 mg under the skin. Every 4 weeks (Patient not taking: Reported on 4/16/2025), Disp: , Rfl:   [2]   Past Medical History:  Diagnosis Date    Acute upper respiratory infection, unspecified 07/26/2016    Viral URI with cough    Acute upper respiratory infection, unspecified 11/15/2017    Viral URI with cough    Allergy status to unspecified drugs, medicaments and biological substances     History of allergy    Chronic maxillary sinusitis 08/27/2018    Maxillary sinusitis, unspecified chronicity    Dystonia, unspecified 04/08/2014    Dystonia    Encounter for other screening for malignant neoplasm of breast 01/22/2018    Breast screening    Encounter for screening for respiratory tuberculosis 11/11/2013    Screening examination for pulmonary tuberculosis    Essential (primary) hypertension 12/27/2017    Benign essential hypertension    Hypersomnia, unspecified 04/13/2015    Sleeps too much    Idiopathic sleep related nonobstructive alveolar hypoventilation     Nocturnal hypoxemia    Idiopathic sleep related nonobstructive alveolar hypoventilation 02/09/2018    Nocturnal hypoxia    Impaired fasting glucose 01/22/2018    Impaired fasting glucose    Insect bite (nonvenomous) of lower back and pelvis, initial encounter 03/29/2018    Tick bite of back    Left lower quadrant pain 01/07/2019    Left lower quadrant pain    Local infection of the skin and subcutaneous tissue, unspecified 07/28/2017    Skin infection    Low back pain, unspecified 05/23/2019    Acute low back pain    Other conditions influencing health status 02/27/2017    Sprain of right thumb, unspecified site of finger, initial encounter    Other malaise 04/16/2018    Malaise and fatigue    Other microscopic hematuria 03/30/2018    Microscopic hematuria    Other specified cough 09/07/2018    Productive cough    Other specified  health status 01/07/2019    Acute medical illness    Other symptoms and signs involving the musculoskeletal system 07/12/2018    Weakness of right lower extremity    Other symptoms and signs involving the musculoskeletal system 03/21/2018    Polyarticular joint involvement    Other symptoms and signs involving the musculoskeletal system 07/12/2018    RUE weakness    Pain in right lower leg 01/27/2016    Right calf pain    Pain in unspecified hip 01/20/2016    Hip pain    Pelvic and perineal pain 04/17/2018    Pelvic pain    Personal history of diseases of the skin and subcutaneous tissue 08/07/2018    History of dermatitis    Personal history of other (healed) physical injury and trauma 08/07/2018    History of insect bite    Personal history of other diseases of the circulatory system     History of hypertension    Personal history of other diseases of the female genital tract 11/24/2015    History of menorrhagia    Personal history of other diseases of the musculoskeletal system and connective tissue 03/14/2018    History of tendinitis    Personal history of other diseases of the nervous system and sense organs 04/08/2014    History of Parkinson's disease    Personal history of other diseases of the respiratory system 01/07/2019    History of acute bronchitis    Personal history of other diseases of the respiratory system 10/05/2018    History of paranasal sinus pain    Personal history of other specified conditions 04/13/2015    History of snoring    Personal history of other specified conditions 09/11/2017    History of headache    Personal history of other specified conditions 09/07/2018    History of persistent cough    Personal history of other specified conditions 04/08/2014    History of memory loss    Personal history of other specified conditions 03/26/2018    History of left flank pain    Personal history of other specified conditions     History of heartburn    Personal history of urinary calculi  03/26/2018    Personal history of urinary calculi    Personal history of urinary calculi 03/26/2018    History of renal calculi    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/2017    Plantar fasciitis, left    Primary insomnia 04/13/2015    Primary insomnia    Rash and other nonspecific skin eruption 04/16/2018    Rash    Unspecified abdominal pain 01/07/2019    Left sided abdominal pain    Urinary tract infection, site not specified 10/19/2018    UTI (urinary tract infection), bacterial    Urinary tract infection, site not specified 10/18/2018    Recurrent UTI    Vitamin D deficiency, unspecified 04/22/2016    Vitamin D deficiency    Zoster with other complications 11/25/2015    Herpes zoster dermatitis   [3]   Past Surgical History:  Procedure Laterality Date    CT ANGIO CORONARY ART WITH HEARTFLOW IF SCORE >30%  3/18/2020    CT HEART CORONARY ANGIOGRAM 3/18/2020 [facility]    HAND TENDON SURGERY  11/11/2013    Hand Incision Tendon Sheath Of A Finger    HYSTERECTOMY  03/04/2016    Hysterectomy    LITHOTRIPSY  11/11/2013    Renal Lithotripsy    TONSILLECTOMY  12/19/2013    Tonsillectomy With Adenoidectomy   [4]   Family History  Problem Relation Name Age of Onset    Asthma Mother      Hypertension Mother      Irregular heart beat Mother      Allergies Father      Heart disease Father      Hypertension Father      Sinusitis Father      Allergies Sister      Asthma Daughter      Allergies Son      Heart disease Maternal Grandmother      Breast cancer Paternal Grandmother      Heart disease Paternal Grandfather      Lung cancer Paternal Grandfather      Dementia Paternal Great-Grandfather     [5]   Allergies  Allergen Reactions    Clindamycin Anaphylaxis    Dupilumab Anaphylaxis    Hydrochlorothiazide Anaphylaxis, Itching and Swelling    Sulfamethoxazole-Trimethoprim Anaphylaxis    Cefazolin Hives, Other and Swelling     STATES TONGUE SPLITS    Atorvastatin Hives    Cephalexin Hives and Swelling     Joint swelling per  patient    Clarithromycin Swelling     hives, tongue swelling    Lactose Constipation    Nitrofurantoin Monohyd/M-Cryst Hives    Sulfa (Sulfonamide Antibiotics) Hives    Sulfacetamide Hives

## 2025-04-17 NOTE — ASSESSMENT & PLAN NOTE
44-year-old female with a complicated medical history and multiple comorbid conditions presenting for follow-up of back pain radiating from mid back through lumbar spine as well as widespread polyarticular pain most bothersome to bilateral elbows.  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  Continues to experience thoracic pain and spasms consistent with dystonia.  Currently her right elbow pain is most bothersome at today's visit.  Continues to follow closely with rheumatology and is maintained on Cosentyx, leflunomide and allopurinol.  Recently added meloxicam which she feels has been beneficial.  Patient transitioned from Nucynta to buprenorphine 10 mcg/h transdermal patch weekly to prevent tolerance and hyperalgesia.  Currently tolerating buprenorphine without adverse effects.  She is unsure how much relief the medication is providing.  Reminded patient that it may take 4 to 6 weeks for optimal relief from the buprenorphine patch.  She verbalized understanding.  Continued benefit with Lyrica, orphenadrine and duloxetine.  She also feels that the NSAID compounding cream remains beneficial.  Adding the TENS unit to treat thoracic and lumbar symptoms.  I do think the TENS unit may help her dystonia symptoms.  Previously provided the patient with a referral to physical therapy to help with polyarticular pain as well as back pain.  She is willing to pursue physical therapy when her pain is better controlled.  Encouraged the patient to do home therapy stretches and exercises in the meantime. Reviewed recent lumbar and thoracic MRI essentially similar to previous imaging.  Discussed that she may benefit from injections depending on results of physical therapy.  She would like to avoid steroid injections at this time. She also will continue to follow closely with rheumatology and dermatology.  At this time we will continue her current medication regimen.  We may adjust her  buprenorphine dose in the future if she fails to receive relief with this dose after approximately 4 to 6 weeks.  Plan discussed with patient at today's office visit.    -Continue buprenorphine 10 mcg/h transdermal patch weekly.  We may consider increasing her dose in the future if she fails to receive significant relief with this dose of buprenorphine.  -Patient is also willing to start a formal course of physical therapy when she feels that her pain is better controlled and she is able to tolerate physical therapy.     -Patient may benefit from epidural steroid injections targeting lumbar radicular symptoms.  She would like to hold off on injections at this time.    -Patient will continue Lyrica, duloxetine and orphenadrine as ordered.  She continues to tolerate these medications without adverse effects.   -Continue NSAID compounding cream.  -Adding a Xynex nex wave electrotherapy unit targeting lumbar/thoracic symptoms as well as symptoms of dystonia.  -Patient will follow-up in 3 months or sooner if needed.    MEDICAL DECISION MAKING:    My impressions and treatment recommendations were discussed in detail with the patient, who verbalized understanding and had no further questions prior to discharge. Given the patient's report of tolerable pain without adverse effects with her current dose of buprenorphine, it is reasonable to continue buprenorphine 10 mcg/h transdermal patch weekly. The terms of the opioid agreement as well as the potential risks and adverse effects of the patient's medication regimen were discussed in detail. This includes if applicable due to dosage of medication permission to discuss and coordinate care with other treatment providers relevant to the patients condition. The patient verbalized understanding.    Treatment goals were discussed in detail with the patient.  These goals include reduction of pain levels, improved levels of functioning, avoidance of medication side effect and lowest  medication dose possible to achieve  these goals.  The patient was in full agreement with these goals.  Also discussed is the understanding that pain may not be eliminated by medication but that the goal of a better sustaining life through use of medication is appropriate.  Lifestyle modifications including weight management, stretching, diet, exercise and smoking are addressed at each office visit.  The patient provided a urine drug screen for routine monitoring and compliance.  This test may be given as frequently as every month based on the patient's individual opiate risk stratification and prescriber concerns for any aberrancies.  This test is indicated given the use of controlled substances for the patient's medical condition.  Unless otherwise noted the prior (s) urine drug testing results were consistent with prescribed medications.  There is no evidence of illicit drug use or additional medications ingested.     Risks and side effects of chronic opioid therapy including but not limited to tolerance, dependence, constipation, hyperalgesia, cognitive side effects, addiction and possible death due to overuse and or misuse were discussed. I also discussed that such medications when co-administered with other sedative agents including but not limited to alcohol, benzodiazepines, sedative hypnotics and illegal drugs could pose life threatening consequences including death.  I also explained the impact that the administration of such medication has on a patient with obstructive sleep apnea and continued recommendations for use of apnea devices if ordered are prescribed by other physicians.  In order to effectively and safely treat the pain, I also emphasized the importance of compliance with the treatment plan as well as compliance with the terms of the opioid agreement which was reviewed in detail. I explained the importance of being responsible with the medications and to take these only as prescribed, never in  excess and never for reasons other than pain reduction. The patient was counseled on keeping the medications safe and locked away from children and other adults as well as disposal methods and options. The patient understood the risks and instructions.      I also discussed with the patient in detail that based on the clinical response to the opioid medications and improvements of activities of daily living, sleep, and work performance in light of compliance with the treatment plan we can continue this form of therapy for the above chronic  pain.  The goal and rationale used for current treatment with chronic opioid medication is to control the pain and alleviate disability induced by the chronic pain condition noted above after failures of other non-opioid and nonpharmacological modalities to treat the chronic pain and the symptoms associated with have failed.  The patient understood the goals in terms of the above treatment plan and had no further questions prior to leaving the office today.    Given the patient's total MED, general use of daily opiates, or other coadministered medications in various classes the patient was offered a prescription for Narcan.  I instructed the patient that Narcan is for emergency use only and is worse suspected or known opiate overdose.  Call 911 prior to administration of this medication to activate the emergency response team.  It is imperative to fill this medication in order to demonstrate understanding of the gravity of possible side effects including respiratory depression and risk of overdose of this opiate load or medication combination.  As such patients will be required to bring Narcan prescriptions to follow-up appointments as part of the compliance with continued opiate care.    Disclaimer: This note was transcribed using an audio transcription device.  As such, minor errors may be present with regard to spelling, punctuation, and inadvertent word insertion.  Please  disregard such errors.

## 2025-04-18 ENCOUNTER — LAB (OUTPATIENT)
Dept: LAB | Facility: HOSPITAL | Age: 45
End: 2025-04-18
Payer: COMMERCIAL

## 2025-04-18 LAB
BASOPHILS # BLD AUTO: 0.02 X10*3/UL (ref 0–0.1)
BASOPHILS NFR BLD AUTO: 0.5 %
CHOLEST SERPL-MCNC: 212 MG/DL
CHOLEST/HDLC SERPL: 5.2 (CALC)
EOSINOPHIL # BLD AUTO: 0.03 X10*3/UL (ref 0–0.7)
EOSINOPHIL NFR BLD AUTO: 0.8 %
ERYTHROCYTE [DISTWIDTH] IN BLOOD BY AUTOMATED COUNT: 13.4 % (ref 11.5–14.5)
FERRITIN SERPL-MCNC: 60 NG/ML (ref 8–150)
HCT VFR BLD AUTO: 35.6 % (ref 36–46)
HDLC SERPL-MCNC: 41 MG/DL
HGB BLD-MCNC: 11.7 G/DL (ref 12–16)
IMM GRANULOCYTES # BLD AUTO: 0.01 X10*3/UL (ref 0–0.7)
IMM GRANULOCYTES NFR BLD AUTO: 0.3 % (ref 0–0.9)
IRON SATN MFR SERPL: 26 % (ref 25–45)
IRON SERPL-MCNC: 108 UG/DL (ref 35–150)
LDLC SERPL CALC-MCNC: ABNORMAL MG/DL
LDLC SERPL DIRECT ASSAY-MCNC: 98 MG/DL
LYMPHOCYTES # BLD AUTO: 1.66 X10*3/UL (ref 1.2–4.8)
LYMPHOCYTES NFR BLD AUTO: 44.9 %
MCH RBC QN AUTO: 29.5 PG (ref 26–34)
MCHC RBC AUTO-ENTMCNC: 32.9 G/DL (ref 32–36)
MCV RBC AUTO: 90 FL (ref 80–100)
MONOCYTES # BLD AUTO: 0.53 X10*3/UL (ref 0.1–1)
MONOCYTES NFR BLD AUTO: 14.3 %
NEUTROPHILS # BLD AUTO: 1.45 X10*3/UL (ref 1.2–7.7)
NEUTROPHILS NFR BLD AUTO: 39.2 %
NONHDLC SERPL-MCNC: 171 MG/DL (CALC)
NRBC BLD-RTO: 0 /100 WBCS (ref 0–0)
PLATELET # BLD AUTO: 263 X10*3/UL (ref 150–450)
RBC # BLD AUTO: 3.96 X10*6/UL (ref 4–5.2)
TIBC SERPL-MCNC: 423 UG/DL (ref 240–445)
TRIGL SERPL-MCNC: 472 MG/DL
UIBC SERPL-MCNC: 315 UG/DL (ref 110–370)
VIT B12 SERPL-MCNC: 237 PG/ML (ref 211–911)
WBC # BLD AUTO: 3.7 X10*3/UL (ref 4.4–11.3)

## 2025-04-18 PROCEDURE — 82728 ASSAY OF FERRITIN: CPT

## 2025-04-18 PROCEDURE — 85025 COMPLETE CBC W/AUTO DIFF WBC: CPT

## 2025-04-18 PROCEDURE — 83550 IRON BINDING TEST: CPT

## 2025-04-18 PROCEDURE — 83540 ASSAY OF IRON: CPT

## 2025-04-18 PROCEDURE — 82607 VITAMIN B-12: CPT

## 2025-04-19 LAB — TSH SERPL-ACNC: 0.85 MIU/L

## 2025-04-20 LAB
1OH-MIDAZOLAM UR-MCNC: NEGATIVE NG/ML
7AMINOCLONAZEPAM UR-MCNC: NEGATIVE NG/ML
A-OH ALPRAZ UR-MCNC: NEGATIVE NG/ML
A-OH-TRIAZOLAM UR-MCNC: NEGATIVE NG/ML
AMPHETAMINES UR QL: NEGATIVE NG/ML
BARBITURATES UR QL: NEGATIVE NG/ML
BUPRENORPHINE UR-MCNC: NORMAL NG/ML
BZE UR QL: NEGATIVE NG/ML
CODEINE UR-MCNC: NORMAL NG/ML
CREAT UR-MCNC: 160.4 MG/DL
DRUG SCREEN COMMENT UR-IMP: NORMAL
EDDP UR-MCNC: NEGATIVE NG/ML
ETHANOL UR QL: NEGATIVE NG/ML
FENTANYL UR-MCNC: NORMAL NG/ML
HYDROCODONE UR-MCNC: NORMAL UG/ML
HYDROMORPHONE UR-MCNC: NORMAL NG/ML
LORAZEPAM UR-MCNC: NEGATIVE NG/ML
METHADONE UR-MCNC: NEGATIVE NG/ML
MORPHINE UR-MCNC: NORMAL NG/ML
NORBUPRENORPHINE UR-MCNC: NORMAL NG/ML
NORDIAZEPAM UR-MCNC: NEGATIVE NG/ML
NORFENTANYL UR-MCNC: NORMAL NG/ML
NORHYDROCODONE UR CFM-MCNC: NORMAL NG/ML
NOROXYCODONE UR CFM-MCNC: NORMAL NG/ML
NORTAPENTADOL UR-MCNC: NEGATIVE NG/ML
NORTRAMADOL UR-MCNC: NEGATIVE NG/ML
OH-ETHYLFLURAZ UR-MCNC: NEGATIVE NG/ML
OXAZEPAM UR-MCNC: NEGATIVE NG/ML
OXIDANTS UR QL: NEGATIVE MCG/ML
OXYCODONE UR CFM-MCNC: NORMAL NG/ML
OXYMORPHONE UR CFM-MCNC: NORMAL NG/ML
PCP UR QL: NEGATIVE NG/ML
PH UR: 6.8 [PH] (ref 4.5–9)
QUEST 6 ACETYLMORPHINE: NORMAL
QUEST NALOXONE, URINE: NORMAL
QUEST NOTES AND COMMENTS: NORMAL
QUEST PATIENT HISTORICAL REPORT: NORMAL
QUEST ZOLPIDEM: NEGATIVE NG/ML
TAPENTADOL UR-MCNC: NEGATIVE NG/ML
TEMAZEPAM UR-MCNC: NEGATIVE NG/ML
THC UR QL: NEGATIVE NG/ML
TRAMADOL UR-MCNC: NEGATIVE NG/ML
ZOLPIDEM PHENYL-4-CARB UR CFM-MCNC: NEGATIVE NG/ML

## 2025-04-22 ENCOUNTER — INFUSION (OUTPATIENT)
Dept: HEMATOLOGY/ONCOLOGY | Facility: CLINIC | Age: 45
End: 2025-04-22
Payer: COMMERCIAL

## 2025-04-22 ENCOUNTER — OFFICE VISIT (OUTPATIENT)
Dept: HEMATOLOGY/ONCOLOGY | Facility: CLINIC | Age: 45
End: 2025-04-22
Payer: COMMERCIAL

## 2025-04-22 VITALS
BODY MASS INDEX: 36.17 KG/M2 | HEART RATE: 104 BPM | OXYGEN SATURATION: 94 % | WEIGHT: 224.1 LBS | SYSTOLIC BLOOD PRESSURE: 136 MMHG | DIASTOLIC BLOOD PRESSURE: 90 MMHG | TEMPERATURE: 97.7 F | RESPIRATION RATE: 17 BRPM

## 2025-04-22 DIAGNOSIS — K90.9 IDIOPATHIC STEATORRHEA (HHS-HCC): ICD-10-CM

## 2025-04-22 DIAGNOSIS — D50.8 IRON DEFICIENCY ANEMIA SECONDARY TO INADEQUATE DIETARY IRON INTAKE: ICD-10-CM

## 2025-04-22 LAB
1OH-MIDAZOLAM UR-MCNC: NEGATIVE NG/ML
7AMINOCLONAZEPAM UR-MCNC: NEGATIVE NG/ML
A-OH ALPRAZ UR-MCNC: NEGATIVE NG/ML
A-OH-TRIAZOLAM UR-MCNC: NEGATIVE NG/ML
AMPHETAMINES UR QL: NEGATIVE NG/ML
BARBITURATES UR QL: NEGATIVE NG/ML
BUPRENORPHINE UR-MCNC: 10 NG/ML
BZE UR QL: NEGATIVE NG/ML
CODEINE UR-MCNC: NEGATIVE NG/ML
CREAT UR-MCNC: 160.4 MG/DL
DRUG SCREEN COMMENT UR-IMP: ABNORMAL
DRUG SCREEN COMMENT UR-IMP: NORMAL
EDDP UR-MCNC: NEGATIVE NG/ML
ERYTHROCYTE [DISTWIDTH] IN BLOOD BY AUTOMATED COUNT: 13.1 % (ref 11.5–14.5)
ETHANOL UR QL: NEGATIVE NG/ML
FENTANYL UR-MCNC: NEGATIVE NG/ML
FERRITIN SERPL-MCNC: 45 NG/ML (ref 8–150)
HCT VFR BLD AUTO: 40.4 % (ref 36–46)
HGB BLD-MCNC: 13.2 G/DL (ref 12–16)
HYDROCODONE UR-MCNC: NEGATIVE NG/ML
HYDROMORPHONE UR-MCNC: NEGATIVE NG/ML
IRON SATN MFR SERPL: 27 % (ref 25–45)
IRON SERPL-MCNC: 119 UG/DL (ref 35–150)
LORAZEPAM UR-MCNC: NEGATIVE NG/ML
MCH RBC QN AUTO: 29.9 PG (ref 26–34)
MCHC RBC AUTO-ENTMCNC: 32.7 G/DL (ref 32–36)
MCV RBC AUTO: 92 FL (ref 80–100)
METHADONE UR-MCNC: NEGATIVE NG/ML
MORPHINE UR-MCNC: NEGATIVE NG/ML
NORBUPRENORPHINE UR-MCNC: 5 NG/ML
NORDIAZEPAM UR-MCNC: NEGATIVE NG/ML
NORFENTANYL UR-MCNC: NEGATIVE NG/ML
NORHYDROCODONE UR CFM-MCNC: NEGATIVE NG/ML
NOROXYCODONE UR CFM-MCNC: ABNORMAL NG/ML
NORTAPENTADOL UR-MCNC: NEGATIVE NG/ML
NORTRAMADOL UR-MCNC: NEGATIVE NG/ML
OH-ETHYLFLURAZ UR-MCNC: NEGATIVE NG/ML
OXAZEPAM UR-MCNC: NEGATIVE NG/ML
OXIDANTS UR QL: NEGATIVE MCG/ML
OXYCODONE UR CFM-MCNC: NEGATIVE NG/ML
OXYMORPHONE UR CFM-MCNC: NEGATIVE NG/ML
PCP UR QL: NEGATIVE NG/ML
PH UR: 6.8 [PH] (ref 4.5–9)
PLATELET # BLD AUTO: 280 X10*3/UL (ref 150–450)
QUEST 6 ACETYLMORPHINE: NEGATIVE NG/ML
QUEST NALOXONE, URINE: NEGATIVE NG/ML
QUEST NOTES AND COMMENTS: NORMAL
QUEST ZOLPIDEM: NEGATIVE NG/ML
RBC # BLD AUTO: 4.41 X10*6/UL (ref 4–5.2)
TAPENTADOL UR-MCNC: NEGATIVE NG/ML
TEMAZEPAM UR-MCNC: NEGATIVE NG/ML
THC UR QL: NEGATIVE NG/ML
TIBC SERPL-MCNC: 449 UG/DL (ref 240–445)
TRAMADOL UR-MCNC: NEGATIVE NG/ML
UIBC SERPL-MCNC: 330 UG/DL (ref 110–370)
WBC # BLD AUTO: 5 X10*3/UL (ref 4.4–11.3)
ZOLPIDEM PHENYL-4-CARB UR CFM-MCNC: NEGATIVE NG/ML

## 2025-04-22 PROCEDURE — 99214 OFFICE O/P EST MOD 30 MIN: CPT | Performed by: PHYSICIAN ASSISTANT

## 2025-04-22 PROCEDURE — 3080F DIAST BP >= 90 MM HG: CPT | Performed by: PHYSICIAN ASSISTANT

## 2025-04-22 PROCEDURE — 36415 COLL VENOUS BLD VENIPUNCTURE: CPT | Performed by: PHYSICIAN ASSISTANT

## 2025-04-22 PROCEDURE — 3075F SYST BP GE 130 - 139MM HG: CPT | Performed by: PHYSICIAN ASSISTANT

## 2025-04-22 PROCEDURE — 85027 COMPLETE CBC AUTOMATED: CPT | Performed by: PHYSICIAN ASSISTANT

## 2025-04-22 PROCEDURE — 82728 ASSAY OF FERRITIN: CPT | Performed by: PHYSICIAN ASSISTANT

## 2025-04-22 PROCEDURE — 83550 IRON BINDING TEST: CPT | Performed by: PHYSICIAN ASSISTANT

## 2025-04-22 RX ORDER — CYANOCOBALAMIN 1000 UG/ML
1000 INJECTION, SOLUTION INTRAMUSCULAR; SUBCUTANEOUS SEE ADMIN INSTRUCTIONS
Qty: 30 ML | Refills: 2 | Status: SHIPPED | OUTPATIENT
Start: 2025-04-22

## 2025-04-22 ASSESSMENT — PAIN SCALES - GENERAL: PAINLEVEL_OUTOF10: 3

## 2025-04-22 NOTE — PROGRESS NOTES
" C/w Reason for Visit  Nichelle Izaguirre is a 44 y.o. female with multiple medical problems including esophagitis and gastritis on PPI referred by Sandra Lilly NP for normocytic anemia form CHALINO likely secondary to malabsorption.    Patient with long standing anemia since teenage years due to menses. Patient has been on oral iron for \"many years\" with constipation and no response. Patient had c-scope and EGD in 2/2019 with internal and external hemorrhoids otherwise normal with normal biopsy and esophagitis and gastritis on PPI.    On assessment, reports extreme fatigue. Patient had oral surgery yesterday. Notes that appetite is good but diet not well balanced due to poor dentition. Otherwise doing well.     S/P 5 doses of 3 doses of Venofer, had reaction with first one so requires pre-meds prior to infusions. Continues to have fatigue despite correction in CHALINO    PMH/PSH: HTN, HL, psoriatic arthritis, gout, asthma, Parkinsonism/Dystonia, Immunodeficiency disorder (MBL) on prophylaxis antibiotics with amoxicillin alternating with Doxycycline. Oral surgery, kidney stones, s/p lithotripsy, GUICHO on CPAP, h/o sepsis.   FH: PGM-breast cancer, PGF-stomach cancer, MGM-bone cancer, MGF-stomach/esophagus, maternal aunt-cervical cancer.  Soc Hx: Denies smoking. ETOH, illicit drugs; SAHM, , 3 kids.    History of Present Illness:  Patient presents for follow up. Feels tired recently had cpap and has GUICHO waiting to see sleep medicine. Continues vitamin B12 injections. otherwise doing well.    Review of Systems: All of the systems have been reviewed and are negative for complaints except what is stated in the HPI and/or past medical history.    Allergies and Intolerances:  Allergies   Allergen Reactions    Clindamycin Anaphylaxis    Dupilumab Anaphylaxis    Hydrochlorothiazide Anaphylaxis, Itching and Swelling    Sulfamethoxazole-Trimethoprim Anaphylaxis    Cefazolin Hives, Other and Swelling     STATES TONGUE SPLITS    " Atorvastatin Hives    Cephalexin Hives and Swelling     Joint swelling per patient    Clarithromycin Swelling     hives, tongue swelling    Lactose Constipation    Nitrofurantoin Monohyd/M-Cryst Hives    Sulfa (Sulfonamide Antibiotics) Hives    Sulfacetamide Hives     Outpatient Medication Profile:  Current Outpatient Medications   Medication Sig Dispense Refill    albuterol 2.5 mg /3 mL (0.083 %) nebulizer solution inhale the contents of one vial via nebulizer every 4 hours as needed      allopurinol (Zyloprim) 100 mg tablet Take 1 tablet (100 mg) by mouth once daily. 60 tablet 0    allopurinol (Zyloprim) 300 mg tablet Take 1 tablet (300 mg) by mouth once daily. 60 tablet 0    ALPRAZolam (Xanax) 1 mg tablet Take 1 tablet (1 mg) by mouth as needed at bedtime for anxiety. 30 tablet 2    [START ON 6/2/2025] ALPRAZolam (Xanax) 1 mg tablet Take 1 tablet (1 mg) by mouth as needed at bedtime for anxiety. Do not fill before June 2, 2025. 30 tablet 3    amLODIPine (Norvasc) 10 mg tablet Take 1 tablet (10 mg) by mouth once daily. 90 tablet 3    amoxicillin (Amoxil) 500 mg capsule Take 1 capsule (500 mg) by mouth once daily.      ARIPiprazole (Abilify) 5 mg tablet Take 1 tablet (5 mg) by mouth once daily. 30 tablet 2    ascorbic acid, vitamin C, 500 mg capsule Take 1 capsule by mouth once daily.      benzoyl peroxide (Benzac AC) 10 % external wash Apply topically if needed.      [START ON 4/25/2025] buprenorphine (Butrans) 10 mcg/hour patch Place 1 patch on the skin 1 (one) time per week for 28 days. Do not fill before April 25, 2025. 4 patch 2    buPROPion XL (Wellbutrin XL) 150 mg 24 hr tablet Take 1 tablet (150 mg) by mouth once daily. Do not crush, chew, or split. 90 tablet 3    buPROPion XL (Wellbutrin XL) 300 mg 24 hr tablet Take 1 tablet (300 mg) by mouth once daily in the morning. Do not crush, chew, or split. 90 tablet 3    carbidopa-levodopa-entacapone (Stalevo) -200 mg tablet Take 1 tablet by mouth every 4  hours. 540 tablet 1    clindamycin (Cleocin T) 1 % lotion Apply topically once daily as needed.      clobetasol (Temovate) 0.05 % external solution Apply topically 2 times a day. As needed      clobetasoL 0.05 % lotion One application as needed for flaring only.not for daily use. For scalp and hands only 118 mL 3    cloNIDine (Catapres) 0.1 mg tablet Take 1 tablet (0.1 mg) by mouth if needed for high blood pressure.      cloNIDine (Catapres-TTS) 0.3 mg/24 hr patch Apply one patach on the skin and replace every 7 days, as directed 4 patch 11    Cosentyx Pen, 2 Pens, 150 mg/mL self-injector pen Inject 2 pens (300 mg) under the skin every 30 (thirty) days. 2 mL 2    cyanocobalamin (Vitamin B-12) 1,000 mcg/mL injection Inject 1 mL (1,000 mcg) into the muscle see administration instructions. 30 mL 2    diclofenac sodium (Voltaren) 1 % gel gel Apply 4.5 inches (4 g) topically 2 times a day as needed.      DULoxetine (Cymbalta) 60 mg DR capsule Take 2 capsules (120 mg) by mouth once daily. Do not crush or chew. 180 capsule 1    eplerenone (Inspra) 25 mg tablet Take 1 tablet (25 mg) by mouth once daily.      evolocumab (Repatha SureClick) 140 mg/mL injection Inject 1 mL (140 mg) under the skin every 14 (fourteen) days. 6 mL 3    fluticasone furoate-vilanteroL (Breo Ellipta) 200-25 mcg/dose inhaler 1puff daily, Inhalation      ipratropium-albuteroL (Duo-Neb) 0.5-2.5 mg/3 mL nebulizer solution Take 3 mL by nebulization every 4 hours if needed for wheezing.      lactobacillus acidophilus (Lactobacillus acidoph-L.bulgar) tablet tablet Take 1 tablet by mouth once daily. 90 tablet 0    leflunomide (Arava) 10 mg tablet Take 1 tablet (10 mg) by mouth once daily. 60 tablet 0    levalbuterol (Xopenex) 1.25 mg/3 mL nebulizer solution Take 1 ampule by nebulization 3 times a day. As needed      meloxicam (Mobic) 15 mg tablet Take 1 tablet (15 mg) by mouth once daily. 30 tablet 1    metoprolol succinate XL (Toprol-XL) 50 mg 24 hr tablet  "Take 1 tablet (50 mg) by mouth once daily. Do not crush or chew. Two tablets 50 mg twice daily 90 tablet 3    metroNIDAZOLE (Metrocream) 0.75 % cream Apply topically once daily as needed.      montelukast (Singulair) 10 mg tablet Take 1 tablet (10 mg) by mouth once daily at bedtime.      naloxone (Narcan) 4 mg/0.1 mL nasal spray Administer 1 spray (4 mg) into affected nostril(s) if needed for opioid reversal. May repeat every 2-3 minutes if needed, alternating nostrils, until medical assistance becomes available. 2 each 0    nebulizer and compressor device use q4-6 hours with albuterol PRN cough/wheeze      nebulizers (Matchmove Disposable Nebulizer) misc every 4 hours if needed.      omega-3 acid ethyl esters (Lovaza) 1 gram capsule Take 2 capsules (2 grams) by mouth 2 times a day. 360 capsule 3    omeprazole (PriLOSEC) 40 mg DR capsule Take 1 capsule (40 mg) by mouth once daily in the morning. Take before meals. Do not crush or chew. 90 capsule 3    ondansetron ODT (Zofran-ODT) 4 mg disintegrating tablet Dissolve 1 tablet (4 mg) in the mouth every 8 hours if needed for nausea or vomiting. 90 tablet 0    orphenadrine (Norflex) 100 mg 12 hr tablet Take 1 tablet (100 mg) by mouth 2 times a day as needed for muscle spasms. 60 tablet 3    pregabalin (Lyrica) 75 mg capsule Take 1 capsule (75 mg) by mouth 3 times a day. 90 capsule 2    ProAir HFA 90 mcg/actuation inhaler Inhale 2 puffs every 4 hours if needed.      semaglutide, weight loss, (Wegovy) 0.25 mg/0.5 mL pen injector Inject 0.25 mg under the skin 1 (one) time per week for 4 doses. 2 mL 0    Spiriva Respimat 1.25 mcg/actuation inhaler Inhale 2 puffs once daily.      syringe with needle, safety 3 mL 23 gauge x 1\" syringe 1 Box see administration instructions. 30 each 3    tezepelumab-ekko (Tezspire) SubQ Pen Injector Inject 210 mg under the skin. Every 4 weeks      cetirizine-pseudoephedrine (ZyrTEC-D) 5-120 mg 12 hr tablet Take 1 tablet by mouth 2 times a day. " "180 tablet 0    ezetimibe (Zetia) 10 mg tablet Take 1 tablet (10 mg) by mouth once daily. 90 tablet 3    rizatriptan (Maxalt) 10 mg tablet Take 1 tablet (10 mg) by mouth 1 time if needed for migraine. May repeat in 2 hours if unresolved. Do not exceed 30 mg in 24 hours. 9 tablet 0     No current facility-administered medications for this visit.        Vitals and Measurements:       11/21/2024     9:47 AM 12/4/2024     2:37 PM 2/10/2025    11:08 AM 2/19/2025    11:51 AM 2/27/2025    11:01 AM 4/17/2025    12:06 PM 4/22/2025     1:25 PM   Vitals   Systolic 138 150 148 178 152 131 136   Diastolic 88 97 99 111 99 87 90   BP Location  Left arm Right arm Left arm   Left arm   Heart Rate 92 100 110 98 93 92 104   Temp 36.7 °C (98.1 °F) 36.4 °C (97.5 °F)  37 °C (98.6 °F)   36.5 °C (97.7 °F)   Resp  16 16   16 17   Height 1.702 m (5' 7\")  1.676 m (5' 6\")  1.676 m (5' 6\")     Weight (lb) 194.8 228.18 224.6  224 224 224.1   BMI 30.51 kg/m2 35.74 kg/m2 36.25 kg/m2  36.15 kg/m2 36.15 kg/m2 36.17 kg/m2   BSA (m2) 2.04 m2 2.22 m2 2.18 m2  2.18 m2 2.18 m2 2.18 m2   Visit Report Report Report Report Report Report Report Report     physical Exam:   Constitutional: alert, awake and oriented, not in acute distress   HEENT: moist mucous membranes, normal nose   Neck: supple, no lymphadenopathy   EYES: PERRL, EOM intact, conjunctiva normal  Skin: no jaundice, rash or erythema  Neurological: AAOx3, no gross focal deficit   Psychiatric: normal mood and behavior     Lab Results:  Lab Results   Component Value Date    WBC 5.0 04/22/2025    NEUTROABS 1.45 04/18/2025    IGABSOL 0.01 04/18/2025    LYMPHSABS 1.66 04/18/2025    MONOSABS 0.53 04/18/2025    EOSABS 0.03 04/18/2025    BASOSABS 0.02 04/18/2025    RBC 4.41 04/22/2025    MCV 92 04/22/2025    MCHC 32.7 04/22/2025    HGB 13.2 04/22/2025    HCT 40.4 04/22/2025     04/22/2025     Lab Results   Component Value Date    RETICCTPCT 1.7 09/26/2024      Lab Results   Component Value Date    " "CREATININE 0.56 02/19/2025    BUN 9 02/19/2025    EGFR 114 02/19/2025     02/19/2025    K 3.8 02/19/2025     02/19/2025    CO2 28 02/19/2025      Lab Results   Component Value Date    ALT 24 02/19/2025    AST 20 02/19/2025    ALKPHOS 58 02/19/2025    BILITOT 0.4 02/19/2025      Lab Results   Component Value Date    TSH 0.85 04/18/2025     Lab Results   Component Value Date    TSH 0.85 04/18/2025    B9ZTNWX 7.3 11/19/2020     Lab Results   Component Value Date    IRON 108 04/18/2025    TIBC 423 04/18/2025    FERRITIN 60 04/18/2025      Lab Results   Component Value Date    FEWLTCUI94 237 04/18/2025      Lab Results   Component Value Date    FOLATE 10.8 09/26/2024     Lab Results   Component Value Date    WILBER NEGATIVE 03/19/2020    RF <10 03/19/2020    SEDRATE 14 02/19/2025      Lab Results   Component Value Date    CRP <3.0 02/19/2025      No results found for: \"MUSTAPHA\"  Lab Results   Component Value Date    LDH 92 09/26/2024     Lab Results   Component Value Date    HAPTOGLOBIN 124 09/26/2024     Lab Results   Component Value Date    SPEP Normal. 09/26/2024     Lab Results   Component Value Date    IGG 1,090 11/07/2024    IGM 77 11/07/2024     11/07/2024     Assessment:    44 y.o. female with multiple medical problems including esophagitis and gastritis on PPI referred for normocytic anemia form CHALINO likely secondary to malabsorption.    c-scope and EGD in 2/2019 with internal and external hemorrhoids otherwise normal with normal biopsy and esophagitis and gastritis on PPI    Plan:    Reviewed and discussed lab, imaging, and pathology results with patient in detail as well as diagnosis, prognosis, and treatment options.    Will continue to monitor CHALINO    Continue Vitamin B12 injections    F/U w/GI-next scoped in 2029    F/U w/PCP    RTC in 6 months     Patient verbalized understanding, and all her questions were answered to her satisfaction    30 min spent with patient greater than 50 % of which was " spent in consultation and coordination of care.

## 2025-04-28 ENCOUNTER — APPOINTMENT (OUTPATIENT)
Dept: PHARMACY | Facility: HOSPITAL | Age: 45
End: 2025-04-28
Payer: COMMERCIAL

## 2025-04-29 ENCOUNTER — PATIENT MESSAGE (OUTPATIENT)
Dept: PRIMARY CARE | Facility: CLINIC | Age: 45
End: 2025-04-29
Payer: COMMERCIAL

## 2025-04-29 DIAGNOSIS — K21.9 GASTROESOPHAGEAL REFLUX DISEASE WITHOUT ESOPHAGITIS: ICD-10-CM

## 2025-04-30 RX ORDER — OMEPRAZOLE 40 MG/1
40 CAPSULE, DELAYED RELEASE ORAL
Qty: 90 CAPSULE | Refills: 3 | Status: SHIPPED | OUTPATIENT
Start: 2025-04-30

## 2025-05-01 ENCOUNTER — PATIENT MESSAGE (OUTPATIENT)
Dept: PRIMARY CARE | Facility: CLINIC | Age: 45
End: 2025-05-01
Payer: COMMERCIAL

## 2025-05-01 DIAGNOSIS — K21.9 GASTROESOPHAGEAL REFLUX DISEASE WITHOUT ESOPHAGITIS: ICD-10-CM

## 2025-05-01 RX ORDER — OMEPRAZOLE 40 MG/1
40 CAPSULE, DELAYED RELEASE ORAL
Qty: 90 CAPSULE | Refills: 3 | OUTPATIENT
Start: 2025-05-01

## 2025-05-02 ENCOUNTER — SPECIALTY PHARMACY (OUTPATIENT)
Dept: PHARMACY | Facility: CLINIC | Age: 45
End: 2025-05-02

## 2025-05-02 DIAGNOSIS — E78.1 HYPERTRIGLYCERIDEMIA: ICD-10-CM

## 2025-05-02 RX ORDER — OMEGA-3-ACID ETHYL ESTERS 1 G/1
2 CAPSULE, LIQUID FILLED ORAL 2 TIMES DAILY
Qty: 360 CAPSULE | Refills: 3 | Status: SHIPPED | OUTPATIENT
Start: 2025-05-02 | End: 2026-05-02

## 2025-05-06 ENCOUNTER — APPOINTMENT (OUTPATIENT)
Dept: HEMATOLOGY/ONCOLOGY | Facility: CLINIC | Age: 45
End: 2025-05-06
Payer: COMMERCIAL

## 2025-05-09 ENCOUNTER — APPOINTMENT (OUTPATIENT)
Dept: HEMATOLOGY/ONCOLOGY | Facility: CLINIC | Age: 45
End: 2025-05-09
Payer: COMMERCIAL

## 2025-05-13 ENCOUNTER — APPOINTMENT (OUTPATIENT)
Dept: HEMATOLOGY/ONCOLOGY | Facility: CLINIC | Age: 45
End: 2025-05-13
Payer: COMMERCIAL

## 2025-05-14 PROCEDURE — RXMED WILLOW AMBULATORY MEDICATION CHARGE

## 2025-05-15 DIAGNOSIS — L40.50 PSORIATIC ARTHROPATHY (MULTI): ICD-10-CM

## 2025-05-16 ENCOUNTER — APPOINTMENT (OUTPATIENT)
Dept: HEMATOLOGY/ONCOLOGY | Facility: CLINIC | Age: 45
End: 2025-05-16
Payer: COMMERCIAL

## 2025-05-16 ENCOUNTER — PHARMACY VISIT (OUTPATIENT)
Dept: PHARMACY | Facility: CLINIC | Age: 45
End: 2025-05-16
Payer: MEDICAID

## 2025-05-17 ENCOUNTER — PHARMACY VISIT (OUTPATIENT)
Dept: PHARMACY | Facility: CLINIC | Age: 45
End: 2025-05-17

## 2025-05-19 RX ORDER — LEFLUNOMIDE 10 MG/1
10 TABLET ORAL DAILY
Qty: 30 TABLET | Refills: 0 | Status: SHIPPED | OUTPATIENT
Start: 2025-05-19

## 2025-05-19 RX ORDER — MELOXICAM 15 MG/1
15 TABLET ORAL DAILY
Qty: 30 TABLET | Refills: 0 | Status: SHIPPED | OUTPATIENT
Start: 2025-05-19

## 2025-05-20 ENCOUNTER — APPOINTMENT (OUTPATIENT)
Dept: HEMATOLOGY/ONCOLOGY | Facility: CLINIC | Age: 45
End: 2025-05-20
Payer: COMMERCIAL

## 2025-05-20 DIAGNOSIS — L40.50 PSORIATIC ARTHROPATHY (MULTI): Primary | ICD-10-CM

## 2025-05-20 DIAGNOSIS — M10.9 GOUTY ARTHROPATHY: ICD-10-CM

## 2025-05-20 RX ORDER — PREDNISONE 5 MG/1
TABLET ORAL
Qty: 12 TABLET | Refills: 0 | Status: SHIPPED | OUTPATIENT
Start: 2025-05-20 | End: 2025-05-26

## 2025-05-29 ENCOUNTER — TELEPHONE (OUTPATIENT)
Dept: PAIN MEDICINE | Facility: HOSPITAL | Age: 45
End: 2025-05-29
Payer: COMMERCIAL

## 2025-05-29 NOTE — TELEPHONE ENCOUNTER
Called pt and left a VM for her to call the office.  Per [facility] we need to know how pt is using the patch, if she is applying it every 7 days, and when she last filled her patches.  Madiha Cabrera RN

## 2025-06-02 ENCOUNTER — APPOINTMENT (OUTPATIENT)
Dept: CARDIOLOGY | Facility: HOSPITAL | Age: 45
End: 2025-06-02
Payer: COMMERCIAL

## 2025-06-02 DIAGNOSIS — M54.41 CHRONIC RIGHT-SIDED LOW BACK PAIN WITH RIGHT-SIDED SCIATICA: ICD-10-CM

## 2025-06-02 DIAGNOSIS — I10 BENIGN ESSENTIAL HYPERTENSION: Primary | ICD-10-CM

## 2025-06-02 DIAGNOSIS — M10.9 GOUTY ARTHROPATHY: ICD-10-CM

## 2025-06-02 DIAGNOSIS — G89.29 CHRONIC RIGHT-SIDED LOW BACK PAIN WITH RIGHT-SIDED SCIATICA: ICD-10-CM

## 2025-06-02 DIAGNOSIS — M25.50 ARTHRALGIA OF MULTIPLE SITES: Primary | ICD-10-CM

## 2025-06-02 DIAGNOSIS — F33.41 RECURRENT MAJOR DEPRESSIVE DISORDER, IN PARTIAL REMISSION: ICD-10-CM

## 2025-06-02 RX ORDER — BUPROPION HYDROCHLORIDE 150 MG/1
TABLET ORAL
Qty: 90 TABLET | Refills: 3 | Status: SHIPPED | OUTPATIENT
Start: 2025-06-02

## 2025-06-02 NOTE — TELEPHONE ENCOUNTER
Pt is requesting refill of     buprenorphine 15 mcg/hr weekly patch Elite - Per our conversation - increased dose sent                                                      LV:   4/17/25                 NV:   7/14/25               OARRS reviewed with [facility]:    4/25/25 #4/28 days                        Pended RX to JEMIMA Rosenbaum for transmission to pharmacy.

## 2025-06-03 RX ORDER — BUPRENORPHINE 15 UG/H
1 PATCH TRANSDERMAL
Qty: 4 PATCH | Refills: 1 | Status: SHIPPED | OUTPATIENT
Start: 2025-06-08 | End: 2025-07-06

## 2025-06-04 ENCOUNTER — SPECIALTY PHARMACY (OUTPATIENT)
Dept: PHARMACY | Facility: CLINIC | Age: 45
End: 2025-06-04

## 2025-06-04 ENCOUNTER — LAB (OUTPATIENT)
Dept: LAB | Facility: HOSPITAL | Age: 45
End: 2025-06-04
Payer: COMMERCIAL

## 2025-06-04 DIAGNOSIS — L40.50 PSORIATIC ARTHROPATHY (MULTI): ICD-10-CM

## 2025-06-04 DIAGNOSIS — B99.9 RECURRENT INFECTIONS: Primary | ICD-10-CM

## 2025-06-04 LAB
BASOPHILS # BLD AUTO: 0.02 X10*3/UL (ref 0–0.1)
BASOPHILS NFR BLD AUTO: 0.4 %
EOSINOPHIL # BLD AUTO: 0.03 X10*3/UL (ref 0–0.7)
EOSINOPHIL # BLD AUTO: 0.03 X10*3/UL (ref 0–0.7)
EOSINOPHIL # BLD AUTO: 0.04 X10*3/UL (ref 0–0.7)
EOSINOPHIL NFR BLD AUTO: 0.6 %
EOSINOPHIL NFR BLD AUTO: 0.6 %
EOSINOPHIL NFR BLD AUTO: 0.8 %
ERYTHROCYTE [DISTWIDTH] IN BLOOD BY AUTOMATED COUNT: 13.8 % (ref 11.5–14.5)
HCT VFR BLD AUTO: 36.5 % (ref 36–46)
HCT VFR BLD AUTO: 36.8 % (ref 36–46)
HCT VFR BLD AUTO: 37 % (ref 36–46)
HGB BLD-MCNC: 12.4 G/DL (ref 12–16)
HGB BLD-MCNC: 12.4 G/DL (ref 12–16)
HGB BLD-MCNC: 12.6 G/DL (ref 12–16)
IMM GRANULOCYTES # BLD AUTO: 0 X10*3/UL (ref 0–0.7)
IMM GRANULOCYTES # BLD AUTO: 0.01 X10*3/UL (ref 0–0.7)
IMM GRANULOCYTES # BLD AUTO: 0.01 X10*3/UL (ref 0–0.7)
IMM GRANULOCYTES NFR BLD AUTO: 0 % (ref 0–0.9)
IMM GRANULOCYTES NFR BLD AUTO: 0.2 % (ref 0–0.9)
IMM GRANULOCYTES NFR BLD AUTO: 0.2 % (ref 0–0.9)
LYMPHOCYTES # BLD AUTO: 2.06 X10*3/UL (ref 1.2–4.8)
LYMPHOCYTES # BLD AUTO: 2.11 X10*3/UL (ref 1.2–4.8)
LYMPHOCYTES # BLD AUTO: 2.15 X10*3/UL (ref 1.2–4.8)
LYMPHOCYTES NFR BLD AUTO: 42.4 %
LYMPHOCYTES NFR BLD AUTO: 42.6 %
LYMPHOCYTES NFR BLD AUTO: 42.7 %
MCH RBC QN AUTO: 29.2 PG (ref 26–34)
MCH RBC QN AUTO: 29.4 PG (ref 26–34)
MCH RBC QN AUTO: 30.2 PG (ref 26–34)
MCHC RBC AUTO-ENTMCNC: 33.7 G/DL (ref 32–36)
MCHC RBC AUTO-ENTMCNC: 34 G/DL (ref 32–36)
MCHC RBC AUTO-ENTMCNC: 34.1 G/DL (ref 32–36)
MCV RBC AUTO: 87 FL (ref 80–100)
MCV RBC AUTO: 87 FL (ref 80–100)
MCV RBC AUTO: 89 FL (ref 80–100)
MONOCYTES # BLD AUTO: 0.5 X10*3/UL (ref 0.1–1)
MONOCYTES # BLD AUTO: 0.54 X10*3/UL (ref 0.1–1)
MONOCYTES # BLD AUTO: 0.54 X10*3/UL (ref 0.1–1)
MONOCYTES NFR BLD AUTO: 10.3 %
MONOCYTES NFR BLD AUTO: 10.7 %
MONOCYTES NFR BLD AUTO: 10.9 %
NEUTROPHILS # BLD AUTO: 2.23 X10*3/UL (ref 1.2–7.7)
NEUTROPHILS # BLD AUTO: 2.25 X10*3/UL (ref 1.2–7.7)
NEUTROPHILS # BLD AUTO: 2.29 X10*3/UL (ref 1.2–7.7)
NEUTROPHILS NFR BLD AUTO: 45.2 %
NEUTROPHILS NFR BLD AUTO: 45.3 %
NEUTROPHILS NFR BLD AUTO: 46.3 %
NRBC BLD-RTO: 0 /100 WBCS (ref 0–0)
PLATELET # BLD AUTO: 324 X10*3/UL (ref 150–450)
PLATELET # BLD AUTO: 326 X10*3/UL (ref 150–450)
PLATELET # BLD AUTO: 327 X10*3/UL (ref 150–450)
RBC # BLD AUTO: 4.17 X10*6/UL (ref 4–5.2)
RBC # BLD AUTO: 4.22 X10*6/UL (ref 4–5.2)
RBC # BLD AUTO: 4.24 X10*6/UL (ref 4–5.2)
WBC # BLD AUTO: 4.9 X10*3/UL (ref 4.4–11.3)
WBC # BLD AUTO: 4.9 X10*3/UL (ref 4.4–11.3)
WBC # BLD AUTO: 5.1 X10*3/UL (ref 4.4–11.3)

## 2025-06-04 PROCEDURE — 88185 FLOWCYTOMETRY/TC ADD-ON: CPT

## 2025-06-04 PROCEDURE — 88184 FLOWCYTOMETRY/ TC 1 MARKER: CPT

## 2025-06-04 PROCEDURE — 85025 COMPLETE CBC W/AUTO DIFF WBC: CPT

## 2025-06-04 PROCEDURE — 88187 FLOWCYTOMETRY/READ 2-8: CPT

## 2025-06-06 LAB
CD19 CELLS # BLD: 0.32 X10E9/L (ref 0.07–0.91)
CD19 CELLS NFR BLD: 15 % (ref 6–19)
CD19+CD24++CD38++%: 7.4 % (ref 1–3.6)
CD19+CD24-CD38++%: 0.5 % (ref 0.6–1.6)
CD19+CD27+IGD+%: 2.5 % (ref 13.4–21.4)
CD19+CD27+IGD-%: 5.1 % (ref 9.2–18.9)
CD19+CD27-IGD+%: 89.9 % (ref 58–72.1)
FLOW CYTOMETRY SPECIALIST REVIEW: ABNORMAL
LYMPHOCYTES # SPEC AUTO: 2.11 X10*3/UL
PATH REVIEW, B CELL PHENOTYPING, EXTENDED: ABNORMAL

## 2025-06-09 LAB
CD19 CELLS # BLD: 0.34 X10E9/L (ref 0.07–0.91)
CD19 CELLS NFR BLD: 16 % (ref 6–19)
CD3 CELLS # BLD: 1.62 X10E9/L (ref 0.71–4.18)
CD3 CELLS NFR SPEC: 77 % (ref 59–87)
CD3+CD4+ CELLS # BLD: 1.27 X10E9/L (ref 0.35–2.74)
CD3+CD4+ CELLS # BLD: 1266 /MM3 (ref 350–2740)
CD3+CD4+ CELLS NFR BLD: 60 % (ref 29–57)
CD3+CD4+ CELLS/CD3+CD8+ CLL BLD: 3.53 % (ref 1–3.5)
CD3+CD4-CD8-CD45+ CELLS NFR BLD: 1 % (ref 0–6)
CD3+CD8+ CELLS # BLD: 0.36 X10E9/L (ref 0.08–1.49)
CD3+CD8+ CELLS NFR BLD: 17 % (ref 7–31)
CD3-CD16+CD56+ CELLS # BLD: 0.15 X10E9/L (ref 0–0.86)
CD3-CD16+CD56+ CELLS NFR BLD: 7 % (ref 0–18)
FLOW CYTOMETRY SPECIALIST REVIEW: ABNORMAL
LYMPHOCYTES # SPEC AUTO: 2.11 X10*3/UL
PATH REVIEW, IMMUNODEFICIENCY PROFILE: ABNORMAL

## 2025-06-09 RX ORDER — SECUKINUMAB 150 MG/ML
300 INJECTION SUBCUTANEOUS
Qty: 2 ML | Refills: 2 | Status: SHIPPED | OUTPATIENT
Start: 2025-06-09

## 2025-06-12 LAB
CD3 CELLS # BLD: 1.58 X10E9/L (ref 0.71–4.18)
CD3 CELLS NFR SPEC: 75 % (ref 59–87)
CD3+CD4+ CELLS # BLD: 1.35 X10E9/L (ref 0.35–2.74)
CD3+CD4+ CELLS # BLD: 1350 /MM3 (ref 350–2740)
CD3+CD4+ CELLS NFR BLD: 64 % (ref 29–57)
CD3+CD4+ CELLS/CD3+CD8+ CLL BLD: 3.76 % (ref 1–3.5)
CD3+CD45RA+CD45RO-%: 27.6 % (ref 12.1–50.7)
CD3+CD45RO+CD45RA-%: 64.6 % (ref 24.3–60.8)
CD3+CD8+ CELLS # BLD: 0.36 X10E9/L (ref 0.08–1.49)
CD3+CD8+ CELLS NFR BLD: 17 % (ref 7–31)
CD4+CD25+CD127-%: 5.1 % (ref 3.1–9.4)
CD4+CD27+CD45RO+%: 65.3 % (ref 31.7–75.2)
CD4+CD27+CD45RO-%: 31.4 % (ref 11.6–59.9)
CD4+CD27-CD45RO+%: 3.4 % (ref 0.2–15.3)
CD8+CD27+CD45RO+%: 61 % (ref 16.6–63.4)
CD8+CD27+CD45RO-%: 31.6 % (ref 10.6–59.8)
CD8+CD27-CD45RO+%: 4.4 % (ref 0–19.5)
FLOW CYTOMETRY SPECIALIST REVIEW: ABNORMAL
LYMPHOCYTES # SPEC AUTO: 2.11 X10*3/UL
PATH REVIEW, T CELL PHENOTYPING, EXTENDED: ABNORMAL

## 2025-06-12 PROCEDURE — RXMED WILLOW AMBULATORY MEDICATION CHARGE

## 2025-06-13 ENCOUNTER — PHARMACY VISIT (OUTPATIENT)
Dept: PHARMACY | Facility: CLINIC | Age: 45
End: 2025-06-13
Payer: MEDICAID

## 2025-06-17 DIAGNOSIS — G47.19 EXCESSIVE DAYTIME SLEEPINESS: Primary | ICD-10-CM

## 2025-06-20 ENCOUNTER — TELEMEDICINE CLINICAL SUPPORT (OUTPATIENT)
Dept: PHARMACY | Facility: HOSPITAL | Age: 45
End: 2025-06-20
Payer: COMMERCIAL

## 2025-06-20 NOTE — PROGRESS NOTES
[facility] Clinical Note  Patient Reassessment     Introduction  Nichelle Izaguirre is a 45 y.o. female who is on the specialty pharmacy service for management of psoriatic arthritis, PsA Rheumatology Core Program    [facility] supplied medication: Cosentyx 150mg/mL pen - 2 injections (300mg) under the skin once every month for PsA        Duration of therapy: Maintenance    The most recent encounter visit with the referring prescriber Dr. Mirtha Hilton on 2/19/2025 was reviewed.  Pharmacy will continue to collaborate in the care of this patient with the referring prescriber.    Discussion  Nichelle was contacted on 6/20/2025 at 12:22 PM for a pharmacy visit with encounter number [id number] from:   [facility]  [id number]  [place] [address]  Dept: [phone]  Dept Fax: [phone]  Loc: [phone]  Nichelle consented to a/an Telephone visit, which was performed.    Efficacy  Patient has developed new symptoms of condition: No   Patient/caregiver feels medication is affecting the disease state: patient states she was sick about a month or so ago and developed a flare, her knees are about 75% back to normal but her elbow is still bothering her. She is seeing Dr. Hilton on 6/23 and will ask about a steroid injection for her elbow    Goals  Provided education on goals and possible outcomes of therapy:  Adherence with therapy  Timely completion of appropriate labs  Timely and appropriate follow up with provider  Identify and address medication interactions with presciption medications, OTC medications and supplements  Optimize or maintain quality of life  Rheumatology: Remission or low disease activity  Reduction of inflammation, joint pain, swelling, and morning stiffness  Patient has documented target(s) for goals of therapy: Yes  Patient status for goal(s): On track    Targets       Target Due Completed Completed By Outcome  Source     Goal:  Prevent and reduce disease flares 12/20/2025 -- -- -- --         Goal: Remission or low disease activity 12/20/2025 -- -- -- --         Goal:  Prevent and reduce disease flares 12/20/2025 6/20/2025 Adelia Walls PharmD On Track --    Getting out of a flare       Goal: Remission or low disease activity 12/20/2025 6/20/2025 Adelia Walls PharmD On Track --    Getting out of a flare              Tolerance  Patient has experienced side effects from this medication: No  Changes to current therapy regimen: No    The follow-up timeline was discussed. Every person responds to and reacts to therapy differently. Patient should be assessed for efficacy and tolerability in approximately: 6 months            Adherence      The importance of adherence was discussed and patient/caregiver was advised to take the medication as prescribed by their provider. Encouraged patient/caregiver to call physician's office or specialty pharmacy if they have a question regarding a missed dose.    General Assessment  Changes to home medications, OTCs or supplements: No  Current Medications[1]  Reported new allergies: No  Reported new medical conditions: No  Additional monitoring reviewed: Rheumatology- CBC-diff:   Lab Results   Component Value Date    WBC 4.9 06/04/2025    WBC 5.1 06/04/2025    WBC 4.9 06/04/2025    RBC 4.24 06/04/2025    RBC 4.17 06/04/2025    RBC 4.22 06/04/2025    HGB 12.4 06/04/2025    HGB 12.6 06/04/2025    HGB 12.4 06/04/2025    HCT 36.8 06/04/2025    HCT 37.0 06/04/2025    HCT 36.5 06/04/2025    MCV 87 06/04/2025    MCV 89 06/04/2025    MCV 87 06/04/2025    MCHC 33.7 06/04/2025    MCHC 34.1 06/04/2025    MCHC 34.0 06/04/2025     06/04/2025     06/04/2025     06/04/2025    RDW 13.8 06/04/2025    RDW 13.8 06/04/2025    RDW 13.8 06/04/2025    NEUTOPHILPCT 45.2 06/04/2025    NEUTOPHILPCT 45.3 06/04/2025    NEUTOPHILPCT 46.3 06/04/2025    IGPCT 0.2 06/04/2025    IGPCT 0.2  06/04/2025    IGPCT 0.0 06/04/2025    LYMPHOPCT 42.7 06/04/2025    LYMPHOPCT 42.6 06/04/2025    LYMPHOPCT 42.4 06/04/2025    MONOPCT 10.9 06/04/2025    MONOPCT 10.7 06/04/2025    MONOPCT 10.3 06/04/2025    EOSPCT 0.6 06/04/2025    EOSPCT 0.8 06/04/2025    EOSPCT 0.6 06/04/2025    BASOPCT 0.4 06/04/2025    BASOPCT 0.4 06/04/2025    BASOPCT 0.4 06/04/2025    NEUTROABS 2.23 06/04/2025    NEUTROABS 2.29 06/04/2025    NEUTROABS 2.25 06/04/2025    LYMPHSABS 2.11 06/04/2025    LYMPHSABS 2.15 06/04/2025    LYMPHSABS 2.06 06/04/2025    MONOSABS 0.54 06/04/2025    MONOSABS 0.54 06/04/2025    MONOSABS 0.50 06/04/2025    EOSABS 0.03 06/04/2025    EOSABS 0.04 06/04/2025    EOSABS 0.03 06/04/2025    BASOSABS 0.02 06/04/2025    BASOSABS 0.02 06/04/2025    BASOSABS 0.02 06/04/2025    and CMP:   Lab Results   Component Value Date    GLUCOSE 95 02/19/2025     02/19/2025    K 3.8 02/19/2025     02/19/2025    CO2 28 02/19/2025    ANIONGAP 11 02/19/2025    BUN 9 02/19/2025    CREATININE 0.56 02/19/2025    GFRF >90 09/18/2023    CALCIUM 9.4 02/19/2025    ALBUMIN 4.3 02/19/2025    ALKPHOS 58 02/19/2025    PROT 7.0 02/19/2025    AST 20 02/19/2025    BILITOT 0.4 02/19/2025    ALT 24 02/19/2025     Is laboratory follow up needed? Yes - per provider discretion, seeing MD on 6/23    Advised to contact the pharmacy if there are any changes to the patient's medication list, including prescriptions, OTC medications, herbal products, or supplements.    Impression/Plan  This patient has not been identified as high risk due to Lack of high risk qualifiers.  The following action was taken:N/A          QOL/Patient Satisfaction  Rate your quality of life on scale of 1-10: 7  Rate your satisfaction with  Specialty Pharmacy on scale of 1-10: 10 - Completely satisfied    Provided contact information ([phone]) for [facility] and reviewed dispensing process, refill timeline and patient management follow up.  Confirmed understanding of education conducted during assessment. All questions and concerns were addressed and patient/caregiver was encouraged to reach out for additional questions or concerns.    Based on the patient's diagnosis, medication list, progress towards goals, adherence, tolerance, and medication list, medication remains appropriate: Therapy remains appropriate (I attest)    Spoke with patient for reassessment, Cosentyx is going fine, no issues. Patient did have a flare but she was sick (URI) and tends to have a flare when she gets sick. Her symptoms are improving and she will see Dr. Hilton next week. Pt did have a question on her Ozempic/Wegovy rx and how to get it approved. Let pt know to message her provider and ask her to send over an updated rx to [facility] specifically so that our team can help with the PA. Pt understood and appreciated the info. Will continue to follow up.     Adelia Walls, MerlyD       [1]   Current Outpatient Medications   Medication Sig Dispense Refill    albuterol 2.5 mg /3 mL (0.083 %) nebulizer solution inhale the contents of one vial via nebulizer every 4 hours as needed      ALPRAZolam (Xanax) 1 mg tablet Take 1 tablet (1 mg) by mouth as needed at bedtime for anxiety. 30 tablet 2    ALPRAZolam (Xanax) 1 mg tablet Take 1 tablet (1 mg) by mouth as needed at bedtime for anxiety. Do not fill before June 2, 2025. 30 tablet 3    amLODIPine (Norvasc) 10 mg tablet Take 1 tablet (10 mg) by mouth once daily. 90 tablet 3    amoxicillin (Amoxil) 500 mg capsule Take 1 capsule (500 mg) by mouth once daily.      ARIPiprazole (Abilify) 5 mg tablet Take 1 tablet (5 mg) by mouth once daily. 30 tablet 2    ascorbic acid, vitamin C, 500 mg capsule Take 1 capsule by mouth once daily.      benzoyl peroxide (Benzac AC) 10 % external wash Apply topically if needed.      buprenorphine (Butrans) 15 mcg/hour Place 1 patch on the skin 1 (one) time per week for 28 days. 4 patch 1    buPROPion XL  (Wellbutrin XL) 150 mg 24 hr tablet TAKE ONE TABLET BY MOUTH ONCE DAILY (DO NOT CRUSH, CHEW, OR SPLIT) 90 tablet 3    buPROPion XL (Wellbutrin XL) 300 mg 24 hr tablet Take 1 tablet (300 mg) by mouth once daily in the morning. Do not crush, chew, or split. 90 tablet 3    carbidopa-levodopa-entacapone (Stalevo) -200 mg tablet Take 1 tablet by mouth every 4 hours. 540 tablet 1    cetirizine-pseudoephedrine (ZyrTEC-D) 5-120 mg 12 hr tablet Take 1 tablet by mouth 2 times a day. 180 tablet 0    clindamycin (Cleocin T) 1 % lotion Apply topically once daily as needed.      clobetasol (Temovate) 0.05 % external solution Apply topically 2 times a day. As needed      clobetasoL 0.05 % lotion One application as needed for flaring only.not for daily use. For scalp and hands only 118 mL 3    cloNIDine (Catapres) 0.1 mg tablet Take 1 tablet (0.1 mg) by mouth if needed for high blood pressure.      cloNIDine (Catapres-TTS) 0.3 mg/24 hr patch Apply one patach on the skin and replace every 7 days, as directed 4 patch 11    Cosentyx Pen, 2 Pens, 150 mg/mL self-injector pen Inject 2 pens (300 mg) under the skin every 30 (thirty) days. 2 mL 2    cyanocobalamin (Vitamin B-12) 1,000 mcg/mL injection Inject 1 mL (1,000 mcg) into the muscle see administration instructions. 30 mL 2    diclofenac sodium (Voltaren) 1 % gel gel Apply 4.5 inches (4 g) topically 2 times a day as needed.      DULoxetine (Cymbalta) 60 mg DR capsule Take 2 capsules (120 mg) by mouth once daily. Do not crush or chew. 180 capsule 1    eplerenone (Inspra) 25 mg tablet Take 1 tablet (25 mg) by mouth once daily.      evolocumab (Repatha SureClick) 140 mg/mL injection Inject 1 mL (140 mg) under the skin every 14 (fourteen) days. 6 mL 3    ezetimibe (Zetia) 10 mg tablet Take 1 tablet (10 mg) by mouth once daily. 90 tablet 3    fluticasone furoate-vilanteroL (Breo Ellipta) 200-25 mcg/dose inhaler 1puff daily, Inhalation      ipratropium-albuteroL (Duo-Neb) 0.5-2.5  mg/3 mL nebulizer solution Take 3 mL by nebulization every 4 hours if needed for wheezing.      lactobacillus acidophilus (Lactobacillus acidoph-L.bulgar) tablet tablet Take 1 tablet by mouth once daily. 90 tablet 0    leflunomide (Arava) 10 mg tablet TAKE 1 TABLET BY MOUTH ONCE  DAILY 30 tablet 0    levalbuterol (Xopenex) 1.25 mg/3 mL nebulizer solution Take 1 ampule by nebulization 3 times a day. As needed      meloxicam (Mobic) 15 mg tablet TAKE 1 TABLET BY MOUTH ONCE  DAILY 30 tablet 0    metoprolol succinate XL (Toprol-XL) 50 mg 24 hr tablet Take 1 tablet (50 mg) by mouth once daily. Do not crush or chew. Two tablets 50 mg twice daily 90 tablet 3    metroNIDAZOLE (Metrocream) 0.75 % cream Apply topically once daily as needed.      montelukast (Singulair) 10 mg tablet Take 1 tablet (10 mg) by mouth once daily at bedtime.      naloxone (Narcan) 4 mg/0.1 mL nasal spray Administer 1 spray (4 mg) into affected nostril(s) if needed for opioid reversal. May repeat every 2-3 minutes if needed, alternating nostrils, until medical assistance becomes available. 2 each 0    nebulizer and compressor device use q4-6 hours with albuterol PRN cough/wheeze      nebulizers ([facility] Disposable Nebulizer) misc every 4 hours if needed.      omega-3 acid ethyl esters (Lovaza) 1 gram capsule Take 2 capsules (2 grams) by mouth 2 times a day. 360 capsule 3    omeprazole (PriLOSEC) 40 mg DR capsule Take 1 capsule (40 mg) by mouth once daily in the morning. Take before meals. Do not crush or chew. 90 capsule 3    ondansetron ODT (Zofran-ODT) 4 mg disintegrating tablet Dissolve 1 tablet (4 mg) in the mouth every 8 hours if needed for nausea or vomiting. 90 tablet 0    orphenadrine (Norflex) 100 mg 12 hr tablet Take 1 tablet (100 mg) by mouth 2 times a day as needed for muscle spasms. 60 tablet 3    pregabalin (Lyrica) 75 mg capsule Take 1 capsule (75 mg) by mouth 3 times a day. 90 capsule 2    ProAir HFA 90 mcg/actuation inhaler Inhale  "2 puffs every 4 hours if needed.      rizatriptan (Maxalt) 10 mg tablet Take 1 tablet (10 mg) by mouth 1 time if needed for migraine. May repeat in 2 hours if unresolved. Do not exceed 30 mg in 24 hours. 9 tablet 0    semaglutide, weight loss, (Wegovy) 0.25 mg/0.5 mL pen injector Inject 0.25 mg under the skin 1 (one) time per week for 4 doses. 2 mL 0    Spiriva Respimat 1.25 mcg/actuation inhaler Inhale 2 puffs once daily.      syringe with needle, safety 3 mL 23 gauge x 1\" syringe 1 Box see administration instructions. 30 each 3    tezepelumab-ekko (Tezspire) SubQ Pen Injector Inject 210 mg under the skin. Every 4 weeks       No current facility-administered medications for this visit.     "

## 2025-06-21 DIAGNOSIS — F33.41 RECURRENT MAJOR DEPRESSIVE DISORDER, IN PARTIAL REMISSION: ICD-10-CM

## 2025-06-23 ENCOUNTER — APPOINTMENT (OUTPATIENT)
Dept: RHEUMATOLOGY | Facility: CLINIC | Age: 45
End: 2025-06-23
Payer: COMMERCIAL

## 2025-06-23 VITALS
BODY MASS INDEX: 36.16 KG/M2 | SYSTOLIC BLOOD PRESSURE: 151 MMHG | HEART RATE: 87 BPM | DIASTOLIC BLOOD PRESSURE: 90 MMHG | WEIGHT: 224.06 LBS | OXYGEN SATURATION: 99 %

## 2025-06-23 DIAGNOSIS — M77.11 LATERAL EPICONDYLITIS OF RIGHT ELBOW: ICD-10-CM

## 2025-06-23 DIAGNOSIS — M10.9 GOUTY ARTHROPATHY: ICD-10-CM

## 2025-06-23 DIAGNOSIS — L40.50 PSORIATIC ARTHROPATHY (MULTI): Primary | ICD-10-CM

## 2025-06-23 DIAGNOSIS — M76.899 KNEE TENDINITIS: ICD-10-CM

## 2025-06-23 PROCEDURE — 3080F DIAST BP >= 90 MM HG: CPT | Performed by: INTERNAL MEDICINE

## 2025-06-23 PROCEDURE — 3077F SYST BP >= 140 MM HG: CPT | Performed by: INTERNAL MEDICINE

## 2025-06-23 PROCEDURE — 99214 OFFICE O/P EST MOD 30 MIN: CPT | Performed by: INTERNAL MEDICINE

## 2025-06-23 PROCEDURE — 1036F TOBACCO NON-USER: CPT | Performed by: INTERNAL MEDICINE

## 2025-06-23 RX ORDER — METHYLPREDNISOLONE 4 MG/1
TABLET ORAL
Qty: 21 TABLET | Refills: 0 | Status: SHIPPED | OUTPATIENT
Start: 2025-06-23 | End: 2025-06-29

## 2025-06-23 RX ORDER — BUPROPION HYDROCHLORIDE 300 MG/1
TABLET ORAL
Qty: 90 TABLET | Refills: 3 | Status: SHIPPED | OUTPATIENT
Start: 2025-06-23

## 2025-06-23 ASSESSMENT — ENCOUNTER SYMPTOMS
OCCASIONAL FEELINGS OF UNSTEADINESS: 0
LOSS OF SENSATION IN FEET: 1
DEPRESSION: 0

## 2025-06-23 NOTE — PROGRESS NOTES
Follow-up Rheumatology Patient Visit    Chief Complaint:  Nichelle Izaguirre is a 45 y.o. female presenting today for Follow-up and Med Refill.    History of Presenting Problem:   46 y/o female with Hx of psoriatic arthritis, gout, asthma, Parkinsonism/Dystonia, Immunodeficiency disorder (MBL) on prophylaxis antibiotics with amoxicillin presents for follow-up  She is tolerating leflunomide currently taking 10 mg daily now  She is taking allopurinol 400 mg daily  She  is finally getting  Cosentyx consistently   Today she report pain in her right lateral elbow and right knee which has been going on for the last month.  She does use meloxicam as needed she asked if leflunomide can be increased.   Previous meds ;  MTX  Otezla      Problem List:   Patient Active Problem List   Diagnosis    Allergic rhinitis    Anxiety disorder    Arthralgia of multiple sites    Bariatric surgery status    Benign essential hypertension    Bowel disease, inflammatory    Immunodeficiency syndrome (Multi)    Chronic diarrhea    Continuous LLQ abdominal pain    Depression    Essential familial hyperlipidemia    GERD (gastroesophageal reflux disease)    Gouty arthropathy    Hoarseness    Hot flashes    Hyperlipidemia    Hypertriglyceridemia    Hypothyroidism (acquired)    Inflammatory arthropathy    Rheumatoid arthritis    Hypersomnia    Labile hypertension    Low vitamin D level    Lyme disease    Mannose-binding lectin deficiency (Multi)    Methadone use    Migraine without status migrainosus, not intractable    Morbid obesity (Multi)    Mixed incontinence urge and stress    Osteoporosis    Obstructive sleep apnea, adult    DRD (DOPA-responsive dystonia)    Post herpetic neuralgia    Polyphagia    POTS (postural orthostatic tachycardia syndrome)    Prediabetes    Psoriatic arthropathy (Multi)    Steroid withdrawal syndrome with complication    Steroid-dependent asthma (HHS-HCC)    Vitamin B12 deficiency    Vitamin D deficiency    Gait  disturbance    Bilateral leg edema    Cystocele with uterine descensus    Dyspnea on effort    Elbow tendinitis    Foreign body in ear, right, initial encounter    Frequent falls    Hyperglycemia    Hyperuricemia    Incomplete bladder emptying    Insomnia, organic    Intermittent palpitations    Lower back pain    Maxillary sinusitis    Myofascial pain    Orthostatic intolerance    Other ovarian cyst, unspecified side    Pelvic floor weakness    Perioral dermatitis    Peripheral neuropathy    Postural dizziness    Psoriasis vulgaris    Recurrent urinary tract infection    Renal lesion    Right lumbar radiculopathy    Rosacea, unspecified    Sacroiliac joint dysfunction of right side    Snoring    Spasm of thoracic back muscle    Syncope    Urinary frequency    Panic attacks    Facial swelling    Iron deficiency anemia secondary to inadequate dietary iron intake    Idiopathic steatorrhea (HHS-HCC)       Past Medical History:   Past Medical History:   Diagnosis Date    Acute upper respiratory infection, unspecified 07/26/2016    Viral URI with cough    Acute upper respiratory infection, unspecified 11/15/2017    Viral URI with cough    Allergy status to unspecified drugs, medicaments and biological substances     History of allergy    Chronic maxillary sinusitis 08/27/2018    Maxillary sinusitis, unspecified chronicity    Dystonia, unspecified 04/08/2014    Dystonia    Encounter for other screening for malignant neoplasm of breast 01/22/2018    Breast screening    Encounter for screening for respiratory tuberculosis 11/11/2013    Screening examination for pulmonary tuberculosis    Essential (primary) hypertension 12/27/2017    Benign essential hypertension    Hypersomnia, unspecified 04/13/2015    Sleeps too much    Idiopathic sleep related nonobstructive alveolar hypoventilation     Nocturnal hypoxemia    Idiopathic sleep related nonobstructive alveolar hypoventilation 02/09/2018    Nocturnal hypoxia    Impaired  fasting glucose 01/22/2018    Impaired fasting glucose    Insect bite (nonvenomous) of lower back and pelvis, initial encounter 03/29/2018    Tick bite of back    Left lower quadrant pain 01/07/2019    Left lower quadrant pain    Local infection of the skin and subcutaneous tissue, unspecified 07/28/2017    Skin infection    Low back pain, unspecified 05/23/2019    Acute low back pain    Other conditions influencing health status 02/27/2017    Sprain of right thumb, unspecified site of finger, initial encounter    Other malaise 04/16/2018    Malaise and fatigue    Other microscopic hematuria 03/30/2018    Microscopic hematuria    Other specified cough 09/07/2018    Productive cough    Other specified health status 01/07/2019    Acute medical illness    Other symptoms and signs involving the musculoskeletal system 07/12/2018    Weakness of right lower extremity    Other symptoms and signs involving the musculoskeletal system 03/21/2018    Polyarticular joint involvement    Other symptoms and signs involving the musculoskeletal system 07/12/2018    RUE weakness    Pain in right lower leg 01/27/2016    Right calf pain    Pain in unspecified hip 01/20/2016    Hip pain    Pelvic and perineal pain 04/17/2018    Pelvic pain    Personal history of diseases of the skin and subcutaneous tissue 08/07/2018    History of dermatitis    Personal history of other (healed) physical injury and trauma 08/07/2018    History of insect bite    Personal history of other diseases of the circulatory system     History of hypertension    Personal history of other diseases of the female genital tract 11/24/2015    History of menorrhagia    Personal history of other diseases of the musculoskeletal system and connective tissue 03/14/2018    History of tendinitis    Personal history of other diseases of the nervous system and sense organs 04/08/2014    History of Parkinson's disease    Personal history of other diseases of the respiratory system  01/07/2019    History of acute bronchitis    Personal history of other diseases of the respiratory system 10/05/2018    History of paranasal sinus pain    Personal history of other specified conditions 04/13/2015    History of snoring    Personal history of other specified conditions 09/11/2017    History of headache    Personal history of other specified conditions 09/07/2018    History of persistent cough    Personal history of other specified conditions 04/08/2014    History of memory loss    Personal history of other specified conditions 03/26/2018    History of left flank pain    Personal history of other specified conditions     History of heartburn    Personal history of urinary calculi 03/26/2018    Personal history of urinary calculi    Personal history of urinary calculi 03/26/2018    History of renal calculi    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/2017    Plantar fasciitis, left    Primary insomnia 04/13/2015    Primary insomnia    Rash and other nonspecific skin eruption 04/16/2018    Rash    Unspecified abdominal pain 01/07/2019    Left sided abdominal pain    Urinary tract infection, site not specified 10/19/2018    UTI (urinary tract infection), bacterial    Urinary tract infection, site not specified 10/18/2018    Recurrent UTI    Vitamin D deficiency, unspecified 04/22/2016    Vitamin D deficiency    Zoster with other complications 11/25/2015    Herpes zoster dermatitis       Surgical History:   Past Surgical History:   Procedure Laterality Date    CT ANGIO CORONARY ART WITH HEARTFLOW IF SCORE >30%  3/18/2020    CT HEART CORONARY ANGIOGRAM 3/18/2020 [facility]    HAND TENDON SURGERY  11/11/2013    Hand Incision Tendon Sheath Of A Finger    HYSTERECTOMY  03/04/2016    Hysterectomy    LITHOTRIPSY  11/11/2013    Renal Lithotripsy    TONSILLECTOMY  12/19/2013    Tonsillectomy With Adenoidectomy        Allergies:   Allergies   Allergen Reactions    Clindamycin Anaphylaxis    Dupilumab Anaphylaxis     Hydrochlorothiazide Anaphylaxis, Itching and Swelling    Sulfamethoxazole-Trimethoprim Anaphylaxis    Cefazolin Hives, Other and Swelling     STATES TONGUE SPLITS    Atorvastatin Hives    Cephalexin Hives and Swelling     Joint swelling per patient    Clarithromycin Swelling     hives, tongue swelling    Lactose Constipation    Nitrofurantoin Monohyd/M-Cryst Hives    Sulfa (Sulfonamide Antibiotics) Hives    Sulfacetamide Hives       Medications:   Current Outpatient Medications:     albuterol 2.5 mg /3 mL (0.083 %) nebulizer solution, inhale the contents of one vial via nebulizer every 4 hours as needed, Disp: , Rfl:     ALPRAZolam (Xanax) 1 mg tablet, Take 1 tablet (1 mg) by mouth as needed at bedtime for anxiety. Do not fill before June 2, 2025., Disp: 30 tablet, Rfl: 3    amLODIPine (Norvasc) 10 mg tablet, Take 1 tablet (10 mg) by mouth once daily., Disp: 90 tablet, Rfl: 3    amoxicillin (Amoxil) 500 mg capsule, Take 1 capsule (500 mg) by mouth once daily., Disp: , Rfl:     ARIPiprazole (Abilify) 5 mg tablet, Take 1 tablet (5 mg) by mouth once daily., Disp: 30 tablet, Rfl: 2    ascorbic acid, vitamin C, 500 mg capsule, Take 1 capsule by mouth once daily., Disp: , Rfl:     benzoyl peroxide (Benzac AC) 10 % external wash, Apply topically if needed., Disp: , Rfl:     buprenorphine (Butrans) 15 mcg/hour, Place 1 patch on the skin 1 (one) time per week for 28 days., Disp: 4 patch, Rfl: 1    buPROPion XL (Wellbutrin XL) 150 mg 24 hr tablet, TAKE ONE TABLET BY MOUTH ONCE DAILY (DO NOT CRUSH, CHEW, OR SPLIT), Disp: 90 tablet, Rfl: 3    buPROPion XL (Wellbutrin XL) 300 mg 24 hr tablet, TAKE ONE TABLET BY MOUTH EVERY MORNING (DO NOT CRUSH, CHEW, OR SPLIT), Disp: 90 tablet, Rfl: 3    carbidopa-levodopa-entacapone (Stalevo) -200 mg tablet, Take 1 tablet by mouth every 4 hours., Disp: 540 tablet, Rfl: 1    clindamycin (Cleocin T) 1 % lotion, Apply topically once daily as needed., Disp: , Rfl:     clobetasol (Temovate)  0.05 % external solution, Apply topically 2 times a day. As needed, Disp: , Rfl:     clobetasoL 0.05 % lotion, One application as needed for flaring only.not for daily use. For scalp and hands only, Disp: 118 mL, Rfl: 3    cloNIDine (Catapres) 0.1 mg tablet, Take 1 tablet (0.1 mg) by mouth if needed for high blood pressure., Disp: , Rfl:     cloNIDine (Catapres-TTS) 0.3 mg/24 hr patch, Apply one patach on the skin and replace every 7 days, as directed, Disp: 4 patch, Rfl: 11    Cosentyx Pen, 2 Pens, 150 mg/mL self-injector pen, Inject 2 pens (300 mg) under the skin every 30 (thirty) days., Disp: 2 mL, Rfl: 2    cyanocobalamin (Vitamin B-12) 1,000 mcg/mL injection, Inject 1 mL (1,000 mcg) into the muscle see administration instructions., Disp: 30 mL, Rfl: 2    diclofenac sodium (Voltaren) 1 % gel gel, Apply 4.5 inches (4 g) topically 2 times a day as needed., Disp: , Rfl:     DULoxetine (Cymbalta) 60 mg DR capsule, Take 2 capsules (120 mg) by mouth once daily. Do not crush or chew., Disp: 180 capsule, Rfl: 1    eplerenone (Inspra) 25 mg tablet, Take 1 tablet (25 mg) by mouth once daily., Disp: , Rfl:     evolocumab (Repatha SureClick) 140 mg/mL injection, Inject 1 mL (140 mg) under the skin every 14 (fourteen) days., Disp: 6 mL, Rfl: 3    fluticasone furoate-vilanteroL (Breo Ellipta) 200-25 mcg/dose inhaler, 1puff daily, Inhalation, Disp: , Rfl:     ipratropium-albuteroL (Duo-Neb) 0.5-2.5 mg/3 mL nebulizer solution, Take 3 mL by nebulization every 4 hours if needed for wheezing., Disp: , Rfl:     lactobacillus acidophilus (Lactobacillus acidoph-L.bulgar) tablet tablet, Take 1 tablet by mouth once daily., Disp: 90 tablet, Rfl: 0    leflunomide (Arava) 10 mg tablet, TAKE 1 TABLET BY MOUTH ONCE  DAILY, Disp: 30 tablet, Rfl: 0    levalbuterol (Xopenex) 1.25 mg/3 mL nebulizer solution, Take 3 mL (1.25 mg) by nebulization 3 times a day. As needed, Disp: , Rfl:     meloxicam (Mobic) 15 mg tablet, TAKE 1 TABLET BY MOUTH ONCE  " DAILY, Disp: 30 tablet, Rfl: 0    metoprolol succinate XL (Toprol-XL) 50 mg 24 hr tablet, Take 1 tablet (50 mg) by mouth once daily. Do not crush or chew. Two tablets 50 mg twice daily, Disp: 90 tablet, Rfl: 3    metroNIDAZOLE (Metrocream) 0.75 % cream, Apply topically once daily as needed., Disp: , Rfl:     montelukast (Singulair) 10 mg tablet, Take 1 tablet (10 mg) by mouth once daily at bedtime., Disp: , Rfl:     naloxone (Narcan) 4 mg/0.1 mL nasal spray, Administer 1 spray (4 mg) into affected nostril(s) if needed for opioid reversal. May repeat every 2-3 minutes if needed, alternating nostrils, until medical assistance becomes available., Disp: 2 each, Rfl: 0    nebulizer and compressor device, use q4-6 hours with albuterol PRN cough/wheeze, Disp: , Rfl:     nebulizers (Amimon Disposable Nebulizer) misc, every 4 hours if needed., Disp: , Rfl:     omega-3 acid ethyl esters (Lovaza) 1 gram capsule, Take 2 capsules (2 grams) by mouth 2 times a day., Disp: 360 capsule, Rfl: 3    omeprazole (PriLOSEC) 40 mg DR capsule, Take 1 capsule (40 mg) by mouth once daily in the morning. Take before meals. Do not crush or chew., Disp: 90 capsule, Rfl: 3    ondansetron ODT (Zofran-ODT) 4 mg disintegrating tablet, Dissolve 1 tablet (4 mg) in the mouth every 8 hours if needed for nausea or vomiting., Disp: 90 tablet, Rfl: 0    ProAir HFA 90 mcg/actuation inhaler, Inhale 2 puffs every 4 hours if needed., Disp: , Rfl:     Spiriva Respimat 1.25 mcg/actuation inhaler, Inhale 2 puffs once daily., Disp: , Rfl:     syringe with needle, safety 3 mL 23 gauge x 1\" syringe, 1 Box see administration instructions., Disp: 30 each, Rfl: 3    tezepelumab-ekko (Tezspire) SubQ Pen Injector, Inject 210 mg under the skin. Every 4 weeks, Disp: , Rfl:     ALPRAZolam (Xanax) 1 mg tablet, Take 1 tablet (1 mg) by mouth as needed at bedtime for anxiety., Disp: 30 tablet, Rfl: 2    cetirizine-pseudoephedrine (ZyrTEC-D) 5-120 mg 12 hr tablet, Take 1 " tablet by mouth 2 times a day., Disp: 180 tablet, Rfl: 0    ezetimibe (Zetia) 10 mg tablet, Take 1 tablet (10 mg) by mouth once daily., Disp: 90 tablet, Rfl: 3    methylPREDNISolone (Medrol Dospak) 4 mg tablets, Follow schedule on package instructions, Disp: 21 tablet, Rfl: 0    orphenadrine (Norflex) 100 mg 12 hr tablet, Take 1 tablet (100 mg) by mouth 2 times a day as needed for muscle spasms., Disp: 60 tablet, Rfl: 3    pregabalin (Lyrica) 75 mg capsule, Take 1 capsule (75 mg) by mouth 3 times a day., Disp: 90 capsule, Rfl: 2    rizatriptan (Maxalt) 10 mg tablet, Take 1 tablet (10 mg) by mouth 1 time if needed for migraine. May repeat in 2 hours if unresolved. Do not exceed 30 mg in 24 hours., Disp: 9 tablet, Rfl: 0    semaglutide, weight loss, (Wegovy) 0.25 mg/0.5 mL pen injector, Inject 0.25 mg under the skin 1 (one) time per week for 4 doses., Disp: 2 mL, Rfl: 0      Objective   Physical Examination:    Visit Vitals  /90 (BP Location: Right arm, Patient Position: Sitting)   Pulse 87   Wt 102 kg (224 lb 1 oz)   LMP 05/05/2025 (Approximate)   SpO2 99%   BMI 36.16 kg/m²   OB Status Having periods   Smoking Status Never   BSA 2.18 m²       Procedures :None    Orders:  Orders Placed This Encounter   Procedures    CBC and Auto Differential    C-Reactive Protein    Sedimentation Rate    Hepatic Function Panel    Creatinine    Calcium    Uric Acid        Provider Impression:   Assessment/Plan   Encounter Diagnoses   Name Primary?    Psoriatic arthropathy (Multi) Yes    Gouty arthropathy     Lateral epicondylitis of right elbow     Knee tendinitis          46 y/o female with Hx of asthma, Parkinsonism/Dystonia, Immunodeficiency disorder (MBL) on prophylaxis antibiotics with amoxicillin present for pain in the right elbow and bilateral knee. Workup shows elevated uric acid levels and positive Family History on the maternal side. underlying gout and possible underlying PSA. MRI show severe lateral epicondylitis.   Diagnosis was made of underlying PSA and gout  -Start medrol dose ana m   -Continue Cosentyx 300 mg monthly  -Continue leflunomide 10 mg, may consider increase if sign of overt inflammation from blood work.  we have been conservative given her history of immunodeficiency disorder on prophylactic antibiotics  -Continue Allopurinol 400 mg daily  -Continue Meloxicam 15 mg daily as needed   -Reviewed recent labs will check uric acid vitamin D and inflammatory markers  -F/u in 4 months

## 2025-06-24 LAB
ALBUMIN SERPL-MCNC: 4.5 G/DL (ref 3.6–5.1)
ALBUMIN/GLOB SERPL: 1.6 (CALC) (ref 1–2.5)
ALP SERPL-CCNC: 76 U/L (ref 31–125)
ALT SERPL-CCNC: 20 U/L (ref 6–29)
AST SERPL-CCNC: 18 U/L (ref 10–35)
BASOPHILS # BLD AUTO: 31 CELLS/UL (ref 0–200)
BASOPHILS NFR BLD AUTO: 0.8 %
BILIRUB DIRECT SERPL-MCNC: 0.1 MG/DL
BILIRUB INDIRECT SERPL-MCNC: 0.3 MG/DL (CALC) (ref 0.2–1.2)
BILIRUB SERPL-MCNC: 0.4 MG/DL (ref 0.2–1.2)
CALCIUM SERPL-MCNC: 9.6 MG/DL (ref 8.6–10.2)
CREAT SERPL-MCNC: 0.51 MG/DL (ref 0.5–0.99)
CRP SERPL-MCNC: <3 MG/L
EGFRCR SERPLBLD CKD-EPI 2021: 116 ML/MIN/1.73M2
EOSINOPHIL # BLD AUTO: 20 CELLS/UL (ref 15–500)
EOSINOPHIL NFR BLD AUTO: 0.5 %
ERYTHROCYTE [DISTWIDTH] IN BLOOD BY AUTOMATED COUNT: 13.5 % (ref 11–15)
ERYTHROCYTE [SEDIMENTATION RATE] IN BLOOD BY WESTERGREN METHOD: 11 MM/H
GLOBULIN SER CALC-MCNC: 2.9 G/DL (CALC) (ref 1.9–3.7)
HCT VFR BLD AUTO: 39.7 % (ref 35–45)
HGB BLD-MCNC: 13 G/DL (ref 11.7–15.5)
LYMPHOCYTES # BLD AUTO: 1864 CELLS/UL (ref 850–3900)
LYMPHOCYTES NFR BLD AUTO: 47.8 %
MCH RBC QN AUTO: 30 PG (ref 27–33)
MCHC RBC AUTO-ENTMCNC: 32.7 G/DL (ref 32–36)
MCV RBC AUTO: 91.5 FL (ref 80–100)
MONOCYTES # BLD AUTO: 460 CELLS/UL (ref 200–950)
MONOCYTES NFR BLD AUTO: 11.8 %
NEUTROPHILS # BLD AUTO: 1525 CELLS/UL (ref 1500–7800)
NEUTROPHILS NFR BLD AUTO: 39.1 %
PLATELET # BLD AUTO: 267 THOUSAND/UL (ref 140–400)
PMV BLD REES-ECKER: 8.9 FL (ref 7.5–12.5)
PROT SERPL-MCNC: 7.4 G/DL (ref 6.1–8.1)
RBC # BLD AUTO: 4.34 MILLION/UL (ref 3.8–5.1)
URATE SERPL-MCNC: 3.4 MG/DL (ref 2.5–7)
WBC # BLD AUTO: 3.9 THOUSAND/UL (ref 3.8–10.8)

## 2025-06-26 LAB
BASOPHILS # BLD AUTO: 30 CELLS/UL (ref 0–200)
BASOPHILS NFR BLD AUTO: 0.8 %
C TETANI TOXOID AB SER-ACNC: 0.72 IU/ML
EOSINOPHIL # BLD AUTO: 30 CELLS/UL (ref 15–500)
EOSINOPHIL NFR BLD AUTO: 0.8 %
ERYTHROCYTE [DISTWIDTH] IN BLOOD BY AUTOMATED COUNT: 13.5 % (ref 11–15)
HCT VFR BLD AUTO: 40.1 % (ref 35–45)
HGB BLD-MCNC: 12.8 G/DL (ref 11.7–15.5)
IGA SERPL-MCNC: 198 MG/DL (ref 47–310)
IGG SERPL-MCNC: 1091 MG/DL (ref 600–1640)
IGG SERPL-MCNC: 1237 MG/DL (ref 600–1640)
IGG1 SER-MCNC: 478 MG/DL (ref 382–929)
IGG2 SER-MCNC: 543 MG/DL (ref 241–700)
IGG3 SER-MCNC: 51 MG/DL (ref 22–178)
IGG4 SER-MCNC: 34.7 MG/DL (ref 4–86)
IGM SERPL-MCNC: 61 MG/DL (ref 50–300)
LYMPHOCYTES # BLD AUTO: 1824 CELLS/UL (ref 850–3900)
LYMPHOCYTES NFR BLD AUTO: 48 %
MCH RBC QN AUTO: 29 PG (ref 27–33)
MCHC RBC AUTO-ENTMCNC: 31.9 G/DL (ref 32–36)
MCV RBC AUTO: 90.9 FL (ref 80–100)
MONOCYTES # BLD AUTO: 467 CELLS/UL (ref 200–950)
MONOCYTES NFR BLD AUTO: 12.3 %
NEUTROPHILS # BLD AUTO: 1448 CELLS/UL (ref 1500–7800)
NEUTROPHILS NFR BLD AUTO: 38.1 %
PLATELET # BLD AUTO: 273 THOUSAND/UL (ref 140–400)
PMV BLD REES-ECKER: 8.9 FL (ref 7.5–12.5)
RBC # BLD AUTO: 4.41 MILLION/UL (ref 3.8–5.1)
S PN DA SERO 19F IGG SER-MCNC: 0.9 UG/ML
S PNEUM DA 1 IGG SER-MCNC: 2.8 UG/ML
S PNEUM DA 10A IGG SER-MCNC: 14.3 UG/ML
S PNEUM DA 11A IGG SER-MCNC: 34.3 UG/ML
S PNEUM DA 12F IGG SER-MCNC: 11 UG/ML
S PNEUM DA 14 IGG SER-MCNC: 8.6
S PNEUM DA 15B IGG SER-MCNC: 2.5 UG/ML
S PNEUM DA 17F IGG SER-MCNC: 4.9 UG/ML
S PNEUM DA 18C IGG SER-MCNC: 27.2
S PNEUM DA 19A IGG SER-MCNC: 31.2 UG/ML
S PNEUM DA 2 IGG SER-MCNC: 1.8 UG/ML
S PNEUM DA 20A IGG SER-MCNC: 3.4 UG/ML
S PNEUM DA 22F IGG SER-MCNC: 20.5 UG/ML
S PNEUM DA 23F IGG SER-MCNC: 12.3 UG/ML
S PNEUM DA 3 IGG SER-MCNC: 0.6 UG/ML
S PNEUM DA 33F IGG SER-MCNC: 16.7 UG/ML
S PNEUM DA 4 IGG SER-MCNC: 2.2 UG/ML
S PNEUM DA 5 IGG SER-MCNC: >154 UG/ML
S PNEUM DA 6B IGG SER-MCNC: 62.1 UG/ML
S PNEUM DA 7F IGG SER-MCNC: 3.9 UG/ML
S PNEUM DA 8 IGG SER-MCNC: 21.1 UG/ML
S PNEUM DA 9N IGG SER-MCNC: <0.3 UG/ML
S PNEUM DA 9V IGG SER-MCNC: 2.7 UG/ML
WBC # BLD AUTO: 3.8 THOUSAND/UL (ref 3.8–10.8)

## 2025-07-02 DIAGNOSIS — J32.9 RECURRENT SINUS INFECTIONS: Primary | ICD-10-CM

## 2025-07-10 ENCOUNTER — SPECIALTY PHARMACY (OUTPATIENT)
Dept: PHARMACY | Facility: CLINIC | Age: 45
End: 2025-07-10

## 2025-07-10 PROCEDURE — RXMED WILLOW AMBULATORY MEDICATION CHARGE

## 2025-07-14 ENCOUNTER — OFFICE VISIT (OUTPATIENT)
Dept: PAIN MEDICINE | Facility: HOSPITAL | Age: 45
End: 2025-07-14
Payer: COMMERCIAL

## 2025-07-14 ENCOUNTER — SPECIALTY PHARMACY (OUTPATIENT)
Dept: PHARMACY | Facility: CLINIC | Age: 45
End: 2025-07-14

## 2025-07-14 VITALS
BODY MASS INDEX: 36.35 KG/M2 | DIASTOLIC BLOOD PRESSURE: 93 MMHG | HEIGHT: 66 IN | OXYGEN SATURATION: 97 % | WEIGHT: 226.2 LBS | SYSTOLIC BLOOD PRESSURE: 146 MMHG | HEART RATE: 98 BPM | RESPIRATION RATE: 18 BRPM

## 2025-07-14 DIAGNOSIS — M62.830 SPASM OF THORACIC BACK MUSCLE: ICD-10-CM

## 2025-07-14 DIAGNOSIS — M25.50 ARTHRALGIA OF MULTIPLE SITES: Primary | ICD-10-CM

## 2025-07-14 DIAGNOSIS — M77.8 ELBOW TENDINITIS: ICD-10-CM

## 2025-07-14 DIAGNOSIS — M10.9 GOUTY ARTHROPATHY: ICD-10-CM

## 2025-07-14 PROCEDURE — 99214 OFFICE O/P EST MOD 30 MIN: CPT | Performed by: CLINICAL NURSE SPECIALIST

## 2025-07-14 PROCEDURE — 3077F SYST BP >= 140 MM HG: CPT | Performed by: CLINICAL NURSE SPECIALIST

## 2025-07-14 PROCEDURE — 3080F DIAST BP >= 90 MM HG: CPT | Performed by: CLINICAL NURSE SPECIALIST

## 2025-07-14 PROCEDURE — 1036F TOBACCO NON-USER: CPT | Performed by: CLINICAL NURSE SPECIALIST

## 2025-07-14 PROCEDURE — 3008F BODY MASS INDEX DOCD: CPT | Performed by: CLINICAL NURSE SPECIALIST

## 2025-07-14 ASSESSMENT — ENCOUNTER SYMPTOMS
LOSS OF SENSATION IN FEET: 0
DEPRESSION: 0
OCCASIONAL FEELINGS OF UNSTEADINESS: 1

## 2025-07-14 ASSESSMENT — PATIENT HEALTH QUESTIONNAIRE - PHQ9
SUM OF ALL RESPONSES TO PHQ9 QUESTIONS 1 AND 2: 0
1. LITTLE INTEREST OR PLEASURE IN DOING THINGS: NOT AT ALL
2. FEELING DOWN, DEPRESSED OR HOPELESS: NOT AT ALL

## 2025-07-14 ASSESSMENT — PAIN SCALES - GENERAL: PAINLEVEL_OUTOF10: 4

## 2025-07-14 NOTE — ASSESSMENT & PLAN NOTE
45-year-old female with a complicated medical history and multiple comorbid conditions presenting for follow-up of back pain radiating from mid back through lumbar spine as well as widespread polyarticular pain most bothersome to bilateral elbows.  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  Continues to experience exacerbations of thoracic pain/dystonia.  Most bothered at today's visit by an increase in thoracic dystonia.  Also continues to be most bothered by elbow pain.  Continues to follow closely with rheumatology and is maintained on Cosentyx, leflunomide and allopurinol.  Recently added meloxicam which she feels has been beneficial.  Patient transitioned from Nucynta to buprenorphine 10 mcg/h transdermal patch weekly to prevent tolerance and hyperalgesia.  She did not feel the buprenorphine 10 mcg/h transdermal patch was providing 7 days worth of relief and was experiencing 2 days with decreased pain relief.  Increased buprenorphine to 15 mcg/h transdermal patch weekly.  Patient states that she does feel that it provides some relief, however, she is having significant difficulty with patch adherence due to excessive sweating.  She has tried multiple tapes as well as Tegaderm without success. Continued benefit with Lyrica, orphenadrine and duloxetine.  She also feels that the NSAID compounding cream remains beneficial.  She has not received her TENS unit to treat thoracic and lumbar symptoms.  I do think the TENS unit will help with dystonia symptoms.  Will provide the patient with a contact number for [email or web address] so that she may coordinate delivery of her TENS unit. Previously provided the patient with a referral to physical therapy to help with polyarticular pain as well as back pain.  She does not want to pursue physical therapy at this time as she feels that she is extremely immunocompromised.  She may pursue physical therapy in the future.  Advised patient to learn home  physical therapy exercises and do them consistently at home.  Discussed that she may benefit from injections depending on results of physical therapy.  She would like to avoid steroid injections at this time. She also will continue to follow closely with rheumatology and dermatology.  At this time, secondary to significant difficulty with patch adherence, we will rotate patient to Belbuca 150 mcg twice daily.  Continue meloxicam, Lyrica, orphenadrine and duloxetine.  Patient will also continue NSAID compounding cream. Plan discussed with patient at today's office visit.    - Rotate the patient from buprenorphine transdermal patch to Belbuca 150 mcg buccal films every 12 hours.  Reviewed application of buccal films.  Reviewed potential side effects.  - Advised patient to consider starting a formal course of physical therapy to learn home therapy exercises and stretches and then do these exercises consistently for optimal pain relief.       -Patient may benefit from epidural steroid injections targeting lumbar radicular symptoms.  She would like to hold off on injections at this time.    -Patient will continue Lyrica, duloxetine and orphenadrine as ordered.  She continues to tolerate these medications without adverse effects.   -Recommended she utilize oral NSAIDs sparingly. The patient was cautioned about possible side effects and risks of this medication including stomach upset, stomach ulcers, kidney risks and cardiovascular risks to include hypertension and slightly increased risks of stroke and myocardial infarction. Also discussed were ways to minimize these risks by taking this medication with food, staying well-hydrated, watching for any hypertension, and taking the medication with a stomach protectant such as pepcid, zantac, or a proton pump inhibitor.   -Continue NSAID compounding cream.  - Provided patient with contact information for [facility] [employer] nex wave electrotherapy unit.   -Patient will follow-up in 6-8  weeks for reevaluation.     MEDICAL DECISION MAKING:    My impressions and treatment recommendations were discussed in detail with the patient, who verbalized understanding and had no further questions prior to discharge. Given the patient's report o difficulty of difficulty with patch adherence, it is reasonable to rotate the patient to Belbuca 150 mcg buccal film every 12 hours. The terms of the opioid agreement as well as the potential risks and adverse effects of the patient's medication regimen were discussed in detail. This includes if applicable due to dosage of medication permission to discuss and coordinate care with other treatment providers relevant to the patients condition. The patient verbalized understanding.    Treatment goals were discussed in detail with the patient.  These goals include reduction of pain levels, improved levels of functioning, avoidance of medication side effect and lowest medication dose possible to achieve  these goals.  The patient was in full agreement with these goals.  Also discussed is the understanding that pain may not be eliminated by medication but that the goal of a better sustaining life through use of medication is appropriate.  Lifestyle modifications including weight management, stretching, diet, exercise and smoking are addressed at each office visit.  The patient provided a urine drug screen for routine monitoring and compliance.  This test may be given as frequently as every month based on the patient's individual opiate risk stratification and prescriber concerns for any aberrancies.  This test is indicated given the use of controlled substances for the patient's medical condition.  Unless otherwise noted the prior (s) urine drug testing results were consistent with prescribed medications.  There is no evidence of illicit drug use or additional medications ingested.     Risks and side effects of chronic opioid therapy including but not limited to tolerance,  dependence, constipation, hyperalgesia, cognitive side effects, addiction and possible death due to overuse and or misuse were discussed. I also discussed that such medications when co-administered with other sedative agents including but not limited to alcohol, benzodiazepines, sedative hypnotics and illegal drugs could pose life threatening consequences including death.  I also explained the impact that the administration of such medication has on a patient with obstructive sleep apnea and continued recommendations for use of apnea devices if ordered are prescribed by other physicians.  In order to effectively and safely treat the pain, I also emphasized the importance of compliance with the treatment plan as well as compliance with the terms of the opioid agreement which was reviewed in detail. I explained the importance of being responsible with the medications and to take these only as prescribed, never in excess and never for reasons other than pain reduction. The patient was counseled on keeping the medications safe and locked away from children and other adults as well as disposal methods and options. The patient understood the risks and instructions.      I also discussed with the patient in detail that based on the clinical response to the opioid medications and improvements of activities of daily living, sleep, and work performance in light of compliance with the treatment plan we can continue this form of therapy for the above chronic  pain.  The goal and rationale used for current treatment with chronic opioid medication is to control the pain and alleviate disability induced by the chronic pain condition noted above after failures of other non-opioid and nonpharmacological modalities to treat the chronic pain and the symptoms associated with have failed.  The patient understood the goals in terms of the above treatment plan and had no further questions prior to leaving the office today.    Given the  patient's total MED, general use of daily opiates, or other coadministered medications in various classes the patient was offered a prescription for Narcan.  I instructed the patient that Narcan is for emergency use only and is worse suspected or known opiate overdose.  Call 911 prior to administration of this medication to activate the emergency response team.  It is imperative to fill this medication in order to demonstrate understanding of the gravity of possible side effects including respiratory depression and risk of overdose of this opiate load or medication combination.  As such patients will be required to bring Narcan prescriptions to follow-up appointments as part of the compliance with continued opiate care.    Disclaimer: This note was transcribed using an audio transcription device.  As such, minor errors may be present with regard to spelling, punctuation, and inadvertent word insertion.  Please disregard such errors.

## 2025-07-14 NOTE — PROGRESS NOTES
Follow Up Visit    Location of Pain: dystonia mid back to upper breast area. Right elbow arthritic pain  Began buprenorphine 3 mths ago -doesn't seem to help, patch does not stay on her arm. Has tried water proof tape and alcohol wipes       Pain Score: 4/10    Treatment: buprenorphine 15 mcg/hr  Last dose: last Sunday 7/6/25  Medication Count:   0  Fill date: 6/4/2025    Efficacy: 40%    Side Effects: none    Narcan: Exp 2/2025    Other pain medication/neuromodulator: tylenol, ibuprofen prn, voltaren prn to elbow and back, meloxicam prn, lyrica, norflex    Injections and/or Procedures: none    Other: none, heating pad, tried accupuncture-made dystonia worse    Genetic Swab:  Urine Screen: 4/17/25  Narcotics Agreement: 4/17/25  ORT: 4/17/25  PDUQ: 4/17/25  OA: 4/17/25    Subjective   Patient ID: Nichelle Izaguirre is a 45 y.o. female who presents for widespread polyarticular pain, mid back/thoracic pain and dystonia.  HPI    45-year-old female presents for follow-up of widespread pain affecting multiple joints, cervical, thoracic and lumbar spine as well as myofascial component of pain.  Medical history complicated by psoriatic arthritis, gout, hypertension, GUICHO, irritable bowel disease, asthma, Parkinson's/dystonia, immune deficiency syndrome, anxiety.  Most recent imaging including an MRI from 2021 consistent with lumbar lordosis, normal disc height, small central disc bulge at L5-S1 and L4-5 with no significant foraminal stenosis; moderate lateral recess stenosis at L4-5.  We have discussed injections targeting lumbar symptoms in the past.  Patient wanted to hold off on steroid injection secondary to immune deficiency.  Sent for formal course of physical therapy targeting her polyarticular symptoms as well as widespread pain.  She is followed closely by cardiology, rheumatology and neurology.  Currently maintained on Cosentyx, leflunomide and allopurinol.  Transition patient to buprenorphine and recently  increased her dose to 15 mcg/h transdermal patch weekly due to end of dose failure with her previous dose.  Also maintained on meloxicam, Lyrica, orphenadrine, duloxetine and NSAID compounding cream.  Added a [employer] electrotherapy unit to target her thoracic dystonia.  Presenting at today's office visit for routine follow-up.  She is currently experiencing an exacerbation of her thoracic/dystonia.  Pain wraps around anteriorly into her mid abdomen.  Describes this pain as squeezing accompanied by spasms.  She denies any upper extremity numbness/tingling or increasing weakness.  Continues to experience significant polyarticular pain most bothersome to her elbows.  She also states that she has noted an increase in pain from her cervical through her lumbar spine.  She denies any radiating symptoms into her lower extremities.  She has intermittent numbness and tingling in her feet.  Denies any worsening weakness which she describes as widespread.  Denies any changes in her bowel/bladder function.  As far as medication, the patient is having difficulty with patch adherence secondary to profuse sweating.  She states that she has tried multiple types of tape and Tegaderm dressings without improvement.  Continued benefit with Lyrica, orphenadrine, duloxetine and meloxicam.  She also feels that NSAID compounding cream remains beneficial for polyarticular symptoms.  She has not yet received her Z[employer]nex electrotherapy unit.  She was not able to proceed with physical therapy.  States that she has had multiple sinus infections and due to her immunodeficiency was fearful to be out in public physical therapy.  She has experienced 1 fall since her last office visit due to gait disturbance which she attributes to her Parkinson's.  Continues to take a low-dose of Xanax for anxiety/panic attacks. Continues to follow with psychiatry/counseling.    OARRS:  Santa Dunn, APRN-CNP, APRN-CNS on 7/14/2025  2:01 PM  I have personally reviewed  the OARRS report for Nichelle Izaguirre. I have considered the risks of abuse, dependence, addiction and diversion    Is the patient prescribed a combination of a benzodiazepine and opioid?  Yes, I feel it is clincially indicated to continue the medication and have discussed with the patient risks/benefits/alternatives.    Last Urine Drug Screen / ordered today: No  Recent Results (from the past 8760 hours)   Buprenorphine Confirm,Urine    Collection Time: 04/17/25  1:10 PM   Result Value Ref Range    Buprenorphine 10 (H) <2 ng/mL    Norbuprenorphine 5 (H) <2 ng/mL    Naloxone NEGATIVE <2 ng/mL    Buprenorphine Comments     Tapentadol Confirmation, Urine    Collection Time: 04/17/25  1:10 PM   Result Value Ref Range    Tapentadol NEGATIVE <50 ng/mL    Nortapentadol NEGATIVE <50 ng/mL    Tapentadol Comments      Notes and Comments     Opiate/Opioid/Benzo Prescription Compliance    Collection Time: 04/17/25  1:10 PM   Result Value Ref Range    Creatinine 160.4 > or = 20.0 mg/dL    pH 6.8 4.5 - 9.0    Oxidant NEGATIVE <200 mcg/mL    Amphetamines NEGATIVE <500 ng/mL    Barbiturates NEGATIVE <300 ng/mL    Cocaine Metabolite NEGATIVE <150 ng/mL    Marijuana Metabolite NEGATIVE <20 ng/mL    Phencyclidine NEGATIVE <25 ng/mL    Alphahydroxyalprazolam NEGATIVE <25 ng/mL    Alphahydroxymidazolam NEGATIVE <50 ng/mL    Alphahydroxytriazolam NEGATIVE <50 ng/mL    Aminoclonazepam NEGATIVE <25 ng/mL    Hydroxyethylflurazepam NEGATIVE <50 ng/mL    Lorazepam NEGATIVE <50 ng/mL    Nordiazepam NEGATIVE <50 ng/mL    Oxazepam NEGATIVE <50 ng/mL    Temazepam NEGATIVE <50 ng/mL    Benzodiazepines Comments      Fentanyl NEGATIVE <0.5 ng/mL    Norfentanyl NEGATIVE <0.5 ng/mL    Fentanyl Comments      6 Acetylmorphine NEGATIVE <10 ng/mL    Heroin Metab Comments      EDDP NEGATIVE <100 ng/mL    Methadone NEGATIVE <100 ng/mL    Methadone Comments      Codeine NEGATIVE <50 ng/mL    Hydrocodone NEGATIVE <50 ng/mL    Hydromorphone NEGATIVE <50  ng/mL    Morphine NEGATIVE <50 ng/mL    Norhydrocodone NEGATIVE <50 ng/mL    Opiates Comments      Noroxycodone INTERFERENCE (A) <50 ng/mL    Oxycodone NEGATIVE <50 ng/mL    Oxymorphone NEGATIVE <50 ng/mL    Oxycodone Comments      Desmethyltramadol NEGATIVE <100 ng/mL    Tramadol NEGATIVE <100 ng/mL    Tramadol Comments      Zolpidem NEGATIVE <5 ng/mL    Zolpidem Metabolite NEGATIVE <5 ng/mL    Zolpidem Comments     Drug Screen, Urine With Reflex to Confirmation    Collection Time: 11/21/24  5:20 PM   Result Value Ref Range    Amphetamine Screen, Urine Presumptive Negative Presumptive Negative    Barbiturate Screen, Urine Presumptive Negative Presumptive Negative    Benzodiazepines Screen, Urine Presumptive Negative Presumptive Negative    Cannabinoid Screen, Urine Presumptive Negative Presumptive Negative    Cocaine Metabolite Screen, Urine Presumptive Negative Presumptive Negative    Fentanyl Screen, Urine Presumptive Negative Presumptive Negative    Opiate Screen, Urine Presumptive Negative Presumptive Negative    Oxycodone Screen, Urine Presumptive Negative Presumptive Negative    PCP Screen, Urine Presumptive Negative Presumptive Negative    Methadone Screen, Urine Presumptive Negative Presumptive Negative   Screen Opiate/Opioid/Benzo Prescription Compliance    Collection Time: 11/05/24 11:30 AM   Result Value Ref Range    Creatinine, Urine Random 88.0 20.0 - 320.0 mg/dL    Amphetamine Screen, Urine Presumptive Negative Presumptive Negative    Barbiturate Screen, Urine Presumptive Negative Presumptive Negative    Cannabinoid Screen, Urine Presumptive Negative Presumptive Negative    Cocaine Metabolite Screen, Urine Presumptive Negative Presumptive Negative    PCP Screen, Urine Presumptive Negative Presumptive Negative     Results are as expected.     Controlled Substance Agreement:  Date of the Last Agreement:  4/17/25  Reviewed Controlled Substance Agreement including but not limited to the benefits, risks,  "and alternatives to treatment with a Controlled Substance medication(s).    Monitoring and compliance:    ORT: 4/17/25    PDUQ: 4/17/25    Office Agreement: 4/17/25      Review of Systems    ROS:   General: No fevers, chills, weight loss  Skin: Negative for lesions  Eyes: No acute vision changes  Ears: No vertigo  Nose, mouth, throat: No difficulty swallowing or speaking  Respiratory: No cough, shortness of breath, cyanosis  Cardiovascular: Negative for chest pain syncope or palpitation  Gastrointestinal: No constipation, nausea, vomiting  Neurological: Negative for headache, positive for neuralgia/dystonia  Psychological: Negative for severe or debilitating anxiety, depression. Negative memory loss  Musculoskeletal: Positive for arthralgia, myalgia, pain and spasms, positive for joint stiffness and swelling  Endocrine: Negative for weight gain, appetite changes, excessive sweating  Allergy/immune: Negative    All 13 systems were reviewed and are within normal levels except as noted or in the history of present illness.  Positive or pertinent negative responses are noted or were in the history of present illness. As noted, the patient denies significant or impairing weakness in the bilateral upper and lower extremities, medication induced constipation, and bowel or bladder incontinence.   Current Medications[1]     Medical History[2]     Surgical History[3]     Family History[4]     RX Allergies[5]     Objective     Visit Vitals  BP (!) 146/93   Pulse 98   Resp 18   Ht 1.676 m (5' 6\")   Wt 103 kg (226 lb 3.2 oz)   LMP 05/05/2025 (Approximate)   SpO2 97%   BMI 36.51 kg/m²   OB Status Having periods   Smoking Status Never   BSA 2.19 m²        Physical Exam    PE:  General: Well-developed, well-nourished, no acute distress. The patient demonstrates no pain behavior, symptom magnification or overt drug-seeking behavior.  Eye: Pupils appropriate for room lighting  Neck/thyroid: No obvious goiter or enlargement of neck " noted  Respiratory exam: Normal respiratory effort, unlabored respiration. No accessory muscle use noted  Cardiac exam: Bilateral radial pulses intact  Abdominal: Nondistended  Spine, lumbar: The patient is able to rise from a seated to standing position without hesitancy, push off, or delay. Gait is slow and deliberate.  Tenderness to paraspinous musculature mid back/thoracic through lower lumbar region bilaterally.  Flexion intact with extension more limited.  Negative straight leg raise.  Widespread polyarticular pain most bothersome to her right elbow with mild swelling.  Pain with active/passive range of motion right elbow.  Pain over right lateral epicondyle.  Neurologic exam: Muscle strength is antigravity in all 4 extremities.  Psychiatric exam: Judgment and insight normal, affect normal, speech is fluent, affect appropriate, demonstrating no signs of hypersomnolence, sedation, or confusion      Assessment/Plan   Problem List Items Addressed This Visit           ICD-10-CM    Arthralgia of multiple sites - Primary M25.50    45-year-old female with a complicated medical history and multiple comorbid conditions presenting for follow-up of back pain radiating from mid back through lumbar spine as well as widespread polyarticular pain most bothersome to bilateral elbows.  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  Continues to experience exacerbations of thoracic pain/dystonia.  Most bothered at today's visit by an increase in thoracic dystonia.  Also continues to be most bothered by elbow pain.  Continues to follow closely with rheumatology and is maintained on Cosentyx, leflunomide and allopurinol.  Recently added meloxicam which she feels has been beneficial.  Patient transitioned from Nucynta to buprenorphine 10 mcg/h transdermal patch weekly to prevent tolerance and hyperalgesia.  She did not feel the buprenorphine 10 mcg/h transdermal patch was providing 7 days worth of  relief and was experiencing 2 days with decreased pain relief.  Increased buprenorphine to 15 mcg/h transdermal patch weekly.  Patient states that she does feel that it provides some relief, however, she is having significant difficulty with patch adherence due to excessive sweating.  She has tried multiple tapes as well as Tegaderm without success. Continued benefit with Lyrica, orphenadrine and duloxetine.  She also feels that the NSAID compounding cream remains beneficial.  She has not received her TENS unit to treat thoracic and lumbar symptoms.  I do think the TENS unit will help with dystonia symptoms.  Will provide the patient with a contact number for [employer] so that she may coordinate delivery of her TENS unit. Previously provided the patient with a referral to physical therapy to help with polyarticular pain as well as back pain.  She does not want to pursue physical therapy at this time as she feels that she is extremely immunocompromised.  She may pursue physical therapy in the future.  Advised patient to learn home physical therapy exercises and do them consistently at home.  Discussed that she may benefit from injections depending on results of physical therapy.  She would like to avoid steroid injections at this time. She also will continue to follow closely with rheumatology and dermatology.  At this time, secondary to significant difficulty with patch adherence, we will rotate patient to Belbuca 150 mcg twice daily.  Continue meloxicam, Lyrica, orphenadrine and duloxetine.  Patient will also continue NSAID compounding cream. Plan discussed with patient at today's office visit.    - Rotate the patient from buprenorphine transdermal patch to Belbuca 150 mcg buccal films every 12 hours.  Reviewed application of buccal films.  Reviewed potential side effects.  - Advised patient to consider starting a formal course of physical therapy to learn home therapy exercises and stretches and then do these  exercises consistently for optimal pain relief.       -Patient may benefit from epidural steroid injections targeting lumbar radicular symptoms.  She would like to hold off on injections at this time.    -Patient will continue Lyrica, duloxetine and orphenadrine as ordered.  She continues to tolerate these medications without adverse effects.   -Recommended she utilize oral NSAIDs sparingly. The patient was cautioned about possible side effects and risks of this medication including stomach upset, stomach ulcers, kidney risks and cardiovascular risks to include hypertension and slightly increased risks of stroke and myocardial infarction. Also discussed were ways to minimize these risks by taking this medication with food, staying well-hydrated, watching for any hypertension, and taking the medication with a stomach protectant such as pepcid, zantac, or a proton pump inhibitor.   -Continue NSAID compounding cream.  - Provided patient with contact information for [employer] nex wave electrotherapy unit.   -Patient will follow-up in 6-8 weeks for reevaluation.     MEDICAL DECISION MAKING:    My impressions and treatment recommendations were discussed in detail with the patient, who verbalized understanding and had no further questions prior to discharge. Given the patient's report o difficulty of difficulty with patch adherence, it is reasonable to rotate the patient to Belbuca 150 mcg buccal film every 12 hours. The terms of the opioid agreement as well as the potential risks and adverse effects of the patient's medication regimen were discussed in detail. This includes if applicable due to dosage of medication permission to discuss and coordinate care with other treatment providers relevant to the patients condition. The patient verbalized understanding.    Treatment goals were discussed in detail with the patient.  These goals include reduction of pain levels, improved levels of functioning, avoidance of medication side  effect and lowest medication dose possible to achieve  these goals.  The patient was in full agreement with these goals.  Also discussed is the understanding that pain may not be eliminated by medication but that the goal of a better sustaining life through use of medication is appropriate.  Lifestyle modifications including weight management, stretching, diet, exercise and smoking are addressed at each office visit.  The patient provided a urine drug screen for routine monitoring and compliance.  This test may be given as frequently as every month based on the patient's individual opiate risk stratification and prescriber concerns for any aberrancies.  This test is indicated given the use of controlled substances for the patient's medical condition.  Unless otherwise noted the prior (s) urine drug testing results were consistent with prescribed medications.  There is no evidence of illicit drug use or additional medications ingested.     Risks and side effects of chronic opioid therapy including but not limited to tolerance, dependence, constipation, hyperalgesia, cognitive side effects, addiction and possible death due to overuse and or misuse were discussed. I also discussed that such medications when co-administered with other sedative agents including but not limited to alcohol, benzodiazepines, sedative hypnotics and illegal drugs could pose life threatening consequences including death.  I also explained the impact that the administration of such medication has on a patient with obstructive sleep apnea and continued recommendations for use of apnea devices if ordered are prescribed by other physicians.  In order to effectively and safely treat the pain, I also emphasized the importance of compliance with the treatment plan as well as compliance with the terms of the opioid agreement which was reviewed in detail. I explained the importance of being responsible with the medications and to take these only as  prescribed, never in excess and never for reasons other than pain reduction. The patient was counseled on keeping the medications safe and locked away from children and other adults as well as disposal methods and options. The patient understood the risks and instructions.      I also discussed with the patient in detail that based on the clinical response to the opioid medications and improvements of activities of daily living, sleep, and work performance in light of compliance with the treatment plan we can continue this form of therapy for the above chronic  pain.  The goal and rationale used for current treatment with chronic opioid medication is to control the pain and alleviate disability induced by the chronic pain condition noted above after failures of other non-opioid and nonpharmacological modalities to treat the chronic pain and the symptoms associated with have failed.  The patient understood the goals in terms of the above treatment plan and had no further questions prior to leaving the office today.    Given the patient's total MED, general use of daily opiates, or other coadministered medications in various classes the patient was offered a prescription for Narcan.  I instructed the patient that Narcan is for emergency use only and is worse suspected or known opiate overdose.  Call 911 prior to administration of this medication to activate the emergency response team.  It is imperative to fill this medication in order to demonstrate understanding of the gravity of possible side effects including respiratory depression and risk of overdose of this opiate load or medication combination.  As such patients will be required to bring Narcan prescriptions to follow-up appointments as part of the compliance with continued opiate care.    Disclaimer: This note was transcribed using an audio transcription device.  As such, minor errors may be present with regard to spelling, punctuation, and inadvertent word  insertion.  Please disregard such errors.             Relevant Medications    buprenorphine (Belbuca) 150 mcg buccal film    Gouty arthropathy M10.9    Relevant Medications    buprenorphine (Belbuca) 150 mcg buccal film    Elbow tendinitis M77.8    Relevant Medications    buprenorphine (Belbuca) 150 mcg buccal film    Spasm of thoracic back muscle M62.830            [1]   Current Outpatient Medications:     albuterol 2.5 mg /3 mL (0.083 %) nebulizer solution, inhale the contents of one vial via nebulizer every 4 hours as needed, Disp: , Rfl:     ALPRAZolam (Xanax) 1 mg tablet, Take 1 tablet (1 mg) by mouth as needed at bedtime for anxiety. Do not fill before June 2, 2025., Disp: 30 tablet, Rfl: 3    amLODIPine (Norvasc) 10 mg tablet, Take 1 tablet (10 mg) by mouth once daily., Disp: 90 tablet, Rfl: 3    amoxicillin (Amoxil) 500 mg capsule, Take 1 capsule (500 mg) by mouth once daily., Disp: , Rfl:     ARIPiprazole (Abilify) 5 mg tablet, Take 1 tablet (5 mg) by mouth once daily., Disp: 30 tablet, Rfl: 2    ascorbic acid, vitamin C, 500 mg capsule, Take 1 capsule by mouth once daily., Disp: , Rfl:     benzoyl peroxide (Benzac AC) 10 % external wash, Apply topically if needed., Disp: , Rfl:     buPROPion XL (Wellbutrin XL) 150 mg 24 hr tablet, TAKE ONE TABLET BY MOUTH ONCE DAILY (DO NOT CRUSH, CHEW, OR SPLIT), Disp: 90 tablet, Rfl: 3    buPROPion XL (Wellbutrin XL) 300 mg 24 hr tablet, TAKE ONE TABLET BY MOUTH EVERY MORNING (DO NOT CRUSH, CHEW, OR SPLIT), Disp: 90 tablet, Rfl: 3    carbidopa-levodopa-entacapone (Stalevo) -200 mg tablet, Take 1 tablet by mouth every 4 hours., Disp: 540 tablet, Rfl: 1    clindamycin (Cleocin T) 1 % lotion, Apply topically once daily as needed., Disp: , Rfl:     clobetasol (Temovate) 0.05 % external solution, Apply topically 2 times a day. As needed, Disp: , Rfl:     clobetasoL 0.05 % lotion, One application as needed for flaring only.not for daily use. For scalp and hands only,  Disp: 118 mL, Rfl: 3    cloNIDine (Catapres) 0.1 mg tablet, Take 1 tablet (0.1 mg) by mouth if needed for high blood pressure., Disp: , Rfl:     cloNIDine (Catapres-TTS) 0.3 mg/24 hr patch, Apply one patach on the skin and replace every 7 days, as directed, Disp: 4 patch, Rfl: 11    Cosentyx Pen, 2 Pens, 150 mg/mL self-injector pen, Inject 2 pens (300 mg) under the skin every 30 (thirty) days., Disp: 2 mL, Rfl: 2    cyanocobalamin (Vitamin B-12) 1,000 mcg/mL injection, Inject 1 mL (1,000 mcg) into the muscle see administration instructions., Disp: 30 mL, Rfl: 2    diclofenac sodium (Voltaren) 1 % gel gel, Apply 4.5 inches (4 g) topically 2 times a day as needed., Disp: , Rfl:     DULoxetine (Cymbalta) 60 mg DR capsule, Take 2 capsules (120 mg) by mouth once daily. Do not crush or chew., Disp: 180 capsule, Rfl: 1    eplerenone (Inspra) 25 mg tablet, Take 1 tablet (25 mg) by mouth once daily., Disp: , Rfl:     evolocumab (Repatha SureClick) 140 mg/mL injection, Inject 1 mL (140 mg) under the skin every 14 (fourteen) days., Disp: 6 mL, Rfl: 3    fluticasone furoate-vilanteroL (Breo Ellipta) 200-25 mcg/dose inhaler, 1puff daily, Inhalation, Disp: , Rfl:     ipratropium-albuteroL (Duo-Neb) 0.5-2.5 mg/3 mL nebulizer solution, Take 3 mL by nebulization every 4 hours if needed for wheezing., Disp: , Rfl:     lactobacillus acidophilus (Lactobacillus acidoph-L.bulgar) tablet tablet, Take 1 tablet by mouth once daily., Disp: 90 tablet, Rfl: 0    leflunomide (Arava) 10 mg tablet, TAKE 1 TABLET BY MOUTH ONCE  DAILY, Disp: 30 tablet, Rfl: 0    levalbuterol (Xopenex) 1.25 mg/3 mL nebulizer solution, Take 3 mL (1.25 mg) by nebulization 3 times a day. As needed, Disp: , Rfl:     meloxicam (Mobic) 15 mg tablet, TAKE 1 TABLET BY MOUTH ONCE  DAILY, Disp: 30 tablet, Rfl: 0    metoprolol succinate XL (Toprol-XL) 50 mg 24 hr tablet, Take 1 tablet (50 mg) by mouth once daily. Do not crush or chew. Two tablets 50 mg twice daily, Disp: 90  "tablet, Rfl: 3    metroNIDAZOLE (Metrocream) 0.75 % cream, Apply topically once daily as needed., Disp: , Rfl:     montelukast (Singulair) 10 mg tablet, Take 1 tablet (10 mg) by mouth once daily at bedtime., Disp: , Rfl:     naloxone (Narcan) 4 mg/0.1 mL nasal spray, Administer 1 spray (4 mg) into affected nostril(s) if needed for opioid reversal. May repeat every 2-3 minutes if needed, alternating nostrils, until medical assistance becomes available., Disp: 2 each, Rfl: 0    nebulizer and compressor device, use q4-6 hours with albuterol PRN cough/wheeze, Disp: , Rfl:     nebulizers ([employer]Pay4later Disposable Nebulizer) misc, every 4 hours if needed., Disp: , Rfl:     omega-3 acid ethyl esters (Lovaza) 1 gram capsule, Take 2 capsules (2 grams) by mouth 2 times a day., Disp: 360 capsule, Rfl: 3    omeprazole (PriLOSEC) 40 mg DR capsule, Take 1 capsule (40 mg) by mouth once daily in the morning. Take before meals. Do not crush or chew., Disp: 90 capsule, Rfl: 3    ondansetron ODT (Zofran-ODT) 4 mg disintegrating tablet, Dissolve 1 tablet (4 mg) in the mouth every 8 hours if needed for nausea or vomiting., Disp: 90 tablet, Rfl: 0    ProAir HFA 90 mcg/actuation inhaler, Inhale 2 puffs every 4 hours if needed., Disp: , Rfl:     Spiriva Respimat 1.25 mcg/actuation inhaler, Inhale 2 puffs once daily., Disp: , Rfl:     syringe with needle, safety 3 mL 23 gauge x 1\" syringe, 1 Box see administration instructions., Disp: 30 each, Rfl: 3    tezepelumab-ekko (Tezspire) SubQ Pen Injector, Inject 210 mg under the skin. Every 4 weeks, Disp: , Rfl:     ALPRAZolam (Xanax) 1 mg tablet, Take 1 tablet (1 mg) by mouth as needed at bedtime for anxiety., Disp: 30 tablet, Rfl: 2    buprenorphine (Belbuca) 150 mcg buccal film, Place 1 Film (150 mcg) into mouth between cheek and gum every 12 hours., Disp: 60 each, Rfl: 1    cetirizine-pseudoephedrine (ZyrTEC-D) 5-120 mg 12 hr tablet, Take 1 tablet by mouth 2 times a day., Disp: 180 tablet, Rfl: " 0    ezetimibe (Zetia) 10 mg tablet, Take 1 tablet (10 mg) by mouth once daily., Disp: 90 tablet, Rfl: 3    orphenadrine (Norflex) 100 mg 12 hr tablet, Take 1 tablet (100 mg) by mouth 2 times a day as needed for muscle spasms., Disp: 60 tablet, Rfl: 3    pregabalin (Lyrica) 75 mg capsule, Take 1 capsule (75 mg) by mouth 3 times a day., Disp: 90 capsule, Rfl: 2    rizatriptan (Maxalt) 10 mg tablet, Take 1 tablet (10 mg) by mouth 1 time if needed for migraine. May repeat in 2 hours if unresolved. Do not exceed 30 mg in 24 hours., Disp: 9 tablet, Rfl: 0    semaglutide, weight loss, (Wegovy) 0.25 mg/0.5 mL pen injector, Inject 0.25 mg under the skin 1 (one) time per week for 4 doses., Disp: 2 mL, Rfl: 0  [2]   Past Medical History:  Diagnosis Date    Acute upper respiratory infection, unspecified 07/26/2016    Viral URI with cough    Acute upper respiratory infection, unspecified 11/15/2017    Viral URI with cough    Allergy status to unspecified drugs, medicaments and biological substances     History of allergy    Chronic maxillary sinusitis 08/27/2018    Maxillary sinusitis, unspecified chronicity    Dystonia, unspecified 04/08/2014    Dystonia    Encounter for other screening for malignant neoplasm of breast 01/22/2018    Breast screening    Encounter for screening for respiratory tuberculosis 11/11/2013    Screening examination for pulmonary tuberculosis    Essential (primary) hypertension 12/27/2017    Benign essential hypertension    Hypersomnia, unspecified 04/13/2015    Sleeps too much    Idiopathic sleep related nonobstructive alveolar hypoventilation     Nocturnal hypoxemia    Idiopathic sleep related nonobstructive alveolar hypoventilation 02/09/2018    Nocturnal hypoxia    Impaired fasting glucose 01/22/2018    Impaired fasting glucose    Insect bite (nonvenomous) of lower back and pelvis, initial encounter 03/29/2018    Tick bite of back    Left lower quadrant pain 01/07/2019    Left lower quadrant pain     Local infection of the skin and subcutaneous tissue, unspecified 07/28/2017    Skin infection    Low back pain, unspecified 05/23/2019    Acute low back pain    Other conditions influencing health status 02/27/2017    Sprain of right thumb, unspecified site of finger, initial encounter    Other malaise 04/16/2018    Malaise and fatigue    Other microscopic hematuria 03/30/2018    Microscopic hematuria    Other specified cough 09/07/2018    Productive cough    Other specified health status 01/07/2019    Acute medical illness    Other symptoms and signs involving the musculoskeletal system 07/12/2018    Weakness of right lower extremity    Other symptoms and signs involving the musculoskeletal system 03/21/2018    Polyarticular joint involvement    Other symptoms and signs involving the musculoskeletal system 07/12/2018    RUE weakness    Pain in right lower leg 01/27/2016    Right calf pain    Pain in unspecified hip 01/20/2016    Hip pain    Pelvic and perineal pain 04/17/2018    Pelvic pain    Personal history of diseases of the skin and subcutaneous tissue 08/07/2018    History of dermatitis    Personal history of other (healed) physical injury and trauma 08/07/2018    History of insect bite    Personal history of other diseases of the circulatory system     History of hypertension    Personal history of other diseases of the female genital tract 11/24/2015    History of menorrhagia    Personal history of other diseases of the musculoskeletal system and connective tissue 03/14/2018    History of tendinitis    Personal history of other diseases of the nervous system and sense organs 04/08/2014    History of Parkinson's disease    Personal history of other diseases of the respiratory system 01/07/2019    History of acute bronchitis    Personal history of other diseases of the respiratory system 10/05/2018    History of paranasal sinus pain    Personal history of other specified conditions 04/13/2015    History of  snoring    Personal history of other specified conditions 09/11/2017    History of headache    Personal history of other specified conditions 09/07/2018    History of persistent cough    Personal history of other specified conditions 04/08/2014    History of memory loss    Personal history of other specified conditions 03/26/2018    History of left flank pain    Personal history of other specified conditions     History of heartburn    Personal history of urinary calculi 03/26/2018    Personal history of urinary calculi    Personal history of urinary calculi 03/26/2018    History of renal calculi    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/2017    Plantar fasciitis, left    Primary insomnia 04/13/2015    Primary insomnia    Rash and other nonspecific skin eruption 04/16/2018    Rash    Unspecified abdominal pain 01/07/2019    Left sided abdominal pain    Urinary tract infection, site not specified 10/19/2018    UTI (urinary tract infection), bacterial    Urinary tract infection, site not specified 10/18/2018    Recurrent UTI    Vitamin D deficiency, unspecified 04/22/2016    Vitamin D deficiency    Zoster with other complications 11/25/2015    Herpes zoster dermatitis   [3]   Past Surgical History:  Procedure Laterality Date    CT ANGIO CORONARY ART WITH HEARTFLOW IF SCORE >30%  3/18/2020    CT HEART CORONARY ANGIOGRAM 3/18/2020 [facility]    HAND TENDON SURGERY  11/11/2013    Hand Incision Tendon Sheath Of A Finger    HYSTERECTOMY  03/04/2016    Hysterectomy    LITHOTRIPSY  11/11/2013    Renal Lithotripsy    TONSILLECTOMY  12/19/2013    Tonsillectomy With Adenoidectomy   [4]   Family History  Problem Relation Name Age of Onset    Asthma Mother      Hypertension Mother      Irregular heart beat Mother      Allergies Father      Heart disease Father      Hypertension Father      Sinusitis Father      Allergies Sister      Asthma Daughter      Allergies Son      Heart disease Maternal Grandmother      Breast cancer  Paternal Grandmother      Heart disease Paternal Grandfather      Lung cancer Paternal Grandfather      Dementia Paternal Great-Grandfather     [5]   Allergies  Allergen Reactions    Clindamycin Anaphylaxis    Dupilumab Anaphylaxis    Hydrochlorothiazide Anaphylaxis, Itching and Swelling    Sulfamethoxazole-Trimethoprim Anaphylaxis    Cefazolin Hives, Other and Swelling     STATES TONGUE SPLITS    Atorvastatin Hives    Cephalexin Hives and Swelling     Joint swelling per patient    Clarithromycin Swelling     hives, tongue swelling    Lactose Constipation    Nitrofurantoin Monohyd/M-Cryst Hives    Sulfa (Sulfonamide Antibiotics) Hives    Sulfacetamide Hives

## 2025-07-15 ENCOUNTER — PHARMACY VISIT (OUTPATIENT)
Dept: PHARMACY | Facility: CLINIC | Age: 45
End: 2025-07-15
Payer: MEDICAID

## 2025-07-15 ENCOUNTER — APPOINTMENT (OUTPATIENT)
Dept: PAIN MEDICINE | Facility: HOSPITAL | Age: 45
End: 2025-07-15
Payer: COMMERCIAL

## 2025-07-15 ENCOUNTER — APPOINTMENT (OUTPATIENT)
Dept: BEHAVIORAL HEALTH | Facility: CLINIC | Age: 45
End: 2025-07-15
Payer: COMMERCIAL

## 2025-07-15 DIAGNOSIS — F33.2 SEVERE EPISODE OF RECURRENT MAJOR DEPRESSIVE DISORDER, WITHOUT PSYCHOTIC FEATURES (MULTI): ICD-10-CM

## 2025-07-15 DIAGNOSIS — F41.1 GAD (GENERALIZED ANXIETY DISORDER): ICD-10-CM

## 2025-07-15 DIAGNOSIS — F33.41 RECURRENT MAJOR DEPRESSIVE DISORDER, IN PARTIAL REMISSION: ICD-10-CM

## 2025-07-15 PROCEDURE — 99214 OFFICE O/P EST MOD 30 MIN: CPT

## 2025-07-15 PROCEDURE — 1036F TOBACCO NON-USER: CPT

## 2025-07-15 RX ORDER — DULOXETIN HYDROCHLORIDE 60 MG/1
120 CAPSULE, DELAYED RELEASE ORAL DAILY
Qty: 180 CAPSULE | Refills: 1 | Status: SHIPPED | OUTPATIENT
Start: 2025-07-15 | End: 2026-01-11

## 2025-07-15 RX ORDER — BUPROPION HYDROCHLORIDE 150 MG/1
150 TABLET ORAL EVERY MORNING
Qty: 90 TABLET | Refills: 1 | Status: SHIPPED | OUTPATIENT
Start: 2025-07-15 | End: 2026-01-11

## 2025-07-15 RX ORDER — BUPROPION HYDROCHLORIDE 300 MG/1
300 TABLET ORAL EVERY MORNING
Qty: 90 TABLET | Refills: 1 | Status: SHIPPED | OUTPATIENT
Start: 2025-07-15 | End: 2026-01-11

## 2025-07-15 RX ORDER — ARIPIPRAZOLE 5 MG/1
7.5 TABLET ORAL DAILY
Qty: 45 TABLET | Refills: 5 | Status: SHIPPED | OUTPATIENT
Start: 2025-07-15 | End: 2026-01-11

## 2025-07-15 RX ORDER — ALPRAZOLAM 1 MG/1
1 TABLET ORAL NIGHTLY PRN
Qty: 30 TABLET | Refills: 5 | Status: SHIPPED | OUTPATIENT
Start: 2025-07-15 | End: 2026-01-11

## 2025-07-15 ASSESSMENT — ENCOUNTER SYMPTOMS
PSYCHIATRIC NEGATIVE: 1
CONSTITUTIONAL NEGATIVE: 1

## 2025-07-15 NOTE — PROGRESS NOTES
"Impression : Patient reports no improvement in anxiety symptoms. Explains that Abilify  5 mg, Wellbutrin  mg, Cymbalta 60 mg,  Xanax 1 mg PRNhave been effective at managing symptoms. Reports she thinks she needs Ambien for sleep. NYDIA told patient she is in too many CNS depressants and should pursue scheduling an appointment with sleep medicine for more help. Reports she is still struggling with depression and mood dysregulation. NYDIA increased current dose of Abilify to 7.5 mg to help with mood stabilization.    I performed this visit using real-time telehealth tools, including an AUDIO/VIDEO connection between Nichelle Izaguirre who is at Home and DEBBY Moreno who is at [facility] office.  Virtual or Telephone Consent    An interactive audio and video telecommunication system which permits real time communications between the patient (at the originating site) and provider (at the distant site) was utilized to provide this telehealth service.   Verbal consent was requested and obtained from Nichelle Izaguirre on this date, 07/15/25 for a telehealth visit and the patient's location was confirmed at the time of the visit.      Assessment/Plan     Impression:  Nichelle Izaguirre is a 45 y.o. female who presents via telehealth visit for a follow up visit with CC of  \" My biggest problem is that I am still having trouble sleeping. Sunday night I was up all night. \"    Patient consents to treatment.    Problem List Items Addressed This Visit           ICD-10-CM    Depression F32.A    Relevant Medications    buPROPion XL (Wellbutrin XL) 150 mg 24 hr tablet    buPROPion XL (Wellbutrin XL) 300 mg 24 hr tablet    ALPRAZolam (Xanax) 1 mg tablet    ARIPiprazole (Abilify) 5 mg tablet    DULoxetine (Cymbalta) 60 mg DR capsule     Other Visit Diagnoses         Codes      KILO (generalized anxiety disorder)     F41.1    Relevant Medications    ALPRAZolam (Xanax) 1 mg tablet    DULoxetine (Cymbalta) 60 mg DR capsule " "           Patient is reminded that if there is SI to call [phone] and get themselves to the closest ED for evaluation, otherwise contact me for other questions/concerns.    Patient presenting to outpatient treatment for a scheduled psych follow up visit.      HPI   Background:   Patient presenting to outpatient treatment for a scheduled psych follow up visit.  Patient is at home for the appointment.  Patient was seen a couple of weeks ago for management of MDD, KILO, insomnia.  Last visit the dose of current medications were continued.  Patient reports the medications have NOT helped with sleep.  Patient hopes to be able to sleep at night.    Characteristics/Recent psychiatric symptoms (pertinent positives and negatives):    Patient reports still having symptoms of insomnia. Reports she was told by the sleep study company  that they need to increase the oxygen in her CPAP device to help her sleep. Reports she is planning on seeing sleep medicine at . Reports improvement of anxiety with taking Xanax PRN. Reports \" My depression is up and down. \"  Reports getting 3 hours of sleep at night. Appetite is normal.  Denies wishes for death or consideration for suicide.  CSSRS negative. Denies pawel of psychosis. Denies hx hospitalization.   Patient does explain that current dose of current psychiatric medications  have  NOT been successful in the management of depressive symptoms.     -SI/HI : Denies        Psychiatric Review Of Systems:  Depressive Symptoms: energy and interest  Manic Symptoms: negative    Anxiety Symptoms: Negative  Psychotic Symptoms: negative      REVIEW OF SYSTEMS  Review of Systems   Constitutional: Negative.    Psychiatric/Behavioral: Negative.       All other ROS negative    Current Medications:  Current Medications[1]     Medical History:  Medical History[2]  Surgical History:  Surgical History[3]  Family History:  Family History[4]  Social History:  Social History     Socioeconomic History    " Marital status:      Spouse name: Not on file    Number of children: Not on file    Years of education: Not on file    Highest education level: Not on file   Occupational History    Not on file   Tobacco Use    Smoking status: Never    Smokeless tobacco: Never   Vaping Use    Vaping status: Not on file   Substance and Sexual Activity    Alcohol use: Never    Drug use: Never    Sexual activity: Yes     Partners: Male     Birth control/protection: Rhythm   Other Topics Concern    Not on file   Social History Narrative    Not on file     Social Drivers of Health     Financial Resource Strain: Low Risk  (8/26/2024)    Overall Financial Resource Strain (CARDIA)     Difficulty of Paying Living Expenses: Not very hard   Recent Concern: Financial Resource Strain - Medium Risk (8/24/2024)    Overall Financial Resource Strain (CARDIA)     Difficulty of Paying Living Expenses: Somewhat hard   Food Insecurity: No Food Insecurity (8/26/2024)    Hunger Vital Sign     Worried About Running Out of Food in the Last Year: Never true     Ran Out of Food in the Last Year: Never true   Transportation Needs: No Transportation Needs (8/26/2024)    PRAPARE - Transportation     Lack of Transportation (Medical): No     Lack of Transportation (Non-Medical): No   Physical Activity: Sufficiently Active (8/26/2024)    Exercise Vital Sign     Days of Exercise per Week: 5 days     Minutes of Exercise per Session: 150+ min   Stress: No Stress Concern Present (8/26/2024)    [employer] [place] [employer] - Occupational Stress Questionnaire     Feeling of Stress : Only a little   Social Connections: Moderately Integrated (8/26/2024)    Social Connection and Isolation Panel [NHANES]     Frequency of Communication with Friends and Family: More than three times a week     Frequency of Social Gatherings with Friends and Family: More than three times a week     Attends [employer] Services: More than 4 times per year     Active Member of  "Clubs or Organizations: No     Attends Club or Organization Meetings: Never     Marital Status:    Intimate Partner Violence: Not At Risk (8/26/2024)    Humiliation, Afraid, Rape, and Kick questionnaire     Fear of Current or Ex-Partner: No     Emotionally Abused: No     Physically Abused: No     Sexually Abused: No   Housing Stability: Low Risk  (8/26/2024)    Housing Stability Vital Sign     Unable to Pay for Housing in the Last Year: No     Number of Times Moved in the Last Year: 0     Homeless in the Last Year: No   Recent Concern: Housing Stability - High Risk (8/24/2024)    Housing Stability Vital Sign     Unable to Pay for Housing in the Last Year: Yes     Number of Times Moved in the Last Year: 0     Homeless in the Last Year: No     Vitals:  There were no vitals filed for this visit.  Allergies:  RX Allergies[5]    -PCP: Sandra Lilly, NAYELI-CNP  -Pt reports currently is not pregnant  Objective   Appearance: Well groomed    Attitude: Cooperative, and engaged in conversation.    Behavior: Appropriate eye contact.     Motor Activity: No psychomotor agitation or retardation. No abnormal movements, tremors or tics. No evidence of extrapyramidal symptoms or tardive dyskinesia.    Speech: Regular rate, rhythm, volume. Spontaneous, no pressured speech.    Mood:  \" I am okay \"    Affect: Full range, mood congruent.    Thought Process: Linear, logical, and goal-directed. No loose associations or gross thought disorganization.    Thought Content:  Denied current suicidal ideation or thoughts of harm to self, denied homicidal ideation or thoughts of harm to others. No delusional thinking elicited. No perseverations or obsessions identified.     Perception: Did not endorse auditory or visual hallucinations, did not appear to be responding to hallucinatory stimuli.     Cognition: Alert, oriented x3. Preserved attention span and concentration, recent and remote memory. Adequate fund of knowledge. No deficits in " language.     Insight: Fair, in regards to understanding mental health condition    Judgement: Fair        Physical Exam      -There are no diagnoses linked to this encounter.       MEDICAL-DECISION MAKING    -Patient educated and verbalized understanding on benefits, risks, and side effects of current psychiatric medications.     Psych med regimen as follows:  -INCREASE Abilify to 7.5 mg daily for mood stabilization  -CONTINUE current psychiatric medications as prescribed  -START Psychotherapy  -CONTACT Sleep medicine for an appointment  -CONTINUE exercising and eating heathy diet  -Safety plan reviewed  -READ attached information about the prescribed new medication   -CALL OFFICE IN CASE OF SIDE EFFECT TO SCHEDULE A VISIT FOR ASSESSMENT -[phone]    SI/HI ASSESSMENT  -Patient denied current suicidal ideation or thoughts of harm to self, denied homicidal ideation or thoughts of harm to others. No delusional thinking elicited. No perseverations or obsessions identified.     PLAN  -Continue Medications as directed.  -Follow-up with   -Risks/benefits/assessment of medication interventions discussed with pt; pt agreeable to plan. Will continue to monitor for symptoms mgmt and SEs and adjust plan as needed.  -MI to increase coping skills/behavior regulation.  -Safety plan reviewed.  -Call  Psychiatry at [phone] with issues.  -For [facility] residents, [employer] is a 24/7 hotline you can call for assistance at [phone]. Please call 911 or go to your closest Emergency Room if you feel worse. This includes thoughts of hurting yourself or anyone else, or having other troubles such as hearing voices, seeing visions, or having new and scary thoughts about the people around you.    OARRS:  No data recorded  I have personally reviewed the OARRS report for Nichelle Izaguirre. I have considered the risks of abuse, dependence, addiction and diversion  Medication is felt to be clinically appropriate  based on documented diagnosis.      NAYELI Moreno-CNP  Record Review: extensive  CALL OFFICE IN CASE OF SIDE EFFECT TO SCHEDULE A VISIT FOR ASSESSMENT -[phone]  Follow up:   With   Time Spent:  Prep:   Direct time: 20 minutes  Documentation:  6 minutes  Total: 26 minutes       [1]   Current Outpatient Medications:     albuterol 2.5 mg /3 mL (0.083 %) nebulizer solution, inhale the contents of one vial via nebulizer every 4 hours as needed, Disp: , Rfl:     ALPRAZolam (Xanax) 1 mg tablet, Take 1 tablet (1 mg) by mouth as needed at bedtime for anxiety., Disp: 30 tablet, Rfl: 2    ALPRAZolam (Xanax) 1 mg tablet, Take 1 tablet (1 mg) by mouth as needed at bedtime for anxiety. Do not fill before June 2, 2025., Disp: 30 tablet, Rfl: 3    amLODIPine (Norvasc) 10 mg tablet, Take 1 tablet (10 mg) by mouth once daily., Disp: 90 tablet, Rfl: 3    amoxicillin (Amoxil) 500 mg capsule, Take 1 capsule (500 mg) by mouth once daily., Disp: , Rfl:     ARIPiprazole (Abilify) 5 mg tablet, Take 1 tablet (5 mg) by mouth once daily., Disp: 30 tablet, Rfl: 2    ascorbic acid, vitamin C, 500 mg capsule, Take 1 capsule by mouth once daily., Disp: , Rfl:     benzoyl peroxide (Benzac AC) 10 % external wash, Apply topically if needed., Disp: , Rfl:     buprenorphine (Belbuca) 150 mcg buccal film, Place 1 Film (150 mcg) into mouth between cheek and gum every 12 hours., Disp: 60 each, Rfl: 1    buPROPion XL (Wellbutrin XL) 150 mg 24 hr tablet, TAKE ONE TABLET BY MOUTH ONCE DAILY (DO NOT CRUSH, CHEW, OR SPLIT), Disp: 90 tablet, Rfl: 3    buPROPion XL (Wellbutrin XL) 300 mg 24 hr tablet, TAKE ONE TABLET BY MOUTH EVERY MORNING (DO NOT CRUSH, CHEW, OR SPLIT), Disp: 90 tablet, Rfl: 3    carbidopa-levodopa-entacapone (Stalevo) -200 mg tablet, Take 1 tablet by mouth every 4 hours., Disp: 540 tablet, Rfl: 1    cetirizine-pseudoephedrine (ZyrTEC-D) 5-120 mg 12 hr tablet, Take 1 tablet by mouth 2 times a day., Disp: 180 tablet, Rfl:  0    clindamycin (Cleocin T) 1 % lotion, Apply topically once daily as needed., Disp: , Rfl:     clobetasol (Temovate) 0.05 % external solution, Apply topically 2 times a day. As needed, Disp: , Rfl:     clobetasoL 0.05 % lotion, One application as needed for flaring only.not for daily use. For scalp and hands only, Disp: 118 mL, Rfl: 3    cloNIDine (Catapres) 0.1 mg tablet, Take 1 tablet (0.1 mg) by mouth if needed for high blood pressure., Disp: , Rfl:     cloNIDine (Catapres-TTS) 0.3 mg/24 hr patch, Apply one patach on the skin and replace every 7 days, as directed, Disp: 4 patch, Rfl: 11    Cosentyx Pen, 2 Pens, 150 mg/mL self-injector pen, Inject 2 pens (300 mg) under the skin every 30 (thirty) days., Disp: 2 mL, Rfl: 2    cyanocobalamin (Vitamin B-12) 1,000 mcg/mL injection, Inject 1 mL (1,000 mcg) into the muscle see administration instructions., Disp: 30 mL, Rfl: 2    diclofenac sodium (Voltaren) 1 % gel gel, Apply 4.5 inches (4 g) topically 2 times a day as needed., Disp: , Rfl:     DULoxetine (Cymbalta) 60 mg DR capsule, Take 2 capsules (120 mg) by mouth once daily. Do not crush or chew., Disp: 180 capsule, Rfl: 1    eplerenone (Inspra) 25 mg tablet, Take 1 tablet (25 mg) by mouth once daily., Disp: , Rfl:     evolocumab (Repatha SureClick) 140 mg/mL injection, Inject 1 mL (140 mg) under the skin every 14 (fourteen) days., Disp: 6 mL, Rfl: 3    ezetimibe (Zetia) 10 mg tablet, Take 1 tablet (10 mg) by mouth once daily., Disp: 90 tablet, Rfl: 3    fluticasone furoate-vilanteroL (Breo Ellipta) 200-25 mcg/dose inhaler, 1puff daily, Inhalation, Disp: , Rfl:     ipratropium-albuteroL (Duo-Neb) 0.5-2.5 mg/3 mL nebulizer solution, Take 3 mL by nebulization every 4 hours if needed for wheezing., Disp: , Rfl:     lactobacillus acidophilus (Lactobacillus acidoph-L.bulgar) tablet tablet, Take 1 tablet by mouth once daily., Disp: 90 tablet, Rfl: 0    leflunomide (Arava) 10 mg tablet, TAKE 1 TABLET BY MOUTH ONCE  DAILY,  Disp: 30 tablet, Rfl: 0    levalbuterol (Xopenex) 1.25 mg/3 mL nebulizer solution, Take 3 mL (1.25 mg) by nebulization 3 times a day. As needed, Disp: , Rfl:     meloxicam (Mobic) 15 mg tablet, TAKE 1 TABLET BY MOUTH ONCE  DAILY, Disp: 30 tablet, Rfl: 0    metoprolol succinate XL (Toprol-XL) 50 mg 24 hr tablet, Take 1 tablet (50 mg) by mouth once daily. Do not crush or chew. Two tablets 50 mg twice daily, Disp: 90 tablet, Rfl: 3    metroNIDAZOLE (Metrocream) 0.75 % cream, Apply topically once daily as needed., Disp: , Rfl:     montelukast (Singulair) 10 mg tablet, Take 1 tablet (10 mg) by mouth once daily at bedtime., Disp: , Rfl:     naloxone (Narcan) 4 mg/0.1 mL nasal spray, Administer 1 spray (4 mg) into affected nostril(s) if needed for opioid reversal. May repeat every 2-3 minutes if needed, alternating nostrils, until medical assistance becomes available., Disp: 2 each, Rfl: 0    nebulizer and compressor device, use q4-6 hours with albuterol PRN cough/wheeze, Disp: , Rfl:     nebulizers (DevilQReca! Disposable Nebulizer) misc, every 4 hours if needed., Disp: , Rfl:     omega-3 acid ethyl esters (Lovaza) 1 gram capsule, Take 2 capsules (2 grams) by mouth 2 times a day., Disp: 360 capsule, Rfl: 3    omeprazole (PriLOSEC) 40 mg DR capsule, Take 1 capsule (40 mg) by mouth once daily in the morning. Take before meals. Do not crush or chew., Disp: 90 capsule, Rfl: 3    ondansetron ODT (Zofran-ODT) 4 mg disintegrating tablet, Dissolve 1 tablet (4 mg) in the mouth every 8 hours if needed for nausea or vomiting., Disp: 90 tablet, Rfl: 0    orphenadrine (Norflex) 100 mg 12 hr tablet, Take 1 tablet (100 mg) by mouth 2 times a day as needed for muscle spasms., Disp: 60 tablet, Rfl: 3    pregabalin (Lyrica) 75 mg capsule, Take 1 capsule (75 mg) by mouth 3 times a day., Disp: 90 capsule, Rfl: 2    ProAir HFA 90 mcg/actuation inhaler, Inhale 2 puffs every 4 hours if needed., Disp: , Rfl:     rizatriptan (Maxalt) 10 mg tablet,  "Take 1 tablet (10 mg) by mouth 1 time if needed for migraine. May repeat in 2 hours if unresolved. Do not exceed 30 mg in 24 hours., Disp: 9 tablet, Rfl: 0    semaglutide, weight loss, (Wegovy) 0.25 mg/0.5 mL pen injector, Inject 0.25 mg under the skin 1 (one) time per week for 4 doses., Disp: 2 mL, Rfl: 0    Spiriva Respimat 1.25 mcg/actuation inhaler, Inhale 2 puffs once daily., Disp: , Rfl:     syringe with needle, safety 3 mL 23 gauge x 1\" syringe, 1 Box see administration instructions., Disp: 30 each, Rfl: 3    tezepelumab-ekko (Tezspire) SubQ Pen Injector, Inject 210 mg under the skin. Every 4 weeks, Disp: , Rfl:   [2]   Past Medical History:  Diagnosis Date    Acute upper respiratory infection, unspecified 07/26/2016    Viral URI with cough    Acute upper respiratory infection, unspecified 11/15/2017    Viral URI with cough    Allergy status to unspecified drugs, medicaments and biological substances     History of allergy    Anxiety     Chronic maxillary sinusitis 08/27/2018    Maxillary sinusitis, unspecified chronicity    Depression     Dystonia, unspecified 04/08/2014    Dystonia    Encounter for other screening for malignant neoplasm of breast 01/22/2018    Breast screening    Encounter for screening for respiratory tuberculosis 11/11/2013    Screening examination for pulmonary tuberculosis    Essential (primary) hypertension 12/27/2017    Benign essential hypertension    Head injury     Headache     Hypersomnia, unspecified 04/13/2015    Sleeps too much    Idiopathic sleep related nonobstructive alveolar hypoventilation     Nocturnal hypoxemia    Idiopathic sleep related nonobstructive alveolar hypoventilation 02/09/2018    Nocturnal hypoxia    Impaired fasting glucose 01/22/2018    Impaired fasting glucose    Insect bite (nonvenomous) of lower back and pelvis, initial encounter 03/29/2018    Tick bite of back    Left lower quadrant pain 01/07/2019    Left lower quadrant pain    Local infection of the " skin and subcutaneous tissue, unspecified 07/28/2017    Skin infection    Low back pain, unspecified 05/23/2019    Acute low back pain    Other conditions influencing health status 02/27/2017    Sprain of right thumb, unspecified site of finger, initial encounter    Other malaise 04/16/2018    Malaise and fatigue    Other microscopic hematuria 03/30/2018    Microscopic hematuria    Other specified cough 09/07/2018    Productive cough    Other specified health status 01/07/2019    Acute medical illness    Other symptoms and signs involving the musculoskeletal system 07/12/2018    Weakness of right lower extremity    Other symptoms and signs involving the musculoskeletal system 03/21/2018    Polyarticular joint involvement    Other symptoms and signs involving the musculoskeletal system 07/12/2018    RUE weakness    Pain in right lower leg 01/27/2016    Right calf pain    Pain in unspecified hip 01/20/2016    Hip pain    Panic attack     Pelvic and perineal pain 04/17/2018    Pelvic pain    Personal history of diseases of the skin and subcutaneous tissue 08/07/2018    History of dermatitis    Personal history of other (healed) physical injury and trauma 08/07/2018    History of insect bite    Personal history of other diseases of the circulatory system     History of hypertension    Personal history of other diseases of the female genital tract 11/24/2015    History of menorrhagia    Personal history of other diseases of the musculoskeletal system and connective tissue 03/14/2018    History of tendinitis    Personal history of other diseases of the nervous system and sense organs 04/08/2014    History of Parkinson's disease    Personal history of other diseases of the respiratory system 01/07/2019    History of acute bronchitis    Personal history of other diseases of the respiratory system 10/05/2018    History of paranasal sinus pain    Personal history of other specified conditions 04/13/2015    History of snoring     Personal history of other specified conditions 09/11/2017    History of headache    Personal history of other specified conditions 09/07/2018    History of persistent cough    Personal history of other specified conditions 04/08/2014    History of memory loss    Personal history of other specified conditions 03/26/2018    History of left flank pain    Personal history of other specified conditions     History of heartburn    Personal history of urinary calculi 03/26/2018    Personal history of urinary calculi    Personal history of urinary calculi 03/26/2018    History of renal calculi    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/2017    Plantar fasciitis, left    Primary insomnia 04/13/2015    Primary insomnia    Rash and other nonspecific skin eruption 04/16/2018    Rash    Unspecified abdominal pain 01/07/2019    Left sided abdominal pain    Urinary tract infection, site not specified 10/19/2018    UTI (urinary tract infection), bacterial    Urinary tract infection, site not specified 10/18/2018    Recurrent UTI    Vitamin D deficiency, unspecified 04/22/2016    Vitamin D deficiency    Zoster with other complications 11/25/2015    Herpes zoster dermatitis   [3]   Past Surgical History:  Procedure Laterality Date    CT ANGIO CORONARY ART WITH HEARTFLOW IF SCORE >30%  3/18/2020    CT HEART CORONARY ANGIOGRAM 3/18/2020 [facility]    HAND TENDON SURGERY  11/11/2013    Hand Incision Tendon Sheath Of A Finger    HYSTERECTOMY  03/04/2016    Hysterectomy    LITHOTRIPSY  11/11/2013    Renal Lithotripsy    TONSILLECTOMY  12/19/2013    Tonsillectomy With Adenoidectomy   [4]   Family History  Problem Relation Name Age of Onset    Asthma Mother      Hypertension Mother      Irregular heart beat Mother      Allergies Father      Heart disease Father      Hypertension Father      Sinusitis Father      Allergies Sister      Asthma Daughter      Allergies Son      Heart disease Maternal Grandmother      Breast cancer Paternal  Grandmother      Heart disease Paternal Grandfather      Lung cancer Paternal Grandfather      Dementia Paternal Great-Grandfather      COPD Mother Paige     Depression Mother Paige     Hyperlipidemia Mother Paige     Fibromyalgia Mother Paige     COPD Father Michael     Heart failure Father Michael     Hyperlipidemia Father Michael     Asthma Father Michael     Migraines Sister Bonita     Hypertension Maternal Grandmother Grandma     Hyperlipidemia Maternal Grandmother Grandma     Diabetes Paternal Grandmother La Nena     Hypertension Paternal Grandmother La Nena     COPD Paternal Grandfather Ed     Cancer Paternal Grandfather Ed     Hypertension Paternal Grandfather Ed     Depression Mother's Sister Mouna     Cancer Mother's Sister Mouna     Hypertension Mother's Sister Mouna     Cancer Mother's Sister Arp     Hypertension Mother's Sister Mine     COPD Father's Brother Darwin     Cancer Father's Brother Darwin     Hypertension Father's Brother Darwin     Heart disease Father's Brother Darwin     Intellectual Disability Son Episcopal     Learning disabilities Son Episcopal     Genetic Testing Son Episcopal     Depression Mother's Sister Mouna     Depression Mother's Sister Mouna     Cancer Mother's Sister Mouna     Hypertension Mother's Sister Mouna     Cancer Mother's Sister Mine     Hypertension Mother's Sister Mine     COPD Father's Brother Darwin     Cancer Father's Brother Darwin     Hypertension Father's Brother Darwin     Heart disease Father's Brother Darwin     Intellectual Disability Son Episcopal     Learning disabilities Son Episcopal     Genetic Testing Son Episcopal     Depression Mother's Sister Mouna     Cancer Mother's Sister Mouna     Hypertension Mother's Sister Mouna     Cancer Mother's Sister Arp     Hypertension Mother's Sister Mine     COPD Father's Brother Darwin     Cancer Father's Brother Darwin     Hypertension Father's Brother Darwin     Heart disease Father's Brother  Darwin     Intellectual Disability Son Yarsanism     Learning disabilities Son Yarsanism     Genetic Testing Son Yarsanism     Depression Mother's Sister Mouna    [5]   Allergies  Allergen Reactions    Clindamycin Anaphylaxis    Dupilumab Anaphylaxis    Hydrochlorothiazide Anaphylaxis, Itching and Swelling    Sulfamethoxazole-Trimethoprim Anaphylaxis    Cefazolin Hives, Other and Swelling     STATES TONGUE SPLITS    Atorvastatin Hives    Cephalexin Hives and Swelling     Joint swelling per patient    Clarithromycin Swelling     hives, tongue swelling    Lactose Constipation    Nitrofurantoin Monohyd/M-Cryst Hives    Sulfa (Sulfonamide Antibiotics) Hives    Sulfacetamide Hives

## 2025-07-16 ENCOUNTER — APPOINTMENT (OUTPATIENT)
Facility: CLINIC | Age: 45
End: 2025-07-16
Payer: COMMERCIAL

## 2025-07-16 VITALS — WEIGHT: 227.6 LBS | BODY MASS INDEX: 36.58 KG/M2 | HEIGHT: 66 IN

## 2025-07-16 DIAGNOSIS — M19.90 INFLAMMATORY ARTHROPATHY: ICD-10-CM

## 2025-07-16 DIAGNOSIS — D84.9: Primary | ICD-10-CM

## 2025-07-16 DIAGNOSIS — J34.2 DEVIATED NASAL SEPTUM: ICD-10-CM

## 2025-07-16 DIAGNOSIS — J01.41 ACUTE RECURRENT PANSINUSITIS: ICD-10-CM

## 2025-07-16 PROCEDURE — 31231 NASAL ENDOSCOPY DX: CPT | Performed by: OTOLARYNGOLOGY

## 2025-07-16 PROCEDURE — 3008F BODY MASS INDEX DOCD: CPT | Performed by: OTOLARYNGOLOGY

## 2025-07-16 PROCEDURE — 99204 OFFICE O/P NEW MOD 45 MIN: CPT | Performed by: OTOLARYNGOLOGY

## 2025-07-16 NOTE — LETTER
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 muscle spasms., Disp: 60 tablet, Rfl: 3  •  pregabalin (Lyrica) 75 mg capsule, Take 1 capsule (75 mg) by mouth 3 times a day., Disp: 90 capsule, Rfl: 2  •  rizatriptan (Maxalt) 10 mg tablet, Take 1 tablet (10 mg) by mouth 1 time if needed for migraine. May repeat in 2 hours if unresolved. Do not exceed 30 mg in 24 hours., Disp: 9 tablet, Rfl: 0  •  semaglutide, weight loss, (Wegovy) 0.25 mg/0.5 mL pen injector, Inject 0.25 mg under the skin 1 (one) time per week for 4 doses. (Patient not taking: Reported on 7/15/2025), Disp: 2 mL, Rfl: 0       [1]  Patient Active Problem List  Diagnosis   • Allergic rhinitis   • Anxiety disorder   • Arthralgia of multiple sites   • Bariatric surgery status   • Benign essential hypertension   • Bowel disease, inflammatory   • Immunodeficiency syndrome (Multi)   • Chronic diarrhea   • Continuous LLQ abdominal pain   • Depression   • Essential familial hyperlipidemia   • GERD (gastroesophageal reflux disease)   • Gouty arthropathy   • Hoarseness   • Hot flashes   • Hyperlipidemia   • Hypertriglyceridemia   • Hypothyroidism (acquired)   • Inflammatory arthropathy   • Rheumatoid arthritis   • Hypersomnia   • Labile hypertension "   • Low vitamin D level   • Lyme disease   • Mannose-binding lectin deficiency (Multi)   • Methadone use   • Migraine without status migrainosus, not intractable   • Morbid obesity (Multi)   • Mixed incontinence urge and stress   • Osteoporosis   • Obstructive sleep apnea, adult   • DRD (DOPA-responsive dystonia)   • Post herpetic neuralgia   • Polyphagia   • POTS (postural orthostatic tachycardia syndrome)   • Prediabetes   • Psoriatic arthropathy (Multi)   • Steroid withdrawal syndrome with complication   • Steroid-dependent asthma (HHS-HCC)   • Vitamin B12 deficiency   • Vitamin D deficiency   • Gait disturbance   • Bilateral leg edema   • Cystocele with uterine descensus   • Dyspnea on effort   • Elbow tendinitis   • Foreign body in ear, right, initial encounter   • Frequent falls   • Hyperglycemia   • Hyperuricemia   • Incomplete bladder emptying   • Insomnia, organic   • Intermittent palpitations   • Lower back pain   • Maxillary sinusitis   • Myofascial pain   • Orthostatic intolerance   • Other ovarian cyst, unspecified side   • Pelvic floor weakness   • Perioral dermatitis   • Peripheral neuropathy   • Postural dizziness   • Psoriasis vulgaris   • Recurrent urinary tract infection   • Renal lesion   • Right lumbar radiculopathy   • Rosacea, unspecified   • Sacroiliac joint dysfunction of right side   • Snoring   • Spasm of thoracic back muscle   • Syncope   • Urinary frequency   • Panic attacks   • Facial swelling   • Iron deficiency anemia secondary to inadequate dietary iron intake   • Idiopathic steatorrhea (HHS-HCC)   [2]  Family History  Problem Relation Name Age of Onset   • Asthma Mother     • Hypertension Mother     • Irregular heart beat Mother     • Allergies Father     • Heart disease Father     • Hypertension Father     • Sinusitis Father     • Allergies Sister     • Asthma Daughter     • Allergies Son     • Heart disease Maternal Grandmother     • Breast cancer Paternal Grandmother     • Heart  disease Paternal Grandfather     • Lung cancer Paternal Grandfather     • Dementia Paternal Great-Grandfather     • COPD Mother Paige    • Depression Mother Paige    • Hyperlipidemia Mother Paige    • Fibromyalgia Mother Paige    • COPD Father Michael    • Heart failure Father Michael    • Hyperlipidemia Father Michael    • Asthma Father Michael    • Migraines Sister Bonita    • Hypertension Maternal Grandmother Grandma    • Hyperlipidemia Maternal Grandmother Grandma    • Diabetes Paternal Grandmother [place]    • Hypertension Paternal Grandmother [place]    • COPD Paternal Grandfather Ed    • Cancer Paternal Grandfather Ed    • Hypertension Paternal Grandfather Ed    • Depression Mother's Sister Mouna    • Cancer Mother's Sister Mouna    • Hypertension Mother's Sister Mouna    • Cancer Mother's Sister Mine    • Hypertension Mother's Sister Mine    • COPD Father's Brother Darwin    • Cancer Father's Brother Darwin    • Hypertension Father's Brother Darwin    • Heart disease Father's Brother Darwin    • Intellectual Disability Son [employer]    • Learning disabilities Son [employer]    • Genetic Testing Son [employer]    • Depression Mother's Sister Mouna    • Depression Mother's Sister Mouna    • Cancer Mother's Sister Mouna    • Hypertension Mother's Sister Mouna    • Cancer Mother's Sister [place]    • Hypertension Mother's Sister Mine    • COPD Father's Brother Darwin    • Cancer Father's Brother Darwin    • Hypertension Father's Brother Darwin    • Heart disease Father's Brother Darwin    • Intellectual Disability Son [employer]    • Learning disabilities Son [employer]    • Genetic Testing Son [employer]    • Depression Mother's Sister Mouna    • Cancer Mother's Sister Mouna    • Hypertension Mother's Sister Mouna    • Cancer Mother's Sister Mine    • Hypertension Mother's Sister [place]    • COPD Father's Brother Darwin    • Cancer Father's Brother Darwin    • Hypertension Father's Brother Darwin    • Heart  disease Father's Brother Darwin    • Intellectual Disability Son Shaun    • Learning disabilities Son Shaun    • Genetic Testing Son Shaun    • Depression Mother's Sister Mouna    [3]    Current Outpatient Medications:   •  albuterol 2.5 mg /3 mL (0.083 %) nebulizer solution, inhale the contents of one vial via nebulizer every 4 hours as needed, Disp: , Rfl:   •  ALPRAZolam (Xanax) 1 mg tablet, Take 1 tablet (1 mg) by mouth as needed at bedtime for anxiety. Do not fill before June 2, 2025., Disp: 30 tablet, Rfl: 3  •  ALPRAZolam (Xanax) 1 mg tablet, Take 1 tablet (1 mg) by mouth as needed at bedtime for anxiety., Disp: 30 tablet, Rfl: 5  •  amLODIPine (Norvasc) 10 mg tablet, Take 1 tablet (10 mg) by mouth once daily., Disp: 90 tablet, Rfl: 3  •  amoxicillin (Amoxil) 500 mg capsule, Take 1 capsule (500 mg) by mouth once daily., Disp: , Rfl:   •  ARIPiprazole (Abilify) 5 mg tablet, Take 1.5 tablets (7.5 mg) by mouth once daily., Disp: 45 tablet, Rfl: 5  •  ascorbic acid, vitamin C, 500 mg capsule, Take 1 capsule by mouth once daily., Disp: , Rfl:   •  benzoyl peroxide (Benzac AC) 10 % external wash, Apply topically if needed., Disp: , Rfl:   •  buprenorphine (Belbuca) 150 mcg buccal film, Place 1 Film (150 mcg) into mouth between cheek and gum every 12 hours., Disp: 60 each, Rfl: 1  •  buPROPion XL (Wellbutrin XL) 150 mg 24 hr tablet, Take 1 tablet (150 mg) by mouth once daily in the morning. Do not crush, chew, or split., Disp: 90 tablet, Rfl: 1  •  buPROPion XL (Wellbutrin XL) 300 mg 24 hr tablet, Take 1 tablet (300 mg) by mouth once daily in the morning. Do not crush, chew, or split., Disp: 90 tablet, Rfl: 1  •  carbidopa-levodopa-entacapone (Stalevo) -200 mg tablet, Take 1 tablet by mouth every 4 hours., Disp: 540 tablet, Rfl: 1  •  clindamycin (Cleocin T) 1 % lotion, Apply topically once daily as needed., Disp: , Rfl:   •  clobetasol (Temovate) 0.05 % external solution, Apply topically 2 times  a day. As needed, Disp: , Rfl:   •  clobetasoL 0.05 % lotion, One application as needed for flaring only.not for daily use. For scalp and hands only, Disp: 118 mL, Rfl: 3  •  cloNIDine (Catapres) 0.1 mg tablet, Take 1 tablet (0.1 mg) by mouth if needed for high blood pressure., Disp: , Rfl:   •  cloNIDine (Catapres-TTS) 0.3 mg/24 hr patch, Apply one patach on the skin and replace every 7 days, as directed, Disp: 4 patch, Rfl: 11  •  Cosentyx Pen, 2 Pens, 150 mg/mL self-injector pen, Inject 2 pens (300 mg) under the skin every 30 (thirty) days., Disp: 2 mL, Rfl: 2  •  cyanocobalamin (Vitamin B-12) 1,000 mcg/mL injection, Inject 1 mL (1,000 mcg) into the muscle see administration instructions., Disp: 30 mL, Rfl: 2  •  diclofenac sodium (Voltaren) 1 % gel gel, Apply 4.5 inches (4 g) topically 2 times a day as needed., Disp: , Rfl:   •  DULoxetine (Cymbalta) 60 mg DR capsule, Take 2 capsules (120 mg) by mouth once daily. Do not crush or chew., Disp: 180 capsule, Rfl: 1  •  eplerenone (Inspra) 25 mg tablet, Take 1 tablet (25 mg) by mouth once daily., Disp: , Rfl:   •  evolocumab (Repatha SureClick) 140 mg/mL injection, Inject 1 mL (140 mg) under the skin every 14 (fourteen) days., Disp: 6 mL, Rfl: 3  •  fluticasone furoate-vilanteroL (Breo Ellipta) 200-25 mcg/dose inhaler, 1puff daily, Inhalation, Disp: , Rfl:   •  ipratropium-albuteroL (Duo-Neb) 0.5-2.5 mg/3 mL nebulizer solution, Take 3 mL by nebulization every 4 hours if needed for wheezing., Disp: , Rfl:   •  lactobacillus acidophilus (Lactobacillus acidoph-L.bulgar) tablet tablet, Take 1 tablet by mouth once daily., Disp: 90 tablet, Rfl: 0  •  leflunomide (Arava) 10 mg tablet, TAKE 1 TABLET BY MOUTH ONCE  DAILY, Disp: 30 tablet, Rfl: 0  •  levalbuterol (Xopenex) 1.25 mg/3 mL nebulizer solution, Take 3 mL (1.25 mg) by nebulization 3 times a day. As needed, Disp: , Rfl:   •  meloxicam (Mobic) 15 mg tablet, TAKE 1 TABLET BY MOUTH ONCE  DAILY, Disp: 30 tablet, Rfl: 0  •  " metoprolol succinate XL (Toprol-XL) 50 mg 24 hr tablet, Take 1 tablet (50 mg) by mouth once daily. Do not crush or chew. Two tablets 50 mg twice daily, Disp: 90 tablet, Rfl: 3  •  metroNIDAZOLE (Metrocream) 0.75 % cream, Apply topically once daily as needed., Disp: , Rfl:   •  montelukast (Singulair) 10 mg tablet, Take 1 tablet (10 mg) by mouth once daily at bedtime., Disp: , Rfl:   •  naloxone (Narcan) 4 mg/0.1 mL nasal spray, Administer 1 spray (4 mg) into affected nostril(s) if needed for opioid reversal. May repeat every 2-3 minutes if needed, alternating nostrils, until medical assistance becomes available., Disp: 2 each, Rfl: 0  •  nebulizer and compressor device, use q4-6 hours with albuterol PRN cough/wheeze, Disp: , Rfl:   •  nebulizers ([employer] Disposable Nebulizer) misc, every 4 hours if needed., Disp: , Rfl:   •  omega-3 acid ethyl esters (Lovaza) 1 gram capsule, Take 2 capsules (2 grams) by mouth 2 times a day., Disp: 360 capsule, Rfl: 3  •  omeprazole (PriLOSEC) 40 mg DR capsule, Take 1 capsule (40 mg) by mouth once daily in the morning. Take before meals. Do not crush or chew., Disp: 90 capsule, Rfl: 3  •  ondansetron ODT (Zofran-ODT) 4 mg disintegrating tablet, Dissolve 1 tablet (4 mg) in the mouth every 8 hours if needed for nausea or vomiting., Disp: 90 tablet, Rfl: 0  •  ProAir HFA 90 mcg/actuation inhaler, Inhale 2 puffs every 4 hours if needed., Disp: , Rfl:   •  Spiriva Respimat 1.25 mcg/actuation inhaler, Inhale 2 puffs once daily., Disp: , Rfl:   •  syringe with needle, safety 3 mL 23 gauge x 1\" syringe, 1 Box see administration instructions., Disp: 30 each, Rfl: 3  •  tezepelumab-ekko (Tezspire) SubQ Pen Injector, Inject 210 mg under the skin. Every 4 weeks, Disp: , Rfl:   •  cetirizine-pseudoephedrine (ZyrTEC-D) 5-120 mg 12 hr tablet, Take 1 tablet by mouth 2 times a day., Disp: 180 tablet, Rfl: 0  •  ezetimibe (Zetia) 10 mg tablet, Take 1 tablet (10 mg) by mouth once daily., Disp: 90 " tablet, Rfl: 3  •  orphenadrine (Norflex) 100 mg 12 hr tablet, Take 1 tablet (100 mg) by mouth 2 times a day as needed for muscle spasms., Disp: 60 tablet, Rfl: 3  •  pregabalin (Lyrica) 75 mg capsule, Take 1 capsule (75 mg) by mouth 3 times a day., Disp: 90 capsule, Rfl: 2  •  rizatriptan (Maxalt) 10 mg tablet, Take 1 tablet (10 mg) by mouth 1 time if needed for migraine. May repeat in 2 hours if unresolved. Do not exceed 30 mg in 24 hours., Disp: 9 tablet, Rfl: 0  •  semaglutide, weight loss, (Wegovy) 0.25 mg/0.5 mL pen injector, Inject 0.25 mg under the skin 1 (one) time per week for 4 doses. (Patient not taking: Reported on 7/15/2025), Disp: 2 mL, Rfl: 0
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
## 2025-08-05 ENCOUNTER — TELEPHONE (OUTPATIENT)
Dept: RHEUMATOLOGY | Facility: CLINIC | Age: 45
End: 2025-08-05
Payer: COMMERCIAL

## 2025-08-05 DIAGNOSIS — L40.50 PSORIATIC ARTHROPATHY (MULTI): Primary | ICD-10-CM

## 2025-08-05 RX ORDER — PREDNISONE 5 MG/1
TABLET ORAL
Qty: 12 TABLET | Refills: 0 | Status: SHIPPED | OUTPATIENT
Start: 2025-08-05 | End: 2025-08-11

## 2025-08-05 NOTE — TELEPHONE ENCOUNTER
----- Message from Mirtha Hilton sent at 8/5/2025 12:05 PM EDT -----  Ok I sent a 6 day taper  ----- Message -----  From: Rehan Dominguez MA  Sent: 8/5/2025  11:46 AM EDT  To: Mirtha Hilton DO    Pt called in requesting steroids for flare. Has been going on for a week in elbows

## 2025-08-08 PROCEDURE — RXMED WILLOW AMBULATORY MEDICATION CHARGE

## 2025-08-10 ENCOUNTER — SPECIALTY PHARMACY (OUTPATIENT)
Dept: PHARMACY | Facility: CLINIC | Age: 45
End: 2025-08-10

## 2025-08-13 ENCOUNTER — OFFICE VISIT (OUTPATIENT)
Dept: NEUROLOGY | Facility: CLINIC | Age: 45
End: 2025-08-13
Payer: COMMERCIAL

## 2025-08-13 ENCOUNTER — PHARMACY VISIT (OUTPATIENT)
Dept: PHARMACY | Facility: CLINIC | Age: 45
End: 2025-08-13
Payer: MEDICAID

## 2025-08-13 VITALS
DIASTOLIC BLOOD PRESSURE: 101 MMHG | WEIGHT: 220 LBS | BODY MASS INDEX: 35.36 KG/M2 | HEART RATE: 95 BPM | SYSTOLIC BLOOD PRESSURE: 145 MMHG | HEIGHT: 66 IN

## 2025-08-13 DIAGNOSIS — G24.1: ICD-10-CM

## 2025-08-13 DIAGNOSIS — R42 ORTHOSTATIC DIZZINESS: Primary | ICD-10-CM

## 2025-08-13 PROCEDURE — 3080F DIAST BP >= 90 MM HG: CPT | Performed by: SPECIALIST

## 2025-08-13 PROCEDURE — 99212 OFFICE O/P EST SF 10 MIN: CPT | Performed by: SPECIALIST

## 2025-08-13 PROCEDURE — G2212 PROLONG OUTPT/OFFICE VIS: HCPCS | Performed by: SPECIALIST

## 2025-08-13 PROCEDURE — 3077F SYST BP >= 140 MM HG: CPT | Performed by: SPECIALIST

## 2025-08-13 PROCEDURE — 99215 OFFICE O/P EST HI 40 MIN: CPT | Performed by: SPECIALIST

## 2025-08-13 PROCEDURE — 3008F BODY MASS INDEX DOCD: CPT | Performed by: SPECIALIST

## 2025-08-13 ASSESSMENT — PATIENT HEALTH QUESTIONNAIRE - PHQ9
SUM OF ALL RESPONSES TO PHQ9 QUESTIONS 1 & 2: 2
1. LITTLE INTEREST OR PLEASURE IN DOING THINGS: SEVERAL DAYS
2. FEELING DOWN, DEPRESSED OR HOPELESS: SEVERAL DAYS

## 2025-08-13 ASSESSMENT — ENCOUNTER SYMPTOMS
OCCASIONAL FEELINGS OF UNSTEADINESS: 1
LOSS OF SENSATION IN FEET: 0
DEPRESSION: 1

## 2025-08-14 ENCOUNTER — SPECIALTY PHARMACY (OUTPATIENT)
Dept: PHARMACY | Facility: CLINIC | Age: 45
End: 2025-08-14

## 2025-08-14 PROCEDURE — RXMED WILLOW AMBULATORY MEDICATION CHARGE

## 2025-08-18 ENCOUNTER — PHARMACY VISIT (OUTPATIENT)
Dept: PHARMACY | Facility: CLINIC | Age: 45
End: 2025-08-18
Payer: MEDICAID

## 2025-08-19 ENCOUNTER — APPOINTMENT (OUTPATIENT)
Dept: PAIN MEDICINE | Facility: HOSPITAL | Age: 45
End: 2025-08-19
Payer: COMMERCIAL

## 2025-08-19 DIAGNOSIS — Z12.31 ENCOUNTER FOR SCREENING MAMMOGRAM FOR BREAST CANCER: ICD-10-CM

## 2025-09-02 ENCOUNTER — HOSPITAL ENCOUNTER (EMERGENCY)
Facility: HOSPITAL | Age: 45
Discharge: HOME | End: 2025-09-02
Attending: STUDENT IN AN ORGANIZED HEALTH CARE EDUCATION/TRAINING PROGRAM
Payer: COMMERCIAL

## 2025-09-02 ENCOUNTER — APPOINTMENT (OUTPATIENT)
Dept: RADIOLOGY | Facility: HOSPITAL | Age: 45
End: 2025-09-02
Payer: COMMERCIAL

## 2025-09-02 VITALS
WEIGHT: 220 LBS | TEMPERATURE: 97.3 F | RESPIRATION RATE: 18 BRPM | DIASTOLIC BLOOD PRESSURE: 89 MMHG | HEART RATE: 101 BPM | HEIGHT: 66 IN | SYSTOLIC BLOOD PRESSURE: 156 MMHG | BODY MASS INDEX: 35.36 KG/M2 | OXYGEN SATURATION: 98 %

## 2025-09-02 DIAGNOSIS — N93.9 ABNORMAL UTERINE BLEEDING: ICD-10-CM

## 2025-09-02 DIAGNOSIS — N93.9 VAGINAL BLEEDING: Primary | ICD-10-CM

## 2025-09-02 LAB
ALBUMIN SERPL BCP-MCNC: 4.4 G/DL (ref 3.4–5)
ALP SERPL-CCNC: 74 U/L (ref 33–110)
ALT SERPL W P-5'-P-CCNC: 17 U/L (ref 7–45)
ANION GAP SERPL CALC-SCNC: 14 MMOL/L (ref 10–20)
APPEARANCE UR: ABNORMAL
APTT PPP: 29 SECONDS (ref 26–36)
AST SERPL W P-5'-P-CCNC: 16 U/L (ref 9–39)
BASOPHILS # BLD AUTO: 0.03 X10*3/UL (ref 0–0.1)
BASOPHILS NFR BLD AUTO: 0.6 %
BILIRUB SERPL-MCNC: 0.4 MG/DL (ref 0–1.2)
BILIRUB UR STRIP.AUTO-MCNC: NEGATIVE MG/DL
BUN SERPL-MCNC: 13 MG/DL (ref 6–23)
CALCIUM SERPL-MCNC: 9.8 MG/DL (ref 8.6–10.3)
CHLORIDE SERPL-SCNC: 104 MMOL/L (ref 98–107)
CLUE CELLS SPEC QL WET PREP: NORMAL
CO2 SERPL-SCNC: 27 MMOL/L (ref 21–32)
COLOR UR: ABNORMAL
CREAT SERPL-MCNC: 0.72 MG/DL (ref 0.5–1.05)
EGFRCR SERPLBLD CKD-EPI 2021: >90 ML/MIN/1.73M*2
EOSINOPHIL # BLD AUTO: 0.03 X10*3/UL (ref 0–0.7)
EOSINOPHIL NFR BLD AUTO: 0.6 %
ERYTHROCYTE [DISTWIDTH] IN BLOOD BY AUTOMATED COUNT: 14.1 % (ref 11.5–14.5)
GLUCOSE SERPL-MCNC: 93 MG/DL (ref 74–99)
GLUCOSE UR STRIP.AUTO-MCNC: NORMAL MG/DL
HCG UR QL IA.RAPID: NEGATIVE
HCT VFR BLD AUTO: 37.1 % (ref 36–46)
HGB BLD-MCNC: 12.4 G/DL (ref 12–16)
IMM GRANULOCYTES # BLD AUTO: 0.01 X10*3/UL (ref 0–0.7)
IMM GRANULOCYTES NFR BLD AUTO: 0.2 % (ref 0–0.9)
INR PPP: 1 (ref 0.9–1.1)
KETONES UR STRIP.AUTO-MCNC: NEGATIVE MG/DL
LEUKOCYTE ESTERASE UR QL STRIP.AUTO: ABNORMAL
LYMPHOCYTES # BLD AUTO: 1.73 X10*3/UL (ref 1.2–4.8)
LYMPHOCYTES NFR BLD AUTO: 37.1 %
MAGNESIUM SERPL-MCNC: 1.95 MG/DL (ref 1.6–2.4)
MCH RBC QN AUTO: 29.6 PG (ref 26–34)
MCHC RBC AUTO-ENTMCNC: 33.4 G/DL (ref 32–36)
MCV RBC AUTO: 89 FL (ref 80–100)
MONOCYTES # BLD AUTO: 0.59 X10*3/UL (ref 0.1–1)
MONOCYTES NFR BLD AUTO: 12.7 %
MUCOUS THREADS #/AREA URNS AUTO: ABNORMAL /LPF
NEUTROPHILS # BLD AUTO: 2.27 X10*3/UL (ref 1.2–7.7)
NEUTROPHILS NFR BLD AUTO: 48.8 %
NITRITE UR QL STRIP.AUTO: NEGATIVE
NRBC BLD-RTO: 0 /100 WBCS (ref 0–0)
PH UR STRIP.AUTO: 7 [PH]
PLATELET # BLD AUTO: 304 X10*3/UL (ref 150–450)
POTASSIUM SERPL-SCNC: 3.6 MMOL/L (ref 3.5–5.3)
PROT SERPL-MCNC: 7.7 G/DL (ref 6.4–8.2)
PROT UR STRIP.AUTO-MCNC: ABNORMAL MG/DL
PROTHROMBIN TIME: 11 SECONDS (ref 9.8–12.4)
RBC # BLD AUTO: 4.19 X10*6/UL (ref 4–5.2)
RBC # UR STRIP.AUTO: ABNORMAL MG/DL
RBC #/AREA URNS AUTO: >20 /HPF
SODIUM SERPL-SCNC: 141 MMOL/L (ref 136–145)
SP GR UR STRIP.AUTO: 1.02
T VAGINALIS SPEC QL WET PREP: NORMAL
TRICHOMONAS REFLEX COMMENT: NORMAL
TSH SERPL-ACNC: 1.51 MIU/L (ref 0.44–3.98)
UROBILINOGEN UR STRIP.AUTO-MCNC: NORMAL MG/DL
WBC # BLD AUTO: 4.7 X10*3/UL (ref 4.4–11.3)
WBC #/AREA URNS AUTO: ABNORMAL /HPF
WBC VAG QL WET PREP: NORMAL
YEAST VAG QL WET PREP: NORMAL

## 2025-09-02 PROCEDURE — 85025 COMPLETE CBC W/AUTO DIFF WBC: CPT

## 2025-09-02 PROCEDURE — 81001 URINALYSIS AUTO W/SCOPE: CPT

## 2025-09-02 PROCEDURE — 96361 HYDRATE IV INFUSION ADD-ON: CPT

## 2025-09-02 PROCEDURE — 85730 THROMBOPLASTIN TIME PARTIAL: CPT

## 2025-09-02 PROCEDURE — 81025 URINE PREGNANCY TEST: CPT

## 2025-09-02 PROCEDURE — 96374 THER/PROPH/DIAG INJ IV PUSH: CPT

## 2025-09-02 PROCEDURE — 76856 US EXAM PELVIC COMPLETE: CPT | Performed by: RADIOLOGY

## 2025-09-02 PROCEDURE — 36415 COLL VENOUS BLD VENIPUNCTURE: CPT

## 2025-09-02 PROCEDURE — 83735 ASSAY OF MAGNESIUM: CPT

## 2025-09-02 PROCEDURE — 87661 TRICHOMONAS VAGINALIS AMPLIF: CPT | Mod: GEALAB

## 2025-09-02 PROCEDURE — 76830 TRANSVAGINAL US NON-OB: CPT | Performed by: RADIOLOGY

## 2025-09-02 PROCEDURE — 87210 SMEAR WET MOUNT SALINE/INK: CPT | Mod: 59

## 2025-09-02 PROCEDURE — 84443 ASSAY THYROID STIM HORMONE: CPT

## 2025-09-02 PROCEDURE — 80053 COMPREHEN METABOLIC PANEL: CPT

## 2025-09-02 PROCEDURE — 99284 EMERGENCY DEPT VISIT MOD MDM: CPT | Mod: 25 | Performed by: STUDENT IN AN ORGANIZED HEALTH CARE EDUCATION/TRAINING PROGRAM

## 2025-09-02 PROCEDURE — 85610 PROTHROMBIN TIME: CPT

## 2025-09-02 PROCEDURE — 76830 TRANSVAGINAL US NON-OB: CPT

## 2025-09-02 PROCEDURE — 87086 URINE CULTURE/COLONY COUNT: CPT | Mod: GEALAB

## 2025-09-02 PROCEDURE — 2500000004 HC RX 250 GENERAL PHARMACY W/ HCPCS (ALT 636 FOR OP/ED)

## 2025-09-02 PROCEDURE — 87591 N.GONORRHOEAE DNA AMP PROB: CPT | Mod: GEALAB

## 2025-09-02 RX ORDER — ONDANSETRON HYDROCHLORIDE 2 MG/ML
4 INJECTION, SOLUTION INTRAVENOUS ONCE
Status: COMPLETED | OUTPATIENT
Start: 2025-09-02 | End: 2025-09-02

## 2025-09-02 RX ADMIN — ONDANSETRON 4 MG: 2 INJECTION, SOLUTION INTRAMUSCULAR; INTRAVENOUS at 16:59

## 2025-09-02 RX ADMIN — SODIUM CHLORIDE, SODIUM LACTATE, POTASSIUM CHLORIDE, AND CALCIUM CHLORIDE 1000 ML: .6; .31; .03; .02 INJECTION, SOLUTION INTRAVENOUS at 16:59

## 2025-09-02 ASSESSMENT — PAIN - FUNCTIONAL ASSESSMENT: PAIN_FUNCTIONAL_ASSESSMENT: 0-10

## 2025-09-02 ASSESSMENT — PAIN DESCRIPTION - ORIENTATION: ORIENTATION: RIGHT

## 2025-09-02 ASSESSMENT — PAIN SCALES - GENERAL: PAINLEVEL_OUTOF10: 6

## 2025-09-02 ASSESSMENT — PAIN DESCRIPTION - LOCATION: LOCATION: PELVIS

## 2025-09-03 LAB
C TRACH RRNA SPEC QL NAA+PROBE: NEGATIVE
HOLD SPECIMEN: NORMAL
N GONORRHOEA DNA SPEC QL PROBE+SIG AMP: NEGATIVE
T VAGINALIS RRNA SPEC QL NAA+PROBE: NEGATIVE

## 2025-09-04 ENCOUNTER — TELEMEDICINE (OUTPATIENT)
Dept: PRIMARY CARE | Facility: CLINIC | Age: 45
End: 2025-09-04
Payer: COMMERCIAL

## 2025-09-04 DIAGNOSIS — N93.9 ABNORMAL UTERINE BLEEDING DUE TO ADENOMYOSIS: Primary | ICD-10-CM

## 2025-09-04 DIAGNOSIS — N80.03 ABNORMAL UTERINE BLEEDING DUE TO ADENOMYOSIS: Primary | ICD-10-CM

## 2025-09-04 DIAGNOSIS — R42 DIZZINESS: ICD-10-CM

## 2025-09-04 DIAGNOSIS — G43.809 OTHER MIGRAINE WITHOUT STATUS MIGRAINOSUS, NOT INTRACTABLE: ICD-10-CM

## 2025-09-04 DIAGNOSIS — R11.0 NAUSEA: ICD-10-CM

## 2025-09-04 LAB — BACTERIA UR CULT: NORMAL

## 2025-09-04 PROCEDURE — 1036F TOBACCO NON-USER: CPT | Performed by: FAMILY MEDICINE

## 2025-09-04 PROCEDURE — 99214 OFFICE O/P EST MOD 30 MIN: CPT | Performed by: FAMILY MEDICINE

## 2025-09-04 RX ORDER — ONDANSETRON 4 MG/1
4 TABLET, ORALLY DISINTEGRATING ORAL EVERY 8 HOURS PRN
Qty: 30 TABLET | Refills: 0 | Status: SHIPPED | OUTPATIENT
Start: 2025-09-04

## 2025-09-04 RX ORDER — MEDROXYPROGESTERONE ACETATE 10 MG/1
10 TABLET ORAL DAILY
Qty: 5 TABLET | Refills: 0 | Status: SHIPPED | OUTPATIENT
Start: 2025-09-04 | End: 2025-09-09

## 2025-09-04 ASSESSMENT — PATIENT HEALTH QUESTIONNAIRE - PHQ9
1. LITTLE INTEREST OR PLEASURE IN DOING THINGS: NOT AT ALL
SUM OF ALL RESPONSES TO PHQ9 QUESTIONS 1 AND 2: 0
2. FEELING DOWN, DEPRESSED OR HOPELESS: NOT AT ALL

## 2025-09-04 ASSESSMENT — ENCOUNTER SYMPTOMS
DEPRESSION: 0
LOSS OF SENSATION IN FEET: 0
OCCASIONAL FEELINGS OF UNSTEADINESS: 0

## 2025-09-18 ENCOUNTER — APPOINTMENT (OUTPATIENT)
Dept: OBSTETRICS AND GYNECOLOGY | Facility: CLINIC | Age: 45
End: 2025-09-18
Payer: COMMERCIAL

## 2025-09-23 ENCOUNTER — APPOINTMENT (OUTPATIENT)
Dept: PAIN MEDICINE | Facility: HOSPITAL | Age: 45
End: 2025-09-23
Payer: COMMERCIAL

## 2025-09-23 ENCOUNTER — APPOINTMENT (OUTPATIENT)
Dept: RHEUMATOLOGY | Facility: CLINIC | Age: 45
End: 2025-09-23
Payer: COMMERCIAL

## 2025-10-14 ENCOUNTER — APPOINTMENT (OUTPATIENT)
Dept: BEHAVIORAL HEALTH | Facility: CLINIC | Age: 45
End: 2025-10-14
Payer: COMMERCIAL